# Patient Record
Sex: MALE | Race: WHITE | NOT HISPANIC OR LATINO | Employment: OTHER | URBAN - METROPOLITAN AREA
[De-identification: names, ages, dates, MRNs, and addresses within clinical notes are randomized per-mention and may not be internally consistent; named-entity substitution may affect disease eponyms.]

---

## 2017-04-11 ENCOUNTER — OFFICE VISIT (OUTPATIENT)
Dept: FAMILY MEDICINE | Facility: CLINIC | Age: 72
End: 2017-04-11
Payer: MEDICARE

## 2017-04-11 VITALS
HEIGHT: 70 IN | DIASTOLIC BLOOD PRESSURE: 85 MMHG | SYSTOLIC BLOOD PRESSURE: 135 MMHG | OXYGEN SATURATION: 98 % | HEART RATE: 82 BPM | BODY MASS INDEX: 28.53 KG/M2 | TEMPERATURE: 98 F | WEIGHT: 199.31 LBS

## 2017-04-11 DIAGNOSIS — I10 ESSENTIAL HYPERTENSION: ICD-10-CM

## 2017-04-11 DIAGNOSIS — E78.5 HYPERLIPIDEMIA, UNSPECIFIED HYPERLIPIDEMIA TYPE: ICD-10-CM

## 2017-04-11 DIAGNOSIS — Z00.00 ROUTINE MEDICAL EXAM: Primary | ICD-10-CM

## 2017-04-11 DIAGNOSIS — N20.0 URIC ACID KIDNEY STONE: ICD-10-CM

## 2017-04-11 DIAGNOSIS — F39 MOOD DISORDER: ICD-10-CM

## 2017-04-11 DIAGNOSIS — F41.9 ANXIETY DISORDER, UNSPECIFIED TYPE: ICD-10-CM

## 2017-04-11 DIAGNOSIS — I25.10 CORONARY ARTERY DISEASE INVOLVING NATIVE CORONARY ARTERY OF NATIVE HEART WITHOUT ANGINA PECTORIS: ICD-10-CM

## 2017-04-11 DIAGNOSIS — Z12.5 SCREENING FOR PROSTATE CANCER: ICD-10-CM

## 2017-04-11 DIAGNOSIS — Z86.010 HISTORY OF COLONIC POLYPS: ICD-10-CM

## 2017-04-11 DIAGNOSIS — M54.2 NECK PAIN ON LEFT SIDE: ICD-10-CM

## 2017-04-11 DIAGNOSIS — N40.0 BENIGN PROSTATIC HYPERPLASIA, PRESENCE OF LOWER URINARY TRACT SYMPTOMS UNSPECIFIED, UNSPECIFIED MORPHOLOGY: ICD-10-CM

## 2017-04-11 DIAGNOSIS — D64.9 ANEMIA, UNSPECIFIED TYPE: ICD-10-CM

## 2017-04-11 DIAGNOSIS — M47.812 ARTHROPATHY OF CERVICAL SPINE: ICD-10-CM

## 2017-04-11 PROBLEM — Z86.0100 HISTORY OF COLONIC POLYPS: Status: ACTIVE | Noted: 2017-04-11

## 2017-04-11 PROCEDURE — 1125F AMNT PAIN NOTED PAIN PRSNT: CPT | Mod: S$GLB,,, | Performed by: INTERNAL MEDICINE

## 2017-04-11 PROCEDURE — 3079F DIAST BP 80-89 MM HG: CPT | Mod: S$GLB,,, | Performed by: INTERNAL MEDICINE

## 2017-04-11 PROCEDURE — 3046F HEMOGLOBIN A1C LEVEL >9.0%: CPT | Mod: S$GLB,,, | Performed by: INTERNAL MEDICINE

## 2017-04-11 PROCEDURE — 3075F SYST BP GE 130 - 139MM HG: CPT | Mod: S$GLB,,, | Performed by: INTERNAL MEDICINE

## 2017-04-11 PROCEDURE — 99214 OFFICE O/P EST MOD 30 MIN: CPT | Mod: 25,S$GLB,, | Performed by: INTERNAL MEDICINE

## 2017-04-11 PROCEDURE — 1157F ADVNC CARE PLAN IN RCRD: CPT | Mod: S$GLB,,, | Performed by: INTERNAL MEDICINE

## 2017-04-11 PROCEDURE — 99499 UNLISTED E&M SERVICE: CPT | Mod: S$GLB,,, | Performed by: INTERNAL MEDICINE

## 2017-04-11 PROCEDURE — 99397 PER PM REEVAL EST PAT 65+ YR: CPT | Mod: 25,S$GLB,, | Performed by: INTERNAL MEDICINE

## 2017-04-11 PROCEDURE — 99999 PR PBB SHADOW E&M-EST. PATIENT-LVL III: CPT | Mod: PBBFAC,,, | Performed by: INTERNAL MEDICINE

## 2017-04-11 PROCEDURE — 1159F MED LIST DOCD IN RCRD: CPT | Mod: S$GLB,,, | Performed by: INTERNAL MEDICINE

## 2017-04-11 PROCEDURE — 1160F RVW MEDS BY RX/DR IN RCRD: CPT | Mod: S$GLB,,, | Performed by: INTERNAL MEDICINE

## 2017-04-11 PROCEDURE — 4010F ACE/ARB THERAPY RXD/TAKEN: CPT | Mod: S$GLB,,, | Performed by: INTERNAL MEDICINE

## 2017-04-11 RX ORDER — ASPIRIN 81 MG/1
81 TABLET ORAL DAILY
COMMUNITY

## 2017-04-11 RX ORDER — LORAZEPAM 0.5 MG/1
TABLET ORAL
Qty: 60 TABLET | Refills: 3 | Status: SHIPPED | OUTPATIENT
Start: 2017-04-11 | End: 2017-10-16 | Stop reason: SDUPTHER

## 2017-04-11 RX ORDER — ROSUVASTATIN CALCIUM 40 MG/1
40 TABLET, COATED ORAL DAILY
COMMUNITY
Start: 2017-03-08 | End: 2020-12-02 | Stop reason: SDUPTHER

## 2017-04-11 NOTE — PROGRESS NOTES
Chief complaint: Physical and establish care    71-year-old white male new to me his PCP retired.  Regarding health maintenance it does appear he is overdue for his PSA.  He is due for other blood work not having had labs in a year.  Outside records reviewed and it appeared he did have a colonoscopy with colon polyps 6/16 with a 5 year follow-up recommended.  He is trying to go to the gym 3 times per week    ROS:   CONST: weight stable. EYES: no vision change. ENT: no sore throat. CV: no chest pain w/ exertion. RESP: no shortness of breath. GI: no nausea, vomiting, diarrhea. No dysphagia. : no urinary issues. MUSCULOSKELETAL: no new myalgias or arthralgias other than some chronic left neck pain with movement. SKIN: no new changes. NEURO: no focal deficits. PSYCH: no new issues. ENDOCRINE: no polyuria. HEME: no lymph nodes. ALLERGY: no general pruritis.    Past Medical History:   Diagnosis Date    Anxiety     BPH (benign prostatic hypertrophy) 2/2/2015    Cataract     Coronary artery disease     Diabetes mellitus, type 2     History of colonic polyps 4/11/2017 2/13 -- polyps 6/16, 5 years     History of MI (myocardial infarction) 2005    Followed by cardiology, Dr. Acevedo (at [facility])    History of stroke November 2005    Hyperlipidemia     Hypertension     Incomplete bladder emptying 2/2/2015    Overweight     Personal history of kidney stones     Renal cyst 2/10/2015   History of cervical radiculopathy on the right  Cervical arthropathy on x-ray      Past Surgical History:   Procedure Laterality Date    APPENDECTOMY      BACK SURGERY      CATARACT EXTRACTION      od    CORONARY ANGIOPLASTY WITH STENT PLACEMENT  2000    CYSTOSCOPY W/ URETERAL STENT PLACEMENT      stents placed x2    EYE SURGERY      cataract right eye    SPINE SURGERY      TONSILLECTOMY       Social History     Social History    Marital status:      Spouse name: N/A    Number of children: 2     Years of education: N/A     Occupational History    Retired - chemical industry      Social History Main Topics    Smoking status: Never Smoker    Smokeless tobacco: Never Used    Alcohol use Yes      Comment: occasional    Drug use: No    Sexual activity: Yes     Partners: Female     Other Topics Concern    Not on file     Social History Narrative       family history includes Cancer in his father; Cataracts in his father and mother; Hyperlipidemia in his father and sister. There is no history of Diabetes, Amblyopia, Blindness, Glaucoma, Hypertension, Macular degeneration, Retinal detachment, Strabismus, Stroke, or Thyroid disease.     Gen: no distress  EYES: conjunctiva clear, non-icteric, PERRL  ENT: nose clear, nasal mucosa normal, oropharynx clear and moist, teeth good  NECK:supple, thyroid non-palpable  RESP: effort is good, lungs clear  CV: heart RRR w/o murmur, gallops or rubs; no carotid bruits, no edema  GI: abdomen soft, non-distended, non-tender, no hepatosplenomegaly  MS: gait normal, no clubbing or cyanosis of the digits  SKIN: no rashes, warm to touch  SHOULDER: both arms no defects, full ROM, no impingement, joints stable, strength 5/5,  SKIN no rashes, warm to touch.  NECK full ROM, no defect, reproducible left upper trapezius muscle pain on the left, slightly reduced head turning to the left    Ruben was seen today for neck pain.    Diagnoses and all orders for this visit:    Routine medical exam, new to me, up-to-date on colonoscopy, overdue for PSA and other blood work, continue to exercise    Screening for prostate cancer  -     Prostate Specific Antigen, Diagnostic; Future                                                            Additional evaluation and management issues:    Additionally, patient has numerous other medical issues which are new to me and separate from his physical.  We did openly discussed that he does have uncontrolled diabetes and A1c of 6.7 and that was over a year  ago and we discussed all the A1c should be checked at least every 3 months.  He was given regular metformin 500 but had reproducible itching on his head and face with the metformin.  He has not seen the dietitian and we discussed the importance that we will initially try to control with dietary modification, we can always try extended release metformin since he could've an ALLERGIC to an increase in tenderness his other medications we can use as well.    Reviewed his history of colon polyps as well as his outside: Report.  We reviewed his hyperlipidemia which appears to be well controlled with Crestor.  He does follow with an outside cardiologist who probably has checked his cholesterol is well needs to be documented that it is checked yearly along with his history of diabetes.  He does see an outside ophthalmologist every 2 years because of his history of stroke and he has some vision issues related to that but discussed he likely should see his ophthalmologist yearly given his current current diagnosis of diabetes.  He has blood pressure which she monitors at home and it runs much better.  He has a little bit of anxiety since his stroke and he worries about things in the evening and uses Ativan half a pill to help him sleep at night.  We did discuss that if indeed his need for the Ativan increases we would try to treat his anxiety disorder with other medications.  He was given a refill.    Also has had some intermittent ongoing left neck pain with turning.  It's definitely positional.  It seems to come and go.  He has no radicular symptoms and is familiar with radicular symptoms since he did have some in the past on the right arm.  He never got the results of his cervical spine x-ray which does appear to have been sent to him on the computer.  It does show some expected disc disease and arthritis we discussed we cannot ascertain if there is a pinched nerve based on the x-ray but clinically he does not have a  pinched nerve in the left neck.  He has reproducible muscle pain and we discussed it should heal on his own without reinjury as long as he applies heat and so forth.  We discussed possible physical therapy if indeed he is reinjuring it at the gym and so forth.    We discussed his slight anemia which we will reassess and presumably he is up-to-date from a GI standpoint in that regard.  All the separate issues reviewed and patient counseled and his evaluation and management will be based upon time counseling. Total time over 25 minutes with over 50% counseling.                     Assessment and plan:          Uncontrolled type 2 diabetes mellitus without complication, without long-term current use of insulin, overdue for assessment, ALLERGIC to metformin, refer to dietitian and repeat every 3 months  -     Hemoglobin A1c; Future  -     Lipid panel; Future  -     TSH; Future  -     CBC auto differential; Future  -     Comprehensive metabolic panel; Future  -     Ambulatory referral to Nutrition Services    History of colonic polyps, up-to-date    Hyperlipidemia, unspecified hyperlipidemia type, reassess    Benign prostatic hyperplasia, presence of lower urinary tract symptoms unspecified, unspecified morphology, overdue for PSA  -     Prostate Specific Antigen, Diagnostic; Future    Essential hypertension, continue to monitor at home    Anxiety disorder, unspecified type, minimal use of Ativan at night appears appropriate    Mood disorder    Coronary artery disease involving native coronary artery of native heart without angina pectoris, no symptoms, follows with cardiology    Anemia, unspecified type, reassess    Arthropathy of cervical spine, discussed as above    Neck pain on left side, muscular, consider referral to physical therapy if it's an ongoing recurrent problem    Other orders  -     lorazepam (ATIVAN) 0.5 MG tablet; 1-2 tablets nightly if needed    Clinical note will be sensitive since he is new to me and  "I do not want any discrepancies in the available history to cause problems as well as confusion regarding evaluation and management issues"This note will not be shared with the patient."  "

## 2017-04-12 ENCOUNTER — LAB VISIT (OUTPATIENT)
Dept: LAB | Facility: HOSPITAL | Age: 72
End: 2017-04-12
Attending: INTERNAL MEDICINE
Payer: MEDICARE

## 2017-04-12 DIAGNOSIS — N40.0 BENIGN PROSTATIC HYPERPLASIA, PRESENCE OF LOWER URINARY TRACT SYMPTOMS UNSPECIFIED, UNSPECIFIED MORPHOLOGY: ICD-10-CM

## 2017-04-12 DIAGNOSIS — Z12.5 SCREENING FOR PROSTATE CANCER: ICD-10-CM

## 2017-04-12 LAB
ALBUMIN SERPL BCP-MCNC: 4 G/DL
ALP SERPL-CCNC: 59 U/L
ALT SERPL W/O P-5'-P-CCNC: 30 U/L
ANION GAP SERPL CALC-SCNC: 11 MMOL/L
AST SERPL-CCNC: 36 U/L
BASOPHILS # BLD AUTO: 0.01 K/UL
BASOPHILS NFR BLD: 0.2 %
BILIRUB SERPL-MCNC: 0.6 MG/DL
BUN SERPL-MCNC: 20 MG/DL
CALCIUM SERPL-MCNC: 9.2 MG/DL
CHLORIDE SERPL-SCNC: 105 MMOL/L
CHOLEST/HDLC SERPL: 3.8 {RATIO}
CO2 SERPL-SCNC: 20 MMOL/L
COMPLEXED PSA SERPL-MCNC: 0.55 NG/ML
CREAT SERPL-MCNC: 1.1 MG/DL
DIFFERENTIAL METHOD: ABNORMAL
EOSINOPHIL # BLD AUTO: 0.2 K/UL
EOSINOPHIL NFR BLD: 3.3 %
ERYTHROCYTE [DISTWIDTH] IN BLOOD BY AUTOMATED COUNT: 13.6 %
EST. GFR  (AFRICAN AMERICAN): >60 ML/MIN/1.73 M^2
EST. GFR  (NON AFRICAN AMERICAN): >60 ML/MIN/1.73 M^2
GLUCOSE SERPL-MCNC: 128 MG/DL
HCT VFR BLD AUTO: 39 %
HDL/CHOLESTEROL RATIO: 26.6 %
HDLC SERPL-MCNC: 199 MG/DL
HDLC SERPL-MCNC: 53 MG/DL
HGB BLD-MCNC: 13.2 G/DL
LDLC SERPL CALC-MCNC: 114.4 MG/DL
LYMPHOCYTES # BLD AUTO: 1.2 K/UL
LYMPHOCYTES NFR BLD: 22.7 %
MCH RBC QN AUTO: 30.6 PG
MCHC RBC AUTO-ENTMCNC: 33.8 %
MCV RBC AUTO: 90 FL
MONOCYTES # BLD AUTO: 0.5 K/UL
MONOCYTES NFR BLD: 8.7 %
NEUTROPHILS # BLD AUTO: 3.4 K/UL
NEUTROPHILS NFR BLD: 64.9 %
NONHDLC SERPL-MCNC: 146 MG/DL
PLATELET # BLD AUTO: 171 K/UL
PMV BLD AUTO: 11.2 FL
POTASSIUM SERPL-SCNC: 4.3 MMOL/L
PROT SERPL-MCNC: 7.6 G/DL
RBC # BLD AUTO: 4.32 M/UL
SODIUM SERPL-SCNC: 136 MMOL/L
TRIGL SERPL-MCNC: 158 MG/DL
TSH SERPL DL<=0.005 MIU/L-ACNC: 1.27 UIU/ML
WBC # BLD AUTO: 5.19 K/UL

## 2017-04-12 PROCEDURE — 80061 LIPID PANEL: CPT

## 2017-04-12 PROCEDURE — 84153 ASSAY OF PSA TOTAL: CPT

## 2017-04-12 PROCEDURE — 84443 ASSAY THYROID STIM HORMONE: CPT

## 2017-04-12 PROCEDURE — 80053 COMPREHEN METABOLIC PANEL: CPT

## 2017-04-12 PROCEDURE — 85025 COMPLETE CBC W/AUTO DIFF WBC: CPT

## 2017-04-12 PROCEDURE — 36415 COLL VENOUS BLD VENIPUNCTURE: CPT | Mod: PO

## 2017-04-12 PROCEDURE — 83036 HEMOGLOBIN GLYCOSYLATED A1C: CPT

## 2017-04-12 RX ORDER — ALLOPURINOL 300 MG/1
TABLET ORAL
Qty: 90 TABLET | Refills: 3 | Status: SHIPPED | OUTPATIENT
Start: 2017-04-12 | End: 2018-03-31 | Stop reason: SDUPTHER

## 2017-04-13 LAB
ESTIMATED AVG GLUCOSE: 148 MG/DL
HBA1C MFR BLD HPLC: 6.8 %

## 2017-10-17 RX ORDER — LORAZEPAM 0.5 MG/1
TABLET ORAL
Qty: 20 TABLET | Refills: 0 | Status: SHIPPED | OUTPATIENT
Start: 2017-10-17 | End: 2017-11-29 | Stop reason: SDUPTHER

## 2017-10-18 NOTE — TELEPHONE ENCOUNTER
----- Message from Reynold Willett sent at 10/18/2017  2:52 PM CDT -----  Contact: Self  REFILL: lorazepam (ATIVAN) 0.5 MG tablet

## 2017-10-20 RX ORDER — LORAZEPAM 0.5 MG/1
TABLET ORAL
Qty: 20 TABLET | Refills: 0 | OUTPATIENT
Start: 2017-10-20

## 2017-10-25 ENCOUNTER — TELEPHONE (OUTPATIENT)
Dept: FAMILY MEDICINE | Facility: CLINIC | Age: 72
End: 2017-10-25

## 2017-10-25 ENCOUNTER — HOSPITAL ENCOUNTER (OUTPATIENT)
Dept: RADIOLOGY | Facility: HOSPITAL | Age: 72
Discharge: HOME | End: 2017-10-25
Attending: NURSE PRACTITIONER
Payer: MEDICARE

## 2017-10-25 ENCOUNTER — OFFICE VISIT (OUTPATIENT)
Dept: FAMILY MEDICINE | Facility: CLINIC | Age: 72
End: 2017-10-25
Payer: MEDICARE

## 2017-10-25 VITALS
WEIGHT: 195.56 LBS | HEART RATE: 74 BPM | SYSTOLIC BLOOD PRESSURE: 136 MMHG | OXYGEN SATURATION: 95 % | BODY MASS INDEX: 28 KG/M2 | TEMPERATURE: 99 F | HEIGHT: 70 IN | DIASTOLIC BLOOD PRESSURE: 80 MMHG

## 2017-10-25 DIAGNOSIS — R10.9 ABDOMINAL PAIN, UNSPECIFIED ABDOMINAL LOCATION: ICD-10-CM

## 2017-10-25 DIAGNOSIS — R10.9 ABDOMINAL PAIN, UNSPECIFIED ABDOMINAL LOCATION: Primary | ICD-10-CM

## 2017-10-25 PROCEDURE — 99499 UNLISTED E&M SERVICE: CPT | Mod: S$GLB,,, | Performed by: NURSE PRACTITIONER

## 2017-10-25 PROCEDURE — 74020 XR ABDOMEN FLAT AND ERECT: CPT | Mod: 26,,, | Performed by: RADIOLOGY

## 2017-10-25 PROCEDURE — 99999 PR PBB SHADOW E&M-EST. PATIENT-LVL IV: CPT | Mod: PBBFAC,,, | Performed by: NURSE PRACTITIONER

## 2017-10-25 PROCEDURE — 99214 OFFICE O/P EST MOD 30 MIN: CPT | Mod: S$GLB,,, | Performed by: NURSE PRACTITIONER

## 2017-10-25 PROCEDURE — 74020 XR ABDOMEN FLAT AND ERECT: CPT | Mod: TC,PO

## 2017-10-25 RX ORDER — OMEPRAZOLE 40 MG/1
40 CAPSULE, DELAYED RELEASE ORAL DAILY
Qty: 30 CAPSULE | Refills: 5 | Status: SHIPPED | OUTPATIENT
Start: 2017-10-25 | End: 2018-07-26

## 2017-10-25 NOTE — TELEPHONE ENCOUNTER
I left a message on patient's voice mail that his x-ray was normal and that I was going to send him a prescription of Omeprazole to the pharmacy. If he gets worse he can go to the ER. If he had any questions to call the office and follow up with Dr. Holder as scheduled.

## 2017-10-25 NOTE — TELEPHONE ENCOUNTER
I spoke with the patient and explained that his abdomen x-ray was normal. I sent a prescription of Prilosec to his pharmacy. If he gets worse go to the ER and if not better when see DR. Cid discuss with him.

## 2017-10-25 NOTE — PROGRESS NOTES
Subjective:       Patient ID: Ruben Macedo Jr. is a 72 y.o. male.    Chief Complaint: Abdominal Pain (1 week)    72-year-old male presents to the clinic today with mid area abdominal pain for the past week.  He said he had little bit of diarrhea on Sunday.  He's had no further diarrhea since then.  He denies any nausea, vomiting, constipation, blood in stool.  He said his last bowel movement was on Monday.  He has slight decrease appetite but is eating and drinking fluids without any difficulty.  He states at times he feels slight amount of indigestion feeling.  He denies any cardiac chest pain, heart palpitations, shortness breath, or swelling to lower extremities.  He denies any headaches, dizziness, or blurred vision.  He was had a MI in the past but he had heaviness in the left side of his chest.       Past Medical History:   Diagnosis Date    Anxiety     BPH (benign prostatic hypertrophy) 2/2/2015    Cataract     Coronary artery disease     Diabetes mellitus, type 2     History of colonic polyps 4/11/2017 2/13 --3 yrs    History of MI (myocardial infarction) 2005    Followed by cardiology, Dr. Acevedo (at [facility])    History of stroke November 2005    Hyperlipidemia     Hypertension     Incomplete bladder emptying 2/2/2015    Overweight(278.02)     Personal history of kidney stones     Renal cyst 2/10/2015     Past Surgical History:   Procedure Laterality Date    APPENDECTOMY      BACK SURGERY      CATARACT EXTRACTION      od    CORONARY ANGIOPLASTY WITH STENT PLACEMENT  2000    CYSTOSCOPY W/ URETERAL STENT PLACEMENT      stents placed x2    EYE SURGERY      cataract right eye    SPINE SURGERY      TONSILLECTOMY        reports that he has never smoked. He has never used smokeless tobacco. He reports that he drinks alcohol. He reports that he does not use drugs.  Review of Systems   Constitutional: Negative for chills and fever.   Respiratory: Negative for cough,  shortness of breath and wheezing.    Cardiovascular: Negative for chest pain, palpitations and leg swelling.   Gastrointestinal: Positive for abdominal distention. Negative for blood in stool, constipation, diarrhea, nausea and vomiting.   Genitourinary: Negative for difficulty urinating, flank pain and hematuria.   Musculoskeletal: Negative for gait problem.   Neurological: Negative for dizziness, light-headedness and headaches.       Objective:      Physical Exam   Constitutional: He is oriented to person, place, and time. He appears well-developed and well-nourished.   Eyes: Conjunctivae and EOM are normal. Pupils are equal, round, and reactive to light. Right eye exhibits no discharge. Left eye exhibits no discharge. No scleral icterus.   Cardiovascular: Normal rate, regular rhythm and normal heart sounds.  Exam reveals no gallop and no friction rub.    No murmur heard.  Pulmonary/Chest: Effort normal and breath sounds normal. No respiratory distress. He has no wheezes.   Abdominal: Soft. Bowel sounds are normal. He exhibits no distension. There is no tenderness. There is no rebound and no guarding.   Bowel sounds present in all 4 quadrants Abdomen non-tender to palpation no rebound or guarding noted    Genitourinary:   Genitourinary Comments: No CVA tenderness noted    Musculoskeletal: Normal range of motion. He exhibits no edema.   Neurological: He is alert and oriented to person, place, and time.   Skin: Skin is warm and dry. He is not diaphoretic.   Psychiatric: He has a normal mood and affect.       Assessment:       1. Abdominal pain, unspecified abdominal location        Plan:         Abdominal pain, unspecified abdominal location  -     X-Ray Abdomen Flat And Erect; Future; Expected date: 10/25/2017  -     CBC auto differential; Future; Expected date: 10/25/2017  -     Comprehensive metabolic panel; Future; Expected date: 10/25/2017  -     Amylase; Future; Expected date: 10/25/2017  -     Lipase; Future;  Expected date: 10/25/2017        If x-ray normal will try Prilosec

## 2017-10-27 ENCOUNTER — PATIENT MESSAGE (OUTPATIENT)
Dept: FAMILY MEDICINE | Facility: CLINIC | Age: 72
End: 2017-10-27

## 2017-10-27 DIAGNOSIS — R74.8 ELEVATED LIPASE: ICD-10-CM

## 2017-10-27 DIAGNOSIS — R10.12 LUQ ABDOMINAL PAIN: Primary | ICD-10-CM

## 2017-10-31 ENCOUNTER — HOSPITAL ENCOUNTER (OUTPATIENT)
Dept: RADIOLOGY | Facility: HOSPITAL | Age: 72
Discharge: HOME | End: 2017-10-31
Attending: NURSE PRACTITIONER
Payer: MEDICARE

## 2017-10-31 DIAGNOSIS — R74.8 ELEVATED LIPASE: ICD-10-CM

## 2017-10-31 DIAGNOSIS — R10.12 LUQ ABDOMINAL PAIN: ICD-10-CM

## 2017-10-31 PROCEDURE — 76700 US EXAM ABDOM COMPLETE: CPT | Mod: TC

## 2017-10-31 PROCEDURE — 76700 US EXAM ABDOM COMPLETE: CPT | Mod: 26,,, | Performed by: RADIOLOGY

## 2017-11-03 ENCOUNTER — OFFICE VISIT (OUTPATIENT)
Dept: FAMILY MEDICINE | Facility: CLINIC | Age: 72
End: 2017-11-03
Payer: MEDICARE

## 2017-11-03 ENCOUNTER — LAB VISIT (OUTPATIENT)
Dept: LAB | Facility: HOSPITAL | Age: 72
End: 2017-11-03
Attending: INTERNAL MEDICINE
Payer: MEDICARE

## 2017-11-03 VITALS
HEART RATE: 95 BPM | HEIGHT: 70 IN | DIASTOLIC BLOOD PRESSURE: 78 MMHG | TEMPERATURE: 98 F | OXYGEN SATURATION: 97 % | SYSTOLIC BLOOD PRESSURE: 130 MMHG | WEIGHT: 198 LBS | BODY MASS INDEX: 28.35 KG/M2

## 2017-11-03 DIAGNOSIS — N28.9 RENAL INSUFFICIENCY: ICD-10-CM

## 2017-11-03 DIAGNOSIS — R10.13 EPIGASTRIC PAIN: Primary | ICD-10-CM

## 2017-11-03 DIAGNOSIS — E78.5 HYPERLIPIDEMIA, UNSPECIFIED HYPERLIPIDEMIA TYPE: ICD-10-CM

## 2017-11-03 DIAGNOSIS — T50.905A ADVERSE EFFECT OF DRUG, INITIAL ENCOUNTER: ICD-10-CM

## 2017-11-03 DIAGNOSIS — D64.9 ANEMIA, UNSPECIFIED TYPE: ICD-10-CM

## 2017-11-03 LAB
ANION GAP SERPL CALC-SCNC: 7 MMOL/L
BUN SERPL-MCNC: 20 MG/DL
CALCIUM SERPL-MCNC: 9.3 MG/DL
CHLORIDE SERPL-SCNC: 106 MMOL/L
CO2 SERPL-SCNC: 24 MMOL/L
CREAT SERPL-MCNC: 1.2 MG/DL
EST. GFR  (AFRICAN AMERICAN): >60 ML/MIN/1.73 M^2
EST. GFR  (NON AFRICAN AMERICAN): >60 ML/MIN/1.73 M^2
ESTIMATED AVG GLUCOSE: 143 MG/DL
GLUCOSE SERPL-MCNC: 155 MG/DL
HBA1C MFR BLD HPLC: 6.6 %
POTASSIUM SERPL-SCNC: 4.2 MMOL/L
SODIUM SERPL-SCNC: 137 MMOL/L

## 2017-11-03 PROCEDURE — 99214 OFFICE O/P EST MOD 30 MIN: CPT | Mod: S$GLB,,, | Performed by: INTERNAL MEDICINE

## 2017-11-03 PROCEDURE — 83036 HEMOGLOBIN GLYCOSYLATED A1C: CPT

## 2017-11-03 PROCEDURE — 99499 UNLISTED E&M SERVICE: CPT | Mod: S$GLB,,, | Performed by: INTERNAL MEDICINE

## 2017-11-03 PROCEDURE — 99999 PR PBB SHADOW E&M-EST. PATIENT-LVL III: CPT | Mod: PBBFAC,,, | Performed by: INTERNAL MEDICINE

## 2017-11-03 PROCEDURE — 80048 BASIC METABOLIC PNL TOTAL CA: CPT

## 2017-11-03 PROCEDURE — 36415 COLL VENOUS BLD VENIPUNCTURE: CPT | Mod: PO

## 2017-11-03 NOTE — PROGRESS NOTES
Chief complaint: Follow-up on stomach pain    72-year-old white male new to me his PCP retired.  Patient recently given tramadol by his outside neurologist for some left cervical radiculopathy.  He thereafter developed upset stomach with some epigastric pain and nausea.  He only threw up once.  No diarrhea except for maybe once.  Symptoms resolved when he quit the tramadol.  He was seen here.  CBC and CMP without any major cause.  He does have a chronic anemia according to the blood work and he remembers being told he was always anemic.  Regarding diabetes he is due for his A1c if not overdo which was 6.8.  He is on no medications.  At form in the past gave him diffuse itching so he is probably ALLERGIC to that.  He has not tried any other medications.  He did have some slight renal insufficiency on recent lab work and we will repeat that.  His blood pressure appears to be under good control.  He is on Crestor 40 mg and his most recent LDL was above 100 but previously under all.  Followed by cardiology.    Patient was placed on some Pepcid as well and he was advised that since symptoms are resolved and likely related tramadol he can discontinue    ROS:   CONST: weight stable. EYES: no vision change. ENT: no sore throat. CV: no chest pain w/ exertion. RESP: no shortness of breath. GI: no nausea, vomiting, diarrhea. No dysphagia. : no urinary issues. MUSCULOSKELETAL: no new myalgias or arthralgias other than some chronic left neck pain with movement. SKIN: no new changes. NEURO: no focal deficits. PSYCH: no new issues. ENDOCRINE: no polyuria. HEME: no lymph nodes. ALLERGY: no general pruritis.    Past Medical History:   Diagnosis Date    Anxiety     BPH (benign prostatic hypertrophy) 2/2/2015    Cataract     Coronary artery disease     Diabetes mellitus, type 2     History of colonic polyps 4/11/2017 2/13 -- polyps 6/16, 5 years     History of MI (myocardial infarction) 2005    Followed by cardiology,   Curtis (at [facility])    History of stroke November 2005    Hyperlipidemia     Hypertension     Incomplete bladder emptying 2/2/2015    Overweight     Personal history of kidney stones     Renal cyst 2/10/2015   History of cervical radiculopathy on the right  Cervical arthropathy on x-ray  ANEMIA    Past Surgical History:   Procedure Laterality Date    APPENDECTOMY      BACK SURGERY      CATARACT EXTRACTION      od    CORONARY ANGIOPLASTY WITH STENT PLACEMENT  2000    CYSTOSCOPY W/ URETERAL STENT PLACEMENT      stents placed x2    EYE SURGERY      cataract right eye    SPINE SURGERY      TONSILLECTOMY       Social History     Social History    Marital status:      Spouse name: N/A    Number of children: 2    Years of education: N/A     Occupational History    Retired - chemical industry      Social History Main Topics    Smoking status: Never Smoker    Smokeless tobacco: Never Used    Alcohol use Yes      Comment: occasional    Drug use: No    Sexual activity: Yes     Partners: Female     Other Topics Concern    Not on file     Social History Narrative    No narrative on file       family history includes Cancer in his father; Cataracts in his father and mother; Hyperlipidemia in his father and sister.     Gen: no distress  EYES: conjunctiva clear, non-icteric, PERRL  Abdomen is soft and benign, no hepatosplenomegaly or any pain  SKIN: no rashes, warm to touch      Ruben was seen today for abdominal pain.    Diagnoses and all orders for this visit:    Epigastric pain, likely adverse reaction to tramadol    Uncontrolled type 2 diabetes mellitus without complication, without long-term current use of insulin, overdue for follow-up, nonicteric medications and we discussed possible need for medications although ALLERGIC to metformin  -     Hemoglobin A1c; Future  -     Basic metabolic panel; Future    Anemia, unspecified type, likely a chronic condition and we will  "continue to follow    Renal insufficiency, reassess   -     Basic metabolic panel; Future    Hyperlipidemia, unspecified hyperlipidemia type, incomplete control on Crestor    Adverse effect of drug, initial encounter, added to the ALLERGY list         Interval note will be sensitive based on his expressed anxiety referable to the issues above as well as the need to document possibly less than perfect compliance with diabetic follow-up"This note will not be shared with the patient."  "

## 2017-11-15 RX ORDER — FINASTERIDE 5 MG/1
TABLET, FILM COATED ORAL
Qty: 90 TABLET | Refills: 3 | Status: SHIPPED | OUTPATIENT
Start: 2017-11-15 | End: 2018-10-09 | Stop reason: SDUPTHER

## 2017-11-29 RX ORDER — LORAZEPAM 0.5 MG/1
TABLET ORAL
Qty: 20 TABLET | Refills: 0 | Status: SHIPPED | OUTPATIENT
Start: 2017-11-29 | End: 2018-01-11 | Stop reason: SDUPTHER

## 2017-12-15 ENCOUNTER — OFFICE VISIT (OUTPATIENT)
Dept: URGENT CARE | Facility: CLINIC | Age: 72
End: 2017-12-15
Payer: MEDICARE

## 2017-12-15 VITALS
WEIGHT: 200 LBS | TEMPERATURE: 98 F | HEIGHT: 70 IN | BODY MASS INDEX: 28.63 KG/M2 | SYSTOLIC BLOOD PRESSURE: 110 MMHG | RESPIRATION RATE: 16 BRPM | HEART RATE: 84 BPM | OXYGEN SATURATION: 96 % | DIASTOLIC BLOOD PRESSURE: 68 MMHG

## 2017-12-15 DIAGNOSIS — J06.9 UPPER RESPIRATORY TRACT INFECTION, UNSPECIFIED TYPE: ICD-10-CM

## 2017-12-15 DIAGNOSIS — R05.9 COUGH WITH FEVER: Primary | ICD-10-CM

## 2017-12-15 DIAGNOSIS — R50.9 COUGH WITH FEVER: Primary | ICD-10-CM

## 2017-12-15 LAB
CTP QC/QA: YES
FLUAV AG NPH QL: NEGATIVE
FLUBV AG NPH QL: NEGATIVE

## 2017-12-15 PROCEDURE — 99213 OFFICE O/P EST LOW 20 MIN: CPT | Mod: 25,S$GLB,, | Performed by: NURSE PRACTITIONER

## 2017-12-15 PROCEDURE — 96372 THER/PROPH/DIAG INJ SC/IM: CPT | Mod: S$GLB,,,

## 2017-12-15 PROCEDURE — 87804 INFLUENZA ASSAY W/OPTIC: CPT | Mod: 59,QW,S$GLB, | Performed by: NURSE PRACTITIONER

## 2017-12-15 RX ORDER — OSELTAMIVIR PHOSPHATE 75 MG/1
75 CAPSULE ORAL DAILY
Qty: 20 CAPSULE | Refills: 0 | Status: SHIPPED | OUTPATIENT
Start: 2017-12-15 | End: 2017-12-22

## 2017-12-15 RX ORDER — BETAMETHASONE SODIUM PHOSPHATE AND BETAMETHASONE ACETATE 3; 3 MG/ML; MG/ML
6 INJECTION, SUSPENSION INTRA-ARTICULAR; INTRALESIONAL; INTRAMUSCULAR; SOFT TISSUE
Status: COMPLETED | OUTPATIENT
Start: 2017-12-15 | End: 2017-12-15

## 2017-12-15 RX ADMIN — BETAMETHASONE SODIUM PHOSPHATE AND BETAMETHASONE ACETATE 6 MG: 3; 3 INJECTION, SUSPENSION INTRA-ARTICULAR; INTRALESIONAL; INTRAMUSCULAR; SOFT TISSUE at 12:12

## 2017-12-15 NOTE — PATIENT INSTRUCTIONS

## 2017-12-15 NOTE — PROGRESS NOTES
"Subjective:       Patient ID: Ruben Macedo Jr. is a 72 y.o. male.    Vitals:  height is 5' 10" (1.778 m) and weight is 90.7 kg (200 lb). His oral temperature is 98.4 °F (36.9 °C). His blood pressure is 110/68 and his pulse is 84. His respiration is 16 and oxygen saturation is 96%.     Chief Complaint: Nasal Congestion    Other   This is a new problem. The current episode started in the past 7 days. The problem occurs constantly. The problem has been gradually worsening. Associated symptoms include congestion and coughing. Pertinent negatives include no abdominal pain, chest pain, chills, fever, headaches, myalgias, nausea or sore throat. The symptoms are aggravated by coughing. Treatments tried: otc cough syrup & nyquil. The treatment provided mild relief.     Review of Systems   Constitution: Negative for chills, fever and malaise/fatigue.   HENT: Positive for congestion. Negative for ear pain, hoarse voice and sore throat.    Eyes: Negative for discharge and redness.   Cardiovascular: Negative for chest pain, dyspnea on exertion and leg swelling.   Respiratory: Positive for cough and sputum production (thick yellow phlegm). Negative for shortness of breath and wheezing.    Musculoskeletal: Negative for myalgias.   Gastrointestinal: Negative for abdominal pain and nausea.   Neurological: Negative for headaches.       Objective:      Physical Exam   Constitutional: He appears well-developed and well-nourished.   HENT:   Head: Normocephalic and atraumatic.   Right Ear: External ear normal.   Left Ear: External ear normal.   Nose: Nose normal.   Mouth/Throat: Oropharynx is clear and moist. No oropharyngeal exudate.   Eyes: Conjunctivae and EOM are normal. Pupils are equal, round, and reactive to light.   Neck: Normal range of motion. Neck supple.   Cardiovascular: Normal rate and regular rhythm.    Pulmonary/Chest: Effort normal.   Abdominal: Soft.   Skin: He is not diaphoretic.       Assessment:       No diagnosis " found.    Plan:       NSAIS as ordered  Follow up with PCP concerning pneumovax re-vaccination;  There are no diagnoses linked to this encounter.

## 2018-01-10 RX ORDER — LORAZEPAM 0.5 MG/1
TABLET ORAL
Qty: 20 TABLET | Refills: 0 | Status: CANCELLED | OUTPATIENT
Start: 2018-01-10

## 2018-01-11 RX ORDER — EZETIMIBE 10 MG/1
TABLET ORAL
COMMUNITY
Start: 2017-11-08 | End: 2018-07-26

## 2018-01-11 RX ORDER — ALBUTEROL SULFATE 90 UG/1
AEROSOL, METERED RESPIRATORY (INHALATION)
COMMUNITY
Start: 2017-12-13 | End: 2018-07-26

## 2018-01-11 RX ORDER — GABAPENTIN 300 MG/1
300 CAPSULE ORAL 2 TIMES DAILY
COMMUNITY
Start: 2017-11-16 | End: 2020-07-07 | Stop reason: CLARIF

## 2018-01-12 RX ORDER — LORAZEPAM 0.5 MG/1
TABLET ORAL
Qty: 20 TABLET | Refills: 0 | Status: SHIPPED | OUTPATIENT
Start: 2018-01-12 | End: 2018-02-26 | Stop reason: SDUPTHER

## 2018-02-26 RX ORDER — LORAZEPAM 0.5 MG/1
TABLET ORAL
Qty: 20 TABLET | Refills: 0 | Status: SHIPPED | OUTPATIENT
Start: 2018-02-26 | End: 2018-04-08 | Stop reason: SDUPTHER

## 2018-02-28 RX ORDER — LORAZEPAM 0.5 MG/1
TABLET ORAL
Qty: 20 TABLET | Refills: 0 | OUTPATIENT
Start: 2018-02-28

## 2018-03-31 DIAGNOSIS — N20.0 URIC ACID KIDNEY STONE: ICD-10-CM

## 2018-03-31 RX ORDER — ALLOPURINOL 300 MG/1
TABLET ORAL
Qty: 90 TABLET | Refills: 3 | Status: SHIPPED | OUTPATIENT
Start: 2018-03-31 | End: 2019-03-23 | Stop reason: SDUPTHER

## 2018-04-08 DIAGNOSIS — F41.9 ANXIETY: Primary | ICD-10-CM

## 2018-04-09 RX ORDER — LORAZEPAM 0.5 MG/1
TABLET ORAL
Qty: 20 TABLET | Refills: 0 | Status: SHIPPED | OUTPATIENT
Start: 2018-04-09 | End: 2018-05-22 | Stop reason: SDUPTHER

## 2018-04-26 DIAGNOSIS — E11.9 TYPE 2 DIABETES MELLITUS WITHOUT COMPLICATION: ICD-10-CM

## 2018-05-22 DIAGNOSIS — F41.9 ANXIETY: ICD-10-CM

## 2018-05-24 RX ORDER — LORAZEPAM 0.5 MG/1
TABLET ORAL
Qty: 10 TABLET | Refills: 0 | Status: SHIPPED | OUTPATIENT
Start: 2018-05-24 | End: 2018-06-18 | Stop reason: SDUPTHER

## 2018-05-31 DIAGNOSIS — E11.9 TYPE 2 DIABETES MELLITUS WITHOUT COMPLICATION: ICD-10-CM

## 2018-06-18 DIAGNOSIS — F41.9 ANXIETY: ICD-10-CM

## 2018-06-18 RX ORDER — LORAZEPAM 0.5 MG/1
TABLET ORAL
Qty: 10 TABLET | Refills: 0 | Status: SHIPPED | OUTPATIENT
Start: 2018-06-18 | End: 2018-07-13 | Stop reason: SDUPTHER

## 2018-07-13 DIAGNOSIS — E11.9 TYPE 2 DIABETES MELLITUS WITHOUT COMPLICATION, UNSPECIFIED WHETHER LONG TERM INSULIN USE: ICD-10-CM

## 2018-07-13 DIAGNOSIS — F41.9 ANXIETY: ICD-10-CM

## 2018-07-13 RX ORDER — LORAZEPAM 0.5 MG/1
TABLET ORAL
Qty: 10 TABLET | Refills: 0 | Status: SHIPPED | OUTPATIENT
Start: 2018-07-13 | End: 2018-08-03 | Stop reason: SDUPTHER

## 2018-07-23 ENCOUNTER — TELEPHONE (OUTPATIENT)
Dept: FAMILY MEDICINE | Facility: CLINIC | Age: 73
End: 2018-07-23

## 2018-07-23 DIAGNOSIS — N40.0 BENIGN PROSTATIC HYPERPLASIA, UNSPECIFIED WHETHER LOWER URINARY TRACT SYMPTOMS PRESENT: ICD-10-CM

## 2018-07-23 DIAGNOSIS — D64.9 ANEMIA, UNSPECIFIED TYPE: ICD-10-CM

## 2018-07-23 DIAGNOSIS — E78.5 HYPERLIPIDEMIA, UNSPECIFIED HYPERLIPIDEMIA TYPE: ICD-10-CM

## 2018-07-23 NOTE — TELEPHONE ENCOUNTER
----- Message from Marcela Irwin sent at 7/23/2018  9:21 AM CDT -----  Contact: Self   Patient need lab orders before his appointment on Thursday 7/26/18. Please call at [phone]

## 2018-07-25 ENCOUNTER — LAB VISIT (OUTPATIENT)
Dept: LAB | Facility: HOSPITAL | Age: 73
End: 2018-07-25
Attending: INTERNAL MEDICINE
Payer: MEDICARE

## 2018-07-25 DIAGNOSIS — E78.5 HYPERLIPIDEMIA, UNSPECIFIED HYPERLIPIDEMIA TYPE: ICD-10-CM

## 2018-07-25 DIAGNOSIS — N40.0 BENIGN PROSTATIC HYPERPLASIA, UNSPECIFIED WHETHER LOWER URINARY TRACT SYMPTOMS PRESENT: ICD-10-CM

## 2018-07-25 DIAGNOSIS — D64.9 ANEMIA, UNSPECIFIED TYPE: ICD-10-CM

## 2018-07-25 LAB
ALBUMIN SERPL BCP-MCNC: 4.2 G/DL
ALP SERPL-CCNC: 70 U/L
ALT SERPL W/O P-5'-P-CCNC: 24 U/L
ANION GAP SERPL CALC-SCNC: 10 MMOL/L
AST SERPL-CCNC: 27 U/L
BASOPHILS # BLD AUTO: 0.04 K/UL
BASOPHILS NFR BLD: 0.7 %
BILIRUB SERPL-MCNC: 0.9 MG/DL
BUN SERPL-MCNC: 19 MG/DL
CALCIUM SERPL-MCNC: 9.7 MG/DL
CHLORIDE SERPL-SCNC: 105 MMOL/L
CHOLEST SERPL-MCNC: 203 MG/DL
CHOLEST/HDLC SERPL: 4.6 {RATIO}
CO2 SERPL-SCNC: 22 MMOL/L
COMPLEXED PSA SERPL-MCNC: 0.42 NG/ML
CREAT SERPL-MCNC: 1.2 MG/DL
DIFFERENTIAL METHOD: ABNORMAL
EOSINOPHIL # BLD AUTO: 0.2 K/UL
EOSINOPHIL NFR BLD: 2.7 %
ERYTHROCYTE [DISTWIDTH] IN BLOOD BY AUTOMATED COUNT: 14 %
EST. GFR  (AFRICAN AMERICAN): >60 ML/MIN/1.73 M^2
EST. GFR  (NON AFRICAN AMERICAN): >60 ML/MIN/1.73 M^2
ESTIMATED AVG GLUCOSE: 166 MG/DL
GLUCOSE SERPL-MCNC: 162 MG/DL
HBA1C MFR BLD HPLC: 7.4 %
HCT VFR BLD AUTO: 39.2 %
HDLC SERPL-MCNC: 44 MG/DL
HDLC SERPL: 21.7 %
HGB BLD-MCNC: 12.9 G/DL
IMM GRANULOCYTES # BLD AUTO: 0.01 K/UL
IMM GRANULOCYTES NFR BLD AUTO: 0.2 %
LDLC SERPL CALC-MCNC: 110.4 MG/DL
LYMPHOCYTES # BLD AUTO: 1.1 K/UL
LYMPHOCYTES NFR BLD: 20.1 %
MCH RBC QN AUTO: 30 PG
MCHC RBC AUTO-ENTMCNC: 32.9 G/DL
MCV RBC AUTO: 91 FL
MONOCYTES # BLD AUTO: 0.6 K/UL
MONOCYTES NFR BLD: 11.2 %
NEUTROPHILS # BLD AUTO: 3.6 K/UL
NEUTROPHILS NFR BLD: 65.1 %
NONHDLC SERPL-MCNC: 159 MG/DL
NRBC BLD-RTO: 0 /100 WBC
PLATELET # BLD AUTO: 175 K/UL
PMV BLD AUTO: 11.2 FL
POTASSIUM SERPL-SCNC: 4.2 MMOL/L
PROT SERPL-MCNC: 7.7 G/DL
RBC # BLD AUTO: 4.3 M/UL
SODIUM SERPL-SCNC: 137 MMOL/L
TRIGL SERPL-MCNC: 243 MG/DL
TSH SERPL DL<=0.005 MIU/L-ACNC: 1.16 UIU/ML
WBC # BLD AUTO: 5.56 K/UL

## 2018-07-25 PROCEDURE — 80061 LIPID PANEL: CPT

## 2018-07-25 PROCEDURE — 84153 ASSAY OF PSA TOTAL: CPT

## 2018-07-25 PROCEDURE — 85025 COMPLETE CBC W/AUTO DIFF WBC: CPT

## 2018-07-25 PROCEDURE — 84443 ASSAY THYROID STIM HORMONE: CPT

## 2018-07-25 PROCEDURE — 80053 COMPREHEN METABOLIC PANEL: CPT

## 2018-07-25 PROCEDURE — 36415 COLL VENOUS BLD VENIPUNCTURE: CPT | Mod: PO

## 2018-07-25 PROCEDURE — 83036 HEMOGLOBIN GLYCOSYLATED A1C: CPT

## 2018-07-26 ENCOUNTER — CLINICAL SUPPORT (OUTPATIENT)
Dept: OPHTHALMOLOGY | Facility: CLINIC | Age: 73
End: 2018-07-26
Attending: INTERNAL MEDICINE
Payer: MEDICARE

## 2018-07-26 ENCOUNTER — OFFICE VISIT (OUTPATIENT)
Dept: FAMILY MEDICINE | Facility: CLINIC | Age: 73
End: 2018-07-26
Payer: MEDICARE

## 2018-07-26 VITALS
DIASTOLIC BLOOD PRESSURE: 70 MMHG | OXYGEN SATURATION: 97 % | HEIGHT: 70 IN | WEIGHT: 197.06 LBS | TEMPERATURE: 98 F | SYSTOLIC BLOOD PRESSURE: 120 MMHG | HEART RATE: 82 BPM | BODY MASS INDEX: 28.21 KG/M2

## 2018-07-26 DIAGNOSIS — R80.9 MICROALBUMINURIA: ICD-10-CM

## 2018-07-26 DIAGNOSIS — M47.812 ARTHROPATHY OF CERVICAL SPINE: ICD-10-CM

## 2018-07-26 DIAGNOSIS — I25.10 CORONARY ARTERY DISEASE INVOLVING NATIVE CORONARY ARTERY OF NATIVE HEART WITHOUT ANGINA PECTORIS: ICD-10-CM

## 2018-07-26 DIAGNOSIS — I10 ESSENTIAL HYPERTENSION: ICD-10-CM

## 2018-07-26 DIAGNOSIS — F13.20 BENZODIAZEPINE DEPENDENCE: ICD-10-CM

## 2018-07-26 DIAGNOSIS — Z12.5 SCREENING FOR PROSTATE CANCER: ICD-10-CM

## 2018-07-26 DIAGNOSIS — F39 MOOD DISORDER: ICD-10-CM

## 2018-07-26 DIAGNOSIS — Z00.00 ROUTINE MEDICAL EXAM: Primary | ICD-10-CM

## 2018-07-26 DIAGNOSIS — D64.9 ANEMIA, UNSPECIFIED TYPE: ICD-10-CM

## 2018-07-26 DIAGNOSIS — F41.9 ANXIETY DISORDER, UNSPECIFIED TYPE: ICD-10-CM

## 2018-07-26 DIAGNOSIS — E78.5 HYPERLIPIDEMIA, UNSPECIFIED HYPERLIPIDEMIA TYPE: ICD-10-CM

## 2018-07-26 PROCEDURE — 99214 OFFICE O/P EST MOD 30 MIN: CPT | Mod: 25,S$GLB,, | Performed by: INTERNAL MEDICINE

## 2018-07-26 PROCEDURE — 3045F PR MOST RECENT HEMOGLOBIN A1C LEVEL 7.0-9.0%: CPT | Mod: CPTII,S$GLB,, | Performed by: INTERNAL MEDICINE

## 2018-07-26 PROCEDURE — 3074F SYST BP LT 130 MM HG: CPT | Mod: CPTII,S$GLB,, | Performed by: INTERNAL MEDICINE

## 2018-07-26 PROCEDURE — 99999 PR PBB SHADOW E&M-EST. PATIENT-LVL II: CPT | Mod: PBBFAC,,,

## 2018-07-26 PROCEDURE — 92250 FUNDUS PHOTOGRAPHY W/I&R: CPT | Mod: S$GLB,,, | Performed by: OPHTHALMOLOGY

## 2018-07-26 PROCEDURE — 3078F DIAST BP <80 MM HG: CPT | Mod: CPTII,S$GLB,, | Performed by: INTERNAL MEDICINE

## 2018-07-26 PROCEDURE — 99499 UNLISTED E&M SERVICE: CPT | Mod: S$GLB,,, | Performed by: INTERNAL MEDICINE

## 2018-07-26 PROCEDURE — 99397 PER PM REEVAL EST PAT 65+ YR: CPT | Mod: 25,S$GLB,, | Performed by: INTERNAL MEDICINE

## 2018-07-26 PROCEDURE — 99999 PR PBB SHADOW E&M-EST. PATIENT-LVL III: CPT | Mod: PBBFAC,,, | Performed by: INTERNAL MEDICINE

## 2018-07-26 NOTE — PROGRESS NOTES
HPI     Ruben Macedo Jr. here for diabetic eye exam with non-dilated fundus   photos.    Small pupils: yes  Pt cooperativity: good      Last edited by Richard Saxena on 7/26/2018 11:12 AM. (History)            Assessment /Plan     For exam results, see Encounter Report.    Uncontrolled type 2 diabetes mellitus with microalbuminuria, without long-term current use of insulin  -     Diabetic Eye Screening Photo      Please see Dr. Hoffman's progress notes for interpretation.

## 2018-07-26 NOTE — Clinical Note
HPI    Ruben Macedo Jr. here for diabetic eye exam with non-dilated fundus  photos.  Small pupils: yes Pt cooperativity: good    Last edited by Richard Saxena on 7/26/2018 11:12 AM. (History)

## 2018-07-26 NOTE — PROGRESS NOTES
Chief complaint: Physical     72-year-old white male .  Regarding health maintenance PSA normal.  .  Outside records reviewed and it appeared he did have a colonoscopy with colon polyps 6/16 with a 5 year follow-up recommended.  He is trying to go to the gym 3 times per week    ROS:   CONST: weight stable. EYES: no vision change. ENT: no sore throat. CV: no chest pain w/ exertion. RESP: no shortness of breath. GI: no nausea, vomiting, diarrhea. No dysphagia. : no urinary issues. MUSCULOSKELETAL: no new myalgias or arthralgias other than some chronic left neck pain with movement. SKIN: no new changes. NEURO: no focal deficits. PSYCH: no new issues. ENDOCRINE: no polyuria. HEME: no lymph nodes. ALLERGY: no general pruritis.    Past Medical History:   Diagnosis Date    Anxiety     BPH (benign prostatic hypertrophy) 2/2/2015    Cataract     Coronary artery disease     Diabetes mellitus, type 2 - with PROTIENURIA     History of colonic polyps 4/11/2017 2/13 -- polyps 6/16, 5 years     History of MI (myocardial infarction) 2005    Followed by cardiology, Dr. Acevedo (at [facility])    History of stroke November 2005    Hyperlipidemia     Hypertension     Incomplete bladder emptying 2/2/2015    Overweight     Personal history of kidney stones     Renal cyst 2/10/2015   History of cervical radiculopathy on the right  Cervical arthropathy on x-ray  Anemia- chronic    Past Surgical History:   Procedure Laterality Date    APPENDECTOMY      BACK SURGERY      CATARACT EXTRACTION      od    CORONARY ANGIOPLASTY WITH STENT PLACEMENT  2000    CYSTOSCOPY W/ URETERAL STENT PLACEMENT      stents placed x2    EYE SURGERY      cataract right eye    SPINE SURGERY      TONSILLECTOMY       Social History     Social History    Marital status:      Spouse name: N/A    Number of children: 2    Years of education: N/A     Occupational History    Retired - chemical industry      Social  History Main Topics    Smoking status: Never Smoker    Smokeless tobacco: Never Used    Alcohol use Yes      Comment: occasional    Drug use: No    Sexual activity: Yes     Partners: Female     Other Topics Concern    Not on file     Social History Narrative    No narrative on file       family history includes Cancer in his father; Cataracts in his father and mother; Hyperlipidemia in his father and sister.     Gen: no distress  EYES: conjunctiva clear, non-icteric, PERRL  ENT: nose clear, nasal mucosa normal, oropharynx clear and moist, teeth good  NECK:supple, thyroid non-palpable  RESP: effort is good, lungs clear  CV: heart RRR w/o murmur, gallops or rubs; no carotid bruits, no edema  GI: abdomen soft, non-distended, non-tender, no hepatosplenomegaly  MS: gait normal, no clubbing or cyanosis of the digits  SKIN: no rashes, warm to touch          Diagnoses and all orders for this visit:    Routine medical exam, up-to-date on colonoscopy,  PSA and other blood work, continue to exercise    Screening for prostate cancer  -                                                                Additional evaluation and management issues:    Additionally, patient has numerous other medical issues which are  separate from his physical.  We did openly discussed that he does have uncontrolled diabetes and A1c of 6.7 and 8 mo later 7.4  and we discussed all the A1c should be checked at least every 3 months.  He was given regular metformin 500 but had reproducible itching on his head and face with the metformin.  We did refer him to the dietitian sometime in the past but never went.  We will refer to diabetes education has been a good deal of time today discussing lifestyle modification.  We discussed Amaryl but that might cause low sugars and we discussed Januvia which would be an increased cost.  We will give him 3 months to try and make some improvements, reassess in early November and make decisions on  medications.    Reviewed his history of colon polyps as well as his outside Report.  We reviewed his hyperlipidemia and he is unsteady and Crestor with an LDL still above 110..  He does follow with an outside cardiologist who probably has checked his cholesterol is well needs to be documented that it is checked yearly along with his history of diabetes.  He does see an outside ophthalmologist every 2 years because of his history of stroke and he has some vision issues related to that but discussed he likely should see his ophthalmologist yearly given his current current diagnosis of diabetes.  We will attempt to set up an eye camera today.  He has blood pressure which she monitors at home and it runs much better.  He has a little bit of anxiety since his stroke and he worries about things in the evening and uses Ativan half a pill to help him sleep at night.  We did discuss that if indeed his need for the Ativan increases we would try to treat his anxiety disorder with other medications.  He was given a refill.    Urinalysis reveals some trace hematuria but he had passed some kidney stones about 3 weeks ago.  He does have some microalbuminuria that this could be related to the hematuria and so we will reassess urine with future blood work.  We did discuss microalbuminuria is a sign of kidney damage.  He was given copies of his results.        We discussed his slight anemia which we will reassess and presumably he is up-to-date from a GI standpoint in that regard.  All the separate issues reviewed and patient counseled and his evaluation and management will be based upon time counseling. Total time over 25 minutes with over 50% counseling.                     Assessment and plan:          Uncontrolled type 2 diabetes mellitus with microalbuminuria, without long-term current use of insulin, worsening, gets refer to diabetes education and dietitian and reassess in 3 months, possibly a Januvia or other similar  "medicine  -     Ambulatory consult to Diabetic Education  -     Diabetic Eye Screening Photo; Future    Microalbuminuria, reassess in the future    Anemia, unspecified type, chronic and stable    Mood disorder, chronic and stable    Essential hypertension, chronic and stable    Anxiety disorder, unspecified type    Arthropathy of cervical spine, chronic and stable    Hyperlipidemia, unspecified hyperlipidemia type, incomplete LDL control but he is on the maximum of current to medications    Coronary artery disease involving native coronary artery of native heart without angina pectoris, follows with cardiology    Benzodiazepine dependence, chronic and stable       Clinical note will be sensitive since he is new to me and I do not want any discrepancies in the available history to cause problems as well as confusion regarding evaluation and management issues"This note will not be shared with the patient."  "

## 2018-07-27 ENCOUNTER — TELEPHONE (OUTPATIENT)
Dept: OPHTHALMOLOGY | Facility: CLINIC | Age: 73
End: 2018-07-27

## 2018-07-27 NOTE — TELEPHONE ENCOUNTER
----- Message from Frances Diaz sent at 7/27/2018  4:07 PM CDT -----      ----- Message -----  From: Sterling Hoffman MD  Sent: 7/27/2018   4:05 PM  To: Ed Ramey

## 2018-07-27 NOTE — LETTER
July 27, 2018    Ruben Macedo Jr.  [address] [place] [address]             [facility]  [address]  [place] [address]  Phone: [phone]  Fax: [phone] Dear Mr. Ruben Macedo:    Below are the results from your recent visit:      Your results are normal.  Please follow-up in 4-6 months with the optometrist, Dr. Ruthy Moser.      Sincerely,        Richard Saxena MA

## 2018-08-03 DIAGNOSIS — F41.9 ANXIETY: ICD-10-CM

## 2018-08-03 RX ORDER — LORAZEPAM 0.5 MG/1
TABLET ORAL
Qty: 10 TABLET | Refills: 0 | Status: SHIPPED | OUTPATIENT
Start: 2018-08-03 | End: 2018-08-28 | Stop reason: SDUPTHER

## 2018-08-13 ENCOUNTER — CLINICAL SUPPORT (OUTPATIENT)
Dept: DIABETES | Facility: CLINIC | Age: 73
End: 2018-08-13
Payer: MEDICARE

## 2018-08-13 VITALS — WEIGHT: 197.81 LBS | BODY MASS INDEX: 28.38 KG/M2

## 2018-08-13 PROCEDURE — G0108 DIAB MANAGE TRN  PER INDIV: HCPCS | Mod: S$GLB,,, | Performed by: DIETITIAN, REGISTERED

## 2018-08-13 NOTE — PROGRESS NOTES
Diabetes Education  Author: Antonietta Hernandez RD  Date: 8/13/2018    Diabetes Education Visit  Diabetes Education Record Assessment/Progress: Initial  Pt visit today focused on introducing pt to diabetes self management w emphasis on CHO awareness/counting, meal planning, SMBG and exercise  Diabetes Type  Diabetes Type : Type II  Diabetes History  Diabetes Diagnosis: 5-10 years    Nutrition 24-hr diet recall  Meal Planning: skipping meals, snacks between meal(avoids milk, eggs 5-6x/wk, limited non-starchy veg or salad intake, no whole grains consumed )  What type of beverages do you drink?: diet soda/tea, juice, water, milk, sport drinks  Meal Plan 24 Hour Recall - Breakfast: 9-930am: 3 pancakes (frz type) w light butter and SF syrup  or 2 canned bisucits w 2 eggs, Diet Green Tea  Meal Plan 24 Hour Recall - Lunch: skips but may snack on SF cookies, banana or ships  Meal Plan 24 Hour Recall - Dinner: 430-5pm: 2 BBQ pork chops, rice-a-jose, corn, diet green tea  Meal Plan 24 Hour Recall - Snack: chips or SF desserts    Monitoring   Self Monitoring : wife checks BG for pt; had been checking 1x every 2-3 weeks; now trying to check daily  Blood Glucose Logs: No(pt states fasting when last checked as 132)  Do you use a personal glucose monitor?: No  In the last month, how often have you had a low blood sugar reaction?: never  Can you tell when your blood sugar is too high?: no    Exercise   Exercise Type: walking, use exercise equipment  Intensity: Moderate  Frequency: 3-5 Times per week  Duration: 30 min    Current Diabetes Treatment   Current Treatment: Diet    Social History  Preferred Learning Method: Face to Face, Group Education  Primary Support: Self, Spouse  Educational Level: High School  Smoking Status: Never a Smoker  Alcohol Use: Weekly    Barriers to Change: None  Learning Challenges : None  Readiness to Learn : Acceptance  Cultural Influences: No    Diabetes Education Assessment/Progress  Diabetes Disease  "Process (diabetes disease process and treatment options): Discussion, Instructed, Individual Session, Written Materials Provided, Comprehends Key Points  Nutrition (Incorporating nutritional management into one's lifestyle): Discussion, Instructed, Individual Session, Written Materials Provided, Comprehends Key Points  -diet hx indicates pt has started reducing high CHO food choices such as using only SF foods/beverages but still consumes in excess of 75+ g CHO per meal or snack. Pt tends to skip lunch and consumes larger dinner meal. diet hx indicates little non-starchy veg, milk and whole grains in meals  -Discussed carb vs non-carb foods. Discussed appropriate amount of carbs to have at meals/snacks. Discussed appropriate serving sizes of individual carb items  - Encouraged decreasing portion sizes of CHO to 3-4 servings (45-60g)per meal, 3 evenly-spaced meals daily and limiting snacks to mostly 0-5g CHO. If does have CHO snack occasionally, no more than 15g    Physical Activity (incorporating physical activity into one's lifestyle): Discussion, Individual Session, Instructed, Written Materials Provided, Comprehends Key Points  - though pt goes to gym regularly, encouraged pt to aim for a total of 150 minutes per week of exercise while keeping 3 components in mind: Frequency- 3-5x/wk, Duration- 30 min, Intensity- can say name but "not sing a song"    Medications (states correct name, dose, onset, peak, duration, side effects & timing of meds): Discussion, Demonstrates Understanding/Competency(verbalizes/demonstrates), Written Materials Provided, Individual Session, Instructed  Monitoring (monitoring blood glucose/other parameters & using results): Discussion, Individual Session, Instructed, Demonstrates Understanding/Competency (verbalizes/demonstrates), Written Materials Provided  - -Reviewed A1c and BG goals. Instructed on keeping BG log and bring log to all clinic appts. Copy of log sheet provided.    Acute " Complications (preventing, detecting, and treating acute complications): Discussion, Individual Session, Instructed, Demonstrates Understanding/Competency (verbalizes/demonstrates), Written Materials Provided  Chronic Complications (preventing, detecting, and treating chronic complications): Discussion, Individual Session, Demonstrates Understanding/Competency (verbalizes/demonstrates), Written Materials Provided, Instructed  Clinical (diabetes, other pertinent medical history, and relevant comorbidities reviewed during visit): Discussion, Instructed, Individual Session, Demonstrates Understanding/Competency (verbalizes/demonstrates), Written Materials Provided  Cognitive (knowledge of self-management skills, functional health literacy): Discussion, Individual Session  Psychosocial (emotional response to diabetes): Discussion, Individual Session  Diabetes Distress and Support Systems: Discussion, Individual Session  Behavioral (readiness for change, lifestyle practices, self-care behaviors): Discussion, Individual Session    Goals  Patient has selected/evaluated goals during today's session: Yes, selected  Healthy Eating: Set(distribute CHO into 3 meals/day, 45-60 gCHO/meal)  Start Date: 08/13/18  Physical Activity: Set(increase aerobic/waking to 30 min duration)  Start Date: 08/13/18  Monitoring: Set(SMBG 1x/day)  Start Date: 08/13/18      Diabetes Care Plan/Intervention  Education Plan/Intervention: Individual Follow-Up DSMT    Diabetes Meal Plan  Restrictions: Restricted Carbohydrate  Carbohydrate Per Snack : 15-20g    Education Units of Time   Time Spent: 60 min    Health Maintenance was reviewed today with patient. Discussed with patient importance of routine eye exams, foot exams/foot care, blood work (i.e.: A1c, microalbumin, and lipid), dental visits, yearly flu vaccine, and pneumonia vaccine as indicated by PCP. Patient verbalized understanding.     Health Maintenance Topics with due status: Not Due        Topic Last Completion Date    Colonoscopy 05/20/2016    PROSTATE-SPECIFIC ANTIGEN 07/25/2018    Lipid Panel 07/25/2018    Hemoglobin A1c 07/25/2018    High Dose Statin 07/26/2018    Eye Exam 07/26/2018     Health Maintenance Due   Topic Date Due    Foot Exam  09/25/1955    TETANUS VACCINE  09/25/1963    Zoster Vaccine  09/25/2005    Influenza Vaccine  08/01/2018

## 2018-08-27 DIAGNOSIS — F41.9 ANXIETY: ICD-10-CM

## 2018-08-27 RX ORDER — LORAZEPAM 0.5 MG/1
TABLET ORAL
Qty: 10 TABLET | Refills: 0 | Status: CANCELLED | OUTPATIENT
Start: 2018-08-27

## 2018-08-28 DIAGNOSIS — F41.9 ANXIETY: ICD-10-CM

## 2018-08-28 RX ORDER — LORAZEPAM 0.5 MG/1
TABLET ORAL
Qty: 10 TABLET | Refills: 0 | Status: SHIPPED | OUTPATIENT
Start: 2018-08-28 | End: 2018-09-19 | Stop reason: SDUPTHER

## 2018-08-28 NOTE — TELEPHONE ENCOUNTER
----- Message from Amarilis Rascon sent at 8/28/2018 12:57 PM CDT -----  Contact: Self   Med refill: [phone]    LORazepam (ATIVAN) 0.5 MG tablet      [id number]  [place] - [address] [facility] [facility]

## 2018-09-19 DIAGNOSIS — F41.9 ANXIETY: ICD-10-CM

## 2018-09-19 RX ORDER — LORAZEPAM 0.5 MG/1
TABLET ORAL
Qty: 10 TABLET | Refills: 0 | Status: SHIPPED | OUTPATIENT
Start: 2018-09-19 | End: 2018-10-16 | Stop reason: SDUPTHER

## 2018-10-05 ENCOUNTER — OFFICE VISIT (OUTPATIENT)
Dept: URGENT CARE | Facility: CLINIC | Age: 73
End: 2018-10-05
Payer: MEDICARE

## 2018-10-05 VITALS
OXYGEN SATURATION: 98 % | DIASTOLIC BLOOD PRESSURE: 84 MMHG | BODY MASS INDEX: 28.27 KG/M2 | TEMPERATURE: 98 F | WEIGHT: 197 LBS | SYSTOLIC BLOOD PRESSURE: 127 MMHG | HEART RATE: 84 BPM

## 2018-10-05 DIAGNOSIS — H92.01 RIGHT EAR PAIN: Primary | ICD-10-CM

## 2018-10-05 PROCEDURE — 99214 OFFICE O/P EST MOD 30 MIN: CPT | Mod: 25,S$GLB,, | Performed by: NURSE PRACTITIONER

## 2018-10-05 PROCEDURE — 1101F PT FALLS ASSESS-DOCD LE1/YR: CPT | Mod: CPTII,S$GLB,, | Performed by: NURSE PRACTITIONER

## 2018-10-05 PROCEDURE — 3074F SYST BP LT 130 MM HG: CPT | Mod: CPTII,S$GLB,, | Performed by: NURSE PRACTITIONER

## 2018-10-05 PROCEDURE — 3079F DIAST BP 80-89 MM HG: CPT | Mod: CPTII,S$GLB,, | Performed by: NURSE PRACTITIONER

## 2018-10-05 PROCEDURE — 90662 IIV NO PRSV INCREASED AG IM: CPT | Mod: S$GLB,,, | Performed by: NURSE PRACTITIONER

## 2018-10-05 PROCEDURE — G0008 ADMIN INFLUENZA VIRUS VAC: HCPCS | Mod: S$GLB,,, | Performed by: NURSE PRACTITIONER

## 2018-10-05 RX ORDER — EZETIMIBE 10 MG/1
TABLET ORAL DAILY
COMMUNITY
Start: 2018-08-28 | End: 2020-12-02 | Stop reason: SDUPTHER

## 2018-10-05 NOTE — PROGRESS NOTES
Subjective:       Patient ID: Ruben Macedo Jr. is a 73 y.o. male.    Vitals:  weight is 89.4 kg (197 lb). His temperature is 97.9 °F (36.6 °C). His blood pressure is 127/84 and his pulse is 84. His oxygen saturation is 98%.     Chief Complaint: Otalgia    Otalgia    There is pain in the right ear. This is a new problem. The current episode started yesterday. There has been no fever. The pain is mild. Pertinent negatives include no abdominal pain, diarrhea, headaches, rash, sore throat or vomiting. Associated symptoms comments: Pain and ear feels clogged. He has tried nothing for the symptoms.     Review of Systems   Constitution: Negative for chills and fever.   HENT: Positive for ear pain. Negative for sore throat.    Eyes: Negative for blurred vision.   Cardiovascular: Negative for chest pain.   Respiratory: Negative for shortness of breath.    Skin: Negative for rash.   Musculoskeletal: Negative for back pain and joint pain.   Gastrointestinal: Negative for abdominal pain, diarrhea, nausea and vomiting.   Neurological: Negative for headaches.   Psychiatric/Behavioral: The patient is not nervous/anxious.        Objective:      Physical Exam   Constitutional: He is oriented to person, place, and time. He appears well-developed and well-nourished. He is cooperative.  Non-toxic appearance. He does not appear ill. No distress.   HENT:   Head: Normocephalic and atraumatic.   Right Ear: Hearing, tympanic membrane, external ear and ear canal normal.   Left Ear: Hearing, tympanic membrane, external ear and ear canal normal.   Nose: Nose normal. No mucosal edema, rhinorrhea or nasal deformity. No epistaxis. Right sinus exhibits no maxillary sinus tenderness and no frontal sinus tenderness. Left sinus exhibits no maxillary sinus tenderness and no frontal sinus tenderness.   Mouth/Throat: Uvula is midline, oropharynx is clear and moist and mucous membranes are normal. No trismus in the jaw. Normal dentition. No uvula  swelling. No posterior oropharyngeal erythema.   Eyes: Conjunctivae and lids are normal. No scleral icterus.   Sclera clear bilat   Neck: Trachea normal, full passive range of motion without pain and phonation normal. Neck supple.   Cardiovascular: Normal rate, regular rhythm, normal heart sounds, intact distal pulses and normal pulses.   Pulmonary/Chest: Effort normal and breath sounds normal. No respiratory distress.   Abdominal: Soft. Normal appearance and bowel sounds are normal. He exhibits no distension. There is no tenderness.   Musculoskeletal: Normal range of motion. He exhibits no edema or deformity.   Neurological: He is alert and oriented to person, place, and time. He exhibits normal muscle tone. Coordination normal.   Skin: Skin is warm, dry and intact. He is not diaphoretic. No pallor.   Psychiatric: He has a normal mood and affect. His speech is normal and behavior is normal. Judgment and thought content normal. Cognition and memory are normal.   Nursing note and vitals reviewed.      Assessment:       1. Right ear pain        Plan:         Right ear pain  -     Ambulatory referral to ENT    Other orders  -     Influenza - High Dose (65+) (PF) (IM)      Pt is experiencing ear pain and there are no visual abnormalities.  Refer to ENT for specialist evaluation.

## 2018-10-09 RX ORDER — FINASTERIDE 5 MG/1
TABLET, FILM COATED ORAL
Qty: 90 TABLET | Refills: 3 | Status: SHIPPED | OUTPATIENT
Start: 2018-10-09 | End: 2019-09-26 | Stop reason: SDUPTHER

## 2018-10-16 DIAGNOSIS — F41.9 ANXIETY: ICD-10-CM

## 2018-10-18 ENCOUNTER — TELEPHONE (OUTPATIENT)
Dept: FAMILY MEDICINE | Facility: CLINIC | Age: 73
End: 2018-10-18

## 2018-10-18 NOTE — TELEPHONE ENCOUNTER
----- Message from Lucrecia Saxena sent at 10/18/2018 11:27 AM CDT -----  Contact: Self   Pt calling regarding status on med below. [phone]      LORazepam (ATIVAN) 0.5 MG tablet

## 2018-10-20 RX ORDER — LORAZEPAM 0.5 MG/1
TABLET ORAL
Qty: 10 TABLET | Refills: 0 | Status: SHIPPED | OUTPATIENT
Start: 2018-10-20 | End: 2018-11-19 | Stop reason: SDUPTHER

## 2018-11-19 DIAGNOSIS — F41.9 ANXIETY: ICD-10-CM

## 2018-11-20 RX ORDER — LORAZEPAM 0.5 MG/1
TABLET ORAL
Qty: 10 TABLET | Refills: 0 | Status: SHIPPED | OUTPATIENT
Start: 2018-11-20 | End: 2018-12-27 | Stop reason: SDUPTHER

## 2018-12-27 DIAGNOSIS — F41.9 ANXIETY: ICD-10-CM

## 2018-12-27 RX ORDER — LORAZEPAM 0.5 MG/1
TABLET ORAL
Qty: 10 TABLET | Refills: 0 | Status: SHIPPED | OUTPATIENT
Start: 2018-12-27 | End: 2019-02-01 | Stop reason: SDUPTHER

## 2018-12-27 NOTE — TELEPHONE ENCOUNTER
Covering for PCP - Sent medication refill to patient's preferred pharmacy on file. [facility] database queried/reviewed

## 2019-02-01 DIAGNOSIS — F41.9 ANXIETY: ICD-10-CM

## 2019-02-02 RX ORDER — LORAZEPAM 0.5 MG/1
TABLET ORAL
Qty: 10 TABLET | Refills: 0 | Status: SHIPPED | OUTPATIENT
Start: 2019-02-02 | End: 2019-03-04 | Stop reason: SDUPTHER

## 2019-02-06 ENCOUNTER — TELEPHONE (OUTPATIENT)
Dept: FAMILY MEDICINE | Facility: CLINIC | Age: 74
End: 2019-02-06

## 2019-02-06 NOTE — TELEPHONE ENCOUNTER
----- Message from Eric Horn sent at 2/6/2019 11:09 AM CST -----  Contact: CHUCKIE [phone]  The patient is calling to notify the staff that his pharmacy did not receive his medication refill for : lorazepam

## 2019-03-04 DIAGNOSIS — F41.9 ANXIETY: ICD-10-CM

## 2019-03-04 RX ORDER — LORAZEPAM 0.5 MG/1
TABLET ORAL
Qty: 10 TABLET | Refills: 0 | Status: SHIPPED | OUTPATIENT
Start: 2019-03-04 | End: 2019-04-05 | Stop reason: SDUPTHER

## 2019-03-23 DIAGNOSIS — N20.0 URIC ACID KIDNEY STONE: ICD-10-CM

## 2019-03-23 RX ORDER — ALLOPURINOL 300 MG/1
TABLET ORAL
Qty: 90 TABLET | Refills: 3 | Status: SHIPPED | OUTPATIENT
Start: 2019-03-23 | End: 2020-03-29

## 2019-03-25 ENCOUNTER — TELEPHONE (OUTPATIENT)
Dept: FAMILY MEDICINE | Facility: CLINIC | Age: 74
End: 2019-03-25

## 2019-03-25 DIAGNOSIS — D64.9 ANEMIA, UNSPECIFIED TYPE: ICD-10-CM

## 2019-03-25 NOTE — TELEPHONE ENCOUNTER
----- Message from Laura Duvall sent at 3/25/2019  9:35 AM CDT -----  Contact: Self/  [phone]  Patient would like to have blood work LIPID PANEL and HEMOGLOBIN A1C done.  New orders are needed.  Thank you

## 2019-03-25 NOTE — TELEPHONE ENCOUNTER
----- Message from Laura Duvall sent at 3/25/2019  9:38 AM CDT -----  Contact: Self/  [phone]  Patient would like to get a referral to see a podiatrist.  Thank you

## 2019-03-26 NOTE — TELEPHONE ENCOUNTER
NICOLEI, always need a specific referral diagnosis but guest it might be for diabetes.    Lab orders are in, his last labs were last July so very much overdue.  He does need a follow-up appointment after the labs if not already set

## 2019-03-29 ENCOUNTER — LAB VISIT (OUTPATIENT)
Dept: LAB | Facility: HOSPITAL | Age: 74
End: 2019-03-29
Attending: INTERNAL MEDICINE
Payer: MEDICARE

## 2019-03-29 DIAGNOSIS — E11.9 TYPE 2 DIABETES MELLITUS WITHOUT COMPLICATION: ICD-10-CM

## 2019-03-29 DIAGNOSIS — D64.9 ANEMIA, UNSPECIFIED TYPE: ICD-10-CM

## 2019-03-29 LAB
ALBUMIN SERPL BCP-MCNC: 4.1 G/DL (ref 3.5–5.2)
ALP SERPL-CCNC: 64 U/L (ref 55–135)
ALT SERPL W/O P-5'-P-CCNC: 29 U/L (ref 10–44)
ANION GAP SERPL CALC-SCNC: 11 MMOL/L (ref 8–16)
AST SERPL-CCNC: 33 U/L (ref 10–40)
BASOPHILS # BLD AUTO: 0.03 K/UL (ref 0–0.2)
BASOPHILS NFR BLD: 0.6 % (ref 0–1.9)
BILIRUB SERPL-MCNC: 0.9 MG/DL (ref 0.1–1)
BUN SERPL-MCNC: 19 MG/DL (ref 8–23)
CALCIUM SERPL-MCNC: 9.5 MG/DL (ref 8.7–10.5)
CHLORIDE SERPL-SCNC: 107 MMOL/L (ref 95–110)
CHOLEST SERPL-MCNC: 130 MG/DL (ref 120–199)
CHOLEST SERPL-MCNC: 130 MG/DL (ref 120–199)
CHOLEST/HDLC SERPL: 2.7 {RATIO} (ref 2–5)
CHOLEST/HDLC SERPL: 2.7 {RATIO} (ref 2–5)
CO2 SERPL-SCNC: 20 MMOL/L (ref 23–29)
CREAT SERPL-MCNC: 1.1 MG/DL (ref 0.5–1.4)
DIFFERENTIAL METHOD: ABNORMAL
EOSINOPHIL # BLD AUTO: 0.1 K/UL (ref 0–0.5)
EOSINOPHIL NFR BLD: 2.6 % (ref 0–8)
ERYTHROCYTE [DISTWIDTH] IN BLOOD BY AUTOMATED COUNT: 13.5 % (ref 11.5–14.5)
EST. GFR  (AFRICAN AMERICAN): >60 ML/MIN/1.73 M^2
EST. GFR  (NON AFRICAN AMERICAN): >60 ML/MIN/1.73 M^2
ESTIMATED AVG GLUCOSE: 209 MG/DL (ref 68–131)
ESTIMATED AVG GLUCOSE: 209 MG/DL (ref 68–131)
FERRITIN SERPL-MCNC: 148 NG/ML (ref 20–300)
GLUCOSE SERPL-MCNC: 175 MG/DL (ref 70–110)
HBA1C MFR BLD HPLC: 8.9 % (ref 4–5.6)
HBA1C MFR BLD HPLC: 8.9 % (ref 4–5.6)
HCT VFR BLD AUTO: 37.2 % (ref 40–54)
HDLC SERPL-MCNC: 48 MG/DL (ref 40–75)
HDLC SERPL-MCNC: 48 MG/DL (ref 40–75)
HDLC SERPL: 36.9 % (ref 20–50)
HDLC SERPL: 36.9 % (ref 20–50)
HGB BLD-MCNC: 12.3 G/DL (ref 14–18)
IMM GRANULOCYTES # BLD AUTO: 0.01 K/UL (ref 0–0.04)
IMM GRANULOCYTES NFR BLD AUTO: 0.2 % (ref 0–0.5)
IRON SERPL-MCNC: 84 UG/DL (ref 45–160)
LDLC SERPL CALC-MCNC: 57.4 MG/DL (ref 63–159)
LDLC SERPL CALC-MCNC: 57.4 MG/DL (ref 63–159)
LYMPHOCYTES # BLD AUTO: 1.1 K/UL (ref 1–4.8)
LYMPHOCYTES NFR BLD: 20.5 % (ref 18–48)
MCH RBC QN AUTO: 29.8 PG (ref 27–31)
MCHC RBC AUTO-ENTMCNC: 33.1 G/DL (ref 32–36)
MCV RBC AUTO: 90 FL (ref 82–98)
MONOCYTES # BLD AUTO: 0.5 K/UL (ref 0.3–1)
MONOCYTES NFR BLD: 9.1 % (ref 4–15)
NEUTROPHILS # BLD AUTO: 3.6 K/UL (ref 1.8–7.7)
NEUTROPHILS NFR BLD: 67 % (ref 38–73)
NONHDLC SERPL-MCNC: 82 MG/DL
NONHDLC SERPL-MCNC: 82 MG/DL
NRBC BLD-RTO: 0 /100 WBC
PLATELET # BLD AUTO: 153 K/UL (ref 150–350)
PMV BLD AUTO: 10.7 FL (ref 9.2–12.9)
POTASSIUM SERPL-SCNC: 4 MMOL/L (ref 3.5–5.1)
PROT SERPL-MCNC: 7.2 G/DL (ref 6–8.4)
RBC # BLD AUTO: 4.13 M/UL (ref 4.6–6.2)
SATURATED IRON: 19 % (ref 20–50)
SODIUM SERPL-SCNC: 138 MMOL/L (ref 136–145)
TOTAL IRON BINDING CAPACITY: 435 UG/DL (ref 250–450)
TRANSFERRIN SERPL-MCNC: 294 MG/DL (ref 200–375)
TRIGL SERPL-MCNC: 123 MG/DL (ref 30–150)
TRIGL SERPL-MCNC: 123 MG/DL (ref 30–150)
TSH SERPL DL<=0.005 MIU/L-ACNC: 0.74 UIU/ML (ref 0.4–4)
WBC # BLD AUTO: 5.36 K/UL (ref 3.9–12.7)

## 2019-03-29 PROCEDURE — 85025 COMPLETE CBC W/AUTO DIFF WBC: CPT

## 2019-03-29 PROCEDURE — 80053 COMPREHEN METABOLIC PANEL: CPT

## 2019-03-29 PROCEDURE — 36415 COLL VENOUS BLD VENIPUNCTURE: CPT | Mod: PO

## 2019-03-29 PROCEDURE — 83036 HEMOGLOBIN GLYCOSYLATED A1C: CPT

## 2019-03-29 PROCEDURE — 83540 ASSAY OF IRON: CPT

## 2019-03-29 PROCEDURE — 82728 ASSAY OF FERRITIN: CPT

## 2019-03-29 PROCEDURE — 84443 ASSAY THYROID STIM HORMONE: CPT

## 2019-03-29 PROCEDURE — 80061 LIPID PANEL: CPT

## 2019-04-03 ENCOUNTER — TELEPHONE (OUTPATIENT)
Dept: FAMILY MEDICINE | Facility: CLINIC | Age: 74
End: 2019-04-03

## 2019-04-03 DIAGNOSIS — F41.9 ANXIETY: ICD-10-CM

## 2019-04-03 NOTE — LETTER
April 3, 2019    Ruben Macedo Jr.  [address] [place] [address]             [facility]  [address]  [place] [address]  Phone: [phone]  Fax: [phone] Dear Gabriela Macedo:    I have been unable to reach you by phone for your appointment to Podiatry .  Please call me at the clinic [phone] to book your appointment.      If you have any questions or concerns, please don't hesitate to call.    Sincerely,        Hillary Richardson MA

## 2019-04-03 NOTE — TELEPHONE ENCOUNTER
----- Message from Laura Duvall sent at 4/2/2019  4:00 PM CDT -----  Contact: Sofia/ Spouse/  [phone]  Ms Sofia would like staff to give her a call.  She very worried about patients A1C results.  Thank you.

## 2019-04-04 RX ORDER — LORAZEPAM 0.5 MG/1
TABLET ORAL
Qty: 10 TABLET | Refills: 0 | Status: CANCELLED | OUTPATIENT
Start: 2019-04-04

## 2019-04-04 NOTE — TELEPHONE ENCOUNTER
Looks like patient did read the results so that really should be no question here.  Patient very overdue for follow-up in should of just made an appointment to discuss.  Ask wife if patient read the results message ordered she read the results message.  Messages as below        Viewed by Ruben Macedo Jr. on 4/1/2019  4:27 PM   Written by Cedric Holder MD on 3/30/2019  1:30 PM   Diabetes is very bad- very overdue for appt- last was 7/18 w Dr KRISTIN GÓMEZ cannot refill his Ativan legally since he has not been see in over 3 months

## 2019-04-05 DIAGNOSIS — F41.9 ANXIETY: ICD-10-CM

## 2019-04-05 RX ORDER — LORAZEPAM 0.5 MG/1
TABLET ORAL
Qty: 10 TABLET | Refills: 0 | Status: SHIPPED | OUTPATIENT
Start: 2019-04-05 | End: 2019-04-24 | Stop reason: SDUPTHER

## 2019-04-24 ENCOUNTER — OFFICE VISIT (OUTPATIENT)
Dept: FAMILY MEDICINE | Facility: CLINIC | Age: 74
End: 2019-04-24
Payer: MEDICARE

## 2019-04-24 VITALS
OXYGEN SATURATION: 98 % | DIASTOLIC BLOOD PRESSURE: 82 MMHG | HEIGHT: 70 IN | HEART RATE: 97 BPM | TEMPERATURE: 98 F | SYSTOLIC BLOOD PRESSURE: 132 MMHG | BODY MASS INDEX: 27.36 KG/M2 | WEIGHT: 191.13 LBS

## 2019-04-24 DIAGNOSIS — Z86.010 HISTORY OF COLONIC POLYPS: ICD-10-CM

## 2019-04-24 DIAGNOSIS — Z12.5 SCREENING FOR PROSTATE CANCER: ICD-10-CM

## 2019-04-24 DIAGNOSIS — Z00.00 ROUTINE MEDICAL EXAM: Primary | ICD-10-CM

## 2019-04-24 DIAGNOSIS — I25.10 CORONARY ARTERY DISEASE INVOLVING NATIVE CORONARY ARTERY OF NATIVE HEART WITHOUT ANGINA PECTORIS: ICD-10-CM

## 2019-04-24 DIAGNOSIS — R80.9 MICROALBUMINURIA: ICD-10-CM

## 2019-04-24 DIAGNOSIS — D64.9 ANEMIA, UNSPECIFIED TYPE: ICD-10-CM

## 2019-04-24 DIAGNOSIS — F41.9 ANXIETY: ICD-10-CM

## 2019-04-24 DIAGNOSIS — F13.20 BENZODIAZEPINE DEPENDENCE: ICD-10-CM

## 2019-04-24 DIAGNOSIS — M47.812 ARTHROPATHY OF CERVICAL SPINE: ICD-10-CM

## 2019-04-24 DIAGNOSIS — I10 ESSENTIAL HYPERTENSION: ICD-10-CM

## 2019-04-24 DIAGNOSIS — F39 MOOD DISORDER: ICD-10-CM

## 2019-04-24 DIAGNOSIS — N40.0 BENIGN PROSTATIC HYPERPLASIA, UNSPECIFIED WHETHER LOWER URINARY TRACT SYMPTOMS PRESENT: ICD-10-CM

## 2019-04-24 DIAGNOSIS — E78.5 HYPERLIPIDEMIA, UNSPECIFIED HYPERLIPIDEMIA TYPE: ICD-10-CM

## 2019-04-24 PROCEDURE — 2024F 7 FLD RTA PHOTO EVC RTNOPTHY: CPT | Mod: S$GLB,,, | Performed by: INTERNAL MEDICINE

## 2019-04-24 PROCEDURE — 3075F PR MOST RECENT SYSTOLIC BLOOD PRESS GE 130-139MM HG: ICD-10-PCS | Mod: CPTII,S$GLB,, | Performed by: INTERNAL MEDICINE

## 2019-04-24 PROCEDURE — 99499 UNLISTED E&M SERVICE: CPT | Mod: S$GLB,,, | Performed by: INTERNAL MEDICINE

## 2019-04-24 PROCEDURE — 99999 PR PBB SHADOW E&M-EST. PATIENT-LVL III: ICD-10-PCS | Mod: PBBFAC,,, | Performed by: INTERNAL MEDICINE

## 2019-04-24 PROCEDURE — 3045F PR MOST RECENT HEMOGLOBIN A1C LEVEL 7.0-9.0%: ICD-10-PCS | Mod: CPTII,S$GLB,, | Performed by: INTERNAL MEDICINE

## 2019-04-24 PROCEDURE — 1101F PR PT FALLS ASSESS DOC 0-1 FALLS W/OUT INJ PAST YR: ICD-10-PCS | Mod: CPTII,S$GLB,, | Performed by: INTERNAL MEDICINE

## 2019-04-24 PROCEDURE — 99499 RISK ADDL DX/OHS AUDIT: ICD-10-PCS | Mod: S$GLB,,, | Performed by: INTERNAL MEDICINE

## 2019-04-24 PROCEDURE — 99999 PR PBB SHADOW E&M-EST. PATIENT-LVL III: CPT | Mod: PBBFAC,,, | Performed by: INTERNAL MEDICINE

## 2019-04-24 PROCEDURE — 99214 OFFICE O/P EST MOD 30 MIN: CPT | Mod: 25,S$GLB,, | Performed by: INTERNAL MEDICINE

## 2019-04-24 PROCEDURE — 1101F PT FALLS ASSESS-DOCD LE1/YR: CPT | Mod: CPTII,S$GLB,, | Performed by: INTERNAL MEDICINE

## 2019-04-24 PROCEDURE — 2024F PR 7 FIELD PHOTOS WITH INTERP/ REVIEW: ICD-10-PCS | Mod: S$GLB,,, | Performed by: INTERNAL MEDICINE

## 2019-04-24 PROCEDURE — 3079F PR MOST RECENT DIASTOLIC BLOOD PRESSURE 80-89 MM HG: ICD-10-PCS | Mod: CPTII,S$GLB,, | Performed by: INTERNAL MEDICINE

## 2019-04-24 PROCEDURE — 99214 PR OFFICE/OUTPT VISIT, EST, LEVL IV, 30-39 MIN: ICD-10-PCS | Mod: 25,S$GLB,, | Performed by: INTERNAL MEDICINE

## 2019-04-24 PROCEDURE — 3075F SYST BP GE 130 - 139MM HG: CPT | Mod: CPTII,S$GLB,, | Performed by: INTERNAL MEDICINE

## 2019-04-24 PROCEDURE — 3045F PR MOST RECENT HEMOGLOBIN A1C LEVEL 7.0-9.0%: CPT | Mod: CPTII,S$GLB,, | Performed by: INTERNAL MEDICINE

## 2019-04-24 PROCEDURE — 3079F DIAST BP 80-89 MM HG: CPT | Mod: CPTII,S$GLB,, | Performed by: INTERNAL MEDICINE

## 2019-04-24 RX ORDER — VALSARTAN 320 MG/1
320 TABLET ORAL DAILY
Qty: 90 TABLET | Refills: 3 | Status: SHIPPED | OUTPATIENT
Start: 2019-04-24 | End: 2020-02-14

## 2019-04-24 RX ORDER — LORAZEPAM 0.5 MG/1
TABLET ORAL
Qty: 20 TABLET | Refills: 5 | Status: SHIPPED | OUTPATIENT
Start: 2019-04-24 | End: 2019-06-17 | Stop reason: SDUPTHER

## 2019-04-24 NOTE — PROGRESS NOTES
Chief complaint: Physical     73-year-old white male .  Regarding health maintenance PSA normal.  .  Outside records reviewed and it appeared he did have a colonoscopy with colon polyps 6/16 with a 5 year follow-up recommended.  He is trying to go to the gym 3 times per week    ROS:   CONST: weight stable. EYES: no vision change. ENT: no sore throat. CV: no chest pain w/ exertion. RESP: no shortness of breath. GI: no nausea, vomiting, diarrhea. No dysphagia. : no urinary issues. MUSCULOSKELETAL: no new myalgias or arthralgias other than some chronic left neck pain with movement. SKIN: no new changes. NEURO: no focal deficits. PSYCH: no new issues. ENDOCRINE: no polyuria. HEME: no lymph nodes. ALLERGY: no general pruritis.    Past Medical History:   Diagnosis Date    Anxiety     BPH (benign prostatic hypertrophy) 2/2/2015    Cataract     Coronary artery disease     Diabetes mellitus, type 2 - with PROTIENURIA     History of colonic polyps 4/11/2017 2/13 -- polyps 6/16, 5 years     History of MI (myocardial infarction) 2005    Followed by cardiology, Dr. Acevedo (at [facility])    History of stroke November 2005    Hyperlipidemia     Hypertension     Incomplete bladder emptying 2/2/2015    Overweight     Personal history of kidney stones     Renal cyst 2/10/2015   History of cervical radiculopathy on the right  Cervical arthropathy on x-ray  Anemia- chronic    Past Surgical History:   Procedure Laterality Date    APPENDECTOMY      BACK SURGERY      BIOPSY, TESTICLE Left 6/24/2013    [facility] at [facility]    CATARACT EXTRACTION      od    COLONOSCOPY N/A 2/4/2013    Performed by Haroon England [facility] at [facility] ENDO    CORONARY ANGIOPLASTY WITH STENT PLACEMENT  2000    CYSTOSCOPY W/ URETERAL STENT PLACEMENT      stents placed x2    CYSTOSCOPY, WITH URETERAL STENT INSERTION Left 6/19/2013    [facility] OR     EXPLORATION-SCROTUM Left 6/24/2013    [facility]    EYE SURGERY      cataract right eye    LITHOTRIPSY, ESWL Right 9/3/2013    [facility]    LITHOTRIPSY, USING LASER Left 6/19/2013    [facility]    MANIPULATION, CALCULUS Left 6/19/2013    [facility] OR    PYELOGRAM, RETROGRADE Bilateral 6/19/2013    [facility] OR    REMOVAL Left 6/24/2013    [facility]    SPINE SURGERY      TONSILLECTOMY      URETEROSCOPY Left 6/19/2013    [facility]     Social History     Socioeconomic History    Marital status:      Spouse name: Not on file    Number of children: 2    Years of education: Not on file    Highest education level: Not on file   Occupational History    Occupation: Retired - chemical industry   Social Needs    Financial resource strain: Not on file    Food insecurity:     Worry: Not on file     Inability: Not on file    Transportation needs:     Medical: Not on file     Non-medical: Not on file   Tobacco Use    Smoking status: Never Smoker    Smokeless tobacco: Never Used   Substance and Sexual Activity    Alcohol use: Yes     Comment: occasional    Drug use: No    Sexual activity: Yes     Partners: Female   Lifestyle    Physical activity:     Days per week: Not on file     Minutes per session: Not on file    Stress: Not on file   Relationships    Social connections:     Talks on phone: Not on file     Gets together: Not on file     Attends [employer] service: Not on file     Active member of club or organization: Not on file     Attends meetings of clubs or organizations: Not on file     Relationship status: Not on file   Other Topics Concern    Not on file   Social History Narrative    Not on file       family history includes  Alzheimer's disease in his unknown relative; Cancer in his father; Cataracts in his father and mother; Hyperlipidemia in his father and sister.     Gen: no distress  EYES: conjunctiva clear, non-icteric, PERRL  ENT: nose clear, nasal mucosa normal, oropharynx clear and moist, teeth good  NECK:supple, thyroid non-palpable  RESP: effort is good, lungs clear  CV: heart RRR w/o murmur, gallops or rubs; no carotid bruits, no edema  GI: abdomen soft, non-distended, non-tender, no hepatosplenomegaly  MS: gait normal, no clubbing or cyanosis of the digits  SKIN: no rashes, warm to touch          Diagnoses and all orders for this visit:    Routine medical exam, up-to-date on colonoscopy,  PSA will be due in July in all send him a three-month reminder      -                                                                Additional evaluation and management issues:    Additionally, patient has numerous other medical issues which are  separate from his physical.  We did openly discussed that he does have uncontrolled diabetes and A1c of 6.7 and 8 mo later 7.4  and we discussed all the A1c should be checked at least every 3 months. He was overdue and last a1c was 8.9.  He was given regular metformin 500 but had reproducible itching on his head and face with the metformin.  We did refer him to the dietitian sometime in the past but never went.  We will refer to diabetes education has been a good deal of time today discussing lifestyle modification.  We discussed Amaryl but that might cause low sugars and we discussed Januvia which would be an increased cost.  We will give him 3 months to try and make some improvements, and start januvia 100.    Reviewed his history of colon polyps as well as his outside Report.  We reviewed his hyperlipidemia and he is unsteady and Crestor with an LDL was above 110 now 57..  He does follow with an outside cardiologist who probably has checked his cholesterol is well needs to be documented that it  is checked yearly along with his history of diabetes.  He does see an outside ophthalmologist every 2 years because of his history of stroke and he has some vision issues related to that but discussed he likely should see his ophthalmologist yearly given his current current diagnosis of diabetes.       He has blood pressure which she monitors at home and it runs much better.  He has a little bit of anxiety since his stroke and he worries about things in the evening and uses Ativan half a pill to help him sleep at night. Half a pill 2-3 x a week.  We did discuss that if indeed his need for the Ativan increases we would try to treat his anxiety disorder with other medications.  He was given a refill.            We discussed his slight anemia which we will reassess and presumably he is up-to-date from a GI standpoint in that regard.  All the separate issues reviewed and patient counseled and his evaluation and management will be based upon time counseling. Total time over 25 minutes with over 50% counseling.                     Assessment and plan:          Uncontrolled type 2 diabetes mellitus with microalbuminuria, without long-term current use of insulin, initiate Januvia and follow-up in 3 months    Microalbuminuria    Anemia, unspecified type, chronic and stable    Mood disorder    Essential hypertension, uncontrolled, discussed issues with lisinopril, switch to valsartan increased to 320    Benzodiazepine dependence, minimal use appears appropriate    Arthropathy of cervical spine    Hyperlipidemia, unspecified hyperlipidemia type, better control on Crestor    Coronary artery disease involving native coronary artery of native heart without angina pectoris    Screening for prostate cancer, due in July    Benign prostatic hyperplasia, unspecified whether lower urinary tract symptoms present    History of colonic polyps, up-to-date    Anxiety  -     LORazepam (ATIVAN) 0.5 MG tablet; TAKE 1-2 TABLETS BY MOUTH NIGHTLY  "AS NEEDED    Other orders  -     SITagliptin (JANUVIA) 100 MG Tab; Take 1 tablet (100 mg total) by mouth once daily.  -     valsartan (DIOVAN) 320 MG tablet; Take 1 tablet (320 mg total) by mouth once daily.        Clinical note will be sensitive since he is new to me and I do not want any discrepancies in the available history to cause problems as well as confusion regarding evaluation and management issues"This note will not be shared with the patient."  "

## 2019-04-29 ENCOUNTER — TELEPHONE (OUTPATIENT)
Dept: ADMINISTRATIVE | Facility: HOSPITAL | Age: 74
End: 2019-04-29

## 2019-04-30 ENCOUNTER — OFFICE VISIT (OUTPATIENT)
Dept: PODIATRY | Facility: CLINIC | Age: 74
End: 2019-04-30
Payer: MEDICARE

## 2019-04-30 VITALS
SYSTOLIC BLOOD PRESSURE: 120 MMHG | WEIGHT: 191.13 LBS | BODY MASS INDEX: 27.36 KG/M2 | DIASTOLIC BLOOD PRESSURE: 80 MMHG | HEIGHT: 70 IN

## 2019-04-30 DIAGNOSIS — M20.11 HALLUX ABDUCTO VALGUS, RIGHT: ICD-10-CM

## 2019-04-30 DIAGNOSIS — M20.41 HAMMER TOES OF BOTH FEET: ICD-10-CM

## 2019-04-30 DIAGNOSIS — M20.42 HAMMER TOES OF BOTH FEET: ICD-10-CM

## 2019-04-30 DIAGNOSIS — M21.41 PES PLANUS OF BOTH FEET: ICD-10-CM

## 2019-04-30 DIAGNOSIS — M21.42 PES PLANUS OF BOTH FEET: ICD-10-CM

## 2019-04-30 DIAGNOSIS — E11.9 COMPREHENSIVE DIABETIC FOOT EXAMINATION, TYPE 2 DM, ENCOUNTER FOR: Primary | ICD-10-CM

## 2019-04-30 DIAGNOSIS — M20.12 HALLUX ABDUCTO VALGUS, LEFT: ICD-10-CM

## 2019-04-30 PROCEDURE — 99999 PR PBB SHADOW E&M-EST. PATIENT-LVL III: CPT | Mod: PBBFAC,,, | Performed by: PODIATRIST

## 2019-04-30 PROCEDURE — 3045F PR MOST RECENT HEMOGLOBIN A1C LEVEL 7.0-9.0%: ICD-10-PCS | Mod: CPTII,S$GLB,, | Performed by: PODIATRIST

## 2019-04-30 PROCEDURE — 99499 UNLISTED E&M SERVICE: CPT | Mod: S$GLB,,, | Performed by: PODIATRIST

## 2019-04-30 PROCEDURE — 99203 OFFICE O/P NEW LOW 30 MIN: CPT | Mod: S$GLB,,, | Performed by: PODIATRIST

## 2019-04-30 PROCEDURE — 2024F 7 FLD RTA PHOTO EVC RTNOPTHY: CPT | Mod: S$GLB,,, | Performed by: PODIATRIST

## 2019-04-30 PROCEDURE — 2024F PR 7 FIELD PHOTOS WITH INTERP/ REVIEW: ICD-10-PCS | Mod: S$GLB,,, | Performed by: PODIATRIST

## 2019-04-30 PROCEDURE — 99999 PR PBB SHADOW E&M-EST. PATIENT-LVL III: ICD-10-PCS | Mod: PBBFAC,,, | Performed by: PODIATRIST

## 2019-04-30 PROCEDURE — 3045F PR MOST RECENT HEMOGLOBIN A1C LEVEL 7.0-9.0%: CPT | Mod: CPTII,S$GLB,, | Performed by: PODIATRIST

## 2019-04-30 PROCEDURE — 1101F PT FALLS ASSESS-DOCD LE1/YR: CPT | Mod: CPTII,S$GLB,, | Performed by: PODIATRIST

## 2019-04-30 PROCEDURE — 99203 PR OFFICE/OUTPT VISIT, NEW, LEVL III, 30-44 MIN: ICD-10-PCS | Mod: S$GLB,,, | Performed by: PODIATRIST

## 2019-04-30 PROCEDURE — 99499 RISK ADDL DX/OHS AUDIT: ICD-10-PCS | Mod: S$GLB,,, | Performed by: PODIATRIST

## 2019-04-30 PROCEDURE — 3079F PR MOST RECENT DIASTOLIC BLOOD PRESSURE 80-89 MM HG: ICD-10-PCS | Mod: CPTII,S$GLB,, | Performed by: PODIATRIST

## 2019-04-30 PROCEDURE — 3079F DIAST BP 80-89 MM HG: CPT | Mod: CPTII,S$GLB,, | Performed by: PODIATRIST

## 2019-04-30 PROCEDURE — 3074F SYST BP LT 130 MM HG: CPT | Mod: CPTII,S$GLB,, | Performed by: PODIATRIST

## 2019-04-30 PROCEDURE — 3074F PR MOST RECENT SYSTOLIC BLOOD PRESSURE < 130 MM HG: ICD-10-PCS | Mod: CPTII,S$GLB,, | Performed by: PODIATRIST

## 2019-04-30 PROCEDURE — 1101F PR PT FALLS ASSESS DOC 0-1 FALLS W/OUT INJ PAST YR: ICD-10-PCS | Mod: CPTII,S$GLB,, | Performed by: PODIATRIST

## 2019-04-30 NOTE — LETTER
April 30, 2019      Cedric Holder MD  [address] [address]           [facility]  [address] [place] [address]  Phone: [phone]          Patient: Ruben Macedo Jr.   MR Number: [id number]   YOB: 1945   Date of Visit: 4/30/2019       Dear Dr. Cedric Holder:    Thank you for referring Ruben Macedo to me for evaluation. Attached you will find relevant portions of my assessment and plan of care.    If you have questions, please do not hesitate to call me. I look forward to following Ruben Macedo along with you.    Sincerely,    Ramya Hu DPM    Enclosure  CC:  No Recipients    If you would like to receive this communication electronically, please contact [email or web address] or [phone] to request more information on [email or web address] Link access.    For providers and/or their staff who would like to refer a patient to [facility], please contact us through our one-stop-shop provider referral line, [facility], at [phone].    If you feel you have received this communication in error or would no longer like to receive these types of communications, please e-mail [email or web address]

## 2019-04-30 NOTE — PATIENT INSTRUCTIONS
Recommend lotions: eucerin, eucerin for diabetics, aquaphor, A&D ointment, gold bond for diabetics, sween, Cambridge's Bees all purpose baby ointment,  urea 40 with aloe (found on amazon.com)    Shoe recommendations: (try 6pm.com, zappos.com , nordstromrack.Deltasight, or shoes.Deltasight for discounted prices) you can visit DSW shoes in [place]  or [facility] in the [facility] (there are also several shoe brand outlets in the [facility])    Asics (GT 2000 or gel foundations), new balance stability type shoes, saucony (stabil c3),  Coreas (GTS or Beast or transcend), propet (tennis shoe)    Sofamada Roe (women) Diana&Gabriel (men), clarks, crocs, aerosoles, naturalizers, SAS, ecco, born, anuel clement, rockports (dress shoes)    Vionic, burkenstocks, fitflops, propet (sandals)  Nike comfort thong sandals, crocs, propet (house shoes)    Nail Home remedy:  Vicks Vapor rub to nails for easier manageability      Diabetes: Inspecting Your Feet  Diabetes increases your chances of developing foot problems. So inspect your feet every day. This helps you find small skin irritations before they become serious infections. If you have trouble seeing the bottoms of your feet, use a mirror or ask a family member or friend to help.     Pressure spots on the bottom of the foot are common areas where problems develop.   How to check your feet  Below are tips to help you look for foot problems. Try to check your feet at the same time each day, such as when you get out of bed in the morning:  · Check the top of each foot. The tops of toes, back of the heel, and outer edge of the foot can get a lot of rubbing from poor-fitting shoes.  · Check the bottom of each foot. Daily wear and tear often leads to problems at pressure spots.  · Check the toes and nails. Fungal infections often occur between toes. Toenail problems can also be a sign of fungal infections or lead to breaks in the skin.  · Check your shoes, too. Loose objects inside a shoe can injure the  foot. Use your hand to feel inside your shoes for things like sandra, loose stitching, or rough areas that could irritate your skin.  Warning signs  Look for any color changes in the foot. Redness with streaks can signal a severe infection, which needs immediate medical attention. Tell your doctor right away if you have any of these problems:  · Swelling, sometimes with color changes, may be a sign of poor blood flow or infection. Symptoms include tenderness and an increase in the size of your foot.  · Warm or hot areas on your feet may be signs of infection. A foot that is cold may not be getting enough blood.  · Sensations such as burning, tingling, or pins and needles can be signs of a problem. Also check for areas that may be numb.  · Hot spots are caused by friction or pressure. Look for hot spots in areas that get a lot of rubbing. Hot spots can turn into blisters, calluses, or sores.  · Cracks and sores are caused by dry or irritated skin. They are a sign that the skin is breaking down, which can lead to infection.  · Toenail problems to watch for include nails growing into the skin (ingrown toenail) and causing redness or pain. Thick, yellow, or discolored nails can signal a fungal infection.  · Drainage and odor can develop from untreated sores and ulcers. Call your doctor right away if you notice white or yellow drainage, bleeding, or unpleasant odor.   © 6789-7370 [employer]. [address] [place] [address]. All rights reserved. This information is not intended as a substitute for professional medical care. Always follow your healthcare professional's instructions.        Step-by-Step:  Inspecting Your Feet (Diabetes)    Date Last Reviewed: 10/1/2016  © 9346-0300 [employer]. [address] [place] [address]. All rights reserved. This information is not intended as a substitute for professional medical care. Always follow your healthcare professional's  instructions.

## 2019-04-30 NOTE — PROGRESS NOTES
Subjective:      Patient ID: Ruben Macedo Jr. is a 73 y.o. male.    Chief Complaint: Diabetes Mellitus (pcp Gallo 4-24-19); Diabetic Foot Exam; and Nail Care    Ruben is a 73 y.o. male who presents to the clinic upon referral from Dr. Holder  for evaluation and treatment of diabetic feet. Ruben has a past medical history of Anxiety, BPH (benign prostatic hypertrophy) (2/2/2015), Cataract, Coronary artery disease, Diabetes mellitus, type 2, History of colonic polyps (4/11/2017), History of MI (myocardial infarction) (2005), History of stroke (November 2005), Hyperlipidemia, Hypertension, Incomplete bladder emptying (2/2/2015), Overweight(278.02), Personal history of kidney stones, and Renal cyst (2/10/2015). The patient has no major complaints with feet. Chief concern is how to care for feet as a diabetic.    PCP: Cedric Holder MD    Date Last Seen by PCP:   Chief Complaint   Patient presents with    Diabetes Mellitus     pcp Gallo 4-24-19    Diabetic Foot Exam    Nail Care         Current shoe gear: boat shoes    Hemoglobin A1C   Date Value Ref Range Status   03/29/2019 8.9 (H) 4.0 - 5.6 % Final     Comment:     ADA Screening Guidelines:  5.7-6.4%  Consistent with prediabetes  >or=6.5%  Consistent with diabetes  High levels of fetal hemoglobin interfere with the HbA1C  assay. Heterozygous hemoglobin variants (HbS, HgC, etc)do  not significantly interfere with this assay.   However, presence of multiple variants may affect accuracy.     03/29/2019 8.9 (H) 4.0 - 5.6 % Final     Comment:     ADA Screening Guidelines:  5.7-6.4%  Consistent with prediabetes  >or=6.5%  Consistent with diabetes  High levels of fetal hemoglobin interfere with the HbA1C  assay. Heterozygous hemoglobin variants (HbS, HgC, etc)do  not significantly interfere with this assay.   However, presence of multiple variants may affect accuracy.     07/25/2018 7.4 (H) 4.0 - 5.6 % Final     Comment:     ADA Screening  Guidelines:  5.7-6.4%  Consistent with prediabetes  >or=6.5%  Consistent with diabetes  High levels of fetal hemoglobin interfere with the HbA1C  assay. Heterozygous hemoglobin variants (HbS, HgC, etc)do  not significantly interfere with this assay.   However, presence of multiple variants may affect accuracy.                 Patient Active Problem List   Diagnosis    CAD (coronary artery disease)    HTN (hypertension)    Anxiety    Kidney stone    BPH (benign prostatic hypertrophy)    Incomplete bladder emptying    Renal cyst    History of urinary calculi    Prediabetes    Hyperlipemia    History of colonic polyps    Anemia    Coronary artery disease involving native coronary artery of native heart without angina pectoris    Mood disorder    Anxiety disorder    Essential hypertension       Current Outpatient Medications on File Prior to Visit   Medication Sig Dispense Refill    allopurinol (ZYLOPRIM) 300 MG tablet TAKE ONE TABLET BY MOUTH EVERY DAY 90 tablet 3    aspirin (ECOTRIN) 81 MG EC tablet Take 81 mg by mouth once daily.      clopidogrel (PLAVIX) 75 mg tablet Take 75 mg by mouth once.       ezetimibe (ZETIA) 10 mg tablet       finasteride (PROSCAR) 5 mg tablet TAKE ONE TABLET BY MOUTH EVERY DAY 90 tablet 3    gabapentin (NEURONTIN) 300 MG capsule       LORazepam (ATIVAN) 0.5 MG tablet TAKE 1-2 TABLETS BY MOUTH NIGHTLY AS NEEDED 20 tablet 5    metoprolol succinate (TOPROL-XL) 25 MG 24 hr tablet Take 25 mg by mouth once daily.        rosuvastatin (CRESTOR) 40 MG Tab Take 1 tablet by mouth daily      SITagliptin (JANUVIA) 100 MG Tab Take 1 tablet (100 mg total) by mouth once daily. 30 tablet 3    valsartan (DIOVAN) 320 MG tablet Take 1 tablet (320 mg total) by mouth once daily. 90 tablet 3     No current facility-administered medications on file prior to visit.        Review of patient's allergies indicates:   Allergen Reactions    Morphine Nausea And Vomiting    Metformin Itching     Tramadol Nausea And Vomiting       Past Surgical History:   Procedure Laterality Date    APPENDECTOMY      BACK SURGERY      BIOPSY, TESTICLE Left 6/24/2013    [facility]    CATARACT EXTRACTION      od    COLONOSCOPY N/A 2/4/2013    Performed by Haroon England [facility] at [facility] ENDO    CORONARY ANGIOPLASTY WITH STENT PLACEMENT  2000    CYSTOSCOPY W/ URETERAL STENT PLACEMENT      stents placed x2    CYSTOSCOPY, WITH URETERAL STENT INSERTION Left 6/19/2013    [facility]    EXPLORATION-SCROTUM Left 6/24/2013    [facility] OR    EYE SURGERY      cataract right eye    LITHOTRIPSY, ESWL Right 9/3/2013    [facility] OR    LITHOTRIPSY, USING LASER Left 6/19/2013    [facility] OR    MANIPULATION, CALCULUS Left 6/19/2013    [facility] OR    PYELOGRAM, RETROGRADE Bilateral 6/19/2013    [facility] OR    REMOVAL Left 6/24/2013    [facility]M [facility] OR    SPINE SURGERY      TONSILLECTOMY      URETEROSCOPY Left 6/19/2013    [facility]       Family History   Problem Relation Age of Onset    Hyperlipidemia Father     Cancer Father     Cataracts Father     Cataracts Mother     Hyperlipidemia Sister     Alzheimer's disease Unknown     Diabetes Neg Hx     Amblyopia Neg Hx     Blindness Neg Hx     Glaucoma Neg Hx     Hypertension Neg Hx     Macular degeneration Neg Hx     Retinal detachment Neg Hx     Strabismus Neg Hx     Stroke Neg Hx     Thyroid disease Neg Hx        Social History     Socioeconomic History    Marital status:      Spouse name: Not on file    Number of children: 2    Years of education: Not on file    Highest education level: Not on file   Occupational History     "Occupation: Retired - chemical industry   Social Needs    Financial resource strain: Not on file    Food insecurity:     Worry: Not on file     Inability: Not on file    Transportation needs:     Medical: Not on file     Non-medical: Not on file   Tobacco Use    Smoking status: Never Smoker    Smokeless tobacco: Never Used   Substance and Sexual Activity    Alcohol use: Yes     Comment: occasional    Drug use: No    Sexual activity: Yes     Partners: Female   Lifestyle    Physical activity:     Days per week: Not on file     Minutes per session: Not on file    Stress: Not on file   Relationships    Social connections:     Talks on phone: Not on file     Gets together: Not on file     Attends L[employer]-day service: Not on file     Active member of club or organization: Not on file     Attends meetings of clubs or organizations: Not on file     Relationship status: Not on file   Other Topics Concern    Not on file   Social History Narrative    Not on file       Review of Systems   Constitution: Negative for chills and fever.   Cardiovascular: Negative for claudication and leg swelling.   Respiratory: Negative for cough and shortness of breath.    Skin: Positive for dry skin and nail changes. Negative for itching and rash.   Musculoskeletal: Positive for arthritis, joint pain, myalgias and neck pain. Negative for falls, joint swelling and muscle weakness.   Gastrointestinal: Negative for diarrhea, nausea and vomiting.   Neurological: Positive for paresthesias (hands, arms, from neck). Negative for numbness, tremors and weakness.   Psychiatric/Behavioral: Negative for altered mental status and hallucinations.           Objective:      Vitals:    04/30/19 1056   BP: 120/80   Weight: 86.7 kg (191 lb 2.2 oz)   Height: 5' 10" (1.778 m)   PainSc: 0-No pain       Physical Exam   Constitutional: He appears well-developed and well-nourished.  Non-toxic appearance. He does not have a sickly appearance. No distress. "   alert and oriented x 3.    Cardiovascular:   Pulses:       Dorsalis pedis pulses are 2+ on the right side, and 2+ on the left side.        Posterior tibial pulses are 2+ on the right side, and 2+ on the left side.    Capillary refill time is within normal limits. Digital hair present.    Pulmonary/Chest: No respiratory distress.   Musculoskeletal: He exhibits no deformity.        Right ankle: No tenderness. No lateral malleolus, no medial malleolus, no AITFL, no CF ligament and no posterior TFL tenderness found. Achilles tendon exhibits no pain, no defect and normal Curry's test results.        Left ankle: No tenderness. No lateral malleolus, no medial malleolus, no AITFL, no CF ligament and no posterior TFL tenderness found. Achilles tendon exhibits no pain, no defect and normal Curry's test results.        Right foot: There is no tenderness and no bony tenderness.        Left foot: There is no tenderness and no bony tenderness.   There is equinus deformity bilateral with decreased dorsiflexion at the ankle joint bilateral. No tenderness with compression of heel. Negative tinels sign. Gait analysis reveals excessive pronation through midstance and propulsion with early heel off. Shoes reveals lateral heel counter wear bilateral     Decreased first MPJ range of motion both weightbearing and nonweightbearing, no crepitus observed the first MP joint, + dorsal flag sign. Mild  bunion deformity is observed .    Patient has hammertoes of digits 2-5 bilateral partially reducible   Muscle strength is 5/5 in all groups bilaterally.           Feet:   Right Foot:   Protective Sensation: 5 sites tested. 5 sites sensed.   Left Foot:   Protective Sensation: 5 sites tested. 5 sites sensed.   Lymphadenopathy:   No lymphatic streaking     Neurological: He displays no atrophy. No sensory deficit.   Light touch present     Skin: Skin is warm, dry and intact. No rash noted. He is not diaphoretic. No cyanosis. No pallor. Nails  show no clubbing.   Skin is of normal turgor.   Normal temperature gradient.  Examination of the skin reveals no evidence of significant rashes, open lesions, suspicious appearing nevi or other concerning lesions.      Psychiatric: His mood appears not anxious. His affect is not inappropriate. His speech is not slurred. He is not combative. He is communicative. He is attentive.   Nursing note and vitals reviewed.            Assessment:       Encounter Diagnoses   Name Primary?    Comprehensive diabetic foot examination, type 2 DM, encounter for Yes    Hammer toes of both feet     Hallux abducto valgus, right     Hallux abducto valgus, left     Pes planus of both feet          Plan:     Problem List Items Addressed This Visit     None      Visit Diagnoses     Comprehensive diabetic foot examination, type 2 DM, encounter for    -  Primary    Hammer toes of both feet        Hallux abducto valgus, right        Hallux abducto valgus, left        Pes planus of both feet             I counseled the patient on his conditions, their implications and medical management.    Greater than 50% of this visit spent on counseling and coordination of care.    Education about the diabetic foot, neuropathy, and prevention of limb loss.    Shoe inspection. Diabetic Foot Education. Patient reminded of the importance of good nutrition/healthy diet/weight management and blood sugar control to help prevent podiatric complications of diabetes. Patient instructed on proper foot hygeine. Wear comfortable, proper fitting shoes. Wash feet daily. Dry well. After drying, apply moisturizer to feet (no lotion to webspaces). Inspect feet daily for skin breaks, blisters, swelling, or redness. Wear cotton socks (preferably white)  Change socks every day. Do NOT walk barefoot. Do NOT use heating pads or hot water soaks. We discussed wearing proper shoe gear, daily foot inspections, never walking without protective shoe gear.     Discussed edema  control and the importance of daily moisturizer to the feet such as Gold bonds diabetic foot cream    Recommend applying vicks vaporub to thick abnormal toenails daily x 6 months to treat fungal nail infection.    He will continue to monitor the areas daily, inspect his feet, wear protective shoe gear when ambulatory, moisturizer to maintain skin integrity and follow in this office in approximately 12 months, sooner p.r.n.

## 2019-06-17 DIAGNOSIS — F41.9 ANXIETY: ICD-10-CM

## 2019-06-17 RX ORDER — LORAZEPAM 0.5 MG/1
TABLET ORAL
Qty: 20 TABLET | Refills: 5 | Status: CANCELLED | OUTPATIENT
Start: 2019-06-17

## 2019-06-18 DIAGNOSIS — F41.9 ANXIETY: ICD-10-CM

## 2019-06-21 RX ORDER — LORAZEPAM 0.5 MG/1
TABLET ORAL
Qty: 20 TABLET | Refills: 4 | Status: SHIPPED | OUTPATIENT
Start: 2019-06-21 | End: 2019-12-09 | Stop reason: SDUPTHER

## 2019-06-21 RX ORDER — LORAZEPAM 0.5 MG/1
TABLET ORAL
Qty: 20 TABLET | Refills: 5 | OUTPATIENT
Start: 2019-06-21

## 2019-07-12 DIAGNOSIS — E11.9 TYPE 2 DIABETES MELLITUS WITHOUT COMPLICATION: ICD-10-CM

## 2019-08-14 RX ORDER — SITAGLIPTIN 100 MG/1
TABLET, FILM COATED ORAL
Qty: 30 TABLET | Refills: 0 | Status: SHIPPED | OUTPATIENT
Start: 2019-08-14 | End: 2019-09-12 | Stop reason: SDUPTHER

## 2019-08-23 ENCOUNTER — OFFICE VISIT (OUTPATIENT)
Dept: URGENT CARE | Facility: CLINIC | Age: 74
End: 2019-08-23
Payer: MEDICARE

## 2019-08-23 VITALS
BODY MASS INDEX: 27.92 KG/M2 | HEIGHT: 70 IN | DIASTOLIC BLOOD PRESSURE: 67 MMHG | HEART RATE: 80 BPM | RESPIRATION RATE: 17 BRPM | SYSTOLIC BLOOD PRESSURE: 110 MMHG | OXYGEN SATURATION: 98 % | WEIGHT: 195 LBS | TEMPERATURE: 98 F

## 2019-08-23 DIAGNOSIS — J06.9 VIRAL URI WITH COUGH: Primary | ICD-10-CM

## 2019-08-23 PROCEDURE — 99214 OFFICE O/P EST MOD 30 MIN: CPT | Mod: S$GLB,,, | Performed by: NURSE PRACTITIONER

## 2019-08-23 PROCEDURE — 3078F PR MOST RECENT DIASTOLIC BLOOD PRESSURE < 80 MM HG: ICD-10-PCS | Mod: CPTII,S$GLB,, | Performed by: NURSE PRACTITIONER

## 2019-08-23 PROCEDURE — 3074F PR MOST RECENT SYSTOLIC BLOOD PRESSURE < 130 MM HG: ICD-10-PCS | Mod: CPTII,S$GLB,, | Performed by: NURSE PRACTITIONER

## 2019-08-23 PROCEDURE — 1101F PR PT FALLS ASSESS DOC 0-1 FALLS W/OUT INJ PAST YR: ICD-10-PCS | Mod: CPTII,S$GLB,, | Performed by: NURSE PRACTITIONER

## 2019-08-23 PROCEDURE — 3074F SYST BP LT 130 MM HG: CPT | Mod: CPTII,S$GLB,, | Performed by: NURSE PRACTITIONER

## 2019-08-23 PROCEDURE — 1101F PT FALLS ASSESS-DOCD LE1/YR: CPT | Mod: CPTII,S$GLB,, | Performed by: NURSE PRACTITIONER

## 2019-08-23 PROCEDURE — 99214 PR OFFICE/OUTPT VISIT, EST, LEVL IV, 30-39 MIN: ICD-10-PCS | Mod: S$GLB,,, | Performed by: NURSE PRACTITIONER

## 2019-08-23 PROCEDURE — 3078F DIAST BP <80 MM HG: CPT | Mod: CPTII,S$GLB,, | Performed by: NURSE PRACTITIONER

## 2019-08-23 RX ORDER — LORATADINE 10 MG/1
10 TABLET ORAL DAILY
Qty: 30 TABLET | Refills: 0 | Status: SHIPPED | OUTPATIENT
Start: 2019-08-23 | End: 2020-07-07 | Stop reason: CLARIF

## 2019-08-23 RX ORDER — CODEINE PHOSPHATE AND GUAIFENESIN 10; 100 MG/5ML; MG/5ML
5 SOLUTION ORAL 3 TIMES DAILY PRN
Qty: 118 ML | Refills: 0 | Status: SHIPPED | OUTPATIENT
Start: 2019-08-23 | End: 2019-08-30

## 2019-08-23 RX ORDER — FLUTICASONE PROPIONATE 50 MCG
2 SPRAY, SUSPENSION (ML) NASAL DAILY
Qty: 9.9 ML | Refills: 0 | Status: SHIPPED | OUTPATIENT
Start: 2019-08-23 | End: 2020-07-07 | Stop reason: CLARIF

## 2019-08-23 RX ORDER — BENZONATATE 100 MG/1
200 CAPSULE ORAL 3 TIMES DAILY PRN
Qty: 30 CAPSULE | Refills: 0 | Status: SHIPPED | OUTPATIENT
Start: 2019-08-23 | End: 2019-09-02

## 2019-08-23 NOTE — PATIENT INSTRUCTIONS
You have been diagnosed with a viral syndrome. Viral syndromes are self-limited and usually resolve within 10 days from onset of symptoms. Antibiotics are not effective against viral infections. It is important that you get lots of rest and drink plenty of fluids. Treatment is through symptomatic relief.    Below are suggestions for symptomatic relief:   -Tylenol every 4 hours OR ibuprofen every 6 hours as needed for pain/fever.   -Salt water gargles to soothe throat pain.   -Chloroseptic spray also helps to numb throat pain.   -Nasal saline spray reduces inflammation and dryness.   -Warm face compresses to help with facial sinus pain/pressure.   -Vicks vapor rub at night.   -Flonase OTC or Nasacort OTC for nasal congestion.   -Simple foods like chicken noodle soup.   -Delsym helps with coughing at night   -Zyrtec/Claritin during the day & Benadryl at night may help with allergies.     If you DO NOT have Hypertension or any history of palpitations, it is ok to take over the counter Sudafed or Mucinex D or Allegra-D or Claritin-D or Zyrtec-D.  If you do take one of the above, it is ok to combine that with plain over the counter Mucinex or Allegra or Claritin or Zyrtec. If, for example, you are taking Zyrtec -D, you can combine that with Mucinex, but not Mucinex-D.  If you are taking Mucinex-D, you can combine that with plain Allegra or Claritin or Zyrtec.   If you DO have Hypertension or palpitations, it is safe to take Coricidin HBP for relief of sinus symptoms.    Please return to clinic if symptoms have not subsided 10-14 days from onset, as your viral infection may have developed into a bacterial infection. It is at that time antibiotics would be indicated.       Please follow up with your primary care provider within 2-5 days if your signs and symptoms have not resolved or worsen.     If your condition worsens or fails to improve we recommend that you receive another evaluation at the emergency room immediately  or contact your primary medical clinic to discuss your concerns.   You must understand that you have received an Urgent Care treatment only and that you may be released before all of your medical problems are known or treated. You, the patient, will arrange for follow up care as instructed.           Viral Upper Respiratory Illness (Adult)  You have a viral upper respiratory illness (URI), which is another term for the common cold. This illness is contagious during the first few days. It is spread through the air by coughing and sneezing. It may also be spread by direct contact (touching the sick person and then touching your own eyes, nose, or mouth). Frequent handwashing will decrease risk of spread. Most viral illnesses go away within 7 to 10 days with rest and simple home remedies. Sometimes the illness may last for several weeks. Antibiotics will not kill a virus, and they are generally not prescribed for this condition.    Home care  · If symptoms are severe, rest at home for the first 2 to 3 days. When you resume activity, don't let yourself get too tired.  · Avoid being exposed to cigarette smoke (yours or others).  · You may use acetaminophen or ibuprofen to control pain and fever, unless another medicine was prescribed. (Note: If you have chronic liver or kidney disease, have ever had a stomach ulcer or gastrointestinal bleeding, or are taking blood-thinning medicines, talk with your healthcare provider before using these medicines.) Aspirin should never be given to anyone under 18 years of age who is ill with a viral infection or fever. It may cause severe liver or brain damage.  · Your appetite may be poor, so a light diet is fine. Avoid dehydration by drinking 6 to 8 glasses of fluids per day (water, soft drinks, juices, tea, or soup). Extra fluids will help loosen secretions in the nose and lungs.  · Over-the-counter cold medicines will not shorten the length of time youre sick, but they may be  helpful for the following symptoms: cough, sore throat, and nasal and sinus congestion. (Note: Do not use decongestants if you have high blood pressure.)  Follow-up care  Follow up with your healthcare provider, or as advised.  When to seek medical advice  Call your healthcare provider right away if any of these occur:  · Cough with lots of colored sputum (mucus)  · Severe headache; face, neck, or ear pain  · Difficulty swallowing due to throat pain  · Fever of 100.4°F (38°C)  Call 911, or get immediate medical care  Call emergency services right away if any of these occur:  · Chest pain, shortness of breath, wheezing, or difficulty breathing  · Coughing up blood  · Inability to swallow due to throat pain  Date Last Reviewed: 9/13/2015  © 1971-3591 [employer]. [address] [place] [address], [place] [address]. All rights reserved. This information is not intended as a substitute for professional medical care. Always follow your healthcare professional's instructions.

## 2019-08-23 NOTE — PROGRESS NOTES
"Subjective:       Patient ID: Ruben Macedo Jr. is a 73 y.o. male.    Vitals:  height is 5' 10" (1.778 m) and weight is 88.5 kg (195 lb). His temperature is 97.5 °F (36.4 °C). His blood pressure is 110/67 and his pulse is 80. His respiration is 17 and oxygen saturation is 98%.     Chief Complaint: Cough and Nasal Congestion    73-year-old male with past medical history of hypertension, hyperlipidemia, diabetes, CAD, CVA presents to Urgent Care with complaint of URI symptoms x 3 days. Patient complained coughing, runny nose, and nasal congestion.  Patient denies any shortness of breath.  Patient denies any wheezing or chest pain.    Cough   This is a new problem. The current episode started in the past 7 days. The problem has been gradually worsening. The problem occurs constantly. The cough is productive of purulent sputum. Associated symptoms include headaches, nasal congestion and rhinorrhea. Pertinent negatives include no chest pain, chills, ear pain, eye redness, fever, hemoptysis, myalgias, rash, sore throat, shortness of breath or wheezing. The symptoms are aggravated by lying down. Treatments tried: robitussin. The treatment provided no relief.       Constitution: Negative for chills, sweating, fatigue and fever.   HENT: Positive for congestion. Negative for ear pain, sinus pain, sinus pressure, sore throat and voice change.    Neck: Negative for painful lymph nodes.   Cardiovascular: Negative for chest pain.   Eyes: Negative for eye redness.   Respiratory: Positive for cough. Negative for chest tightness, sputum production, bloody sputum, COPD, shortness of breath, stridor, wheezing and asthma.    Gastrointestinal: Negative for nausea and vomiting.   Musculoskeletal: Negative for muscle ache.   Skin: Negative for rash.   Allergic/Immunologic: Negative for seasonal allergies and asthma.   Neurological: Positive for headaches.   Hematologic/Lymphatic: Negative for swollen lymph nodes.       Objective:    "   Physical Exam   Constitutional: He is oriented to person, place, and time. Vital signs are normal. He appears well-developed and well-nourished. He is active and cooperative.  Non-toxic appearance. He does not have a sickly appearance. He does not appear ill. No distress.   HENT:   Head: Normocephalic and atraumatic.   Right Ear: Hearing, tympanic membrane, external ear and ear canal normal.   Left Ear: Hearing, tympanic membrane, external ear and ear canal normal.   Nose: Mucosal edema and rhinorrhea present. No nasal deformity. No epistaxis. Right sinus exhibits no maxillary sinus tenderness and no frontal sinus tenderness. Left sinus exhibits no maxillary sinus tenderness and no frontal sinus tenderness.   Mouth/Throat: Uvula is midline, oropharynx is clear and moist and mucous membranes are normal. No trismus in the jaw. Normal dentition. No uvula swelling. No posterior oropharyngeal edema, posterior oropharyngeal erythema or tonsillar abscesses. No tonsillar exudate.   Eyes: Pupils are equal, round, and reactive to light. Conjunctivae, EOM and lids are normal. No scleral icterus.   Sclera clear bilat   Neck: Trachea normal, full passive range of motion without pain and phonation normal. Neck supple.   Cardiovascular: Normal rate, regular rhythm, normal heart sounds and intact distal pulses.   Pulses:       Radial pulses are 2+ on the right side, and 2+ on the left side.   Pulmonary/Chest: Effort normal and breath sounds normal. No respiratory distress.   Musculoskeletal: Normal range of motion. He exhibits no edema or deformity.   Neurological: He is alert and oriented to person, place, and time. He has normal strength. Gait normal.   Skin: Skin is warm, dry and intact. Capillary refill takes less than 2 seconds. No rash noted. He is not diaphoretic. No pallor.   Psychiatric: He has a normal mood and affect. His speech is normal and behavior is normal. Judgment and thought content normal. Cognition and memory  are normal.   Nursing note and vitals reviewed.      Assessment:       1. Viral URI with cough        Plan:         Viral URI with cough  -     guaifenesin-codeine 100-10 mg/5 ml (TUSSI-ORGANIDIN NR)  mg/5 mL syrup; Take 5 mLs by mouth 3 (three) times daily as needed for Cough (Take at night may cause drowsiness for cough).  Dispense: 118 mL; Refill: 0  -     benzonatate (TESSALON PERLES) 100 MG capsule; Take 2 capsules (200 mg total) by mouth 3 (three) times daily as needed for Cough.  Dispense: 30 capsule; Refill: 0  -     fluticasone propionate (FLONASE) 50 mcg/actuation nasal spray; 2 sprays (100 mcg total) by Each Nostril route once daily.  Dispense: 9.9 mL; Refill: 0  -     loratadine (CLARITIN) 10 mg tablet; Take 1 tablet (10 mg total) by mouth once daily.  Dispense: 30 tablet; Refill: 0      Patient Instructions   You have been diagnosed with a viral syndrome. Viral syndromes are self-limited and usually resolve within 10 days from onset of symptoms. Antibiotics are not effective against viral infections. It is important that you get lots of rest and drink plenty of fluids. Treatment is through symptomatic relief.    Below are suggestions for symptomatic relief:   -Tylenol every 4 hours OR ibuprofen every 6 hours as needed for pain/fever.   -Salt water gargles to soothe throat pain.   -Chloroseptic spray also helps to numb throat pain.   -Nasal saline spray reduces inflammation and dryness.   -Warm face compresses to help with facial sinus pain/pressure.   -Vicks vapor rub at night.   -Flonase OTC or Nasacort OTC for nasal congestion.   -Simple foods like chicken noodle soup.   -Delsym helps with coughing at night   -Zyrtec/Claritin during the day & Benadryl at night may help with allergies.     If you DO NOT have Hypertension or any history of palpitations, it is ok to take over the counter Sudafed or Mucinex D or Allegra-D or Claritin-D or Zyrtec-D.  If you do take one of the above, it is ok to  combine that with plain over the counter Mucinex or Allegra or Claritin or Zyrtec. If, for example, you are taking Zyrtec -D, you can combine that with Mucinex, but not Mucinex-D.  If you are taking Mucinex-D, you can combine that with plain Allegra or Claritin or Zyrtec.   If you DO have Hypertension or palpitations, it is safe to take Coricidin HBP for relief of sinus symptoms.    Please return to clinic if symptoms have not subsided 10-14 days from onset, as your viral infection may have developed into a bacterial infection. It is at that time antibiotics would be indicated.       Please follow up with your primary care provider within 2-5 days if your signs and symptoms have not resolved or worsen.     If your condition worsens or fails to improve we recommend that you receive another evaluation at the emergency room immediately or contact your primary medical clinic to discuss your concerns.   You must understand that you have received an Urgent Care treatment only and that you may be released before all of your medical problems are known or treated. You, the patient, will arrange for follow up care as instructed.           Viral Upper Respiratory Illness (Adult)  You have a viral upper respiratory illness (URI), which is another term for the common cold. This illness is contagious during the first few days. It is spread through the air by coughing and sneezing. It may also be spread by direct contact (touching the sick person and then touching your own eyes, nose, or mouth). Frequent handwashing will decrease risk of spread. Most viral illnesses go away within 7 to 10 days with rest and simple home remedies. Sometimes the illness may last for several weeks. Antibiotics will not kill a virus, and they are generally not prescribed for this condition.    Home care  · If symptoms are severe, rest at home for the first 2 to 3 days. When you resume activity, don't let yourself get too tired.  · Avoid being exposed  to cigarette smoke (yours or others).  · You may use acetaminophen or ibuprofen to control pain and fever, unless another medicine was prescribed. (Note: If you have chronic liver or kidney disease, have ever had a stomach ulcer or gastrointestinal bleeding, or are taking blood-thinning medicines, talk with your healthcare provider before using these medicines.) Aspirin should never be given to anyone under 18 years of age who is ill with a viral infection or fever. It may cause severe liver or brain damage.  · Your appetite may be poor, so a light diet is fine. Avoid dehydration by drinking 6 to 8 glasses of fluids per day (water, soft drinks, juices, tea, or soup). Extra fluids will help loosen secretions in the nose and lungs.  · Over-the-counter cold medicines will not shorten the length of time youre sick, but they may be helpful for the following symptoms: cough, sore throat, and nasal and sinus congestion. (Note: Do not use decongestants if you have high blood pressure.)  Follow-up care  Follow up with your healthcare provider, or as advised.  When to seek medical advice  Call your healthcare provider right away if any of these occur:  · Cough with lots of colored sputum (mucus)  · Severe headache; face, neck, or ear pain  · Difficulty swallowing due to throat pain  · Fever of 100.4°F (38°C)  Call 911, or get immediate medical care  Call emergency services right away if any of these occur:  · Chest pain, shortness of breath, wheezing, or difficulty breathing  · Coughing up blood  · Inability to swallow due to throat pain  Date Last Reviewed: 9/13/2015 © 2000-2017 [employer]. [address] [place] [address], [place] [address]. All rights reserved. This information is not intended as a substitute for professional medical care. Always follow your healthcare professional's instructions.

## 2019-08-30 DIAGNOSIS — J06.9 VIRAL URI WITH COUGH: ICD-10-CM

## 2019-08-30 RX ORDER — TIZANIDINE 4 MG/1
TABLET ORAL
Qty: 118 ML | OUTPATIENT
Start: 2019-08-30

## 2019-09-12 RX ORDER — SITAGLIPTIN 100 MG/1
TABLET, FILM COATED ORAL
Qty: 30 TABLET | Refills: 0 | Status: SHIPPED | OUTPATIENT
Start: 2019-09-12 | End: 2019-10-10 | Stop reason: SDUPTHER

## 2019-09-26 RX ORDER — FINASTERIDE 5 MG/1
TABLET, FILM COATED ORAL
Qty: 90 TABLET | Refills: 3 | Status: SHIPPED | OUTPATIENT
Start: 2019-09-26 | End: 2020-10-13 | Stop reason: SDUPTHER

## 2019-10-10 RX ORDER — SITAGLIPTIN 100 MG/1
TABLET, FILM COATED ORAL
Qty: 30 TABLET | Refills: 0 | Status: SHIPPED | OUTPATIENT
Start: 2019-10-10 | End: 2019-11-18 | Stop reason: SDUPTHER

## 2019-10-25 ENCOUNTER — LAB VISIT (OUTPATIENT)
Dept: LAB | Facility: HOSPITAL | Age: 74
End: 2019-10-25
Attending: INTERNAL MEDICINE
Payer: MEDICARE

## 2019-10-25 DIAGNOSIS — E11.9 TYPE 2 DIABETES MELLITUS WITHOUT COMPLICATION: ICD-10-CM

## 2019-10-25 LAB
ESTIMATED AVG GLUCOSE: 192 MG/DL (ref 68–131)
HBA1C MFR BLD HPLC: 8.3 % (ref 4–5.6)

## 2019-10-25 PROCEDURE — 83036 HEMOGLOBIN GLYCOSYLATED A1C: CPT

## 2019-10-25 PROCEDURE — 36415 COLL VENOUS BLD VENIPUNCTURE: CPT | Mod: PO

## 2019-11-19 RX ORDER — SITAGLIPTIN 100 MG/1
TABLET, FILM COATED ORAL
Qty: 30 TABLET | Refills: 0 | Status: SHIPPED | OUTPATIENT
Start: 2019-11-19 | End: 2019-12-09 | Stop reason: SDUPTHER

## 2019-11-22 DIAGNOSIS — F41.9 ANXIETY: ICD-10-CM

## 2019-11-22 RX ORDER — LORAZEPAM 0.5 MG/1
TABLET ORAL
Qty: 20 TABLET | Refills: 4 | OUTPATIENT
Start: 2019-11-22

## 2019-11-23 DIAGNOSIS — F41.9 ANXIETY: ICD-10-CM

## 2019-11-25 RX ORDER — LORAZEPAM 0.5 MG/1
TABLET ORAL
Qty: 20 TABLET | Refills: 0 | Status: SHIPPED | OUTPATIENT
Start: 2019-11-25 | End: 2019-12-09 | Stop reason: SDUPTHER

## 2019-12-09 ENCOUNTER — OFFICE VISIT (OUTPATIENT)
Dept: FAMILY MEDICINE | Facility: CLINIC | Age: 74
End: 2019-12-09
Payer: MEDICARE

## 2019-12-09 VITALS
BODY MASS INDEX: 27.87 KG/M2 | HEART RATE: 91 BPM | DIASTOLIC BLOOD PRESSURE: 70 MMHG | SYSTOLIC BLOOD PRESSURE: 108 MMHG | OXYGEN SATURATION: 93 % | TEMPERATURE: 99 F | HEIGHT: 70 IN | WEIGHT: 194.69 LBS

## 2019-12-09 DIAGNOSIS — F39 MOOD DISORDER: ICD-10-CM

## 2019-12-09 DIAGNOSIS — F13.20 BENZODIAZEPINE DEPENDENCE: ICD-10-CM

## 2019-12-09 DIAGNOSIS — F41.9 ANXIETY: ICD-10-CM

## 2019-12-09 DIAGNOSIS — I10 ESSENTIAL HYPERTENSION: ICD-10-CM

## 2019-12-09 PROCEDURE — 1126F PR PAIN SEVERITY QUANTIFIED, NO PAIN PRESENT: ICD-10-PCS | Mod: S$GLB,,, | Performed by: INTERNAL MEDICINE

## 2019-12-09 PROCEDURE — 99499 RISK ADDL DX/OHS AUDIT: ICD-10-PCS | Mod: S$GLB,,, | Performed by: INTERNAL MEDICINE

## 2019-12-09 PROCEDURE — 1159F MED LIST DOCD IN RCRD: CPT | Mod: S$GLB,,, | Performed by: INTERNAL MEDICINE

## 2019-12-09 PROCEDURE — 99499 UNLISTED E&M SERVICE: CPT | Mod: S$GLB,,, | Performed by: INTERNAL MEDICINE

## 2019-12-09 PROCEDURE — 1126F AMNT PAIN NOTED NONE PRSNT: CPT | Mod: S$GLB,,, | Performed by: INTERNAL MEDICINE

## 2019-12-09 PROCEDURE — 1101F PR PT FALLS ASSESS DOC 0-1 FALLS W/OUT INJ PAST YR: ICD-10-PCS | Mod: CPTII,S$GLB,, | Performed by: INTERNAL MEDICINE

## 2019-12-09 PROCEDURE — 3078F PR MOST RECENT DIASTOLIC BLOOD PRESSURE < 80 MM HG: ICD-10-PCS | Mod: CPTII,S$GLB,, | Performed by: INTERNAL MEDICINE

## 2019-12-09 PROCEDURE — 3074F PR MOST RECENT SYSTOLIC BLOOD PRESSURE < 130 MM HG: ICD-10-PCS | Mod: CPTII,S$GLB,, | Performed by: INTERNAL MEDICINE

## 2019-12-09 PROCEDURE — 3078F DIAST BP <80 MM HG: CPT | Mod: CPTII,S$GLB,, | Performed by: INTERNAL MEDICINE

## 2019-12-09 PROCEDURE — 99214 OFFICE O/P EST MOD 30 MIN: CPT | Mod: S$GLB,,, | Performed by: INTERNAL MEDICINE

## 2019-12-09 PROCEDURE — 1101F PT FALLS ASSESS-DOCD LE1/YR: CPT | Mod: CPTII,S$GLB,, | Performed by: INTERNAL MEDICINE

## 2019-12-09 PROCEDURE — 3074F SYST BP LT 130 MM HG: CPT | Mod: CPTII,S$GLB,, | Performed by: INTERNAL MEDICINE

## 2019-12-09 PROCEDURE — 1159F PR MEDICATION LIST DOCUMENTED IN MEDICAL RECORD: ICD-10-PCS | Mod: S$GLB,,, | Performed by: INTERNAL MEDICINE

## 2019-12-09 PROCEDURE — 99214 PR OFFICE/OUTPT VISIT, EST, LEVL IV, 30-39 MIN: ICD-10-PCS | Mod: S$GLB,,, | Performed by: INTERNAL MEDICINE

## 2019-12-09 PROCEDURE — 99999 PR PBB SHADOW E&M-EST. PATIENT-LVL III: ICD-10-PCS | Mod: PBBFAC,,, | Performed by: INTERNAL MEDICINE

## 2019-12-09 PROCEDURE — 99999 PR PBB SHADOW E&M-EST. PATIENT-LVL III: CPT | Mod: PBBFAC,,, | Performed by: INTERNAL MEDICINE

## 2019-12-09 RX ORDER — LORAZEPAM 0.5 MG/1
TABLET ORAL
Qty: 20 TABLET | Refills: 4 | Status: SHIPPED | OUTPATIENT
Start: 2019-12-09 | End: 2020-01-02 | Stop reason: SDUPTHER

## 2019-12-09 RX ORDER — PIOGLITAZONEHYDROCHLORIDE 15 MG/1
15 TABLET ORAL NIGHTLY
Qty: 90 TABLET | Refills: 3 | Status: SHIPPED | OUTPATIENT
Start: 2019-12-09 | End: 2020-03-03 | Stop reason: SDUPTHER

## 2020-01-02 DIAGNOSIS — F41.9 ANXIETY: ICD-10-CM

## 2020-01-03 RX ORDER — LORAZEPAM 0.5 MG/1
TABLET ORAL
Qty: 20 TABLET | Refills: 4 | Status: SHIPPED | OUTPATIENT
Start: 2020-01-03 | End: 2020-08-21 | Stop reason: SDUPTHER

## 2020-02-06 ENCOUNTER — HOSPITAL ENCOUNTER (OUTPATIENT)
Dept: RADIOLOGY | Facility: HOSPITAL | Age: 75
Discharge: HOME | End: 2020-02-06
Attending: NURSE PRACTITIONER
Payer: MEDICARE

## 2020-02-06 ENCOUNTER — OFFICE VISIT (OUTPATIENT)
Dept: FAMILY MEDICINE | Facility: CLINIC | Age: 75
End: 2020-02-06
Payer: MEDICARE

## 2020-02-06 ENCOUNTER — HOSPITAL ENCOUNTER (EMERGENCY)
Facility: HOSPITAL | Age: 75
Discharge: HOME | End: 2020-02-06
Attending: EMERGENCY MEDICINE
Payer: MEDICARE

## 2020-02-06 VITALS
TEMPERATURE: 98 F | OXYGEN SATURATION: 95 % | WEIGHT: 195 LBS | HEART RATE: 72 BPM | BODY MASS INDEX: 27.92 KG/M2 | RESPIRATION RATE: 15 BRPM | HEIGHT: 70 IN | SYSTOLIC BLOOD PRESSURE: 165 MMHG | DIASTOLIC BLOOD PRESSURE: 76 MMHG

## 2020-02-06 VITALS
BODY MASS INDEX: 28.36 KG/M2 | OXYGEN SATURATION: 97 % | SYSTOLIC BLOOD PRESSURE: 126 MMHG | WEIGHT: 198.06 LBS | RESPIRATION RATE: 14 BRPM | HEART RATE: 77 BPM | HEIGHT: 70 IN | TEMPERATURE: 98 F | DIASTOLIC BLOOD PRESSURE: 72 MMHG

## 2020-02-06 DIAGNOSIS — R31.9 HEMATURIA, UNSPECIFIED TYPE: ICD-10-CM

## 2020-02-06 DIAGNOSIS — R10.9 FLANK PAIN: ICD-10-CM

## 2020-02-06 DIAGNOSIS — M46.92 UNSPECIFIED INFLAMMATORY SPONDYLOPATHY, CERVICAL REGION: ICD-10-CM

## 2020-02-06 DIAGNOSIS — M54.9 BACK PAIN, UNSPECIFIED BACK LOCATION, UNSPECIFIED BACK PAIN LATERALITY, UNSPECIFIED CHRONICITY: ICD-10-CM

## 2020-02-06 DIAGNOSIS — R52 PAIN: ICD-10-CM

## 2020-02-06 DIAGNOSIS — R10.31 RLQ ABDOMINAL PAIN: Primary | ICD-10-CM

## 2020-02-06 DIAGNOSIS — F39 MOOD DISORDER: ICD-10-CM

## 2020-02-06 DIAGNOSIS — N39.0 URINARY TRACT INFECTION WITHOUT HEMATURIA, SITE UNSPECIFIED: ICD-10-CM

## 2020-02-06 DIAGNOSIS — F13.20 BENZODIAZEPINE DEPENDENCE: ICD-10-CM

## 2020-02-06 DIAGNOSIS — Z87.442 HISTORY OF URINARY CALCULI: Primary | ICD-10-CM

## 2020-02-06 LAB
ALBUMIN SERPL BCP-MCNC: 4.1 G/DL (ref 3.5–5.2)
ALP SERPL-CCNC: 66 U/L (ref 55–135)
ALT SERPL W/O P-5'-P-CCNC: 20 U/L (ref 10–44)
ANION GAP SERPL CALC-SCNC: 7 MMOL/L (ref 8–16)
AST SERPL-CCNC: 21 U/L (ref 10–40)
BACTERIA #/AREA URNS HPF: ABNORMAL /HPF
BASOPHILS # BLD AUTO: 0.02 K/UL (ref 0–0.2)
BASOPHILS NFR BLD: 0.3 % (ref 0–1.9)
BILIRUB SERPL-MCNC: 0.7 MG/DL (ref 0.1–1)
BILIRUB SERPL-MCNC: NORMAL MG/DL
BILIRUB UR QL STRIP: NEGATIVE
BLOOD URINE, POC: NORMAL
BUN SERPL-MCNC: 17 MG/DL (ref 8–23)
CALCIUM SERPL-MCNC: 9.6 MG/DL (ref 8.7–10.5)
CHLORIDE SERPL-SCNC: 105 MMOL/L (ref 95–110)
CLARITY UR: ABNORMAL
CO2 SERPL-SCNC: 26 MMOL/L (ref 23–29)
COLOR UR: YELLOW
COLOR, POC UA: NORMAL
CREAT SERPL-MCNC: 1.2 MG/DL (ref 0.5–1.4)
DIFFERENTIAL METHOD: ABNORMAL
EOSINOPHIL # BLD AUTO: 0.1 K/UL (ref 0–0.5)
EOSINOPHIL NFR BLD: 1.9 % (ref 0–8)
ERYTHROCYTE [DISTWIDTH] IN BLOOD BY AUTOMATED COUNT: 14.3 % (ref 11.5–14.5)
EST. GFR  (AFRICAN AMERICAN): >60 ML/MIN/1.73 M^2
EST. GFR  (NON AFRICAN AMERICAN): 59 ML/MIN/1.73 M^2
GLUCOSE SERPL-MCNC: 162 MG/DL (ref 70–110)
GLUCOSE UR QL STRIP: 500
GLUCOSE UR QL STRIP: NEGATIVE
HCT VFR BLD AUTO: 37.3 % (ref 40–54)
HGB BLD-MCNC: 11.9 G/DL (ref 14–18)
HGB UR QL STRIP: NEGATIVE
HYALINE CASTS #/AREA URNS LPF: 0 /LPF
IMM GRANULOCYTES # BLD AUTO: 0.02 K/UL (ref 0–0.04)
IMM GRANULOCYTES NFR BLD AUTO: 0.3 % (ref 0–0.5)
KETONES UR QL STRIP: NEGATIVE
KETONES UR QL STRIP: NORMAL
LACTATE SERPL-SCNC: 1.4 MMOL/L (ref 0.5–2.2)
LEUKOCYTE ESTERASE UR QL STRIP: ABNORMAL
LEUKOCYTE ESTERASE URINE, POC: NORMAL
LYMPHOCYTES # BLD AUTO: 1.1 K/UL (ref 1–4.8)
LYMPHOCYTES NFR BLD: 16.3 % (ref 18–48)
MCH RBC QN AUTO: 29.5 PG (ref 27–31)
MCHC RBC AUTO-ENTMCNC: 31.9 G/DL (ref 32–36)
MCV RBC AUTO: 92 FL (ref 82–98)
MICROSCOPIC COMMENT: ABNORMAL
MONOCYTES # BLD AUTO: 0.5 K/UL (ref 0.3–1)
MONOCYTES NFR BLD: 7.4 % (ref 4–15)
NEUTROPHILS # BLD AUTO: 5 K/UL (ref 1.8–7.7)
NEUTROPHILS NFR BLD: 73.8 % (ref 38–73)
NITRITE UR QL STRIP: NEGATIVE
NITRITE, POC UA: NORMAL
NRBC BLD-RTO: 0 /100 WBC
PH UR STRIP: 5 [PH] (ref 5–8)
PH, POC UA: 5
PLATELET # BLD AUTO: 156 K/UL (ref 150–350)
PMV BLD AUTO: 10.8 FL (ref 9.2–12.9)
POTASSIUM SERPL-SCNC: 4 MMOL/L (ref 3.5–5.1)
PROT SERPL-MCNC: 7.7 G/DL (ref 6–8.4)
PROT UR QL STRIP: ABNORMAL
PROTEIN, POC: NORMAL
RBC # BLD AUTO: 4.04 M/UL (ref 4.6–6.2)
RBC #/AREA URNS HPF: 0 /HPF (ref 0–4)
SODIUM SERPL-SCNC: 138 MMOL/L (ref 136–145)
SP GR UR STRIP: 1.02 (ref 1–1.03)
SPECIFIC GRAVITY, POC UA: 1.02
SQUAMOUS #/AREA URNS HPF: 5 /HPF
URN SPEC COLLECT METH UR: ABNORMAL
UROBILINOGEN UR STRIP-ACNC: NEGATIVE EU/DL
UROBILINOGEN, POC UA: NORMAL
WBC # BLD AUTO: 6.73 K/UL (ref 3.9–12.7)
WBC #/AREA URNS HPF: 70 /HPF (ref 0–5)

## 2020-02-06 PROCEDURE — 87077 CULTURE AEROBIC IDENTIFY: CPT

## 2020-02-06 PROCEDURE — 99999 PR PBB SHADOW E&M-EST. PATIENT-LVL V: ICD-10-PCS | Mod: PBBFAC,,, | Performed by: NURSE PRACTITIONER

## 2020-02-06 PROCEDURE — 87088 URINE BACTERIA CULTURE: CPT

## 2020-02-06 PROCEDURE — 96361 HYDRATE IV INFUSION ADD-ON: CPT

## 2020-02-06 PROCEDURE — 93005 ELECTROCARDIOGRAM TRACING: CPT

## 2020-02-06 PROCEDURE — 99999 PR PBB SHADOW E&M-EST. PATIENT-LVL V: CPT | Mod: PBBFAC,,, | Performed by: NURSE PRACTITIONER

## 2020-02-06 PROCEDURE — 81001 URINALYSIS AUTO W/SCOPE: CPT | Mod: S$GLB,,, | Performed by: NURSE PRACTITIONER

## 2020-02-06 PROCEDURE — 74176 CT ABD & PELVIS W/O CONTRAST: CPT | Mod: TC

## 2020-02-06 PROCEDURE — 3074F PR MOST RECENT SYSTOLIC BLOOD PRESSURE < 130 MM HG: ICD-10-PCS | Mod: CPTII,S$GLB,, | Performed by: NURSE PRACTITIONER

## 2020-02-06 PROCEDURE — 99214 PR OFFICE/OUTPT VISIT, EST, LEVL IV, 30-39 MIN: ICD-10-PCS | Mod: 25,S$GLB,, | Performed by: NURSE PRACTITIONER

## 2020-02-06 PROCEDURE — 99499 UNLISTED E&M SERVICE: CPT | Mod: S$GLB,,, | Performed by: NURSE PRACTITIONER

## 2020-02-06 PROCEDURE — 63600175 PHARM REV CODE 636 W HCPCS: Performed by: PHYSICIAN ASSISTANT

## 2020-02-06 PROCEDURE — 1159F MED LIST DOCD IN RCRD: CPT | Mod: S$GLB,,, | Performed by: NURSE PRACTITIONER

## 2020-02-06 PROCEDURE — 3078F DIAST BP <80 MM HG: CPT | Mod: CPTII,S$GLB,, | Performed by: NURSE PRACTITIONER

## 2020-02-06 PROCEDURE — 74176 CT ABDOMEN PELVIS WITHOUT CONTRAST: ICD-10-PCS | Mod: 26,,, | Performed by: RADIOLOGY

## 2020-02-06 PROCEDURE — 63600175 PHARM REV CODE 636 W HCPCS: Performed by: NURSE PRACTITIONER

## 2020-02-06 PROCEDURE — 93010 ELECTROCARDIOGRAM REPORT: CPT | Mod: ,,, | Performed by: INTERNAL MEDICINE

## 2020-02-06 PROCEDURE — 3052F PR MOST RECENT HEMOGLOBIN A1C LEVEL 8.0 - < 9.0%: ICD-10-PCS | Mod: CPTII,S$GLB,, | Performed by: NURSE PRACTITIONER

## 2020-02-06 PROCEDURE — 87086 URINE CULTURE/COLONY COUNT: CPT

## 2020-02-06 PROCEDURE — 25000003 PHARM REV CODE 250: Performed by: PHYSICIAN ASSISTANT

## 2020-02-06 PROCEDURE — 3078F PR MOST RECENT DIASTOLIC BLOOD PRESSURE < 80 MM HG: ICD-10-PCS | Mod: CPTII,S$GLB,, | Performed by: NURSE PRACTITIONER

## 2020-02-06 PROCEDURE — 3074F SYST BP LT 130 MM HG: CPT | Mod: CPTII,S$GLB,, | Performed by: NURSE PRACTITIONER

## 2020-02-06 PROCEDURE — 80053 COMPREHEN METABOLIC PANEL: CPT

## 2020-02-06 PROCEDURE — 99214 OFFICE O/P EST MOD 30 MIN: CPT | Mod: 25,S$GLB,, | Performed by: NURSE PRACTITIONER

## 2020-02-06 PROCEDURE — 99284 EMERGENCY DEPT VISIT MOD MDM: CPT | Mod: 25

## 2020-02-06 PROCEDURE — 85025 COMPLETE CBC W/AUTO DIFF WBC: CPT

## 2020-02-06 PROCEDURE — 1159F PR MEDICATION LIST DOCUMENTED IN MEDICAL RECORD: ICD-10-PCS | Mod: S$GLB,,, | Performed by: NURSE PRACTITIONER

## 2020-02-06 PROCEDURE — 1101F PR PT FALLS ASSESS DOC 0-1 FALLS W/OUT INJ PAST YR: ICD-10-PCS | Mod: CPTII,S$GLB,, | Performed by: NURSE PRACTITIONER

## 2020-02-06 PROCEDURE — 1125F AMNT PAIN NOTED PAIN PRSNT: CPT | Mod: S$GLB,,, | Performed by: NURSE PRACTITIONER

## 2020-02-06 PROCEDURE — 83605 ASSAY OF LACTIC ACID: CPT

## 2020-02-06 PROCEDURE — 99499 RISK ADDL DX/OHS AUDIT: ICD-10-PCS | Mod: S$GLB,,, | Performed by: NURSE PRACTITIONER

## 2020-02-06 PROCEDURE — 81000 URINALYSIS NONAUTO W/SCOPE: CPT

## 2020-02-06 PROCEDURE — 1125F PR PAIN SEVERITY QUANTIFIED, PAIN PRESENT: ICD-10-PCS | Mod: S$GLB,,, | Performed by: NURSE PRACTITIONER

## 2020-02-06 PROCEDURE — 87186 SC STD MICRODIL/AGAR DIL: CPT

## 2020-02-06 PROCEDURE — 1101F PT FALLS ASSESS-DOCD LE1/YR: CPT | Mod: CPTII,S$GLB,, | Performed by: NURSE PRACTITIONER

## 2020-02-06 PROCEDURE — 3052F HG A1C>EQUAL 8.0%<EQUAL 9.0%: CPT | Mod: CPTII,S$GLB,, | Performed by: NURSE PRACTITIONER

## 2020-02-06 PROCEDURE — 96374 THER/PROPH/DIAG INJ IV PUSH: CPT

## 2020-02-06 PROCEDURE — 93010 EKG 12-LEAD: ICD-10-PCS | Mod: ,,, | Performed by: INTERNAL MEDICINE

## 2020-02-06 PROCEDURE — 96376 TX/PRO/DX INJ SAME DRUG ADON: CPT

## 2020-02-06 PROCEDURE — 74176 CT ABD & PELVIS W/O CONTRAST: CPT | Mod: 26,,, | Performed by: RADIOLOGY

## 2020-02-06 PROCEDURE — 81001 POCT URINALYSIS, DIPSTICK OR TABLET REAGENT, AUTOMATED, WITH MICROSCOP: ICD-10-PCS | Mod: S$GLB,,, | Performed by: NURSE PRACTITIONER

## 2020-02-06 RX ORDER — CIPROFLOXACIN 500 MG/1
500 TABLET ORAL
Status: COMPLETED | OUTPATIENT
Start: 2020-02-06 | End: 2020-02-06

## 2020-02-06 RX ORDER — OXYCODONE AND ACETAMINOPHEN 5; 325 MG/1; MG/1
1 TABLET ORAL EVERY 4 HOURS PRN
Qty: 10 TABLET | Refills: 0 | Status: ON HOLD | OUTPATIENT
Start: 2020-02-06 | End: 2020-05-14 | Stop reason: SDUPTHER

## 2020-02-06 RX ORDER — CIPROFLOXACIN 500 MG/1
500 TABLET ORAL 2 TIMES DAILY
Qty: 20 TABLET | Refills: 0 | Status: SHIPPED | OUTPATIENT
Start: 2020-02-06 | End: 2020-02-09 | Stop reason: ALTCHOICE

## 2020-02-06 RX ORDER — CIPROFLOXACIN 2 MG/ML
400 INJECTION, SOLUTION INTRAVENOUS
Status: DISCONTINUED | OUTPATIENT
Start: 2020-02-06 | End: 2020-02-06

## 2020-02-06 RX ORDER — METRONIDAZOLE 500 MG/1
500 TABLET ORAL 3 TIMES DAILY
Qty: 30 TABLET | Refills: 0 | Status: SHIPPED | OUTPATIENT
Start: 2020-02-06 | End: 2020-02-16

## 2020-02-06 RX ORDER — METRONIDAZOLE 500 MG/100ML
500 INJECTION, SOLUTION INTRAVENOUS
Status: DISCONTINUED | OUTPATIENT
Start: 2020-02-06 | End: 2020-02-06

## 2020-02-06 RX ORDER — CIPROFLOXACIN 500 MG/1
500 TABLET ORAL 2 TIMES DAILY
Qty: 20 TABLET | Refills: 0 | Status: SHIPPED | OUTPATIENT
Start: 2020-02-06 | End: 2020-02-06 | Stop reason: SDUPTHER

## 2020-02-06 RX ORDER — METRONIDAZOLE 500 MG/1
500 TABLET ORAL EVERY 12 HOURS
Qty: 14 TABLET | Refills: 0 | Status: SHIPPED | OUTPATIENT
Start: 2020-02-06 | End: 2020-02-06 | Stop reason: SDUPTHER

## 2020-02-06 RX ORDER — HYDROMORPHONE HYDROCHLORIDE 2 MG/ML
0.5 INJECTION, SOLUTION INTRAMUSCULAR; INTRAVENOUS; SUBCUTANEOUS
Status: COMPLETED | OUTPATIENT
Start: 2020-02-06 | End: 2020-02-06

## 2020-02-06 RX ORDER — METRONIDAZOLE 500 MG/1
500 TABLET ORAL
Status: COMPLETED | OUTPATIENT
Start: 2020-02-06 | End: 2020-02-06

## 2020-02-06 RX ADMIN — HYDROMORPHONE HYDROCHLORIDE 0.5 MG: 2 INJECTION, SOLUTION INTRAMUSCULAR; INTRAVENOUS; SUBCUTANEOUS at 04:02

## 2020-02-06 RX ADMIN — HYDROMORPHONE HYDROCHLORIDE 0.5 MG: 2 INJECTION, SOLUTION INTRAMUSCULAR; INTRAVENOUS; SUBCUTANEOUS at 06:02

## 2020-02-06 RX ADMIN — CIPROFLOXACIN HYDROCHLORIDE 500 MG: 500 TABLET, FILM COATED ORAL at 05:02

## 2020-02-06 RX ADMIN — METRONIDAZOLE 500 MG: 500 TABLET ORAL at 05:02

## 2020-02-06 RX ADMIN — SODIUM CHLORIDE 1000 ML: 0.9 INJECTION, SOLUTION INTRAVENOUS at 04:02

## 2020-02-06 NOTE — PROGRESS NOTES
Subjective:       Patient ID: Ruben Macedo Jr. is a 74 y.o. male.    Chief Complaint: Abdominal Pain (right side, for past two days)    74-year-old male presents to the clinic today with complaint of right flank pain radiating to right lower quadrant of abdomen for the past 2 days.  He states he feels like he is about to pass a kidney stone.  He has had several kidney stones in the past and symptoms are similar.  He has a mild upset stomach.  He denies any fever, chills, dysuria, urinary frequency, urinary urgency, vomiting, or diarrhea.  He ate only a very small amount this morning.  He has had to have a stent in the past due to a kidney stone.  His urologist is no longer with [facility].  He has had an appendectomy in the past.    Abdominal Pain   This is a new problem. The current episode started yesterday. The onset quality is gradual. The problem occurs intermittently. The most recent episode lasted 5 hours. The problem has been waxing and waning. The pain is located in the right flank. The pain is at a severity of 6/10. The pain is moderate. The quality of the pain is sharp. The abdominal pain radiates to the back. Associated symptoms include hematuria and nausea. Pertinent negatives include no anorexia, arthralgias, constipation, diarrhea, dysuria, fever, flatus, frequency, headaches, hematochezia, melena, myalgias, vomiting or weight loss. The pain is aggravated by coughing. The pain is relieved by certain positions and movement. He has tried acetaminophen for the symptoms. The treatment provided mild relief. Patient's medical history includes kidney stones.     Past Medical History:   Diagnosis Date    Anxiety     BPH (benign prostatic hypertrophy) 2/2/2015    Cataract     Coronary artery disease     Diabetes mellitus, type 2     History of colonic polyps 4/11/2017 2/13 --3 yrs    History of MI (myocardial infarction) 2005    Followed by cardiology, Dr. Acevedo (at [facility])     History of stroke November 2005    Hyperlipidemia     Hypertension     Incomplete bladder emptying 2/2/2015    Overweight(278.02)     Personal history of kidney stones     Renal cyst 2/10/2015     Past Surgical History:   Procedure Laterality Date    APPENDECTOMY      BACK SURGERY      CATARACT EXTRACTION      od    CORONARY ANGIOPLASTY WITH STENT PLACEMENT  2000    CYSTOSCOPY W/ URETERAL STENT PLACEMENT      stents placed x2    EYE SURGERY      cataract right eye    SPINE SURGERY      TONSILLECTOMY        reports that he has never smoked. He has never used smokeless tobacco. He reports that he drinks alcohol. He reports that he does not use drugs.  Review of Systems   Constitutional: Negative for chills, fever and weight loss.   Gastrointestinal: Positive for abdominal pain and nausea. Negative for anorexia, constipation, diarrhea, flatus, hematochezia, melena and vomiting.   Genitourinary: Positive for flank pain and hematuria. Negative for difficulty urinating, dysuria and frequency.   Musculoskeletal: Negative for arthralgias, gait problem and myalgias.   Neurological: Negative for headaches.       Objective:      Physical Exam   Constitutional: He is oriented to person, place, and time. He appears well-developed and well-nourished. No distress.   Eyes: Pupils are equal, round, and reactive to light. Conjunctivae and EOM are normal. Right eye exhibits no discharge. Left eye exhibits no discharge. No scleral icterus.   Neck: Normal range of motion. Neck supple.   Cardiovascular: Normal rate and regular rhythm. Exam reveals no gallop and no friction rub.   No murmur heard.  Pulmonary/Chest: Effort normal and breath sounds normal. No respiratory distress. He has no wheezes. He has no rales.   Abdominal: Soft. Bowel sounds are normal. There is tenderness.   Mild right sided lower abdominal tenderness no rebound or guarding noted mild right sided CVA tenderness noted    Genitourinary:   Genitourinary  Comments: No CVA tendereness   Musculoskeletal: Normal range of motion. He exhibits no edema.   Neurological: He is alert and oriented to person, place, and time.   Skin: Skin is warm and dry. He is not diaphoretic.   Psychiatric: He has a normal mood and affect.       Assessment:       1. History of urinary calculi    2. Back pain, unspecified back location, unspecified back pain laterality, unspecified chronicity    3. Uncontrolled type 2 diabetes mellitus with microalbuminuria, without long-term current use of insulin    4. Unspecified inflammatory spondylopathy, cervical region    5. Benzodiazepine dependence    6. Mood disorder    7. Hematuria, unspecified type    8. Flank pain        Plan:         History of urinary calculi  -     POCT urinalysis, dipstick or tablet reag  - UA 50 RBC's     Back pain, unspecified back location, unspecified back pain laterality, unspecified chronicity  -     POCT urinalysis, dipstick or tablet reag    Uncontrolled type 2 diabetes mellitus with microalbuminuria, without long-term current use of insulin  - continue current medications     Unspecified inflammatory spondylopathy, cervical region  - The current medical regimen is effective;  continue present plan and medications.    Benzodiazepine dependence  - The current medical regimen is effective;  continue present plan and medications.    Mood disorder  - The current medical regimen is effective;  continue present plan and medications.    Hematuria, unspecified type  - will check CT abdomen pelvis renal protocol     Flank pain  -     CT Abdomen Pelvis  Without Contrast; Future; Expected date: 02/06/2020    - I spoke to the patient about his abnormal CT scan and it was very important that he go to the ER now for further evaluation. He is agreeable to go now for further evaluation. I spoke with one of the PA at the ER and report was given.

## 2020-02-06 NOTE — ED TRIAGE NOTES
Pt presents to ED with right abd pain x 2 days. Reports seeing PCP today and having CT done. Pt reports receiving a call to come into ED for further eval due to abnormal CT. Denies n/v/d, weakness, dizziness, or chest pain. NAD noted. Pt AAOx 4.

## 2020-02-06 NOTE — ED PROVIDER NOTES
Encounter Date: 2/6/2020          History     Chief Complaint   Patient presents with    Abdominal Pain     right abd pains x 2days.   had CT done today and md told to come here ? bowel infarction      Chief Complaint:  Abdominal pain  History of  Present Illness: History obtained from patient. This 74 y.o. male who has past medical history of kidney stones, MI, CVA presents to the ED complaining of right lower quadrant abdominal pain intermittently for the last 2 days with pain radiating around to his right flank.  He reports associated dysuria and urinary frequency.  Pain is currently 6/10.  Attempted treatment with Tylenol without relief.  Denies nausea, vomiting, diarrhea, constipation, chest pain, shortness of breath, fever, chills. Patient states she was evaluated by his primary care doctor today and CT.  Patient states he receive CT results and was instructed to go to the ED for further evaluation.    The history is provided by the patient.       Review of patient's allergies indicates:   Allergen Reactions    Morphine Nausea And Vomiting    Metformin Itching    Tramadol Nausea And Vomiting     Past Medical History:   Diagnosis Date    Anxiety     BPH (benign prostatic hypertrophy) 2/2/2015    Cataract     Coronary artery disease     Diabetes mellitus, type 2     History of colonic polyps 4/11/2017    2/13 --3 yrs    History of MI (myocardial infarction) 2005    Followed by cardiology, Dr[facility] Acevedo (at [facility])    History of stroke November 2005    Hyperlipidemia     Hypertension     Incomplete bladder emptying 2/2/2015    Overweight(278.02)     Personal history of kidney stones     Renal cyst 2/10/2015     Past Surgical History:   Procedure Laterality Date    APPENDECTOMY      BACK SURGERY      CATARACT EXTRACTION      od    CORONARY ANGIOPLASTY WITH STENT PLACEMENT  2000    CYSTOSCOPY W/ URETERAL STENT PLACEMENT      stents placed x2    EYE SURGERY      cataract  right eye    SPINE SURGERY      TONSILLECTOMY       Family History   Problem Relation Age of Onset    Hyperlipidemia Father     Cancer Father     Cataracts Father     Cataracts Mother     Hyperlipidemia Sister     Alzheimer's disease Unknown     Diabetes Neg Hx     Amblyopia Neg Hx     Blindness Neg Hx     Glaucoma Neg Hx     Hypertension Neg Hx     Macular degeneration Neg Hx     Retinal detachment Neg Hx     Strabismus Neg Hx     Stroke Neg Hx     Thyroid disease Neg Hx      Social History     Tobacco Use    Smoking status: Never Smoker    Smokeless tobacco: Never Used   Substance Use Topics    Alcohol use: Not Currently     Frequency: 2-4 times a month     Drinks per session: 1 or 2     Binge frequency: Never     Comment: occasional    Drug use: No     Review of Systems   Constitutional: Negative for chills and fever.   HENT: Negative for congestion, rhinorrhea and sore throat.    Eyes: Negative for visual disturbance.   Respiratory: Negative for cough and shortness of breath.    Cardiovascular: Negative for chest pain.   Gastrointestinal: Positive for abdominal pain. Negative for diarrhea, nausea and vomiting.   Genitourinary: Positive for dysuria and flank pain. Negative for frequency and hematuria.   Musculoskeletal: Negative for back pain.   Skin: Negative for rash.   Neurological: Negative for dizziness, weakness and headaches.       Physical Exam     Initial Vitals [02/06/20 1537]   BP Pulse Resp Temp SpO2   (!) 144/73 76 16 97.7 °F (36.5 °C) 96 %      MAP       --         Physical Exam    Nursing note and vitals reviewed.  Constitutional: He appears well-developed and well-nourished. No distress.   HENT:   Head: Normocephalic and atraumatic.   Right Ear: Tympanic membrane normal.   Left Ear: Tympanic membrane normal.   Nose: Nose normal.   Mouth/Throat: Uvula is midline, oropharynx is clear and moist and mucous membranes are normal.   Eyes: EOM are normal. Pupils are equal, round, and  reactive to light.   Neck: Normal range of motion. Neck supple.   Cardiovascular: Normal rate, regular rhythm and normal heart sounds. Exam reveals no gallop and no friction rub.    No murmur heard.  Pulmonary/Chest: Effort normal and breath sounds normal. No respiratory distress. He has no wheezes. He has no rhonchi. He has no rales.   Abdominal: Soft. Bowel sounds are normal. He exhibits no mass. There is tenderness in the right lower quadrant. There is no rigidity, no rebound, no guarding, no CVA tenderness, no tenderness at McBurney's point and negative Rios's sign.   There is a well-healed surgical scar to the right quadrant.   Musculoskeletal: Normal range of motion.   Neurological: He is alert and oriented to person, place, and time. He has normal strength. No cranial nerve deficit or sensory deficit.   Skin: Skin is warm and dry. Capillary refill takes less than 2 seconds.   Psychiatric: He has a normal mood and affect.         ED Course   Procedures  Labs Reviewed   CBC W/ AUTO DIFFERENTIAL - Abnormal; Notable for the following components:       Result Value    RBC 4.04 (*)     Hemoglobin 11.9 (*)     Hematocrit 37.3 (*)     Mean Corpuscular Hemoglobin Conc 31.9 (*)     Gran% 73.8 (*)     Lymph% 16.3 (*)     All other components within normal limits   COMPREHENSIVE METABOLIC PANEL - Abnormal; Notable for the following components:    Glucose 162 (*)     Anion Gap 7 (*)     eGFR if non  59 (*)     All other components within normal limits   URINALYSIS, REFLEX TO URINE CULTURE - Abnormal; Notable for the following components:    Appearance, UA Hazy (*)     Protein, UA 1+ (*)     Leukocytes, UA 2+ (*)     All other components within normal limits    Narrative:     Preferred Collection Type->Urine, Clean Catch   URINALYSIS MICROSCOPIC - Abnormal; Notable for the following components:    WBC, UA 70 (*)     Bacteria Many (*)     All other components within normal limits    Narrative:      Preferred Collection Type->Urine, Clean Catch   CULTURE, URINE   LACTIC ACID, PLASMA          Imaging Results    None          Medical Decision Making:   ED Management:  This is an evaluation of a 74 y.o. male who presents to the ED for right lower quadrant abdominal pain.  Vital signs are stable.   Afebrile.  Patient is nontoxic appearing and in no acute distress. There is a well-healed surgical incision to the right lower quadrant.  There is right lower quadrant abdominal tenderness palpation without rebound tenderness or guarding. Bowel sounds are normal. Lungs are clear to auscultation. Heart sounds are normal.    CT obtained today via PCP was reviewed.  CT shows focus of fat infarct versus epiploic appendagitis with mild surrounding inflammation.  There is also bilateral nonobstructive nephrolithiasis.      CBC shows no leukocytosis.  H&H stable. CMP shows no significant electrolyte abnormalities.  BUN and creatinine are stable.  No transaminitis.  Lactic acid within normal limits. UA shows pyuria with many bacteria.  Given abnormal CT findings, general surgery was consulted.  I discussed the case with Dr. Titus Chaves with General surgery.  Given absence of leukocytosis or fever, felt the patient was safe to discharge home with antibiotics to target intra-abdominal pathogens and close outpatient follow-up.  Patient treated the ED with Cipro and Flagyl.  Will discharge patient home with Cipro Flagyl and encouraged follow-up with General surgery.  Given history of nephrolithiasis with abnormal CT noting abnormal bladder wall findings, will encourage follow-up with Urology.    Patient given return precautions and instructed to return to the emergency department for any new or worsening symptoms. Patient verbalized understanding and agreed with plan.     I discussed the case with Dr. Mays who evaluated the patient and is in agreement with my assessment and plan.                                      Clinical  Impression:       ICD-10-CM ICD-9-CM   1. RLQ abdominal pain R10.31 789.03   2. Pain R52 780.96   3. Urinary tract infection without hematuria, site unspecified N39.0 599.0                       Willy Lemos PA-C  02/06/20 1747

## 2020-02-06 NOTE — DISCHARGE INSTRUCTIONS
Return to the ER for new or worsening symptoms including fever, increased abdominal pain, nausea or vomiting.  Take medications as prescribed.  Follow-up with General surgery and Urology.

## 2020-02-07 ENCOUNTER — TELEPHONE (OUTPATIENT)
Dept: UROLOGY | Facility: CLINIC | Age: 75
End: 2020-02-07

## 2020-02-07 DIAGNOSIS — E11.9 TYPE 2 DIABETES MELLITUS WITHOUT COMPLICATION: ICD-10-CM

## 2020-02-07 NOTE — TELEPHONE ENCOUNTER
----- Message from Miguelangel Almendarez sent at 2/7/2020  8:34 AM CST -----  Contact: CHUCKIE SIMONS JR. [[id number]]  Name of Who is Calling:Pt Wife Sofia       What is the request in detail: Pt is requesting a call back from clinical team in regard to making appt   Please contact to further discuss and advise.          Can the clinic reply by [facility]:       [phone]

## 2020-02-07 NOTE — TELEPHONE ENCOUNTER
Spoke to wife, Sofia.  Appt scheduled on 2/14/20 with Dr. Sanchez for abnormal CT-rule out bladder cancer.

## 2020-02-09 ENCOUNTER — TELEPHONE (OUTPATIENT)
Dept: EMERGENCY MEDICINE | Facility: HOSPITAL | Age: 75
End: 2020-02-09

## 2020-02-09 DIAGNOSIS — N39.0 URINARY TRACT INFECTION WITHOUT HEMATURIA, SITE UNSPECIFIED: Primary | ICD-10-CM

## 2020-02-09 LAB — BACTERIA UR CULT: ABNORMAL

## 2020-02-09 RX ORDER — NITROFURANTOIN 25; 75 MG/1; MG/1
100 CAPSULE ORAL 2 TIMES DAILY
Qty: 14 CAPSULE | Refills: 0 | Status: SHIPPED | OUTPATIENT
Start: 2020-02-09 | End: 2020-02-09

## 2020-02-09 RX ORDER — NITROFURANTOIN 25; 75 MG/1; MG/1
100 CAPSULE ORAL 2 TIMES DAILY
Qty: 14 CAPSULE | Refills: 0 | Status: SHIPPED | OUTPATIENT
Start: 2020-02-09 | End: 2020-02-16

## 2020-02-10 NOTE — TELEPHONE ENCOUNTER
Urine culture shows resistance to Cipro.  Flagyl was not tested.  Culture is sensitive to Macrobid.  Patient called and notified of results and Macrobid was sent to his preferred pharmacy.  CrCl 69.  He was advised to follow up with Urology as scheduled.  All questions answered.

## 2020-02-13 ENCOUNTER — PATIENT OUTREACH (OUTPATIENT)
Dept: ADMINISTRATIVE | Facility: OTHER | Age: 75
End: 2020-02-13

## 2020-02-13 NOTE — PROGRESS NOTES
Chart reviewed.   Immunizations: Triggered Imm Registry     Orders placed: n/a  Upcoming appts to satisfy ELLY topics: n/a

## 2020-02-14 ENCOUNTER — OFFICE VISIT (OUTPATIENT)
Dept: UROLOGY | Facility: CLINIC | Age: 75
End: 2020-02-14
Payer: MEDICARE

## 2020-02-14 VITALS
SYSTOLIC BLOOD PRESSURE: 118 MMHG | WEIGHT: 195.13 LBS | DIASTOLIC BLOOD PRESSURE: 71 MMHG | HEIGHT: 70 IN | BODY MASS INDEX: 27.94 KG/M2 | HEART RATE: 76 BPM

## 2020-02-14 DIAGNOSIS — R31.29 OTHER MICROSCOPIC HEMATURIA: ICD-10-CM

## 2020-02-14 DIAGNOSIS — R31.29 MICROHEMATURIA: ICD-10-CM

## 2020-02-14 DIAGNOSIS — N40.0 ENLARGED PROSTATE: ICD-10-CM

## 2020-02-14 DIAGNOSIS — N20.0 KIDNEY STONE: Primary | ICD-10-CM

## 2020-02-14 DIAGNOSIS — N30.00 ACUTE CYSTITIS WITHOUT HEMATURIA: ICD-10-CM

## 2020-02-14 LAB
BILIRUB SERPL-MCNC: ABNORMAL MG/DL
BLOOD URINE, POC: 50
COLOR, POC UA: YELLOW
GLUCOSE UR QL STRIP: 500
KETONES UR QL STRIP: ABNORMAL
LEUKOCYTE ESTERASE URINE, POC: ABNORMAL
NITRITE, POC UA: ABNORMAL
PH, POC UA: 5
PROTEIN, POC: ABNORMAL
SPECIFIC GRAVITY, POC UA: 1.02
UROBILINOGEN, POC UA: ABNORMAL

## 2020-02-14 PROCEDURE — 1101F PR PT FALLS ASSESS DOC 0-1 FALLS W/OUT INJ PAST YR: ICD-10-PCS | Mod: CPTII,S$GLB,, | Performed by: UROLOGY

## 2020-02-14 PROCEDURE — 99204 OFFICE O/P NEW MOD 45 MIN: CPT | Mod: 25,S$GLB,, | Performed by: UROLOGY

## 2020-02-14 PROCEDURE — 3074F SYST BP LT 130 MM HG: CPT | Mod: CPTII,S$GLB,, | Performed by: UROLOGY

## 2020-02-14 PROCEDURE — 1101F PT FALLS ASSESS-DOCD LE1/YR: CPT | Mod: CPTII,S$GLB,, | Performed by: UROLOGY

## 2020-02-14 PROCEDURE — 3078F DIAST BP <80 MM HG: CPT | Mod: CPTII,S$GLB,, | Performed by: UROLOGY

## 2020-02-14 PROCEDURE — 99204 PR OFFICE/OUTPT VISIT, NEW, LEVL IV, 45-59 MIN: ICD-10-PCS | Mod: 25,S$GLB,, | Performed by: UROLOGY

## 2020-02-14 PROCEDURE — 81002 URINALYSIS NONAUTO W/O SCOPE: CPT | Mod: S$GLB,,, | Performed by: UROLOGY

## 2020-02-14 PROCEDURE — 1159F PR MEDICATION LIST DOCUMENTED IN MEDICAL RECORD: ICD-10-PCS | Mod: S$GLB,,, | Performed by: UROLOGY

## 2020-02-14 PROCEDURE — 87086 URINE CULTURE/COLONY COUNT: CPT

## 2020-02-14 PROCEDURE — 3078F PR MOST RECENT DIASTOLIC BLOOD PRESSURE < 80 MM HG: ICD-10-PCS | Mod: CPTII,S$GLB,, | Performed by: UROLOGY

## 2020-02-14 PROCEDURE — 3074F PR MOST RECENT SYSTOLIC BLOOD PRESSURE < 130 MM HG: ICD-10-PCS | Mod: CPTII,S$GLB,, | Performed by: UROLOGY

## 2020-02-14 PROCEDURE — 1126F PR PAIN SEVERITY QUANTIFIED, NO PAIN PRESENT: ICD-10-PCS | Mod: S$GLB,,, | Performed by: UROLOGY

## 2020-02-14 PROCEDURE — 81002 POCT URINE DIPSTICK WITHOUT MICROSCOPE: ICD-10-PCS | Mod: S$GLB,,, | Performed by: UROLOGY

## 2020-02-14 PROCEDURE — 1126F AMNT PAIN NOTED NONE PRSNT: CPT | Mod: S$GLB,,, | Performed by: UROLOGY

## 2020-02-14 PROCEDURE — 1159F MED LIST DOCD IN RCRD: CPT | Mod: S$GLB,,, | Performed by: UROLOGY

## 2020-02-14 RX ORDER — VALSARTAN 160 MG/1
TABLET ORAL
COMMUNITY
Start: 2020-02-03 | End: 2020-04-30

## 2020-02-14 NOTE — PROGRESS NOTES
"Subjective:      Ruben Macedo Jr. is a 74 y.o. male who was referred by ED for evaluation of UTI.    Right flank pain last week  Diagnosed with UTI by ED  On macrobid currently    Pain resolsved now; feels well now    No fever  No chills  No flank pain    Previous stones and enlarged followed by Dr Jamison Walter; has not seen urology in years        The following portions of the patient's history were reviewed and updated as appropriate: allergies, current medications, past family history, past medical history, past social history, past surgical history and problem list.    Review of Systems  Constitutional: no fever or chills  ENT: no nasal congestion or sore throat  Respiratory: no cough or shortness of breath  Cardiovascular: no chest pain or palpitations  Gastrointestinal: no nausea or vomiting, tolerating diet  Genitourinary: as per HPI  Hematologic/Lymphatic: no easy bruising or lymphadenopathy  Musculoskeletal: no arthralgias or myalgias  Neurological: no seizures or tremors  Behavioral/Psych: no auditory or visual hallucinations     Objective:   Vitals: /71 (BP Location: Left arm, Patient Position: Sitting, BP Method: X-Large (Automatic))   Pulse 76   Ht 5' 10" (1.778 m)   Wt 88.5 kg (195 lb 1.7 oz)   BMI 27.99 kg/m²     Physical Exam   General: alert and oriented, no acute distress  Head: normocephalic, atraumatic  Neck: normal ROM  Respiratory: Symmetric expansion, non-labored breathing  Cardiovascular: no peripheral edema  Abdomen: soft, non tender, non distended, no palpable masses, no hernias, no hepatomegaly or splenomegaly  Genitourinary:   Penis: normal, no lesions, patent orthotopic meatus, no plaques  Scrotum: no rashes or skin changes;   Testes: descended bilaterally, no masses, nontender, normal epididymides bilaterally, no hydroceles  Prostate: very large; unable to reach base; no nodules on the palpable portion of the gland; seminal vesicles not palpated  Rectum: normal rectal tone, no " rectal mass, normal perineum  Lymphatic: no inguinal nodes  Skin: normal coloration and turgor, no rashes, no suspicious skin lesions noted  Neuro: alert and oriented x3, no gross deficits  Psych: normal judgment and insight, normal mood/affect and non-anxious    Physical Exam    Lab Review   Urinalysis demonstrates nitrite neg; +wbc; +rbc  Micro 10-25 wbc; 3-5 rbc; rare bacteria    Lab Results   Component Value Date    WBC 6.73 02/06/2020    HGB 11.9 (L) 02/06/2020    HCT 37.3 (L) 02/06/2020    MCV 92 02/06/2020     02/06/2020     Lab Results   Component Value Date    CREATININE 1.2 02/06/2020    BUN 17 02/06/2020     Lab Results   Component Value Date    PSA 0.67 12/14/2015       Abnormal    ENTEROCOCCUS FAECALIS   > 100,000 cfu/ml      Resulting Agency [facility]   Susceptibility      Enterococcus faecalis     CULTURE, URINE     Ampicillin <=2 mcg/mL Sensitive     Ciprofloxacin >2 mcg/mL Resistant (C)     Nitrofurantoin <=32 mcg/mL Sensitive     Tetracycline >8 mcg/mL Resistant     Vancomycin 1 mcg/mL Sensitive             Imaging    Impression       This report was flagged in Epic as abnormal.    1. There is a focus of either fat infarct or epiploic appendagitis adjacent to the cecum within the right lower quadrant.  There is mild surrounding inflammation.  Correlation with any focal tenderness in the region recommended.  No recent exams are available for comparison.  2. Bilateral nonobstructive nephrolithiasis, no findings to suggest obstructive uropathy.  There is urinary bladder wall thickening, correlation with urinalysis is advised.  Please note, there appear to be high attenuating filling defects within the posterior aspect of the urinary bladder.  This could reflect blood products or sequela of infection noting malignancy is not excluded, correlation is advised, direct visualization as warranted.  3. Additional findings above.      Electronically signed by: Fabio Staley MD  Date: 02/06/2020  Time:  14:36         Assessment:     1. Kidney stone    2. Enlarged prostate    3. Acute cystitis without hematuria    4. Microhematuria    5. Other microscopic hematuria      -  Plan:     Orders Placed This Encounter    Cystoscopy    Urine culture    CT Urogram Abd Pelvis W WO    POCT URINE DIPSTICK WITHOUT MICROSCOPE   PVR at time of above  Cont proscar  Complete macrobid rx    See PCP, Dr Holder, re nonurologic findings on CT

## 2020-02-16 LAB
BACTERIA UR CULT: NORMAL
BACTERIA UR CULT: NORMAL

## 2020-02-17 NOTE — PROGRESS NOTES
Dr Sanchez forwarded these results to the patient via [email or web address].  He will discuss the results with the patient in person at the next appointment.  The patient was instructed to make an appointment if he does not already have one scheduled.

## 2020-02-18 ENCOUNTER — PATIENT OUTREACH (OUTPATIENT)
Dept: ADMINISTRATIVE | Facility: HOSPITAL | Age: 75
End: 2020-02-18

## 2020-02-18 ENCOUNTER — TELEPHONE (OUTPATIENT)
Dept: ADMINISTRATIVE | Facility: HOSPITAL | Age: 75
End: 2020-02-18

## 2020-02-18 DIAGNOSIS — N40.0 BENIGN PROSTATIC HYPERPLASIA, UNSPECIFIED WHETHER LOWER URINARY TRACT SYMPTOMS PRESENT: ICD-10-CM

## 2020-02-19 NOTE — TELEPHONE ENCOUNTER
Okay, full set of new orders is in.  Please discontinue any previously scheduled order such as the A1c so that he does not accidentally come get just that lab done, thanks      Fasting and urine needed

## 2020-02-28 ENCOUNTER — LAB VISIT (OUTPATIENT)
Dept: LAB | Facility: HOSPITAL | Age: 75
End: 2020-02-28
Attending: INTERNAL MEDICINE
Payer: MEDICARE

## 2020-02-28 DIAGNOSIS — N40.0 BENIGN PROSTATIC HYPERPLASIA, UNSPECIFIED WHETHER LOWER URINARY TRACT SYMPTOMS PRESENT: ICD-10-CM

## 2020-02-28 DIAGNOSIS — E11.9 TYPE 2 DIABETES MELLITUS WITHOUT COMPLICATION: ICD-10-CM

## 2020-02-28 LAB
ANION GAP SERPL CALC-SCNC: 10 MMOL/L (ref 8–16)
BASOPHILS # BLD AUTO: 0.03 K/UL (ref 0–0.2)
BASOPHILS NFR BLD: 0.6 % (ref 0–1.9)
BUN SERPL-MCNC: 21 MG/DL (ref 8–23)
CALCIUM SERPL-MCNC: 9.2 MG/DL (ref 8.7–10.5)
CHLORIDE SERPL-SCNC: 106 MMOL/L (ref 95–110)
CHOLEST SERPL-MCNC: 156 MG/DL (ref 120–199)
CHOLEST/HDLC SERPL: 3.4 {RATIO} (ref 2–5)
CO2 SERPL-SCNC: 23 MMOL/L (ref 23–29)
COMPLEXED PSA SERPL-MCNC: 0.41 NG/ML (ref 0–4)
CREAT SERPL-MCNC: 1.1 MG/DL (ref 0.5–1.4)
DIFFERENTIAL METHOD: ABNORMAL
EOSINOPHIL # BLD AUTO: 0.2 K/UL (ref 0–0.5)
EOSINOPHIL NFR BLD: 3.4 % (ref 0–8)
ERYTHROCYTE [DISTWIDTH] IN BLOOD BY AUTOMATED COUNT: 14.5 % (ref 11.5–14.5)
EST. GFR  (AFRICAN AMERICAN): >60 ML/MIN/1.73 M^2
EST. GFR  (NON AFRICAN AMERICAN): >60 ML/MIN/1.73 M^2
ESTIMATED AVG GLUCOSE: 180 MG/DL (ref 68–131)
GLUCOSE SERPL-MCNC: 156 MG/DL (ref 70–110)
HBA1C MFR BLD HPLC: 7.9 % (ref 4–5.6)
HCT VFR BLD AUTO: 38.7 % (ref 40–54)
HDLC SERPL-MCNC: 46 MG/DL (ref 40–75)
HDLC SERPL: 29.5 % (ref 20–50)
HGB BLD-MCNC: 11.9 G/DL (ref 14–18)
IMM GRANULOCYTES # BLD AUTO: 0.01 K/UL (ref 0–0.04)
IMM GRANULOCYTES NFR BLD AUTO: 0.2 % (ref 0–0.5)
LDLC SERPL CALC-MCNC: 76.2 MG/DL (ref 63–159)
LYMPHOCYTES # BLD AUTO: 0.9 K/UL (ref 1–4.8)
LYMPHOCYTES NFR BLD: 18.4 % (ref 18–48)
MCH RBC QN AUTO: 29.4 PG (ref 27–31)
MCHC RBC AUTO-ENTMCNC: 30.7 G/DL (ref 32–36)
MCV RBC AUTO: 96 FL (ref 82–98)
MONOCYTES # BLD AUTO: 0.5 K/UL (ref 0.3–1)
MONOCYTES NFR BLD: 10.6 % (ref 4–15)
NEUTROPHILS # BLD AUTO: 3.2 K/UL (ref 1.8–7.7)
NEUTROPHILS NFR BLD: 66.8 % (ref 38–73)
NONHDLC SERPL-MCNC: 110 MG/DL
NRBC BLD-RTO: 0 /100 WBC
PLATELET # BLD AUTO: 158 K/UL (ref 150–350)
PMV BLD AUTO: 11.4 FL (ref 9.2–12.9)
POTASSIUM SERPL-SCNC: 4.2 MMOL/L (ref 3.5–5.1)
RBC # BLD AUTO: 4.05 M/UL (ref 4.6–6.2)
SODIUM SERPL-SCNC: 139 MMOL/L (ref 136–145)
TRIGL SERPL-MCNC: 169 MG/DL (ref 30–150)
TSH SERPL DL<=0.005 MIU/L-ACNC: 1.24 UIU/ML (ref 0.4–4)
WBC # BLD AUTO: 4.73 K/UL (ref 3.9–12.7)

## 2020-02-28 PROCEDURE — 85025 COMPLETE CBC W/AUTO DIFF WBC: CPT

## 2020-02-28 PROCEDURE — 80061 LIPID PANEL: CPT

## 2020-02-28 PROCEDURE — 84443 ASSAY THYROID STIM HORMONE: CPT

## 2020-02-28 PROCEDURE — 80048 BASIC METABOLIC PNL TOTAL CA: CPT

## 2020-02-28 PROCEDURE — 84153 ASSAY OF PSA TOTAL: CPT

## 2020-02-28 PROCEDURE — 36415 COLL VENOUS BLD VENIPUNCTURE: CPT | Mod: PO

## 2020-02-28 PROCEDURE — 83036 HEMOGLOBIN GLYCOSYLATED A1C: CPT

## 2020-03-03 ENCOUNTER — OFFICE VISIT (OUTPATIENT)
Dept: FAMILY MEDICINE | Facility: CLINIC | Age: 75
End: 2020-03-03
Payer: MEDICARE

## 2020-03-03 ENCOUNTER — HOSPITAL ENCOUNTER (OUTPATIENT)
Dept: RADIOLOGY | Facility: HOSPITAL | Age: 75
Discharge: HOME | End: 2020-03-03
Attending: UROLOGY
Payer: MEDICARE

## 2020-03-03 VITALS
HEART RATE: 85 BPM | BODY MASS INDEX: 27.84 KG/M2 | HEIGHT: 70 IN | WEIGHT: 194.44 LBS | SYSTOLIC BLOOD PRESSURE: 130 MMHG | OXYGEN SATURATION: 94 % | DIASTOLIC BLOOD PRESSURE: 70 MMHG | TEMPERATURE: 98 F

## 2020-03-03 DIAGNOSIS — F39 MOOD DISORDER: ICD-10-CM

## 2020-03-03 DIAGNOSIS — Z12.5 SCREENING FOR PROSTATE CANCER: ICD-10-CM

## 2020-03-03 DIAGNOSIS — I10 ESSENTIAL HYPERTENSION: ICD-10-CM

## 2020-03-03 DIAGNOSIS — I25.10 CORONARY ARTERY DISEASE INVOLVING NATIVE CORONARY ARTERY OF NATIVE HEART WITHOUT ANGINA PECTORIS: ICD-10-CM

## 2020-03-03 DIAGNOSIS — R82.81 PYURIA: ICD-10-CM

## 2020-03-03 DIAGNOSIS — F13.20 BENZODIAZEPINE DEPENDENCE: ICD-10-CM

## 2020-03-03 DIAGNOSIS — Z86.010 HISTORY OF COLONIC POLYPS: ICD-10-CM

## 2020-03-03 DIAGNOSIS — R80.9 MICROALBUMINURIA: ICD-10-CM

## 2020-03-03 DIAGNOSIS — D64.9 ANEMIA, UNSPECIFIED TYPE: ICD-10-CM

## 2020-03-03 DIAGNOSIS — R31.29 OTHER MICROSCOPIC HEMATURIA: ICD-10-CM

## 2020-03-03 DIAGNOSIS — E78.5 HYPERLIPIDEMIA, UNSPECIFIED HYPERLIPIDEMIA TYPE: ICD-10-CM

## 2020-03-03 PROCEDURE — 99214 PR OFFICE/OUTPT VISIT, EST, LEVL IV, 30-39 MIN: ICD-10-PCS | Mod: S$GLB,,, | Performed by: INTERNAL MEDICINE

## 2020-03-03 PROCEDURE — 1101F PT FALLS ASSESS-DOCD LE1/YR: CPT | Mod: CPTII,S$GLB,, | Performed by: INTERNAL MEDICINE

## 2020-03-03 PROCEDURE — 3051F PR MOST RECENT HEMOGLOBIN A1C LEVEL 7.0 - < 8.0%: ICD-10-PCS | Mod: CPTII,S$GLB,, | Performed by: INTERNAL MEDICINE

## 2020-03-03 PROCEDURE — 1159F PR MEDICATION LIST DOCUMENTED IN MEDICAL RECORD: ICD-10-PCS | Mod: S$GLB,,, | Performed by: INTERNAL MEDICINE

## 2020-03-03 PROCEDURE — 1126F PR PAIN SEVERITY QUANTIFIED, NO PAIN PRESENT: ICD-10-PCS | Mod: S$GLB,,, | Performed by: INTERNAL MEDICINE

## 2020-03-03 PROCEDURE — 3075F PR MOST RECENT SYSTOLIC BLOOD PRESS GE 130-139MM HG: ICD-10-PCS | Mod: CPTII,S$GLB,, | Performed by: INTERNAL MEDICINE

## 2020-03-03 PROCEDURE — 25500020 PHARM REV CODE 255: Performed by: UROLOGY

## 2020-03-03 PROCEDURE — 3078F PR MOST RECENT DIASTOLIC BLOOD PRESSURE < 80 MM HG: ICD-10-PCS | Mod: CPTII,S$GLB,, | Performed by: INTERNAL MEDICINE

## 2020-03-03 PROCEDURE — 99999 PR PBB SHADOW E&M-EST. PATIENT-LVL IV: CPT | Mod: PBBFAC,,, | Performed by: INTERNAL MEDICINE

## 2020-03-03 PROCEDURE — 74178 CT UROGRAM ABD PELVIS W WO: ICD-10-PCS | Mod: 26,,, | Performed by: RADIOLOGY

## 2020-03-03 PROCEDURE — 99999 PR PBB SHADOW E&M-EST. PATIENT-LVL IV: ICD-10-PCS | Mod: PBBFAC,,, | Performed by: INTERNAL MEDICINE

## 2020-03-03 PROCEDURE — 1101F PR PT FALLS ASSESS DOC 0-1 FALLS W/OUT INJ PAST YR: ICD-10-PCS | Mod: CPTII,S$GLB,, | Performed by: INTERNAL MEDICINE

## 2020-03-03 PROCEDURE — 1126F AMNT PAIN NOTED NONE PRSNT: CPT | Mod: S$GLB,,, | Performed by: INTERNAL MEDICINE

## 2020-03-03 PROCEDURE — 3075F SYST BP GE 130 - 139MM HG: CPT | Mod: CPTII,S$GLB,, | Performed by: INTERNAL MEDICINE

## 2020-03-03 PROCEDURE — 1159F MED LIST DOCD IN RCRD: CPT | Mod: S$GLB,,, | Performed by: INTERNAL MEDICINE

## 2020-03-03 PROCEDURE — 74178 CT ABD&PLV WO CNTR FLWD CNTR: CPT | Mod: 26,,, | Performed by: RADIOLOGY

## 2020-03-03 PROCEDURE — 3051F HG A1C>EQUAL 7.0%<8.0%: CPT | Mod: CPTII,S$GLB,, | Performed by: INTERNAL MEDICINE

## 2020-03-03 PROCEDURE — 74178 CT ABD&PLV WO CNTR FLWD CNTR: CPT | Mod: TC

## 2020-03-03 PROCEDURE — 3078F DIAST BP <80 MM HG: CPT | Mod: CPTII,S$GLB,, | Performed by: INTERNAL MEDICINE

## 2020-03-03 PROCEDURE — 99214 OFFICE O/P EST MOD 30 MIN: CPT | Mod: S$GLB,,, | Performed by: INTERNAL MEDICINE

## 2020-03-03 RX ORDER — PIOGLITAZONEHYDROCHLORIDE 30 MG/1
30 TABLET ORAL NIGHTLY
Qty: 90 TABLET | Refills: 0 | Status: SHIPPED | OUTPATIENT
Start: 2020-03-03 | End: 2020-08-21 | Stop reason: SDUPTHER

## 2020-03-03 RX ADMIN — IOHEXOL 125 ML: 350 INJECTION, SOLUTION INTRAVENOUS at 09:03

## 2020-03-03 NOTE — PROGRESS NOTES
Dr Sanchez forwarded these results to the patient via [employer].  He will discuss the results with the patient in person at the next appointment.  The patient was instructed to make an appointment if he does not already have one scheduled.

## 2020-03-03 NOTE — PROGRESS NOTES
Chief complaint:  Follow-up on medical conditions    74-year-old white male .  Regarding health maintenance PSA normal.  .  Outside records reviewed and it appeared he did have a colonoscopy with colon polyps 6/16 with a 5 year follow-up recommended.  He is trying to go to the gym 3 times per week.  He is here with his wife.  We reviewed his diabetes which has been chronically uncontrolled with the last A1c of 7.9 slightly improved after adding Actos 15 mg about three months ago and he is tolerating it well so we will increase to 30.  He had itching with metformin.  He is on Januvia 100.  We discussed his diet at great length.  He did see diabetes teaching in the past and does not think he needs to go again.  He is eating a lot of sugar free chocolate and probably other dietary factors that he could improve on according to his wife.    His recent urinalysis did continue to show pyuria.  He did have a positive culture previously and at that time was probably passing a kidney stone on the right.  His most recent CT scan showed nonobstructing kidney stones in both kidneys any does have a cystoscope scheduled for later this month.  He is asymptomatic and so will repeat a urinalysis and culture today.  He did complete seven days of Macrobid while he was having symptoms.  Next Elda did have two insect bites on the left anterior shin about three weeks ago.  There were horse flies.  There is still a little bit itchy and slightly raised.  It does appear he has some dilated veins underneath and he has these scattered throughout the lower extremity.  There are blanching.  There is no infection or induration to suggest any abscess formation he has been putting some hydrocortisone on it.  We discussed using more stress or cream and maybe even cover with a Band-Aid since it may be itchy because of the slight raise and it rubbing on his pants.  I think the inflammatory changes should improve but he probably will also have the  dilated veins there chronically as is scattered on the rest of his lower extremities.  No edema.    ROS:   CONST: weight stable. EYES: no vision change. ENT: no sore throat. CV: no chest pain w/ exertion. RESP: no shortness of breath. GI: no nausea, vomiting, diarrhea. No dysphagia. : no urinary issues. MUSCULOSKELETAL: no new myalgias or arthralgias other than some chronic left neck pain with movement. SKIN: no new changes. NEURO: no focal deficits. PSYCH: no new issues. ENDOCRINE: no polyuria. HEME: no lymph nodes. ALLERGY: no general pruritis.    Past Medical History:   Diagnosis Date    Anxiety     BPH (benign prostatic hypertrophy) 2/2/2015    Cataract     Coronary artery disease     Diabetes mellitus, type 2 - with PROTIENURIA     History of colonic polyps 4/11/2017 2/13 -- polyps 6/16, 5 years     History of MI (myocardial infarction) 2005    Followed by cardiology, [facility]. Acevedo ([facility]    History of stroke November 2005    Hyperlipidemia     Hypertension     Incomplete bladder emptying 2/2/2015    Overweight     Personal history of kidney stones     Renal cyst 2/10/2015   History of cervical radiculopathy on the right  Cervical arthropathy on x-ray  Anemia- chronic    Past Surgical History:   Procedure Laterality Date    APPENDECTOMY      BACK SURGERY      CATARACT EXTRACTION      od    CORONARY ANGIOPLASTY WITH STENT PLACEMENT  2000    CYSTOSCOPY W/ URETERAL STENT PLACEMENT      stents placed x2    EYE SURGERY      cataract right eye    SPINE SURGERY      TONSILLECTOMY       Social History     Socioeconomic History    Marital status:      Spouse name: Not on file    Number of children: 2    Years of education: Not on file    Highest education level: Not on file   Occupational History    Occupation: Retired - chemical industry   Social Needs    Financial resource strain: Not hard at all    Food insecurity:     Worry: Never true     Inability:  Never true    Transportation needs:     Medical: No     Non-medical: No   Tobacco Use    Smoking status: Never Smoker    Smokeless tobacco: Never Used   Substance and Sexual Activity    Alcohol use: Not Currently     Frequency: 2-4 times a month     Drinks per session: 1 or 2     Binge frequency: Never     Comment: occasional    Drug use: No    Sexual activity: Yes     Partners: Female   Lifestyle    Physical activity:     Days per week: 3 days     Minutes per session: 90 min    Stress: Not at all   Relationships    Social connections:     Talks on phone: Once a week     Gets together: Once a week     Attends Church service: Not on file     Active member of club or organization: Yes     Attends meetings of clubs or organizations: More than 4 times per year     Relationship status:    Other Topics Concern    Not on file   Social History Narrative    Not on file       family history includes Alzheimer's disease in his unknown relative; Cancer in his father; Cataracts in his father and mother; Hyperlipidemia in his father and sister.     Gen: no distress  Skin examined as above          Ruben was seen today for annual exam.    Diagnoses and all orders for this visit:    Uncontrolled type 2 diabetes mellitus with microalbuminuria, without long-term current use of insulin, discussed dietary and lifestyle modification improvements, increase Actos to 30 mg and continue Januvia, follow-up in three months    Screening for prostate cancer, normal PSA    Essential hypertension, chronic and stable    Mood disorder, chronic and stable    Benzodiazepine dependence, did not need a refill today    Microalbuminuria, recent micro albuminuria may well also be related to the pyuria    Anemia, unspecified type, slight hemoglobin reduction appears stable in fluctuates    Hyperlipidemia, unspecified hyperlipidemia type, good control on statin    Uncontrolled type 2 diabetes mellitus without complication, without  "long-term current use of insulin    History of colonic polyps, up-to-date    Coronary artery disease involving native coronary artery of native heart without angina pectoris, no recent angina    Pyuria, follow-up with Urology to assess for infected kidney stones and so forth but perhaps the residual pyuria was from a residual UTI as he has no symptoms currently or perhaps he was in the process of passing a stone with some pyuria behind it and so forth.  Will repeat studies today and hold off on antibiotics but he was to watch for symptoms and fever and so forth where upon we would initiate antibiotics promptly  -     Urinalysis; Future  -     Urine culture; Future    Other orders  -     pioglitazone (ACTOS) 30 MG tablet; Take 1 tablet (30 mg total) by mouth every evening.             Patient and wife counseled at length today.Total time over 25 minutes with over 50% counseling.                               -----"This note will not be shared with the patient."  "

## 2020-03-09 ENCOUNTER — TELEPHONE (OUTPATIENT)
Dept: UROLOGY | Facility: CLINIC | Age: 75
End: 2020-03-09

## 2020-03-09 ENCOUNTER — HOSPITAL ENCOUNTER (EMERGENCY)
Facility: OTHER | Age: 75
Discharge: HOME | End: 2020-03-09
Attending: EMERGENCY MEDICINE
Payer: MEDICARE

## 2020-03-09 VITALS
TEMPERATURE: 98 F | DIASTOLIC BLOOD PRESSURE: 84 MMHG | SYSTOLIC BLOOD PRESSURE: 136 MMHG | BODY MASS INDEX: 27.92 KG/M2 | HEIGHT: 70 IN | OXYGEN SATURATION: 98 % | WEIGHT: 195 LBS | HEART RATE: 69 BPM | RESPIRATION RATE: 18 BRPM

## 2020-03-09 DIAGNOSIS — N30.01 ACUTE CYSTITIS WITH HEMATURIA: ICD-10-CM

## 2020-03-09 DIAGNOSIS — R31.9 HEMATURIA, UNSPECIFIED TYPE: Primary | ICD-10-CM

## 2020-03-09 LAB
ALBUMIN SERPL BCP-MCNC: 3.9 G/DL (ref 3.5–5.2)
ALP SERPL-CCNC: 53 U/L (ref 55–135)
ALT SERPL W/O P-5'-P-CCNC: 26 U/L (ref 10–44)
ANION GAP SERPL CALC-SCNC: 11 MMOL/L (ref 8–16)
APTT BLDCRRT: 26.3 SEC (ref 21–32)
AST SERPL-CCNC: 27 U/L (ref 10–40)
BACTERIA #/AREA URNS HPF: ABNORMAL /HPF
BASOPHILS # BLD AUTO: 0.02 K/UL (ref 0–0.2)
BASOPHILS NFR BLD: 0.4 % (ref 0–1.9)
BILIRUB SERPL-MCNC: 0.5 MG/DL (ref 0.1–1)
BILIRUB UR QL STRIP: ABNORMAL
BUN SERPL-MCNC: 28 MG/DL (ref 8–23)
CALCIUM SERPL-MCNC: 9.4 MG/DL (ref 8.7–10.5)
CHLORIDE SERPL-SCNC: 109 MMOL/L (ref 95–110)
CLARITY UR: ABNORMAL
CO2 SERPL-SCNC: 20 MMOL/L (ref 23–29)
COLOR UR: ABNORMAL
CREAT SERPL-MCNC: 1.2 MG/DL (ref 0.5–1.4)
DIFFERENTIAL METHOD: ABNORMAL
EOSINOPHIL # BLD AUTO: 0.2 K/UL (ref 0–0.5)
EOSINOPHIL NFR BLD: 4.6 % (ref 0–8)
ERYTHROCYTE [DISTWIDTH] IN BLOOD BY AUTOMATED COUNT: 14.8 % (ref 11.5–14.5)
EST. GFR  (AFRICAN AMERICAN): >60 ML/MIN/1.73 M^2
EST. GFR  (NON AFRICAN AMERICAN): 59 ML/MIN/1.73 M^2
GLUCOSE SERPL-MCNC: 139 MG/DL (ref 70–110)
GLUCOSE UR QL STRIP: NEGATIVE
HCT VFR BLD AUTO: 35.8 % (ref 40–54)
HGB BLD-MCNC: 11.4 G/DL (ref 14–18)
HGB UR QL STRIP: ABNORMAL
HYALINE CASTS #/AREA URNS LPF: 0 /LPF
IMM GRANULOCYTES # BLD AUTO: 0.01 K/UL (ref 0–0.04)
IMM GRANULOCYTES NFR BLD AUTO: 0.2 % (ref 0–0.5)
INR PPP: 1 (ref 0.8–1.2)
KETONES UR QL STRIP: ABNORMAL
LEUKOCYTE ESTERASE UR QL STRIP: ABNORMAL
LYMPHOCYTES # BLD AUTO: 1.3 K/UL (ref 1–4.8)
LYMPHOCYTES NFR BLD: 24.8 % (ref 18–48)
MCH RBC QN AUTO: 29.4 PG (ref 27–31)
MCHC RBC AUTO-ENTMCNC: 31.8 G/DL (ref 32–36)
MCV RBC AUTO: 92 FL (ref 82–98)
MICROSCOPIC COMMENT: ABNORMAL
MONOCYTES # BLD AUTO: 0.5 K/UL (ref 0.3–1)
MONOCYTES NFR BLD: 10.1 % (ref 4–15)
NEUTROPHILS # BLD AUTO: 3 K/UL (ref 1.8–7.7)
NEUTROPHILS NFR BLD: 59.9 % (ref 38–73)
NITRITE UR QL STRIP: ABNORMAL
NRBC BLD-RTO: 0 /100 WBC
PH UR STRIP: 5 [PH] (ref 5–8)
PLATELET # BLD AUTO: 126 K/UL (ref 150–350)
PMV BLD AUTO: 10.4 FL (ref 9.2–12.9)
POTASSIUM SERPL-SCNC: 4.6 MMOL/L (ref 3.5–5.1)
PROT SERPL-MCNC: 7.1 G/DL (ref 6–8.4)
PROT UR QL STRIP: ABNORMAL
PROTHROMBIN TIME: 10.8 SEC (ref 9–12.5)
RBC # BLD AUTO: 3.88 M/UL (ref 4.6–6.2)
RBC #/AREA URNS HPF: >100 /HPF (ref 0–4)
SODIUM SERPL-SCNC: 140 MMOL/L (ref 136–145)
SP GR UR STRIP: ABNORMAL (ref 1–1.03)
URN SPEC COLLECT METH UR: ABNORMAL
UROBILINOGEN UR STRIP-ACNC: ABNORMAL EU/DL
WBC # BLD AUTO: 5.04 K/UL (ref 3.9–12.7)
WBC #/AREA URNS HPF: 6 /HPF (ref 0–5)

## 2020-03-09 PROCEDURE — 85610 PROTHROMBIN TIME: CPT

## 2020-03-09 PROCEDURE — 81000 URINALYSIS NONAUTO W/SCOPE: CPT

## 2020-03-09 PROCEDURE — 36415 COLL VENOUS BLD VENIPUNCTURE: CPT

## 2020-03-09 PROCEDURE — 99283 EMERGENCY DEPT VISIT LOW MDM: CPT

## 2020-03-09 PROCEDURE — 85730 THROMBOPLASTIN TIME PARTIAL: CPT

## 2020-03-09 PROCEDURE — 85025 COMPLETE CBC W/AUTO DIFF WBC: CPT

## 2020-03-09 PROCEDURE — 80053 COMPREHEN METABOLIC PANEL: CPT

## 2020-03-09 RX ORDER — AMOXICILLIN 500 MG/1
500 CAPSULE ORAL 3 TIMES DAILY
Qty: 42 CAPSULE | Refills: 0 | Status: SHIPPED | OUTPATIENT
Start: 2020-03-09 | End: 2020-03-23

## 2020-03-09 NOTE — TELEPHONE ENCOUNTER
Called and spoke with patient.  She just wanted to update Dr Salazar on her husbands condition.  Informed her that Dr Sanchez was consulted and gave the directions for him to take the amoxicillin for 2 weeks until the procedure.  Wife stated that she called the cardiologist to see if he can hold the Plavix and is awaiting a call back.

## 2020-03-09 NOTE — TELEPHONE ENCOUNTER
----- Message from Kaylynn Mullins sent at 3/9/2020 10:29 AM CDT -----  Contact: chava (wife) [phone]   Name of Who is Calling:     What is the request in detail: request call back in reference to questions/concerns about patient ///patient went to er on this morning and want to give updated  Information//patient is urinating blood   Please contact to further discuss and advise      Can the clinic reply by [facility]: no     What [facility]  chava (wife) [phone]

## 2020-03-09 NOTE — ED PROVIDER NOTES
"Encounter Date: 3/9/2020    SCRIBE #1 NOTE: I, Eda Dimas, am scribing for, and in the presence of, Dr. Mauro.       History     Chief Complaint   Patient presents with    Hematuria     hematuria starting this morning.      Time seen by provider: 6:08 AM    This is a 74 y.o. male who presents with complaint of hematuria that worsened this morning. He reports urinating "pure blood" this morning. He reports associated back "pressure" since yesterday. Patient denies difficulty urinating, fever, chills, nausea, vomiting, chest pain, or SOB. Patient states he has had intermittent cloudy urine that contained microscopic blood over the past month. He was seen by Dr. Holder, PCP, and was treated for a UTI. He was referred to Dr. Sanchez, urology, for follow up. Patient notes that he has a cystoscope procedure scheduled for this month. He notes he has had multiple "tests and scans done" recently for this issue. Patient reports history of kidney stones but states that these symptoms are not similar. This is the extent of the patient's complaints at this time.  Patient reports he was last on antibiotics approximately 2 weeks ago.  Unaware of the results of a recent urinalysis and culture done last week.    The history is provided by the patient.     Review of patient's allergies indicates:   Allergen Reactions    Morphine Nausea And Vomiting    Metformin Itching    Tramadol Nausea And Vomiting     Past Medical History:   Diagnosis Date    Anxiety     BPH (benign prostatic hypertrophy) 2/2/2015    Cataract     Coronary artery disease     Diabetes mellitus, type 2     History of colonic polyps 4/11/2017 2/13 --3 yrs    History of MI (myocardial infarction) 2005    Followed by cardiology, Dr. Acevedo (at [facility])    History of stroke November 2005    Hyperlipidemia     Hypertension     Incomplete bladder emptying 2/2/2015    Overweight(278.02)     Personal history of kidney stones  "    Renal cyst 2/10/2015     Past Surgical History:   Procedure Laterality Date    APPENDECTOMY      BACK SURGERY      CATARACT EXTRACTION      od    CORONARY ANGIOPLASTY WITH STENT PLACEMENT  2000    CYSTOSCOPY W/ URETERAL STENT PLACEMENT      stents placed x2    EYE SURGERY      cataract right eye    SPINE SURGERY      TONSILLECTOMY       Family History   Problem Relation Age of Onset    Hyperlipidemia Father     Cancer Father     Cataracts Father     Cataracts Mother     Hyperlipidemia Sister     Alzheimer's disease Unknown     Diabetes Neg Hx     Amblyopia Neg Hx     Blindness Neg Hx     Glaucoma Neg Hx     Hypertension Neg Hx     Macular degeneration Neg Hx     Retinal detachment Neg Hx     Strabismus Neg Hx     Stroke Neg Hx     Thyroid disease Neg Hx      Social History     Tobacco Use    Smoking status: Never Smoker    Smokeless tobacco: Never Used   Substance Use Topics    Alcohol use: Not Currently     Frequency: 2-4 times a month     Drinks per session: 1 or 2     Binge frequency: Never     Comment: occasional    Drug use: No     Review of Systems   Constitutional: Negative for chills and fever.   HENT: Negative for sore throat.    Respiratory: Negative for shortness of breath.    Cardiovascular: Negative for chest pain.   Gastrointestinal: Negative for abdominal pain, diarrhea, nausea and vomiting.   Genitourinary: Positive for hematuria. Negative for difficulty urinating and dysuria.   Musculoskeletal: Positive for back pain.   Skin: Negative for rash.   Neurological: Negative for weakness.   Hematological: Does not bruise/bleed easily.   All other systems reviewed and are negative.      Physical Exam     Initial Vitals [03/09/20 0516]   BP Pulse Resp Temp SpO2   (!) 148/67 71 16 97.5 °F (36.4 °C) 96 %      MAP       --         Physical Exam    Nursing note and vitals reviewed.  Constitutional: He appears well-developed and well-nourished. He is not diaphoretic. No distress.    HENT:   Head: Normocephalic and atraumatic.   Right Ear: External ear normal.   Left Ear: External ear normal.   Nose: Nose normal.   Mouth/Throat: Oropharynx is clear and moist. No oropharyngeal exudate.   Eyes: Conjunctivae and EOM are normal. Pupils are equal, round, and reactive to light. No scleral icterus.   Neck: Normal range of motion. Neck supple. No JVD present.   Cardiovascular: Normal rate, regular rhythm, normal heart sounds and intact distal pulses. Exam reveals no gallop and no friction rub.    No murmur heard.  Pulmonary/Chest: Breath sounds normal. No respiratory distress. He has no wheezes. He has no rhonchi. He has no rales. He exhibits no tenderness.   Abdominal: Soft. Bowel sounds are normal. He exhibits no distension and no mass. There is no tenderness. There is no rebound and no guarding.   Musculoskeletal: Normal range of motion. He exhibits no edema or tenderness.   Neurological: He is alert and oriented to person, place, and time. He has normal strength. No cranial nerve deficit or sensory deficit.   Skin: Skin is warm and dry. Capillary refill takes less than 2 seconds. No rash noted.   Psychiatric: He has a normal mood and affect.         ED Course   Procedures  Labs Reviewed   CBC W/ AUTO DIFFERENTIAL - Abnormal; Notable for the following components:       Result Value    RBC 3.88 (*)     Hemoglobin 11.4 (*)     Hematocrit 35.8 (*)     Mean Corpuscular Hemoglobin Conc 31.8 (*)     RDW 14.8 (*)     Platelets 126 (*)     All other components within normal limits   URINALYSIS, REFLEX TO URINE CULTURE - Abnormal; Notable for the following components:    Color, UA Red (*)     Appearance, UA Cloudy (*)     Ketones, UA Trace (*)     Occult Blood UA 3+ (*)     Leukocytes, UA Trace (*)     All other components within normal limits    Narrative:     Preferred Collection Type->Urine, Clean Catch   COMPREHENSIVE METABOLIC PANEL - Abnormal; Notable for the following components:    CO2 20 (*)      Glucose 139 (*)     BUN, Bld 28 (*)     Alkaline Phosphatase 53 (*)     eGFR if non  59 (*)     All other components within normal limits   URINALYSIS MICROSCOPIC - Abnormal; Notable for the following components:    RBC, UA >100 (*)     WBC, UA 6 (*)     Bacteria Moderate (*)     All other components within normal limits    Narrative:     Preferred Collection Type->Urine, Clean Catch   PROTIME-INR   APTT          Imaging Results    None          Medical Decision Making:   History:   Old Medical Records: I decided to obtain old medical records.  Old Records Summarized: other records and records from clinic visits.       <> Summary of Records: Reviewed and summarized the old medical record and it showed  Urine culture Status:  Final result   Visible to patient:  Yes (Patient Portal) Next appt:  03/24/2020 at 01:00 PM in Urology   Component 6d ago  Urine Culture, Routine Abnormal    ENTEROCOCCUS FAECALIS >100,000 cfu/ml    Susceptibility    Enterococcus faecalis    CULTURE, URINE    Ampicillin <=2 mcg/mL Sensitive    Nitrofurantoin <=32 mcg/mL Sensitive    Tetracycline >8 mcg/mL Resistant    Vancomycin 2 mcg/mL Sensitive             Differential Diagnosis:   Acute pyelonephritis, ureterolithiais, AAA rupture / dissection, intrabominal hemorrhage, coagulopathy, liver failure, renal failure,  malignancy,  mass, cystitis, medication reaction    Clinical Tests:   Lab Tests: Ordered and Reviewed  ED Management:  Patient's H&H is at his baseline, he remains hemodynamically stable so I do not believe the level of bleeding he has had so far is an urgent or emergent phase.  He has no other significant lab derangement.  I reviewed with him the results of his urine culture as well as Dr. Sanchez is plan.  Wife states that she will reach out to  to discuss holding Plavix. After taking into careful account the patient's historical factors, physical exam findings, empirical and objective data obtained  from ED workup, the patient appears to be low risk for an emergent medical condition. I feel it is safe and appropriate at this time for the patient to be discharged for follow up and re-evaluation as detailed in the discharge instructions. The patient improved with treatment in the ED and the patient/guardian is comfortable going home. I have discussed the specifics of the workup with the patient/guardian and the patient/guardian has verbalized understanding of the details of the workup, the diagnosis, the treatment plan, and the need for outpatient follow-up.  I educated the patient/guardian on the warning signs and symptoms for which they must seek immediate medical attention. I have advised the patient/guardian that they can return to the ED and/or activate EMS at any time with worsening of their symptoms, change of their symptoms, or with any other medical complaints.  Patient's/guardian understands the ED visit today was primarily to address immediate concerns and to rule out emergent causes of the symptoms. They also understand that these symptoms may require further workup and evaluation as an outpatient. I emphasized the importance of followup.  All questions addressed and patient/guardian were given discharge instructions and followup information.               Scribe Attestation:   Scribe #1: I performed the above scribed service and the documentation accurately describes the services I performed. I attest to the accuracy of the note.    Attending Attestation:           Physician Attestation for Scribe:  Physician Attestation Statement for Scribe #1: I, Dr. Mauro, reviewed documentation, as scribed by Eda Dimas in my presence, and it is both accurate and complete.                 ED Course as of Mar 09 0914   Mon Mar 09, 2020   5476 Case discussed with Dr. Sanchez, requesting initiate patient on amoxicillin for the next 2 weeks until his cystoscope.  Requested patient contact his cardiologist and  ask if Plavix can be held for now.     [MA]      ED Course User Index  [MA] Michael Mauro MD                Clinical Impression:     1. Hematuria, unspecified type    2. Acute cystitis with hematuria                ED Disposition Condition    Discharge Stable        ED Prescriptions     Medication Sig Dispense Start Date End Date Auth. Provider    amoxicillin (AMOXIL) 500 MG capsule Take 1 capsule (500 mg total) by mouth 3 (three) times daily. for 14 days 42 capsule 3/9/2020 3/23/2020 Michael Mauro MD        Follow-up Information     Follow up With Specialties Details Why Contact Info    Gelacio Sanchez MD Urology Schedule an appointment as soon as possible for a visit in 3 days For follow-up and re-evaluation [address] [address]  [phone]      [facility] [place] [facility]  As needed, for any new or worsening symptoms [address] [facility] [id number] [phone]                                     Michael Mauro MD  03/09/20 0919

## 2020-03-09 NOTE — ED NOTES
Pt c/o hematuria x 1 month. Pt states he has recently had 2 CT scans to find out cause of hematuria. Pt states he also has pain across his lower back.  Pt states he has a hx of kidney stones, but this pain is different. Pt is A & O x 3, denies SOB, fever, chills and N/V/D. Skin is warm, dry and pink. SOLO x 3mm. BBS- CTA. Abd- SNT. PSM x 4 exts. Will continue to monitor closely.

## 2020-03-10 ENCOUNTER — PATIENT MESSAGE (OUTPATIENT)
Dept: FAMILY MEDICINE | Facility: CLINIC | Age: 75
End: 2020-03-10

## 2020-03-10 RX ORDER — NITROFURANTOIN 25; 75 MG/1; MG/1
100 CAPSULE ORAL 2 TIMES DAILY
Qty: 14 CAPSULE | Refills: 0 | Status: SHIPPED | OUTPATIENT
Start: 2020-03-10 | End: 2020-03-17

## 2020-03-17 ENCOUNTER — TELEPHONE (OUTPATIENT)
Dept: UROLOGY | Facility: CLINIC | Age: 75
End: 2020-03-17

## 2020-03-29 DIAGNOSIS — N20.0 URIC ACID KIDNEY STONE: ICD-10-CM

## 2020-03-29 RX ORDER — ALLOPURINOL 300 MG/1
TABLET ORAL
Qty: 30 TABLET | Refills: 11 | Status: SHIPPED | OUTPATIENT
Start: 2020-03-29 | End: 2021-01-25

## 2020-04-01 ENCOUNTER — PATIENT MESSAGE (OUTPATIENT)
Dept: UROLOGY | Facility: CLINIC | Age: 75
End: 2020-04-01

## 2020-04-01 ENCOUNTER — TELEPHONE (OUTPATIENT)
Dept: UROLOGY | Facility: CLINIC | Age: 75
End: 2020-04-01

## 2020-04-01 NOTE — TELEPHONE ENCOUNTER
LVM to discuss symptoms.      ----- Message from Sheila Parr sent at 4/1/2020 12:49 PM CDT -----  Contact: pt wife    Name of Who is Calling:Cristian    What is the request in detail: Pt wife states the infection has return since the pt has finished the antibiotics Please contact to further discuss and advise    Can the clinic reply by [facility]: No    [facility]: [phone]

## 2020-04-12 ENCOUNTER — PATIENT MESSAGE (OUTPATIENT)
Dept: FAMILY MEDICINE | Facility: CLINIC | Age: 75
End: 2020-04-12

## 2020-04-12 DIAGNOSIS — R82.81 PYURIA: Primary | ICD-10-CM

## 2020-04-13 ENCOUNTER — PATIENT MESSAGE (OUTPATIENT)
Dept: FAMILY MEDICINE | Facility: CLINIC | Age: 75
End: 2020-04-13

## 2020-04-13 RX ORDER — NITROFURANTOIN 25; 75 MG/1; MG/1
100 CAPSULE ORAL 2 TIMES DAILY
Qty: 10 CAPSULE | Refills: 0 | Status: SHIPPED | OUTPATIENT
Start: 2020-04-13 | End: 2020-04-18

## 2020-04-14 ENCOUNTER — LAB VISIT (OUTPATIENT)
Dept: LAB | Facility: HOSPITAL | Age: 75
End: 2020-04-14
Attending: INTERNAL MEDICINE
Payer: MEDICARE

## 2020-04-14 ENCOUNTER — PATIENT MESSAGE (OUTPATIENT)
Dept: FAMILY MEDICINE | Facility: CLINIC | Age: 75
End: 2020-04-14

## 2020-04-14 ENCOUNTER — PATIENT MESSAGE (OUTPATIENT)
Dept: UROLOGY | Facility: CLINIC | Age: 75
End: 2020-04-14

## 2020-04-14 DIAGNOSIS — R82.81 PYURIA: ICD-10-CM

## 2020-04-14 LAB
BACTERIA #/AREA URNS AUTO: ABNORMAL /HPF
BILIRUB UR QL STRIP: NEGATIVE
CLARITY UR REFRACT.AUTO: ABNORMAL
COLOR UR AUTO: ABNORMAL
GLUCOSE UR QL STRIP: NEGATIVE
HGB UR QL STRIP: ABNORMAL
HYALINE CASTS UR QL AUTO: 25 /LPF
KETONES UR QL STRIP: NEGATIVE
LEUKOCYTE ESTERASE UR QL STRIP: ABNORMAL
MICROSCOPIC COMMENT: ABNORMAL
NITRITE UR QL STRIP: NEGATIVE
NON-SQ EPI CELLS #/AREA URNS AUTO: 16 /HPF
PH UR STRIP: 5 [PH] (ref 5–8)
PROT UR QL STRIP: ABNORMAL
RBC #/AREA URNS AUTO: >100 /HPF (ref 0–4)
SP GR UR STRIP: 1.02 (ref 1–1.03)
SQUAMOUS #/AREA URNS AUTO: 3 /HPF
URN SPEC COLLECT METH UR: ABNORMAL
WBC #/AREA URNS AUTO: >100 /HPF (ref 0–5)
WBC CLUMPS UR QL AUTO: ABNORMAL

## 2020-04-14 PROCEDURE — 87086 URINE CULTURE/COLONY COUNT: CPT

## 2020-04-14 PROCEDURE — 81001 URINALYSIS AUTO W/SCOPE: CPT

## 2020-04-15 ENCOUNTER — PATIENT MESSAGE (OUTPATIENT)
Dept: FAMILY MEDICINE | Facility: CLINIC | Age: 75
End: 2020-04-15

## 2020-04-15 LAB
BACTERIA UR CULT: NORMAL
BACTERIA UR CULT: NORMAL

## 2020-04-22 ENCOUNTER — TELEPHONE (OUTPATIENT)
Dept: UROLOGY | Facility: CLINIC | Age: 75
End: 2020-04-22

## 2020-04-22 ENCOUNTER — TELEPHONE (OUTPATIENT)
Dept: FAMILY MEDICINE | Facility: CLINIC | Age: 75
End: 2020-04-22

## 2020-04-22 DIAGNOSIS — R30.0 DYSURIA: Primary | ICD-10-CM

## 2020-04-22 NOTE — TELEPHONE ENCOUNTER
----- Message from Lucrecia Saxena sent at 4/22/2020 10:30 AM CDT -----  Contact: richard - wife   Type: Patient Call Back       What is the request in detail:  Pt wife calling to speak to a nurse regarding pt urine      Can the clinic reply by [facility]? No       Would the patient rather a call back or a response via [facility]? Call back       Best call back number:  [phone] ask for richard

## 2020-04-22 NOTE — TELEPHONE ENCOUNTER
"Spoke with the pt's wife, Mrs. Macedo stated "when I  urinate it burns and he feels pressure. He completed the antibiotics and after 5 days he began to feel symptoms". Pt is scheduled for a Cystoscope on 5/8/20 Please advise            Thanks Daisy  "

## 2020-04-22 NOTE — TELEPHONE ENCOUNTER
Wife states patient has finished antibiotics and she brought patient urine in herself and looked at it and it still has WBC'S over 100. Asking for advice

## 2020-04-22 NOTE — TELEPHONE ENCOUNTER
Please inform the wife that the last message I received from the patient himself was that he had notified his urologist about the matter and the treatment and that urology was going to handle it from there.     Message from April 15th was that he started his antibiotics    Wife may not have been aware of all this.

## 2020-04-22 NOTE — TELEPHONE ENCOUNTER
----- Message from Torsten Dominique sent at 4/22/2020  1:06 PM CDT -----  Contact: Sofia (spouse)  Name of Who is Calling: Sofia (spouse)      What is the request in detail: Would like to speak with staff in regards to following up on patient having another UTI. States he finished the antibiotics but still having symptoms. Please advise      Can the clinic reply by [facility]: no      What [facility]: [phone]

## 2020-04-23 ENCOUNTER — TELEPHONE (OUTPATIENT)
Dept: UROLOGY | Facility: CLINIC | Age: 75
End: 2020-04-23

## 2020-04-23 RX ORDER — NITROFURANTOIN 25; 75 MG/1; MG/1
100 CAPSULE ORAL 2 TIMES DAILY
Qty: 14 CAPSULE | Refills: 0 | Status: SHIPPED | OUTPATIENT
Start: 2020-04-23 | End: 2020-04-30

## 2020-04-23 NOTE — TELEPHONE ENCOUNTER
----- Message from Baljit Valente sent at 4/23/2020 12:46 PM CDT -----  Contact: Pt  Type:  Patient Returning Call    Who Called: CHUCKIE SIMONS JR. [[id number]]    Who Left Message for Patient: Marina    Does the patient know what this is regarding?:Yes     Best Call Back Number:[phone] (home)     Additional Information:

## 2020-04-24 ENCOUNTER — LAB VISIT (OUTPATIENT)
Dept: LAB | Facility: HOSPITAL | Age: 75
End: 2020-04-24
Attending: INTERNAL MEDICINE
Payer: MEDICARE

## 2020-04-24 DIAGNOSIS — R30.0 DYSURIA: ICD-10-CM

## 2020-04-24 PROCEDURE — 87086 URINE CULTURE/COLONY COUNT: CPT

## 2020-04-26 LAB
BACTERIA UR CULT: NORMAL
BACTERIA UR CULT: NORMAL

## 2020-04-30 RX ORDER — SITAGLIPTIN 100 MG/1
TABLET, FILM COATED ORAL
Qty: 30 TABLET | Refills: 3 | Status: SHIPPED | OUTPATIENT
Start: 2020-04-30 | End: 2020-07-31 | Stop reason: SDUPTHER

## 2020-04-30 RX ORDER — VALSARTAN 160 MG/1
TABLET ORAL
Qty: 60 TABLET | Refills: 2 | Status: SHIPPED | OUTPATIENT
Start: 2020-04-30 | End: 2020-07-24

## 2020-05-07 ENCOUNTER — PATIENT OUTREACH (OUTPATIENT)
Dept: ADMINISTRATIVE | Facility: OTHER | Age: 75
End: 2020-05-07

## 2020-05-07 NOTE — PROGRESS NOTES
Chart reviewed.   Immunizations: Triggered Imm Registry     Orders placed: eye exam  Upcoming appts to satisfy ELLY topics: n/a

## 2020-05-08 ENCOUNTER — HOSPITAL ENCOUNTER (OUTPATIENT)
Dept: PREADMISSION TESTING | Facility: OTHER | Age: 75
Discharge: HOME | End: 2020-05-08
Attending: UROLOGY
Payer: MEDICARE

## 2020-05-08 ENCOUNTER — ANESTHESIA EVENT (OUTPATIENT)
Dept: SURGERY | Facility: OTHER | Age: 75
End: 2020-05-08
Payer: MEDICARE

## 2020-05-08 ENCOUNTER — PROCEDURE VISIT (OUTPATIENT)
Dept: UROLOGY | Facility: CLINIC | Age: 75
End: 2020-05-08
Payer: MEDICARE

## 2020-05-08 ENCOUNTER — LAB VISIT (OUTPATIENT)
Dept: INTERNAL MEDICINE | Facility: CLINIC | Age: 75
End: 2020-05-08
Payer: MEDICARE

## 2020-05-08 VITALS
DIASTOLIC BLOOD PRESSURE: 72 MMHG | BODY MASS INDEX: 27.92 KG/M2 | SYSTOLIC BLOOD PRESSURE: 113 MMHG | HEIGHT: 70 IN | WEIGHT: 195 LBS | HEART RATE: 76 BPM

## 2020-05-08 VITALS
HEIGHT: 70 IN | DIASTOLIC BLOOD PRESSURE: 76 MMHG | SYSTOLIC BLOOD PRESSURE: 144 MMHG | BODY MASS INDEX: 27.92 KG/M2 | HEART RATE: 69 BPM | TEMPERATURE: 98 F | WEIGHT: 195 LBS | OXYGEN SATURATION: 97 %

## 2020-05-08 DIAGNOSIS — N30.00 ACUTE CYSTITIS WITHOUT HEMATURIA: ICD-10-CM

## 2020-05-08 DIAGNOSIS — D49.4 BLADDER TUMOR: ICD-10-CM

## 2020-05-08 DIAGNOSIS — N40.0 ENLARGED PROSTATE: ICD-10-CM

## 2020-05-08 DIAGNOSIS — D49.4 BLADDER TUMOR: Primary | ICD-10-CM

## 2020-05-08 DIAGNOSIS — R31.29 MICROHEMATURIA: ICD-10-CM

## 2020-05-08 LAB
BILIRUB SERPL-MCNC: ABNORMAL MG/DL
BLOOD URINE, POC: 250
COLOR, POC UA: ABNORMAL
GLUCOSE UR QL STRIP: NORMAL
KETONES UR QL STRIP: ABNORMAL
LEUKOCYTE ESTERASE URINE, POC: ABNORMAL
NITRITE, POC UA: ABNORMAL
PH, POC UA: 5
PROTEIN, POC: 30
SARS-COV-2 RNA RESP QL NAA+PROBE: NOT DETECTED
SPECIFIC GRAVITY, POC UA: 1.02
UROBILINOGEN, POC UA: NORMAL

## 2020-05-08 PROCEDURE — 81002 URINALYSIS NONAUTO W/O SCOPE: CPT | Mod: S$GLB,,, | Performed by: UROLOGY

## 2020-05-08 PROCEDURE — U0002 COVID-19 LAB TEST NON-CDC: HCPCS

## 2020-05-08 PROCEDURE — 52000 CYSTOURETHROSCOPY: CPT | Mod: S$GLB,,, | Performed by: UROLOGY

## 2020-05-08 PROCEDURE — 81002 POCT URINE DIPSTICK WITHOUT MICROSCOPE: ICD-10-PCS | Mod: S$GLB,,, | Performed by: UROLOGY

## 2020-05-08 PROCEDURE — 52000 CYSTOSCOPY: ICD-10-PCS | Mod: S$GLB,,, | Performed by: UROLOGY

## 2020-05-08 RX ORDER — LIDOCAINE HYDROCHLORIDE 20 MG/ML
JELLY TOPICAL ONCE
Status: CANCELLED | OUTPATIENT
Start: 2020-05-08 | End: 2020-05-08

## 2020-05-08 RX ORDER — CEPHALEXIN 500 MG/1
500 CAPSULE ORAL
Status: COMPLETED | OUTPATIENT
Start: 2020-05-08 | End: 2020-05-08

## 2020-05-08 RX ORDER — LIDOCAINE HYDROCHLORIDE 20 MG/ML
JELLY TOPICAL
Status: COMPLETED | OUTPATIENT
Start: 2020-05-08 | End: 2020-05-08

## 2020-05-08 RX ORDER — PREGABALIN 50 MG/1
50 CAPSULE ORAL ONCE
Status: CANCELLED | OUTPATIENT
Start: 2020-05-08 | End: 2020-05-08

## 2020-05-08 RX ORDER — LIDOCAINE HYDROCHLORIDE 10 MG/ML
0.5 INJECTION, SOLUTION EPIDURAL; INFILTRATION; INTRACAUDAL; PERINEURAL ONCE
Status: CANCELLED | OUTPATIENT
Start: 2020-05-08 | End: 2020-05-08

## 2020-05-08 RX ORDER — ACETAMINOPHEN 500 MG
1000 TABLET ORAL
Status: CANCELLED | OUTPATIENT
Start: 2020-05-08 | End: 2020-05-08

## 2020-05-08 RX ORDER — SODIUM CHLORIDE, SODIUM LACTATE, POTASSIUM CHLORIDE, CALCIUM CHLORIDE 600; 310; 30; 20 MG/100ML; MG/100ML; MG/100ML; MG/100ML
INJECTION, SOLUTION INTRAVENOUS CONTINUOUS
Status: CANCELLED | OUTPATIENT
Start: 2020-05-08

## 2020-05-08 RX ADMIN — CEPHALEXIN 500 MG: 500 CAPSULE ORAL at 03:05

## 2020-05-08 RX ADMIN — LIDOCAINE HYDROCHLORIDE 5 ML: 20 JELLY TOPICAL at 03:05

## 2020-05-08 NOTE — PROCEDURES
Cystoscopy  Date/Time: 5/8/2020 12:51 PM  Performed by: Gelacio Sanchez MD  Authorized by: Gelacio Sanchez MD     Consent Done?:  Yes (Written)  Indications: recurrent UTIs and hematuria    Position:  Supine  Anesthesia:  Lidocaine jelly  Patient sedated?: No    Preparation: Patient was prepped and draped in usual sterile fashion      Scope type:  Flexible cystoscope  Prostate normal: No        Hyperplasia         Positive Varices  Bladder neck normal: Bladder neck abnormal (tumor)  Bladder normal: Yes      Patient tolerance:  Patient tolerated the procedure well with no immediate complications      CT urogram punctate nonobstructive bilateral renal stones   urine culture multiple organisms x2     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

## 2020-05-08 NOTE — ANESTHESIA PREPROCEDURE EVALUATION
05/08/2020  Ruben Macedo Jr. is a 74 y.o., male.    Anesthesia Evaluation    I have reviewed the Patient Summary Reports.    I have reviewed the Nursing Notes.   I have reviewed the Medications.     Review of Systems  Anesthesia Hx:  Denies Family Hx of Anesthesia complications.    Social:  Non-Smoker    Hematology/Oncology:     Oncology Normal    -- Anemia (borderline):   EENT/Dental:EENT/Dental Normal   Cardiovascular:   Hypertension CAD asymptomatic CABG/stent (2005)  ECG has been reviewed. Dr. Acevedo, sees pt yearly, last EST 3 yrs ago, neg   Pulmonary:  Pulmonary Normal    Renal/:   Chronic Renal Disease (very mild), CRI renal calculi BPH    Hepatic/GI:  Hepatic/GI Normal    Musculoskeletal:  Musculoskeletal Normal    Neurological:   CVA (2005), no residual symptoms    Endocrine:   Diabetes, poorly controlled, type 2    Dermatological:  Skin Normal    Psych:  Psychiatric Normal           Physical Exam  General:  Well nourished      Dental:  Dental Findings: In tact, Molar caps        Mental Status:  Mental Status Findings:  Cooperative, Alert and Oriented         Anesthesia Plan  Type of Anesthesia, risks & benefits discussed:  Anesthesia Type:  general  Patient's Preference:   Intra-op Monitoring Plan: standard ASA monitors  Intra-op Monitoring Plan Comments:   Post Op Pain Control Plan: multimodal analgesia and per primary service following discharge from PACU  Post Op Pain Control Plan Comments:   Induction:   IV  Beta Blocker:         Informed Consent: Patient understands risks and agrees with Anesthesia plan.  Questions answered. Anesthesia consent signed with patient.  ASA Score: 3     Day of Surgery Review of History & Physical:    H&P update referred to the surgeon.     Anesthesia Plan Notes: Recent EKG/Labs in [facility] as above, covid pending    Plavix held 5/3, OK'd by  cardiologist        Ready For Surgery From Anesthesia Perspective.

## 2020-05-08 NOTE — DISCHARGE INSTRUCTIONS
Information to Prepare you for your Surgery    PRE-ADMIT TESTING -  [phone]    [address]  [place] [facility]          Your surgery has been scheduled at [facility]. We are pleased to have the opportunity to serve you. For Further Information please call [phone].    On the day of surgery please report to the Information Desk on the 1st floor.    · CONTACT YOUR PHYSICIAN'S OFFICE THE DAY PRIOR TO YOUR SURGERY TO OBTAIN YOUR ARRIVAL TIME.     · The evening before surgery do not eat anything after 9 p.m. ( this includes hard candy, chewing gum and mints).  You may only have GATORADE, POWERADE AND WATER  from 9 p.m. until you leave your home.   DO NOT DRINK ANY LIQUIDS ON THE WAY TO THE HOSPITAL.      SPECIAL MEDICATION INSTRUCTIONS: TAKE medications checked off by the Anesthesiologist on your Medication List.    Angiogram Patients: Take medications as instructed by your physician, including aspirin.     Surgery Patients:    If you take ASPIRIN - Your PHYSICIAN/SURGEON will need to inform you IF/OR when you need to stop taking aspirin prior to your surgery.     Do Not take any medications containing IBUPROFEN.  Do Not Wear any make-up or dark nail polish   (especially eye make-up) to surgery. If you come to surgery with makeup on you will be required to remove the makeup or nail polish.    Do not shave your surgical area at least 5 days prior to your surgery. The surgical prep will be performed at the hospital according to Infection Control regulations.    Leave all valuables at home.   Do Not wear any jewelry or watches, including any metal in body piercings. Jewelry must be removed prior to coming to the hospital.  There is a possibility that rings that are unable to be removed may be cut off if they are on the surgical extremity.    Contact Lens must be removed before surgery. Either do not wear the contact lens or bring a case and solution for  storage.  Please bring a container for eyeglasses or dentures as required.  Bring any paperwork your physician has provided, such as consent forms,  history and physicals, doctor's orders, etc.   Bring comfortable clothes that are loose fitting to wear upon discharge. Take into consideration the type of surgery being performed.  Maintain your diet as advised per your physician the day prior to surgery.      Adequate rest the night before surgery is advised.   Park in the Parking lot behind the hospital or in the [facility] across the street from the parking lot. Parking is complimentary.  If you will be discharged the same day as your procedure, please arrange for a responsible adult to drive you home or to accompany you if traveling by taxi.   YOU WILL NOT BE PERMITTED TO DRIVE OR TO LEAVE THE HOSPITAL ALONE AFTER SURGERY.   It is strongly recommended that you arrange for someone to remain with you for the first 24 hrs following your surgery.    The Surgeon will speak to your family/visitor after your surgery regarding the outcome of your surgery and post op care.  The Surgeon may speak to you after your surgery, but there is a possibility you may not remember the details.  Please check with your family members regarding the conversation with the Surgeon.    We strongly recommend whoever is bringing you home be present for discharge instructions.  This will ensure a thorough understanding for your post op home care.    EACH PATIENT IS ALLOWED TWO FAMILY MEMBERS OR VISITORS IN THE ROOM AND IN THE WAITING ROOMS WHILE YOU ARE IN SURGERY. ALL CHILDREN MUST ALWAYS BE ACCOMPANIED BY AN ADULT.    Thank you for your cooperation.  The Staff of [facility].                Bathing Instructions with Hibiclens     Shower the evening before and morning of your procedure with Hibiclens:   Wash your face with water and your regular face wash/soap   Apply Hibiclens directly on your skin or on a  wet washcloth and wash gently. When showering: Move away from the shower stream when applying Hibiclens to avoid rinsing off too soon.   Rinse thoroughly with warm water   Do not dilute Hibiclens         Dry off as usual, do not use any deodorant, powder, body lotions, perfume, after shave or cologne.

## 2020-05-11 ENCOUNTER — HOSPITAL ENCOUNTER (OUTPATIENT)
Facility: OTHER | Age: 75
Discharge: HOME | End: 2020-05-14
Attending: UROLOGY | Admitting: UROLOGY
Payer: MEDICARE

## 2020-05-11 ENCOUNTER — ANESTHESIA (OUTPATIENT)
Dept: SURGERY | Facility: OTHER | Age: 75
End: 2020-05-11
Payer: MEDICARE

## 2020-05-11 ENCOUNTER — ANESTHESIA EVENT (OUTPATIENT)
Dept: SURGERY | Facility: OTHER | Age: 75
End: 2020-05-11
Payer: MEDICARE

## 2020-05-11 DIAGNOSIS — R33.9 INCOMPLETE BLADDER EMPTYING: ICD-10-CM

## 2020-05-11 DIAGNOSIS — D49.4 BLADDER TUMOR: Primary | ICD-10-CM

## 2020-05-11 DIAGNOSIS — N40.0 BENIGN PROSTATIC HYPERPLASIA, UNSPECIFIED WHETHER LOWER URINARY TRACT SYMPTOMS PRESENT: ICD-10-CM

## 2020-05-11 DIAGNOSIS — I10 ESSENTIAL HYPERTENSION: ICD-10-CM

## 2020-05-11 LAB
POCT GLUCOSE: 115 MG/DL (ref 70–110)
POCT GLUCOSE: 118 MG/DL (ref 70–110)
POCT GLUCOSE: 95 MG/DL (ref 70–110)

## 2020-05-11 PROCEDURE — 88305 TISSUE EXAM BY PATHOLOGIST: CPT | Mod: 26,,, | Performed by: PATHOLOGY

## 2020-05-11 PROCEDURE — 63600175 PHARM REV CODE 636 W HCPCS: Performed by: NURSE ANESTHETIST, CERTIFIED REGISTERED

## 2020-05-11 PROCEDURE — 25000003 PHARM REV CODE 250: Performed by: NURSE ANESTHETIST, CERTIFIED REGISTERED

## 2020-05-11 PROCEDURE — 25000003 PHARM REV CODE 250: Performed by: ANESTHESIOLOGY

## 2020-05-11 PROCEDURE — 25000003 PHARM REV CODE 250: Performed by: SPECIALIST

## 2020-05-11 PROCEDURE — 63600175 PHARM REV CODE 636 W HCPCS: Performed by: UROLOGY

## 2020-05-11 PROCEDURE — 52240 CYSTOSCOPY AND TREATMENT: CPT | Mod: ,,, | Performed by: UROLOGY

## 2020-05-11 PROCEDURE — 63600175 PHARM REV CODE 636 W HCPCS: Performed by: SPECIALIST

## 2020-05-11 PROCEDURE — 25000003 PHARM REV CODE 250: Performed by: STUDENT IN AN ORGANIZED HEALTH CARE EDUCATION/TRAINING PROGRAM

## 2020-05-11 PROCEDURE — 37000009 HC ANESTHESIA EA ADD 15 MINS: Performed by: UROLOGY

## 2020-05-11 PROCEDURE — 63600175 PHARM REV CODE 636 W HCPCS: Performed by: ANESTHESIOLOGY

## 2020-05-11 PROCEDURE — 36000707: Performed by: UROLOGY

## 2020-05-11 PROCEDURE — 82962 GLUCOSE BLOOD TEST: CPT | Performed by: UROLOGY

## 2020-05-11 PROCEDURE — 27201423 OPTIME MED/SURG SUP & DEVICES STERILE SUPPLY: Performed by: UROLOGY

## 2020-05-11 PROCEDURE — 88112 CYTOPATH CELL ENHANCE TECH: CPT | Performed by: PATHOLOGY

## 2020-05-11 PROCEDURE — 88112 CYTOPATH CELL ENHANCE TECH: CPT | Mod: 26,,, | Performed by: PATHOLOGY

## 2020-05-11 PROCEDURE — 82962 GLUCOSE BLOOD TEST: CPT

## 2020-05-11 PROCEDURE — 99900035 HC TECH TIME PER 15 MIN (STAT)

## 2020-05-11 PROCEDURE — 37000008 HC ANESTHESIA 1ST 15 MINUTES: Performed by: UROLOGY

## 2020-05-11 PROCEDURE — 52001 PR CYSTOURETHROSCOPY W/IRRIG & EVAC CLOTS: ICD-10-PCS | Mod: 59,,, | Performed by: UROLOGY

## 2020-05-11 PROCEDURE — 52240 PR CYSTOURETHROSCOPY,FULGUR >5 CM LESN: ICD-10-PCS | Mod: ,,, | Performed by: UROLOGY

## 2020-05-11 PROCEDURE — 88305 TISSUE EXAM BY PATHOLOGIST: ICD-10-PCS | Mod: 26,,, | Performed by: PATHOLOGY

## 2020-05-11 PROCEDURE — 88112 PR  CYTOPATH, CELL ENHANCE TECH: ICD-10-PCS | Mod: 26,,, | Performed by: PATHOLOGY

## 2020-05-11 PROCEDURE — 36000706: Performed by: UROLOGY

## 2020-05-11 PROCEDURE — 71000039 HC RECOVERY, EACH ADD'L HOUR: Performed by: UROLOGY

## 2020-05-11 PROCEDURE — 94761 N-INVAS EAR/PLS OXIMETRY MLT: CPT

## 2020-05-11 PROCEDURE — 52001 CYSTO W/IRRG&EVAC MLT CLOTS: CPT | Mod: 59,,, | Performed by: UROLOGY

## 2020-05-11 PROCEDURE — 88305 TISSUE EXAM BY PATHOLOGIST: CPT | Performed by: PATHOLOGY

## 2020-05-11 PROCEDURE — 71000033 HC RECOVERY, INTIAL HOUR: Performed by: UROLOGY

## 2020-05-11 RX ORDER — PREGABALIN 50 MG/1
50 CAPSULE ORAL ONCE
Status: COMPLETED | OUTPATIENT
Start: 2020-05-11 | End: 2020-05-11

## 2020-05-11 RX ORDER — CEFAZOLIN SODIUM 1 G/3ML
2 INJECTION, POWDER, FOR SOLUTION INTRAMUSCULAR; INTRAVENOUS
Status: COMPLETED | OUTPATIENT
Start: 2020-05-11 | End: 2020-05-11

## 2020-05-11 RX ORDER — OXYCODONE HYDROCHLORIDE 5 MG/1
5 TABLET ORAL
Status: DISCONTINUED | OUTPATIENT
Start: 2020-05-11 | End: 2020-05-11 | Stop reason: HOSPADM

## 2020-05-11 RX ORDER — ONDANSETRON 2 MG/ML
4 INJECTION INTRAMUSCULAR; INTRAVENOUS DAILY PRN
Status: DISCONTINUED | OUTPATIENT
Start: 2020-05-11 | End: 2020-05-11 | Stop reason: SDUPTHER

## 2020-05-11 RX ORDER — LIDOCAINE HYDROCHLORIDE 10 MG/ML
0.5 INJECTION, SOLUTION EPIDURAL; INFILTRATION; INTRACAUDAL; PERINEURAL ONCE
Status: DISCONTINUED | OUTPATIENT
Start: 2020-05-11 | End: 2020-05-11 | Stop reason: HOSPADM

## 2020-05-11 RX ORDER — GLYCOPYRROLATE 0.2 MG/ML
INJECTION INTRAMUSCULAR; INTRAVENOUS
Status: DISCONTINUED | OUTPATIENT
Start: 2020-05-11 | End: 2020-05-11

## 2020-05-11 RX ORDER — LIDOCAINE HYDROCHLORIDE 20 MG/ML
INJECTION INTRAVENOUS
Status: DISCONTINUED | OUTPATIENT
Start: 2020-05-11 | End: 2020-05-11

## 2020-05-11 RX ORDER — LIDOCAINE HYDROCHLORIDE 20 MG/ML
JELLY TOPICAL
Status: DISCONTINUED | OUTPATIENT
Start: 2020-05-11 | End: 2020-05-14 | Stop reason: HOSPADM

## 2020-05-11 RX ORDER — OXYBUTYNIN CHLORIDE 5 MG/1
5 TABLET ORAL 3 TIMES DAILY
Status: DISCONTINUED | OUTPATIENT
Start: 2020-05-11 | End: 2020-05-14

## 2020-05-11 RX ORDER — DIPHENHYDRAMINE HYDROCHLORIDE 50 MG/ML
25 INJECTION INTRAMUSCULAR; INTRAVENOUS EVERY 6 HOURS PRN
Status: DISCONTINUED | OUTPATIENT
Start: 2020-05-11 | End: 2020-05-11 | Stop reason: HOSPADM

## 2020-05-11 RX ORDER — ONDANSETRON 2 MG/ML
INJECTION INTRAMUSCULAR; INTRAVENOUS
Status: DISCONTINUED | OUTPATIENT
Start: 2020-05-11 | End: 2020-05-11

## 2020-05-11 RX ORDER — MEPERIDINE HYDROCHLORIDE 25 MG/ML
12.5 INJECTION INTRAMUSCULAR; INTRAVENOUS; SUBCUTANEOUS ONCE AS NEEDED
Status: DISCONTINUED | OUTPATIENT
Start: 2020-05-11 | End: 2020-05-11 | Stop reason: SDUPTHER

## 2020-05-11 RX ORDER — SODIUM CHLORIDE, SODIUM LACTATE, POTASSIUM CHLORIDE, CALCIUM CHLORIDE 600; 310; 30; 20 MG/100ML; MG/100ML; MG/100ML; MG/100ML
INJECTION, SOLUTION INTRAVENOUS CONTINUOUS
Status: DISCONTINUED | OUTPATIENT
Start: 2020-05-11 | End: 2020-05-11

## 2020-05-11 RX ORDER — ONDANSETRON 2 MG/ML
4 INJECTION INTRAMUSCULAR; INTRAVENOUS DAILY PRN
Status: DISCONTINUED | OUTPATIENT
Start: 2020-05-11 | End: 2020-05-11 | Stop reason: HOSPADM

## 2020-05-11 RX ORDER — SODIUM CHLORIDE 0.9 % (FLUSH) 0.9 %
3 SYRINGE (ML) INJECTION
Status: DISCONTINUED | OUTPATIENT
Start: 2020-05-11 | End: 2020-05-14 | Stop reason: HOSPADM

## 2020-05-11 RX ORDER — ACETAMINOPHEN 325 MG/1
650 TABLET ORAL EVERY 4 HOURS
Status: DISCONTINUED | OUTPATIENT
Start: 2020-05-11 | End: 2020-05-12

## 2020-05-11 RX ORDER — PROPOFOL 10 MG/ML
VIAL (ML) INTRAVENOUS
Status: DISCONTINUED | OUTPATIENT
Start: 2020-05-11 | End: 2020-05-11

## 2020-05-11 RX ORDER — NEOSTIGMINE METHYLSULFATE 1 MG/ML
INJECTION, SOLUTION INTRAVENOUS
Status: DISCONTINUED | OUTPATIENT
Start: 2020-05-11 | End: 2020-05-11

## 2020-05-11 RX ORDER — ATROPA BELLADONNA AND OPIUM 16.2; 6 MG/1; MG/1
60 SUPPOSITORY RECTAL EVERY 8 HOURS PRN
Status: DISCONTINUED | OUTPATIENT
Start: 2020-05-11 | End: 2020-05-14 | Stop reason: HOSPADM

## 2020-05-11 RX ORDER — ALLOPURINOL 300 MG/1
300 TABLET ORAL DAILY
Status: DISCONTINUED | OUTPATIENT
Start: 2020-05-12 | End: 2020-05-14 | Stop reason: HOSPADM

## 2020-05-11 RX ORDER — GABAPENTIN 300 MG/1
300 CAPSULE ORAL 2 TIMES DAILY
Status: DISCONTINUED | OUTPATIENT
Start: 2020-05-11 | End: 2020-05-14 | Stop reason: HOSPADM

## 2020-05-11 RX ORDER — DIPHENHYDRAMINE HYDROCHLORIDE 50 MG/ML
25 INJECTION INTRAMUSCULAR; INTRAVENOUS EVERY 6 HOURS PRN
Status: DISCONTINUED | OUTPATIENT
Start: 2020-05-11 | End: 2020-05-11 | Stop reason: SDUPTHER

## 2020-05-11 RX ORDER — FENTANYL CITRATE 50 UG/ML
INJECTION, SOLUTION INTRAMUSCULAR; INTRAVENOUS
Status: DISCONTINUED | OUTPATIENT
Start: 2020-05-11 | End: 2020-05-11

## 2020-05-11 RX ORDER — CIPROFLOXACIN 2 MG/ML
INJECTION, SOLUTION INTRAVENOUS
Status: DISCONTINUED | OUTPATIENT
Start: 2020-05-11 | End: 2020-05-11

## 2020-05-11 RX ORDER — METOPROLOL SUCCINATE 25 MG/1
25 TABLET, EXTENDED RELEASE ORAL DAILY
Status: DISCONTINUED | OUTPATIENT
Start: 2020-05-12 | End: 2020-05-14 | Stop reason: HOSPADM

## 2020-05-11 RX ORDER — OXYCODONE HYDROCHLORIDE 5 MG/1
5 TABLET ORAL
Status: DISCONTINUED | OUTPATIENT
Start: 2020-05-11 | End: 2020-05-11 | Stop reason: SDUPTHER

## 2020-05-11 RX ORDER — ACETAMINOPHEN 500 MG
1000 TABLET ORAL
Status: COMPLETED | OUTPATIENT
Start: 2020-05-11 | End: 2020-05-11

## 2020-05-11 RX ORDER — FLUTICASONE PROPIONATE 50 MCG
2 SPRAY, SUSPENSION (ML) NASAL DAILY
Status: DISCONTINUED | OUTPATIENT
Start: 2020-05-12 | End: 2020-05-14 | Stop reason: HOSPADM

## 2020-05-11 RX ORDER — PHENYLEPHRINE HYDROCHLORIDE 10 MG/ML
INJECTION INTRAVENOUS
Status: DISCONTINUED | OUTPATIENT
Start: 2020-05-11 | End: 2020-05-11

## 2020-05-11 RX ORDER — ATROPA BELLADONNA AND OPIUM 16.2; 6 MG/1; MG/1
60 SUPPOSITORY RECTAL ONCE
Status: COMPLETED | OUTPATIENT
Start: 2020-05-11 | End: 2020-05-11

## 2020-05-11 RX ORDER — ONDANSETRON 8 MG/1
8 TABLET, ORALLY DISINTEGRATING ORAL EVERY 8 HOURS PRN
Status: DISCONTINUED | OUTPATIENT
Start: 2020-05-11 | End: 2020-05-14 | Stop reason: HOSPADM

## 2020-05-11 RX ORDER — MEPERIDINE HYDROCHLORIDE 25 MG/ML
12.5 INJECTION INTRAMUSCULAR; INTRAVENOUS; SUBCUTANEOUS ONCE AS NEEDED
Status: DISCONTINUED | OUTPATIENT
Start: 2020-05-11 | End: 2020-05-11 | Stop reason: HOSPADM

## 2020-05-11 RX ORDER — HYDROMORPHONE HYDROCHLORIDE 2 MG/ML
0.4 INJECTION, SOLUTION INTRAMUSCULAR; INTRAVENOUS; SUBCUTANEOUS EVERY 5 MIN PRN
Status: DISCONTINUED | OUTPATIENT
Start: 2020-05-11 | End: 2020-05-11 | Stop reason: SDUPTHER

## 2020-05-11 RX ORDER — EPHEDRINE SULFATE 50 MG/ML
INJECTION, SOLUTION INTRAVENOUS
Status: DISCONTINUED | OUTPATIENT
Start: 2020-05-11 | End: 2020-05-11

## 2020-05-11 RX ORDER — ROCURONIUM BROMIDE 10 MG/ML
INJECTION, SOLUTION INTRAVENOUS
Status: DISCONTINUED | OUTPATIENT
Start: 2020-05-11 | End: 2020-05-11

## 2020-05-11 RX ORDER — HYDROMORPHONE HYDROCHLORIDE 2 MG/ML
0.4 INJECTION, SOLUTION INTRAMUSCULAR; INTRAVENOUS; SUBCUTANEOUS EVERY 5 MIN PRN
Status: DISCONTINUED | OUTPATIENT
Start: 2020-05-11 | End: 2020-05-11 | Stop reason: HOSPADM

## 2020-05-11 RX ORDER — LIDOCAINE HYDROCHLORIDE 20 MG/ML
JELLY TOPICAL ONCE
Status: DISCONTINUED | OUTPATIENT
Start: 2020-05-11 | End: 2020-05-11 | Stop reason: HOSPADM

## 2020-05-11 RX ORDER — FINASTERIDE 5 MG/1
5 TABLET, FILM COATED ORAL DAILY
Status: DISCONTINUED | OUTPATIENT
Start: 2020-05-12 | End: 2020-05-14 | Stop reason: HOSPADM

## 2020-05-11 RX ORDER — SODIUM CHLORIDE 0.9 % (FLUSH) 0.9 %
3 SYRINGE (ML) INJECTION
Status: DISCONTINUED | OUTPATIENT
Start: 2020-05-11 | End: 2020-05-11

## 2020-05-11 RX ADMIN — FENTANYL CITRATE 100 MCG: 50 INJECTION, SOLUTION INTRAMUSCULAR; INTRAVENOUS at 12:05

## 2020-05-11 RX ADMIN — EPHEDRINE SULFATE 5 MG: 50 INJECTION INTRAMUSCULAR; INTRAVENOUS; SUBCUTANEOUS at 12:05

## 2020-05-11 RX ADMIN — ACETAMINOPHEN 1000 MG: 500 TABLET, FILM COATED ORAL at 10:05

## 2020-05-11 RX ADMIN — LIDOCAINE HYDROCHLORIDE 75 MG: 20 INJECTION, SOLUTION INTRAVENOUS at 04:05

## 2020-05-11 RX ADMIN — OXYBUTYNIN CHLORIDE 5 MG: 5 TABLET ORAL at 08:05

## 2020-05-11 RX ADMIN — EPHEDRINE SULFATE 10 MG: 50 INJECTION INTRAMUSCULAR; INTRAVENOUS; SUBCUTANEOUS at 01:05

## 2020-05-11 RX ADMIN — GLYCOPYRROLATE 0.2 MG: 0.2 INJECTION, SOLUTION INTRAMUSCULAR; INTRAVENOUS at 04:05

## 2020-05-11 RX ADMIN — HYDROMORPHONE HYDROCHLORIDE 0.4 MG: 2 INJECTION, SOLUTION INTRAMUSCULAR; INTRAVENOUS; SUBCUTANEOUS at 02:05

## 2020-05-11 RX ADMIN — ROCURONIUM BROMIDE 35 MG: 10 INJECTION, SOLUTION INTRAVENOUS at 12:05

## 2020-05-11 RX ADMIN — PHENYLEPHRINE HYDROCHLORIDE 100 MCG: 10 INJECTION INTRAVENOUS at 04:05

## 2020-05-11 RX ADMIN — PREGABALIN 50 MG: 50 CAPSULE ORAL at 10:05

## 2020-05-11 RX ADMIN — ROCURONIUM BROMIDE 15 MG: 10 INJECTION, SOLUTION INTRAVENOUS at 12:05

## 2020-05-11 RX ADMIN — HYDROMORPHONE HYDROCHLORIDE 0.4 MG: 2 INJECTION, SOLUTION INTRAMUSCULAR; INTRAVENOUS; SUBCUTANEOUS at 03:05

## 2020-05-11 RX ADMIN — FENTANYL CITRATE 50 MCG: 50 INJECTION, SOLUTION INTRAMUSCULAR; INTRAVENOUS at 12:05

## 2020-05-11 RX ADMIN — FENTANYL CITRATE 50 MCG: 50 INJECTION, SOLUTION INTRAMUSCULAR; INTRAVENOUS at 01:05

## 2020-05-11 RX ADMIN — CIPROFLOXACIN 400 MG: 2 INJECTION, SOLUTION INTRAVENOUS at 04:05

## 2020-05-11 RX ADMIN — SODIUM CHLORIDE, SODIUM LACTATE, POTASSIUM CHLORIDE, AND CALCIUM CHLORIDE: 600; 310; 30; 20 INJECTION, SOLUTION INTRAVENOUS at 01:05

## 2020-05-11 RX ADMIN — PROPOFOL 150 MG: 10 INJECTION, EMULSION INTRAVENOUS at 04:05

## 2020-05-11 RX ADMIN — OXYCODONE HYDROCHLORIDE 5 MG: 5 TABLET ORAL at 02:05

## 2020-05-11 RX ADMIN — NEOSTIGMINE METHYLSULFATE 5 MG: 1 INJECTION INTRAVENOUS at 01:05

## 2020-05-11 RX ADMIN — CARBOXYMETHYLCELLULOSE SODIUM 2 DROP: 2.5 SOLUTION/ DROPS OPHTHALMIC at 12:05

## 2020-05-11 RX ADMIN — ATROPA BELLADONNA AND OPIUM 60 MG: 16.2; 6 SUPPOSITORY RECTAL at 02:05

## 2020-05-11 RX ADMIN — PROPOFOL 200 MG: 10 INJECTION, EMULSION INTRAVENOUS at 12:05

## 2020-05-11 RX ADMIN — SODIUM CHLORIDE, SODIUM LACTATE, POTASSIUM CHLORIDE, AND CALCIUM CHLORIDE: 600; 310; 30; 20 INJECTION, SOLUTION INTRAVENOUS at 04:05

## 2020-05-11 RX ADMIN — ONDANSETRON 4 MG: 2 INJECTION INTRAMUSCULAR; INTRAVENOUS at 01:05

## 2020-05-11 RX ADMIN — CEFAZOLIN 2 G: 330 INJECTION, POWDER, FOR SOLUTION INTRAMUSCULAR; INTRAVENOUS at 12:05

## 2020-05-11 RX ADMIN — EPHEDRINE SULFATE 5 MG: 50 INJECTION INTRAMUSCULAR; INTRAVENOUS; SUBCUTANEOUS at 01:05

## 2020-05-11 RX ADMIN — ONDANSETRON 4 MG: 2 INJECTION INTRAMUSCULAR; INTRAVENOUS at 04:05

## 2020-05-11 RX ADMIN — GLYCOPYRROLATE 0.6 MG: 0.2 INJECTION, SOLUTION INTRAMUSCULAR; INTRAVENOUS at 01:05

## 2020-05-11 RX ADMIN — SODIUM CHLORIDE, SODIUM LACTATE, POTASSIUM CHLORIDE, AND CALCIUM CHLORIDE: 600; 310; 30; 20 INJECTION, SOLUTION INTRAVENOUS at 11:05

## 2020-05-11 RX ADMIN — GABAPENTIN 300 MG: 300 CAPSULE ORAL at 08:05

## 2020-05-11 RX ADMIN — CARBOXYMETHYLCELLULOSE SODIUM 2 DROP: 2.5 SOLUTION/ DROPS OPHTHALMIC at 04:05

## 2020-05-11 RX ADMIN — ACETAMINOPHEN 650 MG: 325 TABLET ORAL at 08:05

## 2020-05-11 RX ADMIN — LIDOCAINE HYDROCHLORIDE 40 MG: 20 INJECTION, SOLUTION INTRAVENOUS at 12:05

## 2020-05-11 RX ADMIN — ACETAMINOPHEN 650 MG: 325 TABLET ORAL at 11:05

## 2020-05-11 NOTE — ANESTHESIA POSTPROCEDURE EVALUATION
Anesthesia Post Evaluation    Patient: Ruben Macedo Jr.    Procedure(s) Performed: Procedure(s) (LRB):  TURBT (TRANSURETHRAL RESECTION OF BLADDER TUMOR) (N/A)    Final Anesthesia Type: general    Patient location during evaluation: PACU  Patient participation: Yes- Able to Participate  Level of consciousness: awake and alert and oriented  Post-procedure vital signs: reviewed and stable  Pain management: adequate  Airway patency: patent    PONV status at discharge: No PONV  Anesthetic complications: no      Cardiovascular status: blood pressure returned to baseline and hemodynamically stable  Respiratory status: unassisted, spontaneous ventilation and room air  Hydration status: euvolemic  Follow-up not needed.          Vitals Value Taken Time   /76 5/11/2020  3:26 PM   Temp 36.6 °C (97.8 °F) 5/11/2020  1:40 PM   Pulse 63 5/11/2020  3:36 PM   Resp 18 5/11/2020  3:25 PM   SpO2 94 % 5/11/2020  3:36 PM   Vitals shown include unvalidated device data.      No case tracking events are documented in the log.      Pain/Gudelia Score: Pain Rating Prior to Med Admin: 6 (5/11/2020  3:31 PM)  Pain Rating Post Med Admin: 7 (5/11/2020  3:25 PM)  Gudelia Score: 10 (5/11/2020  3:25 PM)

## 2020-05-11 NOTE — OP NOTE
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    Attestation: I performed the procedure.    History:  74-year-old man with a large bladder tumor who underwent transurethral resection earlier today.  The patient was on Plavix until 6 days preoperatively.  He stopped this with permission of his cardiologist.  At the end of the procedure there was no significant hematuria.  However he has developed persistent hematuria in the recovery room which has not cleared with continuous irrigation.  He is hemodynamically stable.  He returns operating room for clot evacuation and fulguration.  Informed consent was obtained from his wife via phone consent.    Procedure In Detail:  The patient was brought to operating room and underwent general anesthesia.  The abdomen was prepped and draped in sterile fashion after the Elizabeth catheter was removed.  Cystoscopy was performed with a 22 Croatian cystoscope.  The evacuator was used to remove a moderate amount of clot from the urinary bladder.  There was diffuse oozing from the previous resection sites primarily at the left lateral wall the bladder neck and at the 2 o'clock position of the bladder wall.  The cystoscope was removed and the monopolar  resectoscope was advanced into the bladder.  Sterile water was utilized.  The resection sites were cauterized with the loop.  This produced good hemostasis.  There did did appear to be some diffuse oozing continuously which required multiple fulgurations.  Ultimately there was no significant bleeding with the bladder decompressed.  The resectoscope was removed and a 24 Lao 3 way Sal was placed.  The catheter irrigated.  Irrigant was light pink.  The catheter was connected to continuous irrigation.  The bladder remained soft and nondistended throughout the case.  The patient tolerated the procedure well and returned to recovery room in good condition

## 2020-05-11 NOTE — NURSING
Just received pt from PACU via stretcher.  VSS and afebrile.  CBI in place and draining to tension.  Urine bloody and clear.  Titrate urine to clear pink.  Pt denies and pain or discomfort at this time.  25 Cuban rush isabel in place.  Taped to left thigh. Diabetic Type 2.  ACHS.  Will continue to monitor.

## 2020-05-11 NOTE — ANESTHESIA PROCEDURE NOTES
Intubation  Performed by: Naida More CRNA  Authorized by: Francisco Javier Morales MD     Intubation:     Induction:  Intravenous    Intubated:  Postinduction    Mask Ventilation:  N/a    Attempts:  1    Attempted By:  CRNA    Method of Intubation:  Other (see comments) (lma)    Difficult Airway Encountered?: No      Complications:  None    Airway Device:  Other (see comments) (lma)    Airway Device Size:  5.0    Style/Cuff Inflation:  Cuffed    Inflation Amount (mL):  10    Secured at:  The lips    Placement Verified By:  Capnometry    Complicating Factors:  None

## 2020-05-11 NOTE — PROGRESS NOTES
post op    Resting comfortably  Af vss  Ab soft and nondistended  Urine clear light pink on slow CBI      Cont CBI  Plan voiding trial Wednesday morning  WIll restart plavix on Thursday morning if urine remains clear  B&O supp around the clock for bladder pain  Ditropan  Tylenol po around the clock    Will ask hosp med to assist with medical management; thanks

## 2020-05-11 NOTE — ANESTHESIA PROCEDURE NOTES
Intubation  Performed by: Janie May CRNA  Authorized by: Martínez Portillo MD     Intubation:     Induction:  Intravenous    Intubated:  Postinduction    Mask Ventilation:  Moderately difficult with oral airway (2 person bag-mask d/t facial hair)    Attempts:  1    Attempted By:  CRNA    Method of Intubation:  Video laryngoscopy    Blade:  Paredes 3    Laryngeal View Grade: Grade I - full view of chords      Laryngeal View Grade comment:  Per Paredes    Difficult Airway Encountered?: No      Complications:  None    Airway Device:  Oral endotracheal tube    Airway Device Size:  7.5    Style/Cuff Inflation:  Cuffed    Tube secured:  22    Secured at:  The lips    Placement Verified By:  Capnometry    Complicating Factors:  None    Findings Post-Intubation:  BS equal bilateral

## 2020-05-11 NOTE — PROGRESS NOTES
post op    Urine bloody with fast CBI  Bladder mildly distended  Will return to OR for clot evac and fulguration  Discussed with pt and wife  Phone consents done with wife

## 2020-05-11 NOTE — TRANSFER OF CARE
"Anesthesia Transfer of Care Note    Patient: Ruben Macedo Jr.    Procedure(s) Performed: Procedure(s):  REMOVAL, BLOOD CLOT  FULGURATION, BLADDER  CYSTOSCOPY    Patient location: PACU    Anesthesia Type: general    Transport from OR: Transported from OR on 2-3 L/min O2 by NC with adequate spontaneous ventilation    Post pain: adequate analgesia    Post assessment: no apparent anesthetic complications and tolerated procedure well    Post vital signs: stable    Level of consciousness: awake and responds to stimulation    Nausea/Vomiting: no nausea/vomiting    Complications: none    Transfer of care protocol was followed      Last vitals:   Visit Vitals  BP (!) 142/75 (BP Location: Right arm)   Pulse 64   Temp 36.6 °C (97.8 °F) (Oral)   Resp 18   Ht 5' 10" (1.778 m)   Wt 88.5 kg (195 lb)   SpO2 97%   BMI 27.98 kg/m²     "

## 2020-05-11 NOTE — ANESTHESIA PREPROCEDURE EVALUATION
05/11/2020  Ruben Macedo Jr. is a 74 y.o., male.    Pre-op Assessment    I have reviewed the Patient Summary Reports.     I have reviewed the Nursing Notes.   I have reviewed the Medications.     Review of Systems  Anesthesia Hx:  No problems with previous Anesthesia  Denies Family Hx of Anesthesia complications.    Social:  Non-Smoker    Hematology/Oncology:     Oncology Normal    -- Anemia (borderline):   EENT/Dental:EENT/Dental Normal   Cardiovascular:   Hypertension CAD asymptomatic CABG/stent (2005)  ECG has been reviewed. Dr. Acevedo, sees pt yearly, last EST 3 yrs ago, neg   Pulmonary:  Pulmonary Normal    Renal/:   Chronic Renal Disease (very mild), CRI renal calculi BPH    Hepatic/GI:  Hepatic/GI Normal    Musculoskeletal:  Musculoskeletal Normal    Neurological:   CVA (2005), no residual symptoms    Endocrine:   Diabetes, poorly controlled, type 2    Dermatological:  Skin Normal    Psych:   Psychiatric History anxiety          Physical Exam  General:  Well nourished    Airway/Jaw/Neck:  Jaw/Neck Findings: Neck ROM: Normal ROM      Dental:  Dental Findings: In tact, Molar caps        Mental Status:  Mental Status Findings:  Cooperative, Alert and Oriented         Anesthesia Plan  Type of Anesthesia, risks & benefits discussed:  Anesthesia Type:  general  Patient's Preference:   Intra-op Monitoring Plan: standard ASA monitors  Intra-op Monitoring Plan Comments:   Post Op Pain Control Plan: multimodal analgesia and per primary service following discharge from PACU  Post Op Pain Control Plan Comments:   Induction:   IV  Beta Blocker:         Informed Consent: Patient understands risks and agrees with Anesthesia plan.  Questions answered. Anesthesia consent signed with patient.  ASA Score: 3  emergent   Day of Surgery Review of History & Physical:    H&P update referred to the surgeon.      Anesthesia Plan Notes: Pt returning to the OR for postop bleeding. Consent obtained from the pt's wife via telephone.            Ready For Surgery From Anesthesia Perspective.

## 2020-05-11 NOTE — TRANSFER OF CARE
"Anesthesia Transfer of Care Note    Patient: Ruben Macedo Jr.    Procedure(s) Performed: Procedure(s) (LRB):  TURBT (TRANSURETHRAL RESECTION OF BLADDER TUMOR) (N/A)    Patient location: PACU    Anesthesia Type: general    Transport from OR: Transported from OR on 2-3 L/min O2 by NC with adequate spontaneous ventilation    Post pain: adequate analgesia    Post assessment: no apparent anesthetic complications    Post vital signs: stable    Level of consciousness: awake and alert    Nausea/Vomiting: no nausea/vomiting    Complications: none    Transfer of care protocol was followed      Last vitals:   Visit Vitals  BP (!) 152/82 (BP Location: Right arm, Patient Position: Sitting)   Pulse 83   Temp 36.7 °C (98 °F) (Oral)   Resp 18   Ht 5' 10" (1.778 m)   Wt 88.5 kg (195 lb)   SpO2 95%   BMI 27.98 kg/m²     "

## 2020-05-11 NOTE — H&P
[facility]  Staff: Gelacio Sanchez MD    HPI:  Ruben Macedo Jr. is a 74 y.o. male with bladder tumor found during work up for hematuria and recurrent UTI. CT urogram negative for upper tract disease.     ROS:  Neg except per HPI    Past Medical History:   Diagnosis Date    Anticoagulant long-term use     plavix    Anxiety     BPH (benign prostatic hypertrophy) 2/2/2015    Cataract     Coronary artery disease 2005    stent x 1        Diabetes mellitus, type 2     History of colonic polyps 4/11/2017 2/13 --3 yrs    History of MI (myocardial infarction) 2005    [facility]    History of stroke November 2005    Hyperlipidemia     Hypertension     Incomplete bladder emptying 2/2/2015    Overweight(278.02)     Personal history of kidney stones     Renal cyst 2/10/2015    Stroke 2005    loss of peripheral vision       Past Surgical History:   Procedure Laterality Date    APPENDECTOMY      BACK SURGERY      CATARACT EXTRACTION      od    CORONARY ANGIOPLASTY WITH STENT PLACEMENT  2000    CYSTOSCOPY W/ URETERAL STENT PLACEMENT      stents placed x2    EYE SURGERY      cataract right eye    SPINE SURGERY      TONSILLECTOMY         Social History     Socioeconomic History    Marital status:      Spouse name: Not on file    Number of children: 2    Years of education: Not on file    Highest education level: Not on file   Occupational History    Occupation: Retired - chemical industry   Social Needs    Financial resource strain: Not hard at all    Food insecurity:     Worry: Never true     Inability: Never true    Transportation needs:     Medical: No     Non-medical: No   Tobacco Use    Smoking status: Never Smoker    Smokeless tobacco: Never Used   Substance and Sexual Activity    Alcohol use: Not Currently     Frequency: 2-4 times a month     Drinks per session: 1 or 2     Binge frequency: Never     Comment:  occasional    Drug use: No    Sexual activity: Yes     Partners: Female   Lifestyle    Physical activity:     Days per week: 3 days     Minutes per session: 90 min    Stress: Not at all   Relationships    Social connections:     Talks on phone: Once a week     Gets together: Once a week     Attends Sikhism service: Not on file     Active member of club or organization: Yes     Attends meetings of clubs or organizations: More than 4 times per year     Relationship status:    Other Topics Concern    Not on file   Social History Narrative    Not on file       Family History   Problem Relation Age of Onset    Hyperlipidemia Father     Cancer Father     Cataracts Father     Cataracts Mother     Hyperlipidemia Sister     Alzheimer's disease Unknown     Diabetes Neg Hx     Amblyopia Neg Hx     Blindness Neg Hx     Glaucoma Neg Hx     Hypertension Neg Hx     Macular degeneration Neg Hx     Retinal detachment Neg Hx     Strabismus Neg Hx     Stroke Neg Hx     Thyroid disease Neg Hx        Review of patient's allergies indicates:   Allergen Reactions    Morphine Nausea And Vomiting    Metformin Itching    Tramadol Nausea And Vomiting       No current facility-administered medications on file prior to encounter.      Current Outpatient Medications on File Prior to Encounter   Medication Sig Dispense Refill    allopurinoL (ZYLOPRIM) 300 MG tablet TAKE ONE TABLET BY MOUTH EVERY DAY 30 tablet 11    aspirin (ECOTRIN) 81 MG EC tablet Take 81 mg by mouth once daily.      clopidogrel (PLAVIX) 75 mg tablet Take 75 mg by mouth once. Last dose taken 5/3/20 as instructed by cardiologist, Dr. Noah Pedersen      ezetimibe (ZETIA) 10 mg tablet once daily.       finasteride (PROSCAR) 5 mg tablet TAKE ONE TABLET BY MOUTH EVERY DAY 90 tablet 3    JANUVIA 100 mg Tab TAKE ONE TABLET BY MOUTH DAILY 30 tablet 3    metoprolol succinate (TOPROL-XL) 25 MG 24 hr tablet Take 25 mg by mouth once daily.         "oxyCODONE-acetaminophen (PERCOCET) 5-325 mg per tablet Take 1 tablet by mouth every 4 (four) hours as needed for Pain. 10 tablet 0    pioglitazone (ACTOS) 30 MG tablet Take 1 tablet (30 mg total) by mouth every evening. 90 tablet 0    rosuvastatin (CRESTOR) 40 MG Tab 40 mg once daily. Take 1 tablet by mouth daily      valsartan (DIOVAN) 160 MG tablet TAKE 2 TABS BY MOUTH ONCE DAILY (Patient taking differently: 160 mg once daily. ) 60 tablet 2    fluticasone propionate (FLONASE) 50 mcg/actuation nasal spray 2 sprays (100 mcg total) by Each Nostril route once daily. 9.9 mL 0    gabapentin (NEURONTIN) 300 MG capsule Take 300 mg by mouth 2 (two) times daily.       loratadine (CLARITIN) 10 mg tablet Take 1 tablet (10 mg total) by mouth once daily. 30 tablet 0    LORazepam (ATIVAN) 0.5 MG tablet TAKE 1-2 TABLETS BY MOUTH NIGHTLY AS NEEDED (Patient not taking: Reported on 5/8/2020) 20 tablet 4       Anticoagulation:  Holding plavix    Physical Exam:  Estimated body mass index is 27.98 kg/m² as calculated from the following:    Height as of this encounter: 5' 10" (1.778 m).    Weight as of this encounter: 88.5 kg (195 lb).    General: No acute distress, well developed. AAOx3  Head: Normocephalic, Atraumatic  Eyes: Extra-occular movements intact, No discharge  Neck: supple, symmetrical, trachea midline  Lungs: normal respiratory effort, no respiratory distress, no wheezes  CV: regular rate, 2+ pulses  Abdomen: soft, non-tender, non-distended, no organomegaly  MSK: no edema, no deformities, normal ROM  Skin: skin color, texture, turgor normal.  Neurologic: no focal deficits, sensation intact    Labs:      Lab Results   Component Value Date    WBC 5.04 03/09/2020    HGB 11.4 (L) 03/09/2020    HCT 35.8 (L) 03/09/2020    MCV 92 03/09/2020     (L) 03/09/2020           BMP  Lab Results   Component Value Date     03/09/2020    K 4.6 03/09/2020     03/09/2020    CO2 20 (L) 03/09/2020    BUN 28 (H) " 03/09/2020    CREATININE 1.2 03/09/2020    CALCIUM 9.4 03/09/2020    ANIONGAP 11 03/09/2020    ESTGFRAFRICA >60 03/09/2020    EGFRNONAA 59 (A) 03/09/2020       Lab Results   Component Value Date    PSA 0.67 12/14/2015    PSA 1.86 06/18/2013       Assessment: Ruben Macedo Jr. is a 74 y.o. male with anterior bladder neck tumor.    Plan:     1. To OR today for TURBT.   2. Consents signed   3. I have explained the risk, benefits, and alternatives of the procedure in detail. The patient voices understanding and all questions have been answered. The patient agrees to proceed as planned.       Edna Fraire MD

## 2020-05-11 NOTE — OP NOTE
[facility]  Operative Note    Date: 05/11/2020    Pre-Op Diagnosis: bladder tumor    Post-Op Diagnosis: same    Procedure(s) Performed:   1.  TURBT of large (>5cm) sized bladder tumor    Specimen(s): bladder tumor    Staff Surgeon: Gelacio Sanchez MD    Assistant Surgeon: Edna Fraire MD    Anesthesia: General endotracheal anesthesia    Indications: Ruben Macedo Jr. is a 74 y.o. male with a bladder tumor.  He presents today for surgical resection.      Findings:   1.  Large papillary bladder tumor encompassing left lateral bladder wall, anterior bladder wall and bladder neck, and right lateral bladder wall. There was a more nodular appearing lesion on the left lateral wall   2.  NADEEN post-TURBT showed mobile bladder with no palpable masses, prostate 35g     Estimated Blood Loss: min    Drains: 24 Fr 3-way isabel catheter    Procedure in detail:  After the risks, benefits and possible complications of the procedure were explained, consents were obtained. The patient was taken to the operating room and placed under anesthesia. Pre-operative antibiotics were administered 30 minutes prior to expected start time. The patient was placed in the dorsal lithotomy position and prepped and draped in the normal and sterile fashion. Time out was performed.      A rigid cystoscope in a 22 Fr sheath was introduced into the bladder per urethra. This passed easily.  The entire urethra was visualized which showed no masses or strictures.  The right and left ureteral orifices were identified in the normal anatomic position and were seen effluxing clear urine.  Formal cystoscopy was performed which revealed papillary bladder lesions throughout the left lateral, anterior, and right lateral wall. There was a nodular lesions present on the left lateral wall as well.     The resectoscope was then assembled with the visual obturator. This was placed into the bladder via the urethra and the visual obturator was  exchanged for the resecting mechanism.  The left lateral wall nodular tumor was first resected, superficially until the base was identified.  We then resected tumor at the anterior bladder neck that hung down over the opening of the bladder neck. The base of the tumor was fulgurated. Our attention was turned to the right bladder wall and areas of active bleeding were resected and fulgurated as well. Specimens were then removed and passed off the field for pathologic analysis.      The bladder was drained and hemostasis was achieved.  The resectoscope was removed.  A 24 3-way Fr isabel catheter was placed with 30 cc in the balloon and placed on traction.  The bladder was then irrigated.    Decision was made to forego mitomycin installation given the extent of the resection on the left.     Post-resection bimanual exam revealed a mobile bladder with no palpable masses.       The patient tolerated the procedure well and was transferred to recovery in stable condition.    Disposition:  The patient will be kept overnight for close monitoring on CBI.     Edna Fraire MD

## 2020-05-12 ENCOUNTER — TELEPHONE (OUTPATIENT)
Dept: UROLOGY | Facility: CLINIC | Age: 75
End: 2020-05-12

## 2020-05-12 LAB
ANION GAP SERPL CALC-SCNC: 9 MMOL/L (ref 8–16)
BASOPHILS # BLD AUTO: 0.02 K/UL (ref 0–0.2)
BASOPHILS NFR BLD: 0.3 % (ref 0–1.9)
BUN SERPL-MCNC: 17 MG/DL (ref 8–23)
CALCIUM SERPL-MCNC: 9 MG/DL (ref 8.7–10.5)
CHLORIDE SERPL-SCNC: 102 MMOL/L (ref 95–110)
CO2 SERPL-SCNC: 25 MMOL/L (ref 23–29)
CREAT SERPL-MCNC: 1 MG/DL (ref 0.5–1.4)
DIFFERENTIAL METHOD: ABNORMAL
EOSINOPHIL # BLD AUTO: 0.1 K/UL (ref 0–0.5)
EOSINOPHIL NFR BLD: 1.6 % (ref 0–8)
ERYTHROCYTE [DISTWIDTH] IN BLOOD BY AUTOMATED COUNT: 15.9 % (ref 11.5–14.5)
EST. GFR  (AFRICAN AMERICAN): >60 ML/MIN/1.73 M^2
EST. GFR  (NON AFRICAN AMERICAN): >60 ML/MIN/1.73 M^2
FINAL PATHOLOGIC DIAGNOSIS: NORMAL
GLUCOSE SERPL-MCNC: 121 MG/DL (ref 70–110)
GROSS: NORMAL
HCT VFR BLD AUTO: 32.6 % (ref 40–54)
HGB BLD-MCNC: 10.4 G/DL (ref 14–18)
IMM GRANULOCYTES # BLD AUTO: 0.02 K/UL (ref 0–0.04)
IMM GRANULOCYTES NFR BLD AUTO: 0.3 % (ref 0–0.5)
LYMPHOCYTES # BLD AUTO: 0.5 K/UL (ref 1–4.8)
LYMPHOCYTES NFR BLD: 7.6 % (ref 18–48)
MCH RBC QN AUTO: 29.7 PG (ref 27–31)
MCHC RBC AUTO-ENTMCNC: 31.9 G/DL (ref 32–36)
MCV RBC AUTO: 93 FL (ref 82–98)
MONOCYTES # BLD AUTO: 0.5 K/UL (ref 0.3–1)
MONOCYTES NFR BLD: 7.8 % (ref 4–15)
NEUTROPHILS # BLD AUTO: 5.6 K/UL (ref 1.8–7.7)
NEUTROPHILS NFR BLD: 82.4 % (ref 38–73)
NRBC BLD-RTO: 0 /100 WBC
PLATELET # BLD AUTO: 116 K/UL (ref 150–350)
PMV BLD AUTO: 10.7 FL (ref 9.2–12.9)
POCT GLUCOSE: 128 MG/DL (ref 70–110)
POCT GLUCOSE: 143 MG/DL (ref 70–110)
POCT GLUCOSE: 171 MG/DL (ref 70–110)
POCT GLUCOSE: 183 MG/DL (ref 70–110)
POCT GLUCOSE: 196 MG/DL (ref 70–110)
POTASSIUM SERPL-SCNC: 4.6 MMOL/L (ref 3.5–5.1)
RBC # BLD AUTO: 3.5 M/UL (ref 4.6–6.2)
SODIUM SERPL-SCNC: 136 MMOL/L (ref 136–145)
WBC # BLD AUTO: 6.81 K/UL (ref 3.9–12.7)

## 2020-05-12 PROCEDURE — 94761 N-INVAS EAR/PLS OXIMETRY MLT: CPT

## 2020-05-12 PROCEDURE — 25000003 PHARM REV CODE 250: Performed by: INTERNAL MEDICINE

## 2020-05-12 PROCEDURE — 25000003 PHARM REV CODE 250: Performed by: UROLOGY

## 2020-05-12 PROCEDURE — 25000003 PHARM REV CODE 250: Performed by: STUDENT IN AN ORGANIZED HEALTH CARE EDUCATION/TRAINING PROGRAM

## 2020-05-12 PROCEDURE — 25000003 PHARM REV CODE 250: Performed by: NURSE PRACTITIONER

## 2020-05-12 PROCEDURE — G0378 HOSPITAL OBSERVATION PER HR: HCPCS

## 2020-05-12 PROCEDURE — 82962 GLUCOSE BLOOD TEST: CPT | Mod: 91

## 2020-05-12 PROCEDURE — 36415 COLL VENOUS BLD VENIPUNCTURE: CPT

## 2020-05-12 PROCEDURE — 85025 COMPLETE CBC W/AUTO DIFF WBC: CPT

## 2020-05-12 PROCEDURE — 80048 BASIC METABOLIC PNL TOTAL CA: CPT

## 2020-05-12 RX ORDER — OXYCODONE HYDROCHLORIDE 5 MG/1
5 TABLET ORAL ONCE
Status: COMPLETED | OUTPATIENT
Start: 2020-05-12 | End: 2020-05-12

## 2020-05-12 RX ORDER — ACETAMINOPHEN 325 MG/1
650 TABLET ORAL EVERY 6 HOURS
Status: DISCONTINUED | OUTPATIENT
Start: 2020-05-12 | End: 2020-05-14 | Stop reason: HOSPADM

## 2020-05-12 RX ORDER — HYDRALAZINE HYDROCHLORIDE 20 MG/ML
10 INJECTION INTRAMUSCULAR; INTRAVENOUS EVERY 8 HOURS PRN
Status: DISCONTINUED | OUTPATIENT
Start: 2020-05-12 | End: 2020-05-14 | Stop reason: HOSPADM

## 2020-05-12 RX ORDER — AMOXICILLIN 250 MG
1 CAPSULE ORAL 2 TIMES DAILY
Status: DISCONTINUED | OUTPATIENT
Start: 2020-05-12 | End: 2020-05-14 | Stop reason: HOSPADM

## 2020-05-12 RX ORDER — IBUPROFEN 200 MG
24 TABLET ORAL
Status: DISCONTINUED | OUTPATIENT
Start: 2020-05-12 | End: 2020-05-14 | Stop reason: HOSPADM

## 2020-05-12 RX ORDER — OXYCODONE AND ACETAMINOPHEN 10; 325 MG/1; MG/1
1 TABLET ORAL ONCE
Status: DISCONTINUED | OUTPATIENT
Start: 2020-05-12 | End: 2020-05-12

## 2020-05-12 RX ORDER — INSULIN ASPART 100 [IU]/ML
0-5 INJECTION, SOLUTION INTRAVENOUS; SUBCUTANEOUS
Status: DISCONTINUED | OUTPATIENT
Start: 2020-05-12 | End: 2020-05-14 | Stop reason: HOSPADM

## 2020-05-12 RX ORDER — GLUCAGON 1 MG
1 KIT INJECTION
Status: DISCONTINUED | OUTPATIENT
Start: 2020-05-12 | End: 2020-05-14 | Stop reason: HOSPADM

## 2020-05-12 RX ORDER — IBUPROFEN 200 MG
16 TABLET ORAL
Status: DISCONTINUED | OUTPATIENT
Start: 2020-05-12 | End: 2020-05-14 | Stop reason: HOSPADM

## 2020-05-12 RX ADMIN — ACETAMINOPHEN 650 MG: 325 TABLET ORAL at 03:05

## 2020-05-12 RX ADMIN — ACETAMINOPHEN 650 MG: 325 TABLET ORAL at 09:05

## 2020-05-12 RX ADMIN — ALLOPURINOL 300 MG: 300 TABLET ORAL at 09:05

## 2020-05-12 RX ADMIN — SODIUM CHLORIDE 250 ML: 0.9 INJECTION, SOLUTION INTRAVENOUS at 12:05

## 2020-05-12 RX ADMIN — ACETAMINOPHEN 650 MG: 325 TABLET ORAL at 07:05

## 2020-05-12 RX ADMIN — ACETAMINOPHEN 650 MG: 325 TABLET ORAL at 02:05

## 2020-05-12 RX ADMIN — ACETAMINOPHEN 650 MG: 325 TABLET ORAL at 05:05

## 2020-05-12 RX ADMIN — SITAGLIPTIN 100 MG: 100 TABLET, FILM COATED ORAL at 09:05

## 2020-05-12 RX ADMIN — GABAPENTIN 300 MG: 300 CAPSULE ORAL at 08:05

## 2020-05-12 RX ADMIN — OXYBUTYNIN CHLORIDE 5 MG: 5 TABLET ORAL at 08:05

## 2020-05-12 RX ADMIN — SODIUM CHLORIDE 250 ML: 0.9 INJECTION, SOLUTION INTRAVENOUS at 04:05

## 2020-05-12 RX ADMIN — OXYCODONE HYDROCHLORIDE 5 MG: 5 TABLET ORAL at 09:05

## 2020-05-12 RX ADMIN — FINASTERIDE 5 MG: 5 TABLET, FILM COATED ORAL at 09:05

## 2020-05-12 RX ADMIN — OXYBUTYNIN CHLORIDE 5 MG: 5 TABLET ORAL at 02:05

## 2020-05-12 RX ADMIN — DOCUSATE SODIUM - SENNOSIDES 1 TABLET: 50; 8.6 TABLET, FILM COATED ORAL at 08:05

## 2020-05-12 RX ADMIN — OXYBUTYNIN CHLORIDE 5 MG: 5 TABLET ORAL at 09:05

## 2020-05-12 RX ADMIN — OXYCODONE HYDROCHLORIDE 5 MG: 5 TABLET ORAL at 05:05

## 2020-05-12 NOTE — HPI
74 YOM with CAD (on DAPT at home), HLD, HTN, and DM presented initially for evaluation of recurrent UTI and hematuria. On work up, he was found to have a bladder tumor. He underwent transurethral resection of bladder tumor on 5/11 and was taken back to the OR on the same day for persistent bleeding. He was kept overnight on continuous bladder irrigation.

## 2020-05-12 NOTE — SUBJECTIVE & OBJECTIVE
Past Medical History:   Diagnosis Date    Anticoagulant long-term use     plavix    Anxiety     BPH (benign prostatic hypertrophy) 2/2/2015    Cataract     Coronary artery disease 2005    stent x 1        Diabetes mellitus, type 2     History of colonic polyps 4/11/2017 2/13 --3 yrs    History of MI (myocardial infarction) 2005    Followed by cardiology, Dr. Acevedo (at [facility])    History of stroke November 2005    Hyperlipidemia     Hypertension     Incomplete bladder emptying 2/2/2015    Overweight(278.02)     Personal history of kidney stones     Renal cyst 2/10/2015    Stroke 2005    loss of peripheral vision       Past Surgical History:   Procedure Laterality Date    APPENDECTOMY      BACK SURGERY      CATARACT EXTRACTION      od    CORONARY ANGIOPLASTY WITH STENT PLACEMENT  2000    CYSTOSCOPY W/ URETERAL STENT PLACEMENT      stents placed x2    EYE SURGERY      cataract right eye    SPINE SURGERY      TONSILLECTOMY         Review of patient's allergies indicates:   Allergen Reactions    Morphine Nausea And Vomiting    Metformin Itching    Tramadol Nausea And Vomiting       No current facility-administered medications on file prior to encounter.      Current Outpatient Medications on File Prior to Encounter   Medication Sig    allopurinoL (ZYLOPRIM) 300 MG tablet TAKE ONE TABLET BY MOUTH EVERY DAY    aspirin (ECOTRIN) 81 MG EC tablet Take 81 mg by mouth once daily.    clopidogrel (PLAVIX) 75 mg tablet Take 75 mg by mouth once. Last dose taken 5/3/20 as instructed by cardiologist, Dr. Noah Pedersen    ezetimibe (ZETIA) 10 mg tablet once daily.     finasteride (PROSCAR) 5 mg tablet TAKE ONE TABLET BY MOUTH EVERY DAY    JANUVIA 100 mg Tab TAKE ONE TABLET BY MOUTH DAILY    metoprolol succinate (TOPROL-XL) 25 MG 24 hr tablet Take 25 mg by mouth once daily.      oxyCODONE-acetaminophen (PERCOCET) 5-325 mg per tablet Take 1 tablet by mouth every 4 (four) hours  as needed for Pain.    pioglitazone (ACTOS) 30 MG tablet Take 1 tablet (30 mg total) by mouth every evening.    rosuvastatin (CRESTOR) 40 MG Tab 40 mg once daily. Take 1 tablet by mouth daily    valsartan (DIOVAN) 160 MG tablet TAKE 2 TABS BY MOUTH ONCE DAILY (Patient taking differently: 160 mg once daily. )    fluticasone propionate (FLONASE) 50 mcg/actuation nasal spray 2 sprays (100 mcg total) by Each Nostril route once daily.    gabapentin (NEURONTIN) 300 MG capsule Take 300 mg by mouth 2 (two) times daily.     loratadine (CLARITIN) 10 mg tablet Take 1 tablet (10 mg total) by mouth once daily.    LORazepam (ATIVAN) 0.5 MG tablet TAKE 1-2 TABLETS BY MOUTH NIGHTLY AS NEEDED (Patient not taking: Reported on 5/8/2020)     Family History     Problem Relation (Age of Onset)    Alzheimer's disease     Cancer Father    Cataracts Father, Mother    Hyperlipidemia Father, Sister        Tobacco Use    Smoking status: Never Smoker    Smokeless tobacco: Never Used   Substance and Sexual Activity    Alcohol use: Not Currently     Frequency: 2-4 times a month     Drinks per session: 1 or 2     Binge frequency: Never     Comment: occasional    Drug use: No    Sexual activity: Yes     Partners: Female     Review of Systems   Constitutional: Negative for activity change, appetite change, fatigue and fever.   HENT: Positive for sore throat. Negative for congestion, ear pain and postnasal drip.    Eyes: Negative for discharge.   Respiratory: Negative for apnea, shortness of breath and wheezing.    Cardiovascular: Negative for chest pain and leg swelling.   Gastrointestinal: Negative for abdominal distention, abdominal pain, nausea and vomiting.   Endocrine: Negative for polydipsia, polyphagia and polyuria.   Genitourinary: Negative for difficulty urinating, flank pain, frequency, hematuria and urgency.        Suprapubic pain   Musculoskeletal: Negative for arthralgias and joint swelling.   Skin: Negative for pallor and  rash.   Allergic/Immunologic: Negative for environmental allergies and food allergies.   Neurological: Negative for dizziness, speech difficulty, weakness, light-headedness and headaches.   Hematological: Does not bruise/bleed easily.   Psychiatric/Behavioral: Negative for agitation.     Objective:     Vital Signs (Most Recent):  Temp: 97.5 °F (36.4 °C) (05/11/20 2359)  Pulse: 77 (05/11/20 2359)  Resp: 18 (05/11/20 2359)  BP: (!) 140/67 (05/11/20 2359)  SpO2: (!) 92 % (05/11/20 2359) Vital Signs (24h Range):  Temp:  [97.5 °F (36.4 °C)-98 °F (36.7 °C)] 97.5 °F (36.4 °C)  Pulse:  [64-83] 77  Resp:  [17-19] 18  SpO2:  [92 %-100 %] 92 %  BP: (129-165)/(59-84) 140/67     Weight: 88.5 kg (195 lb 1.7 oz)  Body mass index is 27.99 kg/m².    Physical Exam   Constitutional: He is oriented to person, place, and time. He appears well-developed and well-nourished.   HENT:   Head: Normocephalic.   Eyes: Conjunctivae are normal.   Neck: Normal range of motion. Neck supple.   Cardiovascular: Normal rate, regular rhythm, normal heart sounds and intact distal pulses.   Pulmonary/Chest: Effort normal and breath sounds normal.   Abdominal: Soft. Bowel sounds are normal. He exhibits no distension. There is no tenderness.   Musculoskeletal: Normal range of motion.   Neurological: He is alert and oriented to person, place, and time.   Skin: Skin is warm and dry.   Psychiatric: He has a normal mood and affect. His behavior is normal.       Significant Labs:   CBC:   Recent Labs   Lab 05/12/20  0342   WBC 6.81   HGB 10.4*   HCT 32.6*   *     CMP:   Recent Labs   Lab 05/12/20  0342      K 4.6      CO2 25   *   BUN 17   CREATININE 1.0   CALCIUM 9.0   ANIONGAP 9   EGFRNONAA >60       Significant Imaging: I have reviewed all pertinent imaging results/findings within the past 24 hours.

## 2020-05-12 NOTE — PROGRESS NOTES
Patient BP low this AM, Dr. Mars notified, instructed to hold metoprolol. 250 cc NS bolus given per order. BP bernardo slightly but is currently 94/61. Patient is asymptomatic. Dr. Mars notified, a second 250 cc NS bolus ordered. Will give and continue to monitor.

## 2020-05-12 NOTE — CONSULTS
[facility] Hospital Medicine  Consult Note    Patient Name: Ruben Macedo Jr.  MRN: [id number]  Admission Date: 5/11/2020  Hospital Length of Stay: 0 days  Attending Physician: Gelacio Sanchez MD   Primary Care Provider: Cedric Holder MD           Patient information was obtained from patient, past medical records and ER records.     Consults  Subjective:     Principal Problem: Bladder tumor    Chief Complaint: No chief complaint on file.       HPI: The patient is a 74 year old male with a past medical history of CAD, HLD, HTN, and DM who presented today for a bladder tumor resection by Dr. Sanchez.  Hospital Medicine was consulted for medical co management while hospitalized.      Past Medical History:   Diagnosis Date    Anticoagulant long-term use     plavix    Anxiety     BPH (benign prostatic hypertrophy) 2/2/2015    Cataract     Coronary artery disease 2005    stent x 1        Diabetes mellitus, type 2     History of colonic polyps 4/11/2017 2/13 --3 yrs    History of MI (myocardial infarction) 2005    Followed by cardiology, Dr. Acevedo (at [facility])    History of stroke November 2005    Hyperlipidemia     Hypertension     Incomplete bladder emptying 2/2/2015    Overweight(278.02)     Personal history of kidney stones     Renal cyst 2/10/2015    Stroke 2005    loss of peripheral vision       Past Surgical History:   Procedure Laterality Date    APPENDECTOMY      BACK SURGERY      CATARACT EXTRACTION      od    CORONARY ANGIOPLASTY WITH STENT PLACEMENT  2000    CYSTOSCOPY W/ URETERAL STENT PLACEMENT      stents placed x2    EYE SURGERY      cataract right eye    SPINE SURGERY      TONSILLECTOMY         Review of patient's allergies indicates:   Allergen Reactions    Morphine Nausea And Vomiting    Metformin Itching    Tramadol Nausea And Vomiting       No current facility-administered medications on file prior to encounter.      Current  Outpatient Medications on File Prior to Encounter   Medication Sig    allopurinoL (ZYLOPRIM) 300 MG tablet TAKE ONE TABLET BY MOUTH EVERY DAY    aspirin (ECOTRIN) 81 MG EC tablet Take 81 mg by mouth once daily.    clopidogrel (PLAVIX) 75 mg tablet Take 75 mg by mouth once. Last dose taken 5/3/20 as instructed by cardiologist, Dr. Noah Pedersen    ezetimibe (ZETIA) 10 mg tablet once daily.     finasteride (PROSCAR) 5 mg tablet TAKE ONE TABLET BY MOUTH EVERY DAY    JANUVIA 100 mg Tab TAKE ONE TABLET BY MOUTH DAILY    metoprolol succinate (TOPROL-XL) 25 MG 24 hr tablet Take 25 mg by mouth once daily.      oxyCODONE-acetaminophen (PERCOCET) 5-325 mg per tablet Take 1 tablet by mouth every 4 (four) hours as needed for Pain.    pioglitazone (ACTOS) 30 MG tablet Take 1 tablet (30 mg total) by mouth every evening.    rosuvastatin (CRESTOR) 40 MG Tab 40 mg once daily. Take 1 tablet by mouth daily    valsartan (DIOVAN) 160 MG tablet TAKE 2 TABS BY MOUTH ONCE DAILY (Patient taking differently: 160 mg once daily. )    fluticasone propionate (FLONASE) 50 mcg/actuation nasal spray 2 sprays (100 mcg total) by Each Nostril route once daily.    gabapentin (NEURONTIN) 300 MG capsule Take 300 mg by mouth 2 (two) times daily.     loratadine (CLARITIN) 10 mg tablet Take 1 tablet (10 mg total) by mouth once daily.    LORazepam (ATIVAN) 0.5 MG tablet TAKE 1-2 TABLETS BY MOUTH NIGHTLY AS NEEDED (Patient not taking: Reported on 5/8/2020)     Family History     Problem Relation (Age of Onset)    Alzheimer's disease     Cancer Father    Cataracts Father, Mother    Hyperlipidemia Father, Sister        Tobacco Use    Smoking status: Never Smoker    Smokeless tobacco: Never Used   Substance and Sexual Activity    Alcohol use: Not Currently     Frequency: 2-4 times a month     Drinks per session: 1 or 2     Binge frequency: Never     Comment: occasional    Drug use: No    Sexual activity: Yes     Partners: Female     Review  of Systems   Constitutional: Negative for activity change, appetite change, fatigue and fever.   HENT: Positive for sore throat. Negative for congestion, ear pain and postnasal drip.    Eyes: Negative for discharge.   Respiratory: Negative for apnea, shortness of breath and wheezing.    Cardiovascular: Negative for chest pain and leg swelling.   Gastrointestinal: Negative for abdominal distention, abdominal pain, nausea and vomiting.   Endocrine: Negative for polydipsia, polyphagia and polyuria.   Genitourinary: Negative for difficulty urinating, flank pain, frequency, hematuria and urgency.        Suprapubic pain   Musculoskeletal: Negative for arthralgias and joint swelling.   Skin: Negative for pallor and rash.   Allergic/Immunologic: Negative for environmental allergies and food allergies.   Neurological: Negative for dizziness, speech difficulty, weakness, light-headedness and headaches.   Hematological: Does not bruise/bleed easily.   Psychiatric/Behavioral: Negative for agitation.     Objective:     Vital Signs (Most Recent):  Temp: 97.5 °F (36.4 °C) (05/11/20 2359)  Pulse: 77 (05/11/20 2359)  Resp: 18 (05/11/20 2359)  BP: (!) 140/67 (05/11/20 2359)  SpO2: (!) 92 % (05/11/20 2359) Vital Signs (24h Range):  Temp:  [97.5 °F (36.4 °C)-98 °F (36.7 °C)] 97.5 °F (36.4 °C)  Pulse:  [64-83] 77  Resp:  [17-19] 18  SpO2:  [92 %-100 %] 92 %  BP: (129-165)/(59-84) 140/67     Weight: 88.5 kg (195 lb 1.7 oz)  Body mass index is 27.99 kg/m².    Physical Exam   Constitutional: He is oriented to person, place, and time. He appears well-developed and well-nourished.   HENT:   Head: Normocephalic.   Eyes: Conjunctivae are normal.   Neck: Normal range of motion. Neck supple.   Cardiovascular: Normal rate, regular rhythm, normal heart sounds and intact distal pulses.   Pulmonary/Chest: Effort normal and breath sounds normal.   Abdominal: Soft. Bowel sounds are normal. He exhibits no distension. There is no tenderness.    Musculoskeletal: Normal range of motion.   Neurological: He is alert and oriented to person, place, and time.   Skin: Skin is warm and dry.   Psychiatric: He has a normal mood and affect. His behavior is normal.       Significant Labs:   CBC:   Recent Labs   Lab 05/12/20  0342   WBC 6.81   HGB 10.4*   HCT 32.6*   *     CMP:   Recent Labs   Lab 05/12/20  0342      K 4.6      CO2 25   *   BUN 17   CREATININE 1.0   CALCIUM 9.0   ANIONGAP 9   EGFRNONAA >60       Significant Imaging: I have reviewed all pertinent imaging results/findings within the past 24 hours.    Assessment/Plan:     * Bladder tumor  Per primary team      Essential hypertension  Hypertensive currently    Continue toprol  Hydralazine PRN    Hyperlipemia  Hold for now; restart on discharge      Prediabetes  Januvia continued  Low dose SSI  BG AC/HS        VTE Risk Mitigation (From admission, onward)    None              Thank you for your consult. I will follow-up with patient. Please contact us if you have any additional questions.    Mayco Newton NP  Department of Hospital Medicine   [facility]

## 2020-05-12 NOTE — CARE UPDATE
Patient admitted for persistent bleeding following transurethral resection of bladder tumor on 5/11. Hospital medicine consulted for medical management. Noted to have persistently low bp; patient asymptomatic. RN instructed to hold metoprolol and give IV bolus. Please see consult note from this morning. Rest of management as per primary team. Will continue to follow.

## 2020-05-12 NOTE — PROGRESS NOTES
Elizabeth in place throughout shift, color of urine continues to clear, no visible clots present. Patient with low BP throughout shift, meds held, see previous note. Patient tolerating a regular diet, ambulating in room independently, pain controlled with PO medication. Patient remained free of falls throughout shift. Purposeful hourly rounding complete.

## 2020-05-12 NOTE — ANESTHESIA POSTPROCEDURE EVALUATION
Anesthesia Post Evaluation    Patient: Ruben Macedo Jr.    Procedure(s) Performed: Procedure(s):  REMOVAL, BLOOD CLOT  FULGURATION, BLADDER  CYSTOSCOPY    Final Anesthesia Type: general    Patient location during evaluation: PACU  Patient participation: Yes- Able to Participate  Level of consciousness: awake and alert and oriented  Post-procedure vital signs: reviewed and stable  Pain management: adequate  Airway patency: patent    PONV status at discharge: No PONV  Anesthetic complications: no      Cardiovascular status: blood pressure returned to baseline and hemodynamically stable  Respiratory status: unassisted, spontaneous ventilation and room air  Hydration status: euvolemic  Follow-up not needed.          Vitals Value Taken Time   BP 93/53 5/12/2020 10:36 AM   Temp 36.9 °C (98.4 °F) 5/12/2020  7:46 AM   Pulse 91 5/12/2020 10:36 AM   Resp 16 5/12/2020  8:53 AM   SpO2 92 % 5/12/2020 10:36 AM         Event Time     Out of Recovery 18:05:00          Pain/Gudelia Score: Pain Rating Prior to Med Admin: 5 (5/12/2020  9:05 AM)  Pain Rating Post Med Admin: 4 (5/12/2020  6:43 AM)  Gudelia Score: 10 (5/11/2020  6:00 PM)

## 2020-05-12 NOTE — HPI
The patient is a 74 year old male with a past medical history of CAD, HLD, HTN, and DM who presented today for a bladder tumor resection by Dr. Sanchez.  [facility] Medicine was consulted for medical co management while hospitalized.

## 2020-05-12 NOTE — TELEPHONE ENCOUNTER
----- Message from Gelacio Sanchez MD sent at 5/12/2020  4:10 PM CDT -----  Please make appt for next Tuesday to see me in person and discuss pathology.

## 2020-05-12 NOTE — PLAN OF CARE
Initial Discharge Planning Assessment:  Patient admitted on 5/11/2020    Chart reviewed, Care plan discussed with treatment team,  attending Dr Sanchez    PCP updated in Epic: Dr. TAMAR Holder  Pharmacy, updated in [facility]: [facility] on [address]    DME at home: none    Current dispo: home with family  Transportation: Wife to drive home    Power of  or Living Will: none    Case management  to follow.  No anticipated DC needs from CM perspective.       05/12/20 1416   Discharge Assessment   Assessment Type Discharge Planning Assessment   Confirmed/corrected address and phone number on facesheet? Yes   Assessment information obtained from? Patient   Communicated expected length of stay with patient/caregiver yes   Prior to hospitilization cognitive status: Alert/Oriented   Prior to hospitalization functional status: Independent   Current cognitive status: Alert/Oriented   Current Functional Status: Independent   Lives With spouse   Able to Return to Prior Arrangements yes   Is patient able to care for self after discharge? Yes   Who are your caregiver(s) and their phone number(s)? wife: Bell Macedo [phone]   Patient's perception of discharge disposition home or selfcare   Readmission Within the Last 30 Days no previous admission in last 30 days   Patient currently being followed by outpatient case management? No   Patient currently receives any other outside agency services? No   Equipment Currently Used at Home none   Do you have any problems affording any of your prescribed medications? No   Is the patient taking medications as prescribed? yes   Does the patient have transportation home? Yes   Transportation Anticipated family or friend will provide   Does the patient receive services at the [facility]? No   Discharge Plan A Home   DME Needed Upon Discharge  none   Patient/Family in Agreement with Plan yes

## 2020-05-12 NOTE — ASSESSMENT & PLAN NOTE
74 YOM with multiple medical comorbidities s/p TURBT and same-day takeback for persistent bleeding on 5/11     -- CBI clamped this morning, patient instructed to walk this morning. Will recheck catheter mid-morning  -- encouraged patient to drink plenty of fluids   -- remain off plavix for now, plan to restart 5/14  -- appreciate assistance of hospital medicine  -- plan for voiding trial 5/13   -- continue tylenol, oxybutynin tid, and B&O suppositories PRN for bladder symptoms

## 2020-05-12 NOTE — SUBJECTIVE & OBJECTIVE
Interval History:  Urine in tubing very light pink with CBI on a slow drip. CBI clamped.  Patient not c/o bladder discomfort, mainly c/o throat soreness   Tolerating jello     Objective:     Temp:  [97.5 °F (36.4 °C)-98 °F (36.7 °C)] 98 °F (36.7 °C)  Pulse:  [64-87] 87  Resp:  [17-19] 18  SpO2:  [92 %-100 %] 93 %  BP: (115-165)/(59-84) 115/59     Body mass index is 27.99 kg/m².           Drains     Drain                 Urethral Catheter 05/11/20 1654 Non-latex;Triple-lumen 24 Fr. less than 1 day                Physical Exam   Constitutional: He is oriented to person, place, and time. He appears well-developed and well-nourished. No distress.   HENT:   Head: Normocephalic and atraumatic.   Eyes: No scleral icterus.   Neck: No tracheal deviation present.   Cardiovascular: Normal rate.    Pulmonary/Chest: Effort normal. No respiratory distress.   Abdominal: Soft. He exhibits no distension. There is no tenderness.   Genitourinary:   Genitourinary Comments: 24 3-way Sal catheter in place draining very light pink urine    Musculoskeletal: Normal range of motion.   Neurological: He is alert and oriented to person, place, and time.   Skin: Skin is warm and dry.     Psychiatric: He has a normal mood and affect. His behavior is normal.       Significant Labs:    BMP:  Recent Labs   Lab 05/12/20  0342      K 4.6      CO2 25   BUN 17   CREATININE 1.0   CALCIUM 9.0       CBC:  Recent Labs   Lab 05/12/20  0342   WBC 6.81   HGB 10.4*   HCT 32.6*   *       All pertinent labs results from the past 24 hours have been reviewed.    Significant Imaging:  All pertinent imaging results/findings from the past 24 hours have been reviewed.

## 2020-05-12 NOTE — PROGRESS NOTES
[facility]  Urology  Progress Note    Patient Name: Ruben Macedo Jr.  MRN: [id number]  Admission Date: 5/11/2020  Hospital Length of Stay: 0 days  Code Status: No Order   Attending Provider: Gelacio Sanchez MD   Primary Care Physician: Cedric Holder MD    Subjective:     HPI:  74 YOM with CAD (on DAPT at home), HLD, HTN, and DM presented initially for evaluation of recurrent UTI and hematuria. On work up, he was found to have a bladder tumor. He underwent transurethral resection of bladder tumor on 5/11 and was taken back to the OR on the same day for persistent bleeding. He was kept overnight on continuous bladder irrigation.     Interval History:  Urine in tubing very light pink with CBI on a slow drip. CBI clamped.  Patient not c/o bladder discomfort, mainly c/o throat soreness   Tolerating jello     Objective:     Temp:  [97.5 °F (36.4 °C)-98 °F (36.7 °C)] 98 °F (36.7 °C)  Pulse:  [64-87] 87  Resp:  [17-19] 18  SpO2:  [92 %-100 %] 93 %  BP: (115-165)/(59-84) 115/59     Body mass index is 27.99 kg/m².           Drains     Drain                 Urethral Catheter 05/11/20 1654 Non-latex;Triple-lumen 24 Fr. less than 1 day                Physical Exam   Constitutional: He is oriented to person, place, and time. He appears well-developed and well-nourished. No distress.   HENT:   Head: Normocephalic and atraumatic.   Eyes: No scleral icterus.   Neck: No tracheal deviation present.   Cardiovascular: Normal rate.    Pulmonary/Chest: Effort normal. No respiratory distress.   Abdominal: Soft. He exhibits no distension. There is no tenderness.   Genitourinary:   Genitourinary Comments: 24 3-way Sal catheter in place draining very light pink urine    Musculoskeletal: Normal range of motion.   Neurological: He is alert and oriented to person, place, and time.   Skin: Skin is warm and dry.     Psychiatric: He has a normal mood and affect. His behavior is normal.       Significant  Labs:    BMP:  Recent Labs   Lab 05/12/20  0342      K 4.6      CO2 25   BUN 17   CREATININE 1.0   CALCIUM 9.0       CBC:  Recent Labs   Lab 05/12/20  0342   WBC 6.81   HGB 10.4*   HCT 32.6*   *       All pertinent labs results from the past 24 hours have been reviewed.    Significant Imaging:  All pertinent imaging results/findings from the past 24 hours have been reviewed.                    Assessment/Plan:     * Bladder tumor  74 YOM with multiple medical comorbidities s/p TURBT and same-day takeback for persistent bleeding on 5/11     -- CBI clamped this morning, patient instructed to walk this morning. Will recheck catheter mid-morning  -- encouraged patient to drink plenty of fluids   -- remain off plavix for now, plan to restart 5/14  -- appreciate assistance of hospital medicine  -- plan for voiding trial 5/13   -- continue tylenol, oxybutynin tid, and B&O suppositories PRN for bladder symptoms         VTE Risk Mitigation (From admission, onward)    None          Edna Fraire MD  Urology  [facility]

## 2020-05-13 PROBLEM — Z87.19 HX OF CONSTIPATION: Status: ACTIVE | Noted: 2017-04-11

## 2020-05-13 LAB
ANION GAP SERPL CALC-SCNC: 8 MMOL/L (ref 8–16)
BASOPHILS # BLD AUTO: 0.02 K/UL (ref 0–0.2)
BASOPHILS NFR BLD: 0.3 % (ref 0–1.9)
BUN SERPL-MCNC: 20 MG/DL (ref 8–23)
CALCIUM SERPL-MCNC: 8.6 MG/DL (ref 8.7–10.5)
CHLORIDE SERPL-SCNC: 104 MMOL/L (ref 95–110)
CO2 SERPL-SCNC: 23 MMOL/L (ref 23–29)
CREAT SERPL-MCNC: 1.2 MG/DL (ref 0.5–1.4)
DIFFERENTIAL METHOD: ABNORMAL
EOSINOPHIL # BLD AUTO: 0.2 K/UL (ref 0–0.5)
EOSINOPHIL NFR BLD: 3.3 % (ref 0–8)
ERYTHROCYTE [DISTWIDTH] IN BLOOD BY AUTOMATED COUNT: 15.9 % (ref 11.5–14.5)
EST. GFR  (AFRICAN AMERICAN): >60 ML/MIN/1.73 M^2
EST. GFR  (NON AFRICAN AMERICAN): 59 ML/MIN/1.73 M^2
FINAL PATHOLOGIC DIAGNOSIS: ABNORMAL
GLUCOSE SERPL-MCNC: 132 MG/DL (ref 70–110)
HCT VFR BLD AUTO: 29.5 % (ref 40–54)
HGB BLD-MCNC: 9.6 G/DL (ref 14–18)
IMM GRANULOCYTES # BLD AUTO: 0.04 K/UL (ref 0–0.04)
IMM GRANULOCYTES NFR BLD AUTO: 0.7 % (ref 0–0.5)
LYMPHOCYTES # BLD AUTO: 1.1 K/UL (ref 1–4.8)
LYMPHOCYTES NFR BLD: 17.7 % (ref 18–48)
MCH RBC QN AUTO: 30.2 PG (ref 27–31)
MCHC RBC AUTO-ENTMCNC: 32.5 G/DL (ref 32–36)
MCV RBC AUTO: 93 FL (ref 82–98)
MONOCYTES # BLD AUTO: 0.8 K/UL (ref 0.3–1)
MONOCYTES NFR BLD: 12.7 % (ref 4–15)
NEUTROPHILS # BLD AUTO: 3.9 K/UL (ref 1.8–7.7)
NEUTROPHILS NFR BLD: 65.3 % (ref 38–73)
NRBC BLD-RTO: 0 /100 WBC
PLATELET # BLD AUTO: 114 K/UL (ref 150–350)
PMV BLD AUTO: 10.2 FL (ref 9.2–12.9)
POCT GLUCOSE: 122 MG/DL (ref 70–110)
POCT GLUCOSE: 125 MG/DL (ref 70–110)
POCT GLUCOSE: 129 MG/DL (ref 70–110)
POCT GLUCOSE: 132 MG/DL (ref 70–110)
POCT GLUCOSE: 137 MG/DL (ref 70–110)
POTASSIUM SERPL-SCNC: 3.9 MMOL/L (ref 3.5–5.1)
RBC # BLD AUTO: 3.18 M/UL (ref 4.6–6.2)
SODIUM SERPL-SCNC: 135 MMOL/L (ref 136–145)
WBC # BLD AUTO: 5.99 K/UL (ref 3.9–12.7)

## 2020-05-13 PROCEDURE — 99225 PR SUBSEQUENT OBSERVATION CARE,LEVEL II: CPT | Mod: ,,, | Performed by: INTERNAL MEDICINE

## 2020-05-13 PROCEDURE — 25000003 PHARM REV CODE 250: Performed by: UROLOGY

## 2020-05-13 PROCEDURE — 82962 GLUCOSE BLOOD TEST: CPT | Mod: 91

## 2020-05-13 PROCEDURE — 80048 BASIC METABOLIC PNL TOTAL CA: CPT

## 2020-05-13 PROCEDURE — 25000003 PHARM REV CODE 250: Performed by: INTERNAL MEDICINE

## 2020-05-13 PROCEDURE — 99900035 HC TECH TIME PER 15 MIN (STAT)

## 2020-05-13 PROCEDURE — 36415 COLL VENOUS BLD VENIPUNCTURE: CPT

## 2020-05-13 PROCEDURE — 25000003 PHARM REV CODE 250: Performed by: STUDENT IN AN ORGANIZED HEALTH CARE EDUCATION/TRAINING PROGRAM

## 2020-05-13 PROCEDURE — G0378 HOSPITAL OBSERVATION PER HR: HCPCS

## 2020-05-13 PROCEDURE — 94761 N-INVAS EAR/PLS OXIMETRY MLT: CPT

## 2020-05-13 PROCEDURE — 99225 PR SUBSEQUENT OBSERVATION CARE,LEVEL II: ICD-10-PCS | Mod: ,,, | Performed by: INTERNAL MEDICINE

## 2020-05-13 PROCEDURE — 85025 COMPLETE CBC W/AUTO DIFF WBC: CPT

## 2020-05-13 RX ORDER — POLYETHYLENE GLYCOL 3350 17 G/17G
17 POWDER, FOR SOLUTION ORAL ONCE
Status: COMPLETED | OUTPATIENT
Start: 2020-05-13 | End: 2020-05-13

## 2020-05-13 RX ORDER — OXYCODONE HYDROCHLORIDE 5 MG/1
5 TABLET ORAL EVERY 6 HOURS PRN
Status: DISCONTINUED | OUTPATIENT
Start: 2020-05-13 | End: 2020-05-14 | Stop reason: HOSPADM

## 2020-05-13 RX ADMIN — DOCUSATE SODIUM - SENNOSIDES 1 TABLET: 50; 8.6 TABLET, FILM COATED ORAL at 09:05

## 2020-05-13 RX ADMIN — SITAGLIPTIN 100 MG: 100 TABLET, FILM COATED ORAL at 09:05

## 2020-05-13 RX ADMIN — DOCUSATE SODIUM - SENNOSIDES 1 TABLET: 50; 8.6 TABLET, FILM COATED ORAL at 08:05

## 2020-05-13 RX ADMIN — ACETAMINOPHEN 650 MG: 325 TABLET ORAL at 11:05

## 2020-05-13 RX ADMIN — ACETAMINOPHEN 650 MG: 325 TABLET ORAL at 05:05

## 2020-05-13 RX ADMIN — OXYCODONE HYDROCHLORIDE 5 MG: 5 TABLET ORAL at 10:05

## 2020-05-13 RX ADMIN — ALLOPURINOL 300 MG: 300 TABLET ORAL at 09:05

## 2020-05-13 RX ADMIN — GABAPENTIN 300 MG: 300 CAPSULE ORAL at 08:05

## 2020-05-13 RX ADMIN — POLYETHYLENE GLYCOL 3350 17 G: 17 POWDER, FOR SOLUTION ORAL at 11:05

## 2020-05-13 RX ADMIN — OXYBUTYNIN CHLORIDE 5 MG: 5 TABLET ORAL at 04:05

## 2020-05-13 RX ADMIN — FINASTERIDE 5 MG: 5 TABLET, FILM COATED ORAL at 09:05

## 2020-05-13 RX ADMIN — OXYBUTYNIN CHLORIDE 5 MG: 5 TABLET ORAL at 09:05

## 2020-05-13 RX ADMIN — OXYBUTYNIN CHLORIDE 5 MG: 5 TABLET ORAL at 08:05

## 2020-05-13 RX ADMIN — GABAPENTIN 300 MG: 300 CAPSULE ORAL at 09:05

## 2020-05-13 NOTE — PROGRESS NOTES
Patient abdomen appears distended while rounding on patient. It is firmer and more distended than previous assessment. Patient complains of some abdominal fullness. Elizabeth draining clear pink urine with no visible clots present. No complaints of N/V from patient.     Dr. Davila called (on call urology), ordered to bladder scan patient and call back if large amount ( >150cc), if less, no new orders just continue to monitor. Bladder scans ranged from 50-140cc.     Patient sitting up in chair eating dinner, no new complaints. All information passed to oncoming RN.

## 2020-05-13 NOTE — SUBJECTIVE & OBJECTIVE
Interval History:  Elizabeth draining slightly pink tinged urine   Not had BM since AM 5/11     Objective:     Temp:  [97.7 °F (36.5 °C)-99.5 °F (37.5 °C)] 99.5 °F (37.5 °C)  Pulse:  [78-91] 88  Resp:  [16-18] 18  SpO2:  [92 %-97 %] 97 %  BP: ()/(53-63) 110/63     Body mass index is 27.99 kg/m².      Bladder Scan Volume (mL): 140 mL (05/12/20 1840)    Drains     Drain                 Urethral Catheter 05/11/20 1654 Non-latex;Triple-lumen 24 Fr. less than 1 day                Physical Exam   Constitutional: He is oriented to person, place, and time. He appears well-developed and well-nourished. No distress.   HENT:   Head: Normocephalic and atraumatic.   Eyes: No scleral icterus.   Neck: No tracheal deviation present.   Cardiovascular: Normal rate.    Pulmonary/Chest: Effort normal. No respiratory distress.   Abdominal: Soft. He exhibits no distension. There is no tenderness.   Genitourinary:   Genitourinary Comments: 24 3-way Sal catheter in place draining yellow urine with very slight pink tinge   Musculoskeletal: Normal range of motion.   Neurological: He is alert and oriented to person, place, and time.   Skin: Skin is warm and dry.     Psychiatric: He has a normal mood and affect. His behavior is normal.       Significant Labs:    BMP:  Recent Labs   Lab 05/12/20  0342      K 4.6      CO2 25   BUN 17   CREATININE 1.0   CALCIUM 9.0       CBC:  Recent Labs   Lab 05/12/20  0342   WBC 6.81   HGB 10.4*   HCT 32.6*   *       All pertinent labs results from the past 24 hours have been reviewed.    Significant Imaging:  All pertinent imaging results/findings from the past 24 hours have been reviewed.                Review of Systems

## 2020-05-13 NOTE — PROGRESS NOTES
[facility] Hospital Medicine  Progress Note    Patient Name: Ruben Macedo Jr.  MRN: [id number]  Patient Class: OP- Observation   Admission Date: 5/11/2020  Length of Stay: 0 days  Attending Physician: Gelacio Sanchez MD  Primary Care Provider: Cedric Holder MD        Subjective:     Principal Problem:Bladder tumor        HPI:  The patient is a 74 year old male with a past medical history of CAD, HLD, HTN, and DM who presented today for a bladder tumor resection by Dr. Sanchez.  Hospital Medicine was consulted for medical co management while hospitalized.      Overview/Hospital Course:  No notes on file    Interval History: No overnight events. Elizabeth in place with small amount of dark red urine. Pt hasn't had BM since Monday. No complaints. Denies N/V, abd pain.     Review of Systems   Constitutional: Negative for activity change, appetite change, chills, diaphoresis, fatigue, fever and unexpected weight change.   Respiratory: Negative for cough, chest tightness, shortness of breath, wheezing and stridor.    Cardiovascular: Negative for chest pain, palpitations and leg swelling.   Gastrointestinal: Negative for abdominal pain, blood in stool, constipation, diarrhea, nausea and vomiting.   Endocrine: Negative for cold intolerance and heat intolerance.   Genitourinary: Negative for dysuria, flank pain and frequency.        Elizabeth in place   Musculoskeletal: Negative for arthralgias, joint swelling and myalgias.   Skin: Negative.    Neurological: Negative for dizziness, weakness, light-headedness and headaches.     Objective:     Vital Signs (Most Recent):  Temp: 98.3 °F (36.8 °C) (05/13/20 1135)  Pulse: 79 (05/13/20 1135)  Resp: 16 (05/13/20 1135)  BP: 127/68 (05/13/20 1135)  SpO2: (!) 94 % (05/13/20 1135) Vital Signs (24h Range):  Temp:  [97.7 °F (36.5 °C)-99.5 °F (37.5 °C)] 98.3 °F (36.8 °C)  Pulse:  [79-98] 79  Resp:  [16-18] 16  SpO2:  [94 %-97 %] 94 %  BP: ()/(58-68) 127/68      Weight: 88.5 kg (195 lb 1.7 oz)  Body mass index is 27.99 kg/m².    Intake/Output Summary (Last 24 hours) at 5/13/2020 1540  Last data filed at 5/13/2020 1345  Gross per 24 hour   Intake 1210 ml   Output 3050 ml   Net -1840 ml      Physical Exam   HENT:   Head: Normocephalic and atraumatic.   Mouth/Throat: Oropharynx is clear and moist.   Eyes: Pupils are equal, round, and reactive to light. Conjunctivae and EOM are normal.   Neck: Normal range of motion. Neck supple.   Cardiovascular: Normal rate, regular rhythm, normal heart sounds and intact distal pulses.   Pulmonary/Chest: Effort normal and breath sounds normal.   Abdominal: Soft. Bowel sounds are normal.   Genitourinary:   Genitourinary Comments: Elizabeth drainage scant amount of dark red urine   Musculoskeletal: Normal range of motion.   Neurological: He is alert.   Skin: Skin is warm and dry.   Psychiatric: He has a normal mood and affect.       Significant Labs: All pertinent labs within the past 24 hours have been reviewed.    Significant Imaging: I have reviewed all pertinent imaging results/findings within the past 24 hours.      Assessment/Plan:      * Bladder tumor  Per primary team      Essential hypertension  BP improved  Continue toprol  Hydralazine PRN    Hx of constipation  Cont bowel regimen  Give dose of miralax      Hyperlipemia  Hold for now; restart on discharge      Prediabetes  Januvia continued  Low dose SSI  BG AC/HS        VTE Risk Mitigation (From admission, onward)    None                Flo Mars MD  Department of Hospital Medicine   [facility]

## 2020-05-13 NOTE — PROGRESS NOTES
[facility]  Urology  Progress Note    Patient Name: Ruben Macedo Jr.  MRN: [id number]  Admission Date: 5/11/2020  Hospital Length of Stay: 0 days  Code Status: No Order   Attending Provider: Gelacio Sanchez MD   Primary Care Physician: Cedric Holder MD    Subjective:     HPI:  74 YOM with CAD (on DAPT at home), HLD, HTN, and DM presented initially for evaluation of recurrent UTI and hematuria. On work up, he was found to have a bladder tumor. He underwent transurethral resection of bladder tumor on 5/11 and was taken back to the OR on the same day for persistent bleeding. He was kept overnight on continuous bladder irrigation.     Interval History:  Elizabeth draining slightly pink tinged urine   Not had BM since AM 5/11     Objective:     Temp:  [97.7 °F (36.5 °C)-99.5 °F (37.5 °C)] 99.5 °F (37.5 °C)  Pulse:  [78-91] 88  Resp:  [16-18] 18  SpO2:  [92 %-97 %] 97 %  BP: ()/(53-63) 110/63     Body mass index is 27.99 kg/m².      Bladder Scan Volume (mL): 140 mL (05/12/20 1840)    Drains     Drain                 Urethral Catheter 05/11/20 1654 Non-latex;Triple-lumen 24 Fr. less than 1 day                Physical Exam   Constitutional: He is oriented to person, place, and time. He appears well-developed and well-nourished. No distress.   HENT:   Head: Normocephalic and atraumatic.   Eyes: No scleral icterus.   Neck: No tracheal deviation present.   Cardiovascular: Normal rate.    Pulmonary/Chest: Effort normal. No respiratory distress.   Abdominal: Soft. He exhibits no distension. There is no tenderness.   Genitourinary:   Genitourinary Comments: 24 3-way Sal catheter in place draining yellow urine with very slight pink tinge   Musculoskeletal: Normal range of motion.   Neurological: He is alert and oriented to person, place, and time.   Skin: Skin is warm and dry.     Psychiatric: He has a normal mood and affect. His behavior is normal.       Significant Labs:    BMP:  Recent Labs   Lab  05/12/20  0342      K 4.6      CO2 25   BUN 17   CREATININE 1.0   CALCIUM 9.0       CBC:  Recent Labs   Lab 05/12/20  0342   WBC 6.81   HGB 10.4*   HCT 32.6*   *       All pertinent labs results from the past 24 hours have been reviewed.    Significant Imaging:  All pertinent imaging results/findings from the past 24 hours have been reviewed.                Review of Systems          Assessment/Plan:     * Bladder tumor  74 YOM with multiple medical comorbidities s/p TURBT and same-day takeback for persistent bleeding on 5/11     -- Isabel catheter draining very lightly pink tinged urine   -- encouraged patient to drink plenty of fluids   -- remain off plavix for now, plan to restart 5/14  -- appreciate assistance of hospital medicine  -- continue tylenol, oxybutynin tid, and B&O suppositories PRN for bladder symptoms   -- will DC isabel later this AM and complete voiding trial  -- tentative DC home later today          VTE Risk Mitigation (From admission, onward)    None          Edna Fraire MD  Urology  [facility]

## 2020-05-13 NOTE — PROGRESS NOTES
Patient complaining of pain 6/10 between doses of scheduled acetaminophen. Called Dr. Davila and rec'd order for PRN oxycodone. See MAR.

## 2020-05-13 NOTE — PLAN OF CARE
Pt VSS and afebrile, free of falls, trauma, injury, and skin breakdowns, isabel in place with pink urine this shift, on RA. Pt pain controlled with medication. Pt in low locked bed, call light in reach, safety precautions maintained, will continue to monitor. Pt BG monitored, no coverage needed this shift.

## 2020-05-13 NOTE — PROGRESS NOTES
No significant events during shift. VSS. Patient ambulated in room and remained free of falls. Elizabeth catheter in place per urology service, draining clear pink urine, no visible clots present. Tolerating regular diet.

## 2020-05-13 NOTE — ASSESSMENT & PLAN NOTE
74 YOM with multiple medical comorbidities s/p TURBT and same-day takeback for persistent bleeding on 5/11     -- Isabel catheter draining very lightly pink tinged urine   -- encouraged patient to drink plenty of fluids   -- remain off plavix for now, plan to restart 5/14  -- appreciate assistance of hospital medicine  -- continue tylenol, oxybutynin tid, and B&O suppositories PRN for bladder symptoms   -- will DC isabel later this AM and complete voiding trial  -- tentative DC home later today

## 2020-05-13 NOTE — PLAN OF CARE
Family to drive home.    Pt to be discharged after Voiding trial.    No DC needs from CM perspective.       05/13/20 1022   Final Note   Assessment Type Final Discharge Note   Anticipated Discharge Disposition Home   What phone number can be called within the next 1-3 days to see how you are doing after discharge? [phone]   Hospital Follow Up  Appt(s) scheduled? Yes   Discharge plans and expectations educations in teach back method with documentation complete? Yes   Right Care Referral Info   Post Acute Recommendation No Care

## 2020-05-13 NOTE — SUBJECTIVE & OBJECTIVE
Interval History: No overnight events. Elizabeth in place with small amount of dark red urine. Pt hasn't had BM since Monday. No complaints. Denies N/V, abd pain.     Review of Systems   Constitutional: Negative for activity change, appetite change, chills, diaphoresis, fatigue, fever and unexpected weight change.   Respiratory: Negative for cough, chest tightness, shortness of breath, wheezing and stridor.    Cardiovascular: Negative for chest pain, palpitations and leg swelling.   Gastrointestinal: Negative for abdominal pain, blood in stool, constipation, diarrhea, nausea and vomiting.   Endocrine: Negative for cold intolerance and heat intolerance.   Genitourinary: Negative for dysuria, flank pain and frequency.        Elizabeth in place   Musculoskeletal: Negative for arthralgias, joint swelling and myalgias.   Skin: Negative.    Neurological: Negative for dizziness, weakness, light-headedness and headaches.     Objective:     Vital Signs (Most Recent):  Temp: 98.3 °F (36.8 °C) (05/13/20 1135)  Pulse: 79 (05/13/20 1135)  Resp: 16 (05/13/20 1135)  BP: 127/68 (05/13/20 1135)  SpO2: (!) 94 % (05/13/20 1135) Vital Signs (24h Range):  Temp:  [97.7 °F (36.5 °C)-99.5 °F (37.5 °C)] 98.3 °F (36.8 °C)  Pulse:  [79-98] 79  Resp:  [16-18] 16  SpO2:  [94 %-97 %] 94 %  BP: ()/(58-68) 127/68     Weight: 88.5 kg (195 lb 1.7 oz)  Body mass index is 27.99 kg/m².    Intake/Output Summary (Last 24 hours) at 5/13/2020 1540  Last data filed at 5/13/2020 1345  Gross per 24 hour   Intake 1210 ml   Output 3050 ml   Net -1840 ml      Physical Exam   HENT:   Head: Normocephalic and atraumatic.   Mouth/Throat: Oropharynx is clear and moist.   Eyes: Pupils are equal, round, and reactive to light. Conjunctivae and EOM are normal.   Neck: Normal range of motion. Neck supple.   Cardiovascular: Normal rate, regular rhythm, normal heart sounds and intact distal pulses.   Pulmonary/Chest: Effort normal and breath sounds normal.   Abdominal: Soft.  Bowel sounds are normal.   Genitourinary:   Genitourinary Comments: Elizabeth drainage scant amount of dark red urine   Musculoskeletal: Normal range of motion.   Neurological: He is alert.   Skin: Skin is warm and dry.   Psychiatric: He has a normal mood and affect.       Significant Labs: All pertinent labs within the past 24 hours have been reviewed.    Significant Imaging: I have reviewed all pertinent imaging results/findings within the past 24 hours.

## 2020-05-14 ENCOUNTER — PATIENT OUTREACH (OUTPATIENT)
Dept: ADMINISTRATIVE | Facility: OTHER | Age: 75
End: 2020-05-14

## 2020-05-14 VITALS
OXYGEN SATURATION: 94 % | RESPIRATION RATE: 14 BRPM | HEIGHT: 70 IN | SYSTOLIC BLOOD PRESSURE: 140 MMHG | HEART RATE: 71 BPM | BODY MASS INDEX: 27.94 KG/M2 | WEIGHT: 195.13 LBS | DIASTOLIC BLOOD PRESSURE: 66 MMHG | TEMPERATURE: 97 F

## 2020-05-14 LAB
ANION GAP SERPL CALC-SCNC: 8 MMOL/L (ref 8–16)
BASOPHILS # BLD AUTO: 0.02 K/UL (ref 0–0.2)
BASOPHILS NFR BLD: 0.4 % (ref 0–1.9)
BUN SERPL-MCNC: 20 MG/DL (ref 8–23)
CALCIUM SERPL-MCNC: 9 MG/DL (ref 8.7–10.5)
CHLORIDE SERPL-SCNC: 105 MMOL/L (ref 95–110)
CO2 SERPL-SCNC: 24 MMOL/L (ref 23–29)
CREAT SERPL-MCNC: 1.2 MG/DL (ref 0.5–1.4)
DIFFERENTIAL METHOD: ABNORMAL
EOSINOPHIL # BLD AUTO: 0.2 K/UL (ref 0–0.5)
EOSINOPHIL NFR BLD: 4.2 % (ref 0–8)
ERYTHROCYTE [DISTWIDTH] IN BLOOD BY AUTOMATED COUNT: 16.2 % (ref 11.5–14.5)
EST. GFR  (AFRICAN AMERICAN): >60 ML/MIN/1.73 M^2
EST. GFR  (NON AFRICAN AMERICAN): 59 ML/MIN/1.73 M^2
GLUCOSE SERPL-MCNC: 191 MG/DL (ref 70–110)
HCT VFR BLD AUTO: 32.1 % (ref 40–54)
HGB BLD-MCNC: 9.9 G/DL (ref 14–18)
IMM GRANULOCYTES # BLD AUTO: 0.01 K/UL (ref 0–0.04)
IMM GRANULOCYTES NFR BLD AUTO: 0.2 % (ref 0–0.5)
LYMPHOCYTES # BLD AUTO: 1.1 K/UL (ref 1–4.8)
LYMPHOCYTES NFR BLD: 22.2 % (ref 18–48)
MCH RBC QN AUTO: 29.3 PG (ref 27–31)
MCHC RBC AUTO-ENTMCNC: 30.8 G/DL (ref 32–36)
MCV RBC AUTO: 95 FL (ref 82–98)
MONOCYTES # BLD AUTO: 0.5 K/UL (ref 0.3–1)
MONOCYTES NFR BLD: 11 % (ref 4–15)
NEUTROPHILS # BLD AUTO: 2.9 K/UL (ref 1.8–7.7)
NEUTROPHILS NFR BLD: 62 % (ref 38–73)
NRBC BLD-RTO: 0 /100 WBC
PLATELET # BLD AUTO: 132 K/UL (ref 150–350)
PMV BLD AUTO: 10.4 FL (ref 9.2–12.9)
POCT GLUCOSE: 107 MG/DL (ref 70–110)
POCT GLUCOSE: 141 MG/DL (ref 70–110)
POTASSIUM SERPL-SCNC: 4.3 MMOL/L (ref 3.5–5.1)
RBC # BLD AUTO: 3.38 M/UL (ref 4.6–6.2)
SODIUM SERPL-SCNC: 137 MMOL/L (ref 136–145)
WBC # BLD AUTO: 4.73 K/UL (ref 3.9–12.7)

## 2020-05-14 PROCEDURE — 94761 N-INVAS EAR/PLS OXIMETRY MLT: CPT

## 2020-05-14 PROCEDURE — 25000003 PHARM REV CODE 250: Performed by: UROLOGY

## 2020-05-14 PROCEDURE — 99225 PR SUBSEQUENT OBSERVATION CARE,LEVEL II: CPT | Mod: ,,, | Performed by: INTERNAL MEDICINE

## 2020-05-14 PROCEDURE — 99225 PR SUBSEQUENT OBSERVATION CARE,LEVEL II: ICD-10-PCS | Mod: ,,, | Performed by: INTERNAL MEDICINE

## 2020-05-14 PROCEDURE — 36415 COLL VENOUS BLD VENIPUNCTURE: CPT

## 2020-05-14 PROCEDURE — 25000003 PHARM REV CODE 250: Performed by: INTERNAL MEDICINE

## 2020-05-14 PROCEDURE — 85025 COMPLETE CBC W/AUTO DIFF WBC: CPT

## 2020-05-14 PROCEDURE — G0378 HOSPITAL OBSERVATION PER HR: HCPCS

## 2020-05-14 PROCEDURE — 82962 GLUCOSE BLOOD TEST: CPT | Mod: 91

## 2020-05-14 PROCEDURE — 80048 BASIC METABOLIC PNL TOTAL CA: CPT

## 2020-05-14 PROCEDURE — 25000003 PHARM REV CODE 250: Performed by: STUDENT IN AN ORGANIZED HEALTH CARE EDUCATION/TRAINING PROGRAM

## 2020-05-14 RX ORDER — BISACODYL 10 MG
10 SUPPOSITORY, RECTAL RECTAL DAILY PRN
Status: DISCONTINUED | OUTPATIENT
Start: 2020-05-14 | End: 2020-05-14 | Stop reason: HOSPADM

## 2020-05-14 RX ORDER — OXYBUTYNIN CHLORIDE 5 MG/1
5 TABLET ORAL 3 TIMES DAILY PRN
Status: DISCONTINUED | OUTPATIENT
Start: 2020-05-14 | End: 2020-05-14 | Stop reason: HOSPADM

## 2020-05-14 RX ORDER — OXYBUTYNIN CHLORIDE 5 MG/1
5 TABLET ORAL 3 TIMES DAILY PRN
Qty: 30 TABLET | Refills: 0 | Status: SHIPPED | OUTPATIENT
Start: 2020-05-14 | End: 2020-05-28 | Stop reason: SDUPTHER

## 2020-05-14 RX ORDER — LACTULOSE 10 G/15ML
15 SOLUTION ORAL DAILY
Status: DISCONTINUED | OUTPATIENT
Start: 2020-05-14 | End: 2020-05-14 | Stop reason: HOSPADM

## 2020-05-14 RX ORDER — OXYCODONE AND ACETAMINOPHEN 5; 325 MG/1; MG/1
1 TABLET ORAL EVERY 4 HOURS PRN
Qty: 12 EACH | Refills: 0 | Status: SHIPPED | OUTPATIENT
Start: 2020-05-14 | End: 2020-07-13 | Stop reason: SDUPTHER

## 2020-05-14 RX ADMIN — SITAGLIPTIN 100 MG: 100 TABLET, FILM COATED ORAL at 09:05

## 2020-05-14 RX ADMIN — ACETAMINOPHEN 650 MG: 325 TABLET ORAL at 12:05

## 2020-05-14 RX ADMIN — LACTULOSE 15 G: 10 SOLUTION ORAL at 01:05

## 2020-05-14 RX ADMIN — ACETAMINOPHEN 650 MG: 325 TABLET ORAL at 11:05

## 2020-05-14 RX ADMIN — OXYCODONE HYDROCHLORIDE 5 MG: 5 TABLET ORAL at 02:05

## 2020-05-14 RX ADMIN — OXYBUTYNIN CHLORIDE 5 MG: 5 TABLET ORAL at 09:05

## 2020-05-14 RX ADMIN — DOCUSATE SODIUM - SENNOSIDES 1 TABLET: 50; 8.6 TABLET, FILM COATED ORAL at 09:05

## 2020-05-14 RX ADMIN — GABAPENTIN 300 MG: 300 CAPSULE ORAL at 09:05

## 2020-05-14 RX ADMIN — ACETAMINOPHEN 650 MG: 325 TABLET ORAL at 05:05

## 2020-05-14 RX ADMIN — ALLOPURINOL 300 MG: 300 TABLET ORAL at 09:05

## 2020-05-14 RX ADMIN — FINASTERIDE 5 MG: 5 TABLET, FILM COATED ORAL at 09:05

## 2020-05-14 NOTE — PLAN OF CARE
05/14/20 1703   Final Note   Assessment Type Final Discharge Note   Anticipated Discharge Disposition Home   What phone number can be called within the next 1-3 days to see how you are doing after discharge?   ([phone])   Hospital Follow Up  Appt(s) scheduled? No

## 2020-05-14 NOTE — ASSESSMENT & PLAN NOTE
74 YOM with multiple medical comorbidities s/p TURBT and same-day takeback for persistent bleeding on 5/11     -- Elizabeth catheter removed this AM  -- voiding trial   -- repeat labs pending   -- encouraged patient to drink plenty of fluids   -- remain off plavix for now, plan to restart 5/14 if urine clear   -- appreciate assistance of hospital medicine  -- continue tylenol, oxybutynin tid PRN, and B&O suppositories PRN for bladder symptoms   -- tentative DC home later today if completes voiding trial

## 2020-05-14 NOTE — PLAN OF CARE
Assessed pt for spiritual distress - none was reported nor presented.  Pt expressed gratitude to and deep trust in his medical caregivers, and is at peace they are doing their best for him.  Pt reports being at peace with his medical condition and good family support.  Follow-up care was offered upon request.

## 2020-05-14 NOTE — SUBJECTIVE & OBJECTIVE
Interval History:  Feeling well, urine very light pink   Still no BM  Catheter removed at bedside this AM    Objective:     Temp:  [97.6 °F (36.4 °C)-98.6 °F (37 °C)] 97.6 °F (36.4 °C)  Pulse:  [72-96] 72  Resp:  [16-18] 18  SpO2:  [94 %-97 %] 95 %  BP: ()/(57-69) 110/57     Body mass index is 27.99 kg/m².      Bladder Scan Volume (mL): 140 mL (05/12/20 1840)    Drains     Drain                 Urethral Catheter 05/11/20 1654 Non-latex;Triple-lumen 24 Fr. less than 1 day                Physical Exam   Constitutional: He is oriented to person, place, and time. He appears well-developed and well-nourished. No distress.   HENT:   Head: Normocephalic and atraumatic.   Eyes: No scleral icterus.   Neck: No tracheal deviation present.   Cardiovascular: Normal rate.    Pulmonary/Chest: Effort normal. No respiratory distress.   Abdominal: Soft. He exhibits no distension. There is no tenderness.   Genitourinary:   Genitourinary Comments: 24 3-way Sal catheter in place draining yellow urine with very slight pink tinge - removed    Musculoskeletal: Normal range of motion.   Neurological: He is alert and oriented to person, place, and time.   Skin: Skin is warm and dry.     Psychiatric: He has a normal mood and affect. His behavior is normal.       Significant Labs:    BMP:  Recent Labs   Lab 05/12/20  0342 05/13/20  0624    135*   K 4.6 3.9    104   CO2 25 23   BUN 17 20   CREATININE 1.0 1.2   CALCIUM 9.0 8.6*       CBC:  Recent Labs   Lab 05/12/20  0342 05/13/20  0624   WBC 6.81 5.99   HGB 10.4* 9.6*   HCT 32.6* 29.5*   * 114*       All pertinent labs results from the past 24 hours have been reviewed.    Significant Imaging:  All pertinent imaging results/findings from the past 24 hours have been reviewed.                Review of Systems

## 2020-05-14 NOTE — SUBJECTIVE & OBJECTIVE
Interval History: No overnight events. Elizabeth removed today. Still no BM. Denies N/V, abd pain.     Review of Systems   Constitutional: Negative for activity change, appetite change, chills, diaphoresis, fatigue, fever and unexpected weight change.   Respiratory: Negative for cough, chest tightness, shortness of breath, wheezing and stridor.    Cardiovascular: Negative for chest pain, palpitations and leg swelling.   Gastrointestinal: Negative for abdominal pain, blood in stool, constipation, diarrhea, nausea and vomiting.   Endocrine: Negative for cold intolerance and heat intolerance.   Genitourinary: Negative for dysuria, flank pain and frequency.   Musculoskeletal: Negative for arthralgias, joint swelling and myalgias.   Skin: Negative.    Neurological: Negative for dizziness, weakness, light-headedness and headaches.     Objective:     Vital Signs (Most Recent):  Temp: 97.4 °F (36.3 °C) (05/14/20 1201)  Pulse: 71 (05/14/20 1201)  Resp: 14 (05/14/20 1201)  BP: (!) 140/66 (05/14/20 1201)  SpO2: (!) 94 % (05/14/20 1201) Vital Signs (24h Range):  Temp:  [97.4 °F (36.3 °C)-98.6 °F (37 °C)] 97.4 °F (36.3 °C)  Pulse:  [71-96] 71  Resp:  [14-20] 14  SpO2:  [94 %-97 %] 94 %  BP: ()/(57-69) 140/66     Weight: 88.5 kg (195 lb 1.7 oz)  Body mass index is 27.99 kg/m².    Intake/Output Summary (Last 24 hours) at 5/14/2020 1524  Last data filed at 5/14/2020 0500  Gross per 24 hour   Intake --   Output 1500 ml   Net -1500 ml      Physical Exam   HENT:   Head: Normocephalic and atraumatic.   Mouth/Throat: Oropharynx is clear and moist.   Eyes: Pupils are equal, round, and reactive to light. Conjunctivae and EOM are normal.   Neck: Normal range of motion. Neck supple.   Cardiovascular: Normal rate, regular rhythm, normal heart sounds and intact distal pulses.   Pulmonary/Chest: Effort normal and breath sounds normal.   Abdominal: Soft. Bowel sounds are normal. He exhibits no distension. There is no tenderness.    Musculoskeletal: Normal range of motion.   Neurological: He is alert.   Skin: Skin is warm and dry.   Psychiatric: He has a normal mood and affect.       Significant Labs: All pertinent labs within the past 24 hours have been reviewed.    Significant Imaging: I have reviewed all pertinent imaging results/findings within the past 24 hours.

## 2020-05-14 NOTE — PROGRESS NOTES
[facility] Hospital Medicine  Progress Note    Patient Name: Ruben Macedo Jr.  MRN: [id number]  Patient Class: OP- Observation   Admission Date: 5/11/2020  Length of Stay: 0 days  Attending Physician: Gelacio Sanchez MD  Primary Care Provider: Cedric Holder MD        Subjective:     Principal Problem:Bladder tumor        HPI:  The patient is a 74 year old male with a past medical history of CAD, HLD, HTN, and DM who presented today for a bladder tumor resection by Dr. Sanchez.  Hospital Medicine was consulted for medical co management while hospitalized.      Overview/Hospital Course:  No notes on file    Interval History: No overnight events. Elizabeth removed today. Still no BM. Denies N/V, abd pain.     Review of Systems   Constitutional: Negative for activity change, appetite change, chills, diaphoresis, fatigue, fever and unexpected weight change.   Respiratory: Negative for cough, chest tightness, shortness of breath, wheezing and stridor.    Cardiovascular: Negative for chest pain, palpitations and leg swelling.   Gastrointestinal: Negative for abdominal pain, blood in stool, constipation, diarrhea, nausea and vomiting.   Endocrine: Negative for cold intolerance and heat intolerance.   Genitourinary: Negative for dysuria, flank pain and frequency.   Musculoskeletal: Negative for arthralgias, joint swelling and myalgias.   Skin: Negative.    Neurological: Negative for dizziness, weakness, light-headedness and headaches.     Objective:     Vital Signs (Most Recent):  Temp: 97.4 °F (36.3 °C) (05/14/20 1201)  Pulse: 71 (05/14/20 1201)  Resp: 14 (05/14/20 1201)  BP: (!) 140/66 (05/14/20 1201)  SpO2: (!) 94 % (05/14/20 1201) Vital Signs (24h Range):  Temp:  [97.4 °F (36.3 °C)-98.6 °F (37 °C)] 97.4 °F (36.3 °C)  Pulse:  [71-96] 71  Resp:  [14-20] 14  SpO2:  [94 %-97 %] 94 %  BP: ()/(57-69) 140/66     Weight: 88.5 kg (195 lb 1.7 oz)  Body mass index is 27.99 kg/m².    Intake/Output  Summary (Last 24 hours) at 5/14/2020 1524  Last data filed at 5/14/2020 0500  Gross per 24 hour   Intake --   Output 1500 ml   Net -1500 ml      Physical Exam   HENT:   Head: Normocephalic and atraumatic.   Mouth/Throat: Oropharynx is clear and moist.   Eyes: Pupils are equal, round, and reactive to light. Conjunctivae and EOM are normal.   Neck: Normal range of motion. Neck supple.   Cardiovascular: Normal rate, regular rhythm, normal heart sounds and intact distal pulses.   Pulmonary/Chest: Effort normal and breath sounds normal.   Abdominal: Soft. Bowel sounds are normal. He exhibits no distension. There is no tenderness.   Musculoskeletal: Normal range of motion.   Neurological: He is alert.   Skin: Skin is warm and dry.   Psychiatric: He has a normal mood and affect.       Significant Labs: All pertinent labs within the past 24 hours have been reviewed.    Significant Imaging: I have reviewed all pertinent imaging results/findings within the past 24 hours.      Assessment/Plan:      * Bladder tumor  Per primary team      Essential hypertension  BP improved  Continue toprol  Hydralazine PRN    Hx of constipation  Cont bowel regimen  Can discharge on daily miralax      Hyperlipemia  Hold for now; restart on discharge      Prediabetes  Januvia continued  Low dose SSI  BG AC/HS        VTE Risk Mitigation (From admission, onward)    None                Flo Mars MD  Department of Hospital Medicine   [facility]

## 2020-05-14 NOTE — DISCHARGE SUMMARY
[facility]  Urology  Discharge Summary      Patient Name: Ruben Macedo Jr.  MRN: [id number]  Admission Date: 5/11/2020  Hospital Length of Stay: 0 days  Discharge Date and Time:  05/14/2020 1:36 PM  Attending Physician: Gelacio Sanchez MD   Discharging Provider: Edna Fraire MD  Primary Care Physician: Cedirc Holder MD    HPI:   74 YOM with CAD (on DAPT at home), HLD, HTN, and DM presented initially for evaluation of recurrent UTI and hematuria. On work up, he was found to have a bladder tumor. He underwent transurethral resection of bladder tumor on 5/11 and was taken back to the OR on the same day for persistent bleeding. He was kept overnight on continuous bladder irrigation.     Procedure(s):  REMOVAL, BLOOD CLOT  FULGURATION, BLADDER  CYSTOSCOPY     Indwelling Lines/Drains at time of discharge:   Lines/Drains/Airways     None                 Hospital Course:  Please see the preoperative H&P and other available documentation for full details related to history prior to this admission.  Briefly, Ruben Macedo Jr. is a 74 y.o. male who was admitted following scheduled elective surgery for Bladder tumor    Please see the operative reports for full intraoperative findings and details.  After take back to OR for clot evacuation and bladder wall fulguration, the patient did well and was transferred from the PACU to the floor in stable condition where they had a stable and uncomplicated hospital course.  His urine slowly continued to lighten in color and his 24 Fr Sal catheter was removed on POD 3 when urine remained a very light clear pink.  He was unable to urinate and bladder scan showed 700cc. The 22 Fr isabel catheter was reinserted and drained about 700cc of clear wine-tinged urine and very few small old clots. His blood counts were monitored to ensure he was no longer actively bleeding. Currently, the patient is doing well at 3 Days Post-Op Day Post-Op and is stable and  appropriate for discharge home at this time. We will arrange close follow up for post op check and in office voiding trial.       Consults:   Consults (From admission, onward)        Status Ordering Provider     Inpatient consult to [facility]  Once     Provider:  Rustam Ayon MD    Acknowledged AMADA EDMOND     Inpatient consult to [facility]  Once     Provider:  Mayco Newotn NP    Acknowledged AMADA EDMOND          Pending Diagnostic Studies:     None          Final Active Diagnoses:    Diagnosis Date Noted POA    PRINCIPAL PROBLEM:  Bladder tumor [D49.4] 05/08/2020 Yes    Essential hypertension [I10] 04/11/2017 Yes    Hx of constipation [Z87.19] 04/11/2017 Not Applicable    Hyperlipemia [E78.5] 12/11/2015 Yes    Prediabetes [R73.03] 05/12/2015 Yes      Problems Resolved During this Admission:       Discharged Condition: good    Disposition: Home or Self Care    Follow Up:  Follow-up Information     Zena Coe NP On 5/18/2020.    Specialty:  Urology  Why:  post op check , catheter removal  Contact information:  [address] [place] [id number]  [phone]                 Patient Instructions:      Diet Adult Regular     Notify your health care provider if you experience any of the following:  temperature >100.4     Notify your health care provider if you experience any of the following:  persistent nausea and vomiting or diarrhea     Notify your health care provider if you experience any of the following:  severe uncontrolled pain     Notify your health care provider if you experience any of the following:  redness, tenderness, or signs of infection (pain, swelling, redness, odor or green/yellow discharge around incision site)     Notify your health care provider if you experience any of the following:  difficulty breathing or increased cough     Notify your health care provider if you experience any of the following:  severe persistent  headache     Notify your health care provider if you experience any of the following:  worsening rash     Notify your health care provider if you experience any of the following:  persistent dizziness, light-headedness, or visual disturbances     Notify your health care provider if you experience any of the following:  increased confusion or weakness     No dressing needed     Activity as tolerated     Shower on day dressing removed (No bath)   Order Comments: OK to shower. Do not soak incisions in bathtub or pool. You may have Dermabond (purple skin glue) over your incisions. This will come off on its own.     Weight bearing restrictions (specify):   Order Comments: Please do not lift anything greater than 10 lbs for 6 weeks in order to reduce your risk of getting a hernia.     Medications:  Reconciled Home Medications:      Medication List      CHANGE how you take these medications    valsartan 160 MG tablet  Commonly known as:  DIOVAN  TAKE 2 TABS BY MOUTH ONCE DAILY  What changed:  See the new instructions.        CONTINUE taking these medications    allopurinoL 300 MG tablet  Commonly known as:  ZYLOPRIM  TAKE ONE TABLET BY MOUTH EVERY DAY     aspirin 81 MG EC tablet  Commonly known as:  ECOTRIN  Take 81 mg by mouth once daily.     clopidogreL 75 mg tablet  Commonly known as:  PLAVIX  Take 75 mg by mouth once. Last dose taken 5/3/20 as instructed by cardiologist, Dr. Noah Pedersen     ezetimibe 10 mg tablet  Commonly known as:  ZETIA  once daily.     finasteride 5 mg tablet  Commonly known as:  PROSCAR  TAKE ONE TABLET BY MOUTH EVERY DAY     fluticasone propionate 50 mcg/actuation nasal spray  Commonly known as:  FLONASE  2 sprays (100 mcg total) by Each Nostril route once daily.     gabapentin 300 MG capsule  Commonly known as:  NEURONTIN  Take 300 mg by mouth 2 (two) times daily.     JANUVIA 100 MG Tab  Generic drug:  SITagliptin  TAKE ONE TABLET BY MOUTH DAILY     loratadine 10 mg tablet  Commonly known  as:  CLARITIN  Take 1 tablet (10 mg total) by mouth once daily.     LORazepam 0.5 MG tablet  Commonly known as:  ATIVAN  TAKE 1-2 TABLETS BY MOUTH NIGHTLY AS NEEDED     metoprolol succinate 25 MG 24 hr tablet  Commonly known as:  TOPROL-XL  Take 25 mg by mouth once daily.     oxyCODONE-acetaminophen 5-325 mg per tablet  Commonly known as:  PERCOCET  Take 1 tablet by mouth every 4 (four) hours as needed for Pain.     pioglitazone 30 MG tablet  Commonly known as:  ACTOS  Take 1 tablet (30 mg total) by mouth every evening.     rosuvastatin 40 MG Tab  Commonly known as:  CRESTOR  40 mg once daily. Take 1 tablet by mouth daily            Time spent on the discharge of patient: 20 minutes    Edna Fraire MD  Urology  [facility]

## 2020-05-14 NOTE — PLAN OF CARE
VSS and afebrile, AAOx4. Pain controlled with PRN pain meds. Regular diet tolerated well. Pt still distended but according to patient it has improved since yesterday. Pt is passing gas and having bowel sounds. Elizabeth intact draining clear pink urine. Pt is doing well. Plan of care reviewed with patient. Purposeful rounding done, call light at bed side, bed at lowest position, brakes on, non-skid socks on, will continue to monitor.

## 2020-05-14 NOTE — PLAN OF CARE
Free from falls, injury, or skin breakdown this hospital admission.  Pt eager & in agreement w/ DC. VU of DC instructions and the need to attend follow-up appointment--paperwork & pain prescription passed & explained. Ditropan prescription filled and delivered to bedside. IV removed w/ cath tip intact, WNL. 22 Fr Elizabeth to gravity, draining pink urine w/ small clots noted.To be DCd home w/ family--will be escorted downstairs via  transport team once dressed, ready & ride arrives. Pt discharged in no distress w/ extra drainage and leg bags.

## 2020-05-14 NOTE — PROGRESS NOTES
[facility]  Urology  Progress Note    Patient Name: Ruben Macedo Jr.  MRN: [id number]  Admission Date: 5/11/2020  Hospital Length of Stay: 0 days  Code Status: No Order   Attending Provider: Gelacio Sanchez MD   Primary Care Physician: Cedric Holder MD    Subjective:     HPI:  74 YOM with CAD (on DAPT at home), HLD, HTN, and DM presented initially for evaluation of recurrent UTI and hematuria. On work up, he was found to have a bladder tumor. He underwent transurethral resection of bladder tumor on 5/11 and was taken back to the OR on the same day for persistent bleeding. He was kept overnight on continuous bladder irrigation.     Interval History:  Feeling well, urine very light pink   Still no BM  Catheter removed at bedside this AM    Objective:     Temp:  [97.6 °F (36.4 °C)-98.6 °F (37 °C)] 97.6 °F (36.4 °C)  Pulse:  [72-96] 72  Resp:  [16-18] 18  SpO2:  [94 %-97 %] 95 %  BP: ()/(57-69) 110/57     Body mass index is 27.99 kg/m².      Bladder Scan Volume (mL): 140 mL (05/12/20 1840)    Drains     Drain                 Urethral Catheter 05/11/20 1654 Non-latex;Triple-lumen 24 Fr. less than 1 day                Physical Exam   Constitutional: He is oriented to person, place, and time. He appears well-developed and well-nourished. No distress.   HENT:   Head: Normocephalic and atraumatic.   Eyes: No scleral icterus.   Neck: No tracheal deviation present.   Cardiovascular: Normal rate.    Pulmonary/Chest: Effort normal. No respiratory distress.   Abdominal: Soft. He exhibits no distension. There is no tenderness.   Genitourinary:   Genitourinary Comments: 24 3-way Asl catheter in place draining yellow urine with very slight pink tinge - removed    Musculoskeletal: Normal range of motion.   Neurological: He is alert and oriented to person, place, and time.   Skin: Skin is warm and dry.     Psychiatric: He has a normal mood and affect. His behavior is normal.       Significant  Labs:    BMP:  Recent Labs   Lab 05/12/20  0342 05/13/20  0624    135*   K 4.6 3.9    104   CO2 25 23   BUN 17 20   CREATININE 1.0 1.2   CALCIUM 9.0 8.6*       CBC:  Recent Labs   Lab 05/12/20  0342 05/13/20  0624   WBC 6.81 5.99   HGB 10.4* 9.6*   HCT 32.6* 29.5*   * 114*       All pertinent labs results from the past 24 hours have been reviewed.    Significant Imaging:  All pertinent imaging results/findings from the past 24 hours have been reviewed.                Review of Systems          Assessment/Plan:     * Bladder tumor  74 YOM with multiple medical comorbidities s/p TURBT and same-day takeback for persistent bleeding on 5/11     -- Elizabeth catheter removed this AM  -- voiding trial   -- dulcolax suppository  -- repeat labs pending   -- encouraged patient to drink plenty of fluids   -- remain off plavix for now, plan to restart 5/14 if urine clear   -- appreciate assistance of hospital medicine  -- continue tylenol, oxybutynin tid PRN, and B&O suppositories PRN for bladder symptoms   -- tentative DC home later today if completes voiding trial         VTE Risk Mitigation (From admission, onward)    None          Edna Fraire MD  Urology  [facility]

## 2020-05-15 ENCOUNTER — TELEPHONE (OUTPATIENT)
Dept: UROLOGY | Facility: CLINIC | Age: 75
End: 2020-05-15

## 2020-05-15 NOTE — TELEPHONE ENCOUNTER
----- Message from Eun Saba sent at 5/15/2020 10:11 AM CDT -----  Contact: Bell Macedo (Spouse)  Name of Who is Calling : Bell Macedo (Spouse)    Bell Macedo (Spouse) is requesting a call from staff in regards to medication she would like to know if  can continue taking Plavix  .....Please contact to further discuss and advise.    Can the clinic reply by [facility] : No    What Number to Call Back :  [phone]

## 2020-05-15 NOTE — TELEPHONE ENCOUNTER
Informed the patient wife to contact the cardiologist and was informed that she did and they told her to start the patient back on medication.

## 2020-05-16 ENCOUNTER — NURSE TRIAGE (OUTPATIENT)
Dept: ADMINISTRATIVE | Facility: CLINIC | Age: 75
End: 2020-05-16

## 2020-05-16 NOTE — TELEPHONE ENCOUNTER
Spoke with Dr. Sanchez, report given per restarting of plavix, with now noted blood tinged urine, new orders noted to tell patient to hold plavix until seen by NP on Monday

## 2020-05-16 NOTE — TELEPHONE ENCOUNTER
Spoke with patient's wife, informed of conversation with Dr. Sanchez, to hold plavix until follow up appt on Monday, patient's wife verbalized understanding, instructed to call back for any changes or concerns, verb understanding,

## 2020-05-16 NOTE — TELEPHONE ENCOUNTER
"  Reason for Disposition   Caller has urgent medication question about med that PCP prescribed and triager unable to answer question    Additional Information   Negative: Drug overdose and nurse unable to answer question   Negative: Caller requesting information not related to medicine   Negative: Caller requesting a prescription for Strep throat and has a positive culture result   Negative: Rash while taking a medication or within 3 days of stopping it   Negative: Immunization reaction suspected   Negative: [1] Asthma AND [2] having symptoms of asthma (cough, wheezing, etc)   Negative: [1] DOUBLE DOSE (an extra dose or lesser amount) of prescription drug AND [2] NO symptoms (Exception: a double dose of antibiotics)   Negative: Diabetes drug error or overdose (e.g., insulin or extra dose)   Negative: [1] Request for URGENT new prescription or refill of "essential" medication (i.e., likelihood of harm to patient if not taken) AND [2] triager unable to fill per unit policy   Negative: [1] Prescription not at pharmacy AND [2] was prescribed today by PCP   Negative: Pharmacy calling with prescription questions and triager unable to answer question    Protocols used: MEDICATION QUESTION CALL-A-AH    "

## 2020-05-16 NOTE — TELEPHONE ENCOUNTER
Patient's wife calling re blood noted in urine this am, after adm plavix, states she called urology to ask if she should restart, was told that was a cardiology decision, cardiology said yes, so she administered, but now his urine is brown with some blood tinges, no clots, patient has no other s/s, will call cardiology on call per patient's request.

## 2020-05-18 ENCOUNTER — OFFICE VISIT (OUTPATIENT)
Dept: UROLOGY | Facility: CLINIC | Age: 75
End: 2020-05-18
Payer: MEDICARE

## 2020-05-18 ENCOUNTER — TELEPHONE (OUTPATIENT)
Dept: UROLOGY | Facility: CLINIC | Age: 75
End: 2020-05-18

## 2020-05-18 VITALS
SYSTOLIC BLOOD PRESSURE: 132 MMHG | WEIGHT: 195 LBS | HEART RATE: 75 BPM | BODY MASS INDEX: 27.92 KG/M2 | DIASTOLIC BLOOD PRESSURE: 75 MMHG | HEIGHT: 70 IN

## 2020-05-18 DIAGNOSIS — C68.9 UROTHELIAL CARCINOMA: Primary | ICD-10-CM

## 2020-05-18 PROCEDURE — 1125F AMNT PAIN NOTED PAIN PRSNT: CPT | Mod: S$GLB,,, | Performed by: NURSE PRACTITIONER

## 2020-05-18 PROCEDURE — 1125F PR PAIN SEVERITY QUANTIFIED, PAIN PRESENT: ICD-10-PCS | Mod: S$GLB,,, | Performed by: NURSE PRACTITIONER

## 2020-05-18 PROCEDURE — 1101F PR PT FALLS ASSESS DOC 0-1 FALLS W/OUT INJ PAST YR: ICD-10-PCS | Mod: CPTII,S$GLB,, | Performed by: NURSE PRACTITIONER

## 2020-05-18 PROCEDURE — 51700 PR IRRIGATION, BLADDER: ICD-10-PCS | Mod: S$GLB,,, | Performed by: NURSE PRACTITIONER

## 2020-05-18 PROCEDURE — 3078F PR MOST RECENT DIASTOLIC BLOOD PRESSURE < 80 MM HG: ICD-10-PCS | Mod: CPTII,S$GLB,, | Performed by: NURSE PRACTITIONER

## 2020-05-18 PROCEDURE — 3078F DIAST BP <80 MM HG: CPT | Mod: CPTII,S$GLB,, | Performed by: NURSE PRACTITIONER

## 2020-05-18 PROCEDURE — 1159F PR MEDICATION LIST DOCUMENTED IN MEDICAL RECORD: ICD-10-PCS | Mod: S$GLB,,, | Performed by: NURSE PRACTITIONER

## 2020-05-18 PROCEDURE — 3075F SYST BP GE 130 - 139MM HG: CPT | Mod: CPTII,S$GLB,, | Performed by: NURSE PRACTITIONER

## 2020-05-18 PROCEDURE — 99213 PR OFFICE/OUTPT VISIT, EST, LEVL III, 20-29 MIN: ICD-10-PCS | Mod: 25,S$GLB,, | Performed by: NURSE PRACTITIONER

## 2020-05-18 PROCEDURE — 3075F PR MOST RECENT SYSTOLIC BLOOD PRESS GE 130-139MM HG: ICD-10-PCS | Mod: CPTII,S$GLB,, | Performed by: NURSE PRACTITIONER

## 2020-05-18 PROCEDURE — 1159F MED LIST DOCD IN RCRD: CPT | Mod: S$GLB,,, | Performed by: NURSE PRACTITIONER

## 2020-05-18 PROCEDURE — 1101F PT FALLS ASSESS-DOCD LE1/YR: CPT | Mod: CPTII,S$GLB,, | Performed by: NURSE PRACTITIONER

## 2020-05-18 PROCEDURE — 51700 IRRIGATION OF BLADDER: CPT | Mod: S$GLB,,, | Performed by: NURSE PRACTITIONER

## 2020-05-18 PROCEDURE — 99213 OFFICE O/P EST LOW 20 MIN: CPT | Mod: 25,S$GLB,, | Performed by: NURSE PRACTITIONER

## 2020-05-18 NOTE — TELEPHONE ENCOUNTER
----- Message from Zena Coe NP sent at 5/18/2020  1:47 PM CDT -----  Please schedule pre op visit with Dr. Sanchez in mid June. Thanks!

## 2020-05-18 NOTE — PROGRESS NOTES
"Subjective:      Ruben Macedo Jr. is a 74 y.o. male who returns today regarding his isabel catheter. He is an established patient of Dr. Pearl and is new to me today.    The patient is s/p TURBT on 5/11. He was taken back to the OR the same day for persistent bleeding. He was kept on CBI and discharged on 5/14.    He presents today for VT. Doing well. Uncomfortable with catheter but denies significant bladder pain. He remains off of Plavix but has resumed his ASA. Hematuria is resolving. Denies fever/chills.     The following portions of the patient's history were reviewed and updated as appropriate: allergies, current medications, past family history, past medical history, past social history, past surgical history and problem list.    Review of Systems  Constitutional: no fever or chills  ENT: no nasal congestion or sore throat  Respiratory: no cough or shortness of breath  Cardiovascular: no chest pain or palpitations  Gastrointestinal: no nausea or vomiting, tolerating diet  Genitourinary: as per HPI  Hematologic/Lymphatic: no easy bruising or lymphadenopathy  Musculoskeletal: no arthralgias or myalgias  Neurological: no seizures or tremors  Behavioral/Psych: no auditory or visual hallucinations     Objective:   Vitals: /75 (BP Location: Right arm, Patient Position: Sitting, BP Method: X-Large (Automatic))   Pulse 75   Ht 5' 10" (1.778 m)   Wt 88.5 kg (195 lb)   BMI 27.98 kg/m²     Physical Exam   General: alert and oriented, no acute distress  Head: normocephalic, atraumatic  Neck: supple, no lymphadenopathy, normal ROM, no masses  Respiratory: Symmetric expansion, non-labored breathing  Cardiovascular: regular rate and rhythm, nomal pulses, no peripheral edema  Abdomen: soft, non tender, non distended, no palpable masses, no hernias, no hepatomegaly or splenomegaly  Genitourinary: isabel to gravity with clear, roscoe urine (no clots noted)  Lymphatic: no inguinal nodes  Skin: normal coloration and " turgor, no rashes, no suspicious skin lesions noted  Neuro: alert and oriented x3, no gross deficits  Psych: normal judgment and insight, normal mood/affect and non-anxious  No CVA tenderness    Lab Review   Urinalysis demonstrates : no specimen   Lab Results   Component Value Date    WBC 4.73 05/14/2020    HGB 9.9 (L) 05/14/2020    HCT 32.1 (L) 05/14/2020    MCV 95 05/14/2020     (L) 05/14/2020     Lab Results   Component Value Date    CREATININE 1.2 05/14/2020    BUN 20 05/14/2020     Lab Results   Component Value Date    PSA 0.67 12/14/2015     Imaging   None      FINAL PATHOLOGICAL DIAGNOSIS  Bladder tumor, biopsy:  -High-grade papillary urothelial carcinoma  -Negative for lamina propria invasion  -Muscularis propria is present and is uninvolved by tumor    Voiding Trial (Fill/Pull/Void): 240cc of sterile saline was instilled into the bladder through the previously placed isabel catheter.  The isabel balloon was then deflated and the isabel removed.  The patient subsequently voided 240cc, consistent with successful voiding trial.  The isabel was not replaced.    Assessment:   Urothelial carcinoma    Plan:   Ruben was seen today for follow-up.    Diagnoses and all orders for this visit:    Urothelial carcinoma      Plan:  --Successful VT  --Continue to avoid straining for BM and heavy lifting   --Dr. Sanchez will call the patient with pathology results  --Follow up with Dr. Sanchez in June to schedule TURBT + blue light laser for 7/8

## 2020-05-20 ENCOUNTER — TELEPHONE (OUTPATIENT)
Dept: UROLOGY | Facility: CLINIC | Age: 75
End: 2020-05-20

## 2020-05-20 DIAGNOSIS — R30.0 DYSURIA: Primary | ICD-10-CM

## 2020-05-20 NOTE — TELEPHONE ENCOUNTER
Spoke to the patient wife and informed that the patient is having burning with urination, no fever, cloudy per the patient wife.Per the patient wife the N.P stated she would place a order for a u/c and a u/a .

## 2020-05-20 NOTE — TELEPHONE ENCOUNTER
----- Message from Brandi Fisher sent at 5/20/2020  8:16 AM CDT -----  Contact: Sofia Macedo-wife  Name of Who is Calling: Ruben Macedo Jr.    What is the request in detail: requesting orders for UA and UC for the pt. States that hes is having pain and blood in urine. Please contact to further discuss and advise.      Can the clinic reply by [facility]: n    What Number to Call Back if not in [facility]: [phone] work ask for Sofia Macedo

## 2020-05-21 ENCOUNTER — TELEPHONE (OUTPATIENT)
Dept: UROLOGY | Facility: CLINIC | Age: 75
End: 2020-05-21

## 2020-05-21 ENCOUNTER — LAB VISIT (OUTPATIENT)
Dept: LAB | Facility: HOSPITAL | Age: 75
End: 2020-05-21
Attending: NURSE PRACTITIONER
Payer: MEDICARE

## 2020-05-21 DIAGNOSIS — R30.0 DYSURIA: ICD-10-CM

## 2020-05-21 LAB
BACTERIA #/AREA URNS HPF: ABNORMAL /HPF
MICROSCOPIC COMMENT: ABNORMAL
NON-SQ EPI CELLS #/AREA URNS HPF: 5 /HPF
RBC #/AREA URNS HPF: 100 /HPF (ref 0–4)
WBC #/AREA URNS HPF: >100 /HPF (ref 0–5)
WBC CLUMPS URNS QL MICRO: ABNORMAL

## 2020-05-21 PROCEDURE — 87088 URINE BACTERIA CULTURE: CPT

## 2020-05-21 PROCEDURE — 87077 CULTURE AEROBIC IDENTIFY: CPT

## 2020-05-21 PROCEDURE — 87186 SC STD MICRODIL/AGAR DIL: CPT

## 2020-05-21 PROCEDURE — 87086 URINE CULTURE/COLONY COUNT: CPT

## 2020-05-21 PROCEDURE — 81000 URINALYSIS NONAUTO W/SCOPE: CPT

## 2020-05-21 NOTE — TELEPHONE ENCOUNTER
SPOKE TO THE PATIENT WIFE AND THE RESULTS ARE BACK AND PLEASE REVIEW AND PLACE MEDICATION IF NEEDED.

## 2020-05-21 NOTE — TELEPHONE ENCOUNTER
----- Message from Aleisha Concepcion sent at 5/21/2020 12:17 PM CDT -----  Contact: Bell Macedo (Spouse)  Name of Who is Calling: Bell Hellen (Spouse)    What is the request in detail:   Patient's spouse called requesting antibiotics called into the local pharmacy for possible UTI. Patient's spouse will be at work today until 3:00 PM.   Please give a call back at your earliest convenience and further advise.     THANKS!        Reply by [facility]: no      Call Back : Bell Hellen (Spouse).// # [phone]

## 2020-05-22 ENCOUNTER — TELEPHONE (OUTPATIENT)
Dept: UROLOGY | Facility: CLINIC | Age: 75
End: 2020-05-22

## 2020-05-22 DIAGNOSIS — N30.00 ACUTE CYSTITIS WITHOUT HEMATURIA: Primary | ICD-10-CM

## 2020-05-22 RX ORDER — AMOXICILLIN 500 MG/1
500 CAPSULE ORAL EVERY 12 HOURS
Qty: 28 CAPSULE | Refills: 0 | Status: SHIPPED | OUTPATIENT
Start: 2020-05-22 | End: 2020-06-05

## 2020-05-22 NOTE — TELEPHONE ENCOUNTER
Spoke with the pt's wife. rx for amoxicillin BID x 14 days. Sensitivities pending. Will adjust if needed.

## 2020-05-22 NOTE — TELEPHONE ENCOUNTER
----- Message from Darlene Iyer RN sent at 5/22/2020  1:59 PM CDT -----  Patient is requesting antibiotic for positive urine culture. Please advise. Thanks!  ----- Message -----  From: Sheila Parr  Sent: 5/22/2020   1:51 PM CDT  To: Saravanan Freitas Staff      Name of Who is Calling:Pt      What is the request in detail: Pt wife states he needs an antibiotic , states the preliminary labs are in ,stated that is was positive. Pt wife asked can something be called in Please contact to further discuss and advise    Can the clinic reply by [facility]: No      What Number to Call Back if not in [facility]: [phone]

## 2020-05-23 LAB — BACTERIA UR CULT: ABNORMAL

## 2020-05-27 ENCOUNTER — TELEPHONE (OUTPATIENT)
Dept: UROLOGY | Facility: CLINIC | Age: 75
End: 2020-05-27

## 2020-05-27 NOTE — TELEPHONE ENCOUNTER
----- Message from Kaylynn Mullins sent at 5/27/2020  3:28 PM CDT -----  Contact: CHUCKIE SIMONS JR. [[id number]]   Name of Who is Calling:     What is the request in detail:  Request call back in reference to patient Please contact to further discuss and advise      Can the clinic reply by [facility]: no     What Number to Call Back if not in [facility]:  Laverne (wife) [phone]

## 2020-05-27 NOTE — TELEPHONE ENCOUNTER
----- Message from Hawa Larios sent at 5/27/2020 12:35 PM CDT -----  Contact: Zena  Name of Who is Calling: Zena pt wife      What is the request in detail:  Patient wife states her his is still symptomatic a and the amoxicillin (AMOXIL) 500 MG capsule is still not working she is requesting to speak with someone concerning the matter       Can the clinic reply by [facility]: no      What [facility] Back [facility]: [phone]

## 2020-05-27 NOTE — TELEPHONE ENCOUNTER
Duplicate msg waiting for Dr. Davila to review results to discuss with patient    ---Felicita Varghese

## 2020-05-27 NOTE — TELEPHONE ENCOUNTER
Forward msg to Dr. Davila to look at the urine culture results to see if the antibiotics need to be adjusted.    ---Felicita Varghese

## 2020-05-28 ENCOUNTER — PATIENT MESSAGE (OUTPATIENT)
Dept: UROLOGY | Facility: CLINIC | Age: 75
End: 2020-05-28

## 2020-05-28 ENCOUNTER — TELEPHONE (OUTPATIENT)
Dept: UROLOGY | Facility: CLINIC | Age: 75
End: 2020-05-28

## 2020-05-28 DIAGNOSIS — N39.0 URINARY TRACT INFECTION WITHOUT HEMATURIA, SITE UNSPECIFIED: Primary | ICD-10-CM

## 2020-05-28 RX ORDER — OXYBUTYNIN CHLORIDE 5 MG/1
5 TABLET ORAL 3 TIMES DAILY PRN
Qty: 30 TABLET | Refills: 3 | Status: ON HOLD | OUTPATIENT
Start: 2020-05-28 | End: 2020-11-12

## 2020-05-28 NOTE — TELEPHONE ENCOUNTER
Spoke with patient's wife she will obtain the urine culture and will wait for the results.    --Felicita Varghese

## 2020-05-28 NOTE — TELEPHONE ENCOUNTER
----- Message from Danny Davila MD sent at 5/28/2020  8:24 AM CDT -----  Contact: Zena  Have him drop off another sample for culture. The antibiotic he is on should treat the infection based on his culture results.    ----- Message -----  From: Felicita Varghese MA  Sent: 5/27/2020   3:21 PM CDT  To: Zena Coe NP    The patient's urine culture results are back. Do you want to adjust the medication due to patient still having same symptoms.    Felicita  ----- Message -----  From: Hawa Larios  Sent: 5/27/2020  12:35 PM CDT  To: Daniel Brizuela Staff    Name of Who is Calling: Zena pt wife      What is the request in detail:  Patient wife states her his is still symptomatic a and the amoxicillin (AMOXIL) 500 MG capsule is still not working she is requesting to speak with someone concerning the matter       Can the clinic reply by [facility]: no      [facility] [phone]

## 2020-05-29 ENCOUNTER — LAB VISIT (OUTPATIENT)
Dept: LAB | Facility: HOSPITAL | Age: 75
End: 2020-05-29
Attending: UROLOGY
Payer: MEDICARE

## 2020-05-29 DIAGNOSIS — N39.0 URINARY TRACT INFECTION WITHOUT HEMATURIA, SITE UNSPECIFIED: ICD-10-CM

## 2020-05-29 PROCEDURE — 87086 URINE CULTURE/COLONY COUNT: CPT

## 2020-05-29 RX ORDER — OXYBUTYNIN CHLORIDE 5 MG/1
5 TABLET ORAL 3 TIMES DAILY PRN
Qty: 30 TABLET | Refills: 3 | Status: CANCELLED | OUTPATIENT
Start: 2020-05-29 | End: 2021-05-29

## 2020-05-31 LAB — BACTERIA UR CULT: NO GROWTH

## 2020-06-03 ENCOUNTER — TELEPHONE (OUTPATIENT)
Dept: UROLOGY | Facility: CLINIC | Age: 75
End: 2020-06-03

## 2020-06-03 ENCOUNTER — LAB VISIT (OUTPATIENT)
Dept: LAB | Facility: HOSPITAL | Age: 75
End: 2020-06-03
Attending: NURSE PRACTITIONER
Payer: MEDICARE

## 2020-06-03 DIAGNOSIS — R30.0 DYSURIA: ICD-10-CM

## 2020-06-03 DIAGNOSIS — R30.0 DYSURIA: Primary | ICD-10-CM

## 2020-06-03 PROCEDURE — 87086 URINE CULTURE/COLONY COUNT: CPT

## 2020-06-03 NOTE — TELEPHONE ENCOUNTER
Spoke with patient regarding getting an urine culture due to blood in urine. Patient verbalized understanding will obtain this urine sample.    ---Felicita Varghese

## 2020-06-03 NOTE — TELEPHONE ENCOUNTER
----- Message from Zena Coe NP sent at 6/3/2020  1:24 PM CDT -----  Contact: Pt  I will place a standing order for urine culture for him. His wife works at [employer] on [employer] so she is usually able to get this done quickly for him. Thanks!    ----- Message -----  From: Felicita Varghese MA  Sent: 6/3/2020   1:17 PM CDT  To: Zena Coe NP    Do you want this patient to come into clinic today  ----- Message -----  From: Sheila Parr  Sent: 6/3/2020  12:56 PM CDT  To: Daniel Brizuela Staff      Name of Who is Calling:CHUCKIE SIMONS JR. [[id number]]      What is the request in detail: Pt states he is in a lot of pain when he urinates and passing brownish blood since yesterday states it has gotten worst today.  pt would like to speak with provider Please contact to further discuss and advise    Can the clinic reply by [facility]: No      What Number to Call Back if not in [facility]: [phone]

## 2020-06-05 LAB — BACTERIA UR CULT: NO GROWTH

## 2020-06-18 ENCOUNTER — PATIENT OUTREACH (OUTPATIENT)
Dept: ADMINISTRATIVE | Facility: OTHER | Age: 75
End: 2020-06-18

## 2020-06-19 ENCOUNTER — OFFICE VISIT (OUTPATIENT)
Dept: UROLOGY | Facility: CLINIC | Age: 75
End: 2020-06-19
Payer: MEDICARE

## 2020-06-19 VITALS
WEIGHT: 195 LBS | HEIGHT: 70 IN | BODY MASS INDEX: 27.92 KG/M2 | HEART RATE: 73 BPM | SYSTOLIC BLOOD PRESSURE: 155 MMHG | DIASTOLIC BLOOD PRESSURE: 74 MMHG

## 2020-06-19 DIAGNOSIS — C68.9 UROTHELIAL CARCINOMA: ICD-10-CM

## 2020-06-19 DIAGNOSIS — R30.0 DYSURIA: Primary | ICD-10-CM

## 2020-06-19 DIAGNOSIS — C67.9 MALIGNANT NEOPLASM OF URINARY BLADDER, UNSPECIFIED SITE: ICD-10-CM

## 2020-06-19 DIAGNOSIS — C68.9 UROTHELIAL CARCINOMA: Primary | ICD-10-CM

## 2020-06-19 PROCEDURE — 87086 URINE CULTURE/COLONY COUNT: CPT

## 2020-06-19 PROCEDURE — 3077F PR MOST RECENT SYSTOLIC BLOOD PRESSURE >= 140 MM HG: ICD-10-PCS | Mod: CPTII,S$GLB,, | Performed by: UROLOGY

## 2020-06-19 PROCEDURE — 99499 RISK ADDL DX/OHS AUDIT: ICD-10-PCS | Mod: S$GLB,,, | Performed by: UROLOGY

## 2020-06-19 PROCEDURE — 1101F PR PT FALLS ASSESS DOC 0-1 FALLS W/OUT INJ PAST YR: ICD-10-PCS | Mod: CPTII,S$GLB,, | Performed by: UROLOGY

## 2020-06-19 PROCEDURE — 3077F SYST BP >= 140 MM HG: CPT | Mod: CPTII,S$GLB,, | Performed by: UROLOGY

## 2020-06-19 PROCEDURE — 1126F AMNT PAIN NOTED NONE PRSNT: CPT | Mod: S$GLB,,, | Performed by: UROLOGY

## 2020-06-19 PROCEDURE — 1159F PR MEDICATION LIST DOCUMENTED IN MEDICAL RECORD: ICD-10-PCS | Mod: S$GLB,,, | Performed by: UROLOGY

## 2020-06-19 PROCEDURE — 87077 CULTURE AEROBIC IDENTIFY: CPT

## 2020-06-19 PROCEDURE — 81002 POCT URINE DIPSTICK WITHOUT MICROSCOPE: ICD-10-PCS | Mod: S$GLB,,, | Performed by: UROLOGY

## 2020-06-19 PROCEDURE — 81002 URINALYSIS NONAUTO W/O SCOPE: CPT | Mod: S$GLB,,, | Performed by: UROLOGY

## 2020-06-19 PROCEDURE — 99499 UNLISTED E&M SERVICE: CPT | Mod: S$GLB,,, | Performed by: UROLOGY

## 2020-06-19 PROCEDURE — 99214 PR OFFICE/OUTPT VISIT, EST, LEVL IV, 30-39 MIN: ICD-10-PCS | Mod: 25,S$GLB,, | Performed by: UROLOGY

## 2020-06-19 PROCEDURE — 3078F DIAST BP <80 MM HG: CPT | Mod: CPTII,S$GLB,, | Performed by: UROLOGY

## 2020-06-19 PROCEDURE — 3078F PR MOST RECENT DIASTOLIC BLOOD PRESSURE < 80 MM HG: ICD-10-PCS | Mod: CPTII,S$GLB,, | Performed by: UROLOGY

## 2020-06-19 PROCEDURE — 1159F MED LIST DOCD IN RCRD: CPT | Mod: S$GLB,,, | Performed by: UROLOGY

## 2020-06-19 PROCEDURE — 1126F PR PAIN SEVERITY QUANTIFIED, NO PAIN PRESENT: ICD-10-PCS | Mod: S$GLB,,, | Performed by: UROLOGY

## 2020-06-19 PROCEDURE — 99214 OFFICE O/P EST MOD 30 MIN: CPT | Mod: 25,S$GLB,, | Performed by: UROLOGY

## 2020-06-19 PROCEDURE — 87088 URINE BACTERIA CULTURE: CPT

## 2020-06-19 PROCEDURE — 87186 SC STD MICRODIL/AGAR DIL: CPT

## 2020-06-19 PROCEDURE — 1101F PT FALLS ASSESS-DOCD LE1/YR: CPT | Mod: CPTII,S$GLB,, | Performed by: UROLOGY

## 2020-06-19 RX ORDER — SULFAMETHOXAZOLE AND TRIMETHOPRIM 400; 80 MG/1; MG/1
1 TABLET ORAL 2 TIMES DAILY
Qty: 14 TABLET | Refills: 0 | Status: SHIPPED | OUTPATIENT
Start: 2020-06-19 | End: 2020-06-26

## 2020-06-19 RX ORDER — LORAZEPAM 0.5 MG/1
TABLET ORAL
COMMUNITY
Start: 2020-06-05 | End: 2020-07-16 | Stop reason: SDUPTHER

## 2020-06-19 RX ORDER — LIDOCAINE HYDROCHLORIDE 20 MG/ML
JELLY TOPICAL ONCE
Status: CANCELLED | OUTPATIENT
Start: 2020-06-19 | End: 2020-06-19

## 2020-06-19 RX ORDER — OXYBUTYNIN CHLORIDE 5 MG/1
TABLET ORAL
COMMUNITY
Start: 2020-06-12 | End: 2020-07-16 | Stop reason: SDUPTHER

## 2020-06-19 RX ORDER — BETAMETHASONE VALERATE 1.2 MG/G
CREAM TOPICAL
COMMUNITY
Start: 2020-03-13

## 2020-06-19 RX ORDER — PHENAZOPYRIDINE HYDROCHLORIDE 100 MG/1
200 TABLET, FILM COATED ORAL 3 TIMES DAILY PRN
Qty: 30 TABLET | Refills: 0 | Status: SHIPPED | OUTPATIENT
Start: 2020-06-19 | End: 2020-06-29

## 2020-06-19 NOTE — PROGRESS NOTES
Subjective:      Ruben Macedo Jr. is a 74 y.o. 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  There was a large amount of tumor and he presents for a 2nd stage resection..    Severe dysuria    Urine is clear and he is back on plavix  The following portions of the patient's history were reviewed and updated as appropriate: allergies, current medications, past family history, past medical history, past social history, past surgical history and problem list.    Review of Systems  Pertinent items are noted in HPI.  A comprehensive multipoint review of systems was negative except as otherwise stated in the HPI.    Past Medical History:   Diagnosis Date    Anticoagulant long-term use     plavix    Anxiety     BPH (benign prostatic hypertrophy) 2/2/2015    Cataract     Coronary artery disease 2005    stent x 1        Diabetes mellitus, type 2     History of colonic polyps 4/11/2017 2/13 --3 yrs    History of MI (myocardial infarction) 2005    Followed by cardiology, Dr. Acevedo (at [facility])    History of stroke November 2005    Hyperlipidemia     Hypertension     Incomplete bladder emptying 2/2/2015    Overweight(278.02)     Personal history of kidney stones     Renal cyst 2/10/2015    Stroke 2005    loss of peripheral vision     Past Surgical History:   Procedure Laterality Date    APPENDECTOMY      BACK SURGERY      BLADDER FULGURATION  5/11/2020    Procedure: FULGURATION, BLADDER;  Surgeon: Gelacio Sanchez MD;  Location: [facility] OR;  Service: Urology;;    CATARACT EXTRACTION      od    CORONARY ANGIOPLASTY WITH STENT PLACEMENT  2000    CYSTOSCOPY  5/11/2020    Procedure: CYSTOSCOPY;  Surgeon: Gelacio Sanchez MD;  Location: [facility] OR;  Service: Urology;;    CYSTOSCOPY W/ URETERAL STENT PLACEMENT      stents placed x2    EYE SURGERY      cataract right eye    REMOVAL OF BLOOD CLOT  5/11/2020    Procedure: REMOVAL,  "BLOOD CLOT;  Surgeon: Gelacio Sanchez MD;  [facility];  Service: Urology;;    SPINE SURGERY      TONSILLECTOMY       Diabetes Medications             JANUVIA 100 mg Tab TAKE ONE TABLET BY MOUTH DAILY    pioglitazone (ACTOS) 30 MG tablet Take 1 tablet (30 mg total) by mouth every evening.        Review of patient's allergies indicates:   Allergen Reactions    Morphine Nausea And Vomiting    Metformin Itching    Tramadol Nausea And Vomiting          Objective:   Vitals: BP (!) 155/74 (BP Location: Right arm, Patient Position: Sitting, BP Method: Large (Automatic))   Pulse 73   Ht 5' 10" (1.778 m)   Wt 88.5 kg (195 lb)   BMI 27.98 kg/m²     Physical Exam   General: alert and oriented, no acute distress  Respiratory: Symmetric expansion, non-labored breathing  Cardiovascular: no peripheral edema  Abdomen: soft, non distended  Skin: normal coloration and turgor, no rashes, no suspicious skin lesions noted  Neuro: no gross deficits  Psych: normal judgment and insight, normal mood/affect and non-anxious    Physical Exam    Lab Review   Urinalysis demonstrates nitrite neg; +wbc; +rbc    Lab Results   Component Value Date    WBC 4.73 05/14/2020    HGB 9.9 (L) 05/14/2020    HCT 32.1 (L) 05/14/2020    MCV 95 05/14/2020     (L) 05/14/2020     Lab Results   Component Value Date    CREATININE 1.2 05/14/2020    BUN 20 05/14/2020     Final Pathologic Diagnosis Bladder tumor, biopsy:   -High-grade papillary urothelial carcinoma   -Negative for lamina propria invasion   -Muscularis propria is present and is uninvolved by tumor    Comment: Interpreted by: Frances Ray M.D., Signed on 05/12/2020 at 16:18         Imaging     Narrative & Impression     EXAMINATION:  CT UROGRAM ABD PELVIS W WO     CLINICAL HISTORY:  Hematuria;Other microscopic hematuria     TECHNIQUE:  Low dose axial, sagittal and coronal reformations were obtained from the lung bases to the pubic symphysis before and following the IV " administration of 120 mL of Omnipaque 350.  Timing was optimized for nephrogram and excretory renal phases.     COMPARISON:  None     FINDINGS:  -Lung Bases: No nodules or infiltrates.     -Heart/Pericardium: Normal size.     -Aorta: Tapers normally without atherosclerotic change.     -EG Junction: There is no hiatal hernia present.     ABDOMEN:     - Liver: No focal mass.     - Gallbladder: No calcified gallstones.     - Bile Ducts: No evidence of dilated ducts.     - Spleen: Negative.     - Adrenals: Unremarkable.     - Pancreas: No mass or peripancreatic fat stranding.     - Retroperitoneum:  No significant adenopathy.     - Abdominal wall:  Unremarkable.     :     - Kidneys: No obstructing stones or solid masses are present.There is no obstructive uropathy.  Nonobstructive bilateral renal calculi identified.  Tiny renal cysts.     - Ureters: The ureters taper normally without filling defects.There is no ureteral duplication.     - Bladder: The bladder is smooth walled without filling defects.     PELVIS: No pelvic masses, adenopathy, or free fluid.     BOWEL/MESENTERY: No evidence of bowel obstruction or inflammation.     BONES: No acute fracture or bony destructive process.     Impression:     Nonobstructive renal calculi.  No evidence for solid renal mass lesions.        Electronically signed by: Cole Nagel MD  Date:                                            03/03/2020  Time:                                           10:06       Assessment and Plan:   Urothelial carcinoma  High-grade multifocal Ta with residual disease requiring 2nd stage resection  -     Diet NPO; Standing  -     Case Request Operating Room: TURBT,WITH BLUE LIGHT CYSTOSCOPY AND CYSVIEW  -     Place in Outpatient - Extended Recovery; Standing    Schedule 2nd stage TURBT with Blue Light 7/9    Hold plavix 7 days preop if OK with Dr Noah Acevedo, poonam    Malignant neoplasm of urinary bladder, unspecified site    Other orders  -      Progressive Mobility Protocol (mobilize patient to their highest level of functioning at least twice daily); Standing    Dysuria  Urine cs  Bactrim

## 2020-06-19 NOTE — H&P (VIEW-ONLY)
Subjective:      Ruben Macedo Jr. is a 74 y.o. 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  There was a large amount of tumor and he presents for a 2nd stage resection..    Severe dysuria    Urine is clear and he is back on plavix  The following portions of the patient's history were reviewed and updated as appropriate: allergies, current medications, past family history, past medical history, past social history, past surgical history and problem list.    Review of Systems  Pertinent items are noted in HPI.  A comprehensive multipoint review of systems was negative except as otherwise stated in the HPI.    Past Medical History:   Diagnosis Date    Anticoagulant long-term use     plavix    Anxiety     BPH (benign prostatic hypertrophy) 2/2/2015    Cataract     Coronary artery disease 2005    stent x 1        Diabetes mellitus, type 2     History of colonic polyps 4/11/2017 2/13 --3 yrs    History of MI (myocardial infarction) 2005    Followed by cardiology, Dr[facility]vedo (at [facility])    History of stroke November 2005    Hyperlipidemia     Hypertension     Incomplete bladder emptying 2/2/2015    Overweight(278.02)     Personal history of kidney stones     Renal cyst 2/10/2015    Stroke 2005    loss of peripheral vision     Past Surgical History:   Procedure Laterality Date    APPENDECTOMY      BACK SURGERY      BLADDER FULGURATION  5/11/2020    Procedure: FULGURATION, BLADDER;  Surgeon: Gelacio Sanchez MD;  Location: [facility] OR;  Service: Urology;;    CATARACT EXTRACTION      od    CORONARY ANGIOPLASTY WITH STENT PLACEMENT  2000    CYSTOSCOPY  5/11/2020    Procedure: CYSTOSCOPY;  Surgeon: Gelacio Sanchez MD;  Location: [facility] OR;  Service: Urology;;    CYSTOSCOPY W/ URETERAL STENT PLACEMENT      stents placed x2    EYE SURGERY      cataract right eye    REMOVAL OF BLOOD CLOT  5/11/2020    Procedure: REMOVAL,  "BLOOD CLOT;  Surgeon: Gelacio Sanchez MD;  Location: [facility];  Service: Urology;;    SPINE SURGERY      TONSILLECTOMY       Diabetes Medications             JANUVIA 100 mg Tab TAKE ONE TABLET BY MOUTH DAILY    pioglitazone (ACTOS) 30 MG tablet Take 1 tablet (30 mg total) by mouth every evening.        Review of patient's allergies indicates:   Allergen Reactions    Morphine Nausea And Vomiting    Metformin Itching    Tramadol Nausea And Vomiting          Objective:   Vitals: BP (!) 155/74 (BP Location: Right arm, Patient Position: Sitting, BP Method: Large (Automatic))   Pulse 73   Ht 5' 10" (1.778 m)   Wt 88.5 kg (195 lb)   BMI 27.98 kg/m²     Physical Exam   General: alert and oriented, no acute distress  Respiratory: Symmetric expansion, non-labored breathing  Cardiovascular: no peripheral edema  Abdomen: soft, non distended  Skin: normal coloration and turgor, no rashes, no suspicious skin lesions noted  Neuro: no gross deficits  Psych: normal judgment and insight, normal mood/affect and non-anxious    Physical Exam    Lab Review   Urinalysis demonstrates nitrite neg; +wbc; +rbc    Lab Results   Component Value Date    WBC 4.73 05/14/2020    HGB 9.9 (L) 05/14/2020    HCT 32.1 (L) 05/14/2020    MCV 95 05/14/2020     (L) 05/14/2020     Lab Results   Component Value Date    CREATININE 1.2 05/14/2020    BUN 20 05/14/2020     Final Pathologic Diagnosis Bladder tumor, biopsy:   -High-grade papillary urothelial carcinoma   -Negative for lamina propria invasion   -Muscularis propria is present and is uninvolved by tumor    Comment: Interpreted by: Frances Ray M.D., Signed on 05/12/2020 at 16:18         Imaging     Narrative & Impression     EXAMINATION:  CT UROGRAM ABD PELVIS W WO     CLINICAL HISTORY:  Hematuria;Other microscopic hematuria     TECHNIQUE:  Low dose axial, sagittal and coronal reformations were obtained from the lung bases to the pubic symphysis before and following the IV " administration of 120 mL of Omnipaque 350.  Timing was optimized for nephrogram and excretory renal phases.     COMPARISON:  None     FINDINGS:  -Lung Bases: No nodules or infiltrates.     -Heart/Pericardium: Normal size.     -Aorta: Tapers normally without atherosclerotic change.     -EG Junction: There is no hiatal hernia present.     ABDOMEN:     - Liver: No focal mass.     - Gallbladder: No calcified gallstones.     - Bile Ducts: No evidence of dilated ducts.     - Spleen: Negative.     - Adrenals: Unremarkable.     - Pancreas: No mass or peripancreatic fat stranding.     - Retroperitoneum:  No significant adenopathy.     - Abdominal wall:  Unremarkable.     :     - Kidneys: No obstructing stones or solid masses are present.There is no obstructive uropathy.  Nonobstructive bilateral renal calculi identified.  Tiny renal cysts.     - Ureters: The ureters taper normally without filling defects.There is no ureteral duplication.     - Bladder: The bladder is smooth walled without filling defects.     PELVIS: No pelvic masses, adenopathy, or free fluid.     BOWEL/MESENTERY: No evidence of bowel obstruction or inflammation.     BONES: No acute fracture or bony destructive process.     Impression:     Nonobstructive renal calculi.  No evidence for solid renal mass lesions.        Electronically signed by: Cole Nagel MD  Date:                                            03/03/2020  Time:                                           10:06       Assessment and Plan:   Urothelial carcinoma  High-grade multifocal Ta with residual disease requiring 2nd stage resection  -     Diet NPO; Standing  -     Case Request Operating Room: TURBT,WITH BLUE LIGHT CYSTOSCOPY AND CYSVIEW  -     Place in Outpatient - Extended Recovery; Standing    Schedule 2nd stage TURBT with Blue Light 7/9    Hold plavix 7 days preop if OK with Dr Noah Acevedo, poonam    Malignant neoplasm of urinary bladder, unspecified site    Other orders  -      Progressive Mobility Protocol (mobilize patient to their highest level of functioning at least twice daily); Standing    Dysuria  Urine cs  Bactrim

## 2020-06-22 LAB
BACTERIA UR CULT: ABNORMAL
BILIRUB SERPL-MCNC: NEGATIVE MG/DL
BLOOD URINE, POC: 250
CLARITY, POC UA: ABNORMAL
COLOR, POC UA: YELLOW
GLUCOSE UR QL STRIP: NORMAL
KETONES UR QL STRIP: NEGATIVE
LEUKOCYTE ESTERASE URINE, POC: ABNORMAL
NITRITE, POC UA: NEGATIVE
PH, POC UA: 5
PROTEIN, POC: 30
SPECIFIC GRAVITY, POC UA: 1.02
UROBILINOGEN, POC UA: NORMAL

## 2020-06-22 RX ORDER — AMOXICILLIN 875 MG/1
875 TABLET, FILM COATED ORAL EVERY 12 HOURS
Qty: 14 TABLET | Refills: 0 | Status: SHIPPED | OUTPATIENT
Start: 2020-06-22 | End: 2020-06-29

## 2020-06-22 NOTE — PROGRESS NOTES
Dr Sanchez forwarded these results to the patient via [email or web address].  Bactrim stopped and Amoxil started

## 2020-07-07 ENCOUNTER — ANESTHESIA EVENT (OUTPATIENT)
Dept: SURGERY | Facility: OTHER | Age: 75
End: 2020-07-07
Payer: MEDICARE

## 2020-07-07 ENCOUNTER — HOSPITAL ENCOUNTER (OUTPATIENT)
Dept: PREADMISSION TESTING | Facility: OTHER | Age: 75
Discharge: HOME | End: 2020-07-07
Attending: UROLOGY
Payer: MEDICARE

## 2020-07-07 VITALS
TEMPERATURE: 98 F | DIASTOLIC BLOOD PRESSURE: 62 MMHG | HEIGHT: 70 IN | OXYGEN SATURATION: 97 % | SYSTOLIC BLOOD PRESSURE: 114 MMHG | HEART RATE: 72 BPM | WEIGHT: 195 LBS | RESPIRATION RATE: 16 BRPM | BODY MASS INDEX: 27.92 KG/M2

## 2020-07-07 DIAGNOSIS — C68.9 UROTHELIAL CARCINOMA: ICD-10-CM

## 2020-07-07 PROCEDURE — U0003 INFECTIOUS AGENT DETECTION BY NUCLEIC ACID (DNA OR RNA); SEVERE ACUTE RESPIRATORY SYNDROME CORONAVIRUS 2 (SARS-COV-2) (CORONAVIRUS DISEASE [COVID-19]), AMPLIFIED PROBE TECHNIQUE, MAKING USE OF HIGH THROUGHPUT TECHNOLOGIES AS DESCRIBED BY CMS-2020-01-R: HCPCS

## 2020-07-07 RX ORDER — CLOPIDOGREL BISULFATE 75 MG/1
75 TABLET ORAL DAILY
COMMUNITY
End: 2020-12-02 | Stop reason: SDUPTHER

## 2020-07-07 RX ORDER — PREGABALIN 50 MG/1
50 CAPSULE ORAL
Status: CANCELLED | OUTPATIENT
Start: 2020-07-07 | End: 2020-07-07

## 2020-07-07 RX ORDER — LIDOCAINE HYDROCHLORIDE 10 MG/ML
0.5 INJECTION, SOLUTION EPIDURAL; INFILTRATION; INTRACAUDAL; PERINEURAL ONCE
Status: CANCELLED | OUTPATIENT
Start: 2020-07-07 | End: 2020-07-07

## 2020-07-07 RX ORDER — ACETAMINOPHEN 500 MG
1000 TABLET ORAL
Status: CANCELLED | OUTPATIENT
Start: 2020-07-07 | End: 2020-07-07

## 2020-07-07 RX ORDER — FAMOTIDINE 20 MG/1
20 TABLET, FILM COATED ORAL
Status: CANCELLED | OUTPATIENT
Start: 2020-07-07 | End: 2020-07-07

## 2020-07-07 RX ORDER — SODIUM CHLORIDE, SODIUM LACTATE, POTASSIUM CHLORIDE, CALCIUM CHLORIDE 600; 310; 30; 20 MG/100ML; MG/100ML; MG/100ML; MG/100ML
INJECTION, SOLUTION INTRAVENOUS CONTINUOUS
Status: CANCELLED | OUTPATIENT
Start: 2020-07-07

## 2020-07-07 NOTE — DISCHARGE INSTRUCTIONS
Information to Prepare you for your Surgery    PRE-ADMIT TESTING -  [phone]    [address]  [place] [facility]          Your surgery has been scheduled at [facility]. We are pleased to have the opportunity to serve you. For Further Information please call [phone].    On the day of surgery please report to the Information Desk on the 1st floor.    · CONTACT YOUR PHYSICIAN'S OFFICE THE DAY PRIOR TO YOUR SURGERY TO OBTAIN YOUR ARRIVAL TIME.     · The evening before surgery do not eat anything after 9 p.m. ( this includes hard candy, chewing gum and mints).  You may only have GATORADE, POWERADE AND WATER  from 9 p.m. until you leave your home.   DO NOT DRINK ANY LIQUIDS ON THE WAY TO THE HOSPITAL.      SPECIAL MEDICATION INSTRUCTIONS: TAKE medications checked off by the Anesthesiologist on your Medication List.    Angiogram Patients: Take medications as instructed by your physician, including aspirin.     Surgery Patients:    If you take ASPIRIN - Your PHYSICIAN/SURGEON will need to inform you IF/OR when you need to stop taking aspirin prior to your surgery.     Do Not take any medications containing IBUPROFEN.  Do Not Wear any make-up or dark nail polish   (especially eye make-up) to surgery. If you come to surgery with makeup on you will be required to remove the makeup or nail polish.    Do not shave your surgical area at least 5 days prior to your surgery. The surgical prep will be performed at the hospital according to Infection Control regulations.    Leave all valuables at home.   Do Not wear any jewelry or watches, including any metal in body piercings. Jewelry must be removed prior to coming to the hospital.  There is a possibility that rings that are unable to be removed may be cut off if they are on the surgical extremity.    Contact Lens must be removed before surgery. Either do not wear the contact lens or bring a case and solution for  storage.  Please bring a container for eyeglasses or dentures as required.  Bring any paperwork your physician has provided, such as consent forms,  history and physicals, doctor's orders, etc.   Bring comfortable clothes that are loose fitting to wear upon discharge. Take into consideration the type of surgery being performed.  Maintain your diet as advised per your physician the day prior to surgery.      Adequate rest the night before surgery is advised.   Park in the Parking lot behind the hospital or in the [facility] across the street from the parking lot. Parking is complimentary.  If you will be discharged the same day as your procedure, please arrange for a responsible adult to drive you home or to accompany you if traveling by taxi.   YOU WILL NOT BE PERMITTED TO DRIVE OR TO LEAVE THE HOSPITAL ALONE AFTER SURGERY.   If you are being discharged the same day, it is strongly recommended that you arrange for someone to remain with you for the first 24 hrs following your surgery.    The Surgeon will speak to your family/visitor after your surgery regarding the outcome of your surgery and post op care.  The Surgeon may speak to you after your surgery, but there is a possibility you may not remember the details.  Please check with your family members regarding the conversation with the Surgeon.    We strongly recommend whoever is bringing you home be present for discharge instructions.  This will ensure a thorough understanding for your post op home care.    ALL CHILDREN MUST ALWAYS BE ACCOMPANIED BY AN ADULT.    Visitors-Refer to current Visitor policy handouts.    Thank you for your cooperation.  The Staff of [facility].                Bathing Instructions with Hibiclens     Shower the evening before and morning of your procedure with Hibiclens:   Wash your face with water and your regular face wash/soap   Apply Hibiclens directly on your skin or on a wet washcloth and wash  gently. When showering: Move away from the shower stream when applying Hibiclens to avoid rinsing off too soon.   Rinse thoroughly with warm water   Do not dilute Hibiclens         Dry off as usual, do not use any deodorant, powder, body lotions, perfume, after shave or cologne.

## 2020-07-07 NOTE — ANESTHESIA PREPROCEDURE EVALUATION
07/07/2020  Ruben Macedo Jr. is a 74 y.o., male.    Anesthesia Evaluation    I have reviewed the Patient Summary Reports.    I have reviewed the Nursing Notes. I have reviewed the NPO Status.   I have reviewed the Medications.     Review of Systems  Anesthesia Hx:  No problems with previous Anesthesia  Denies Family Hx of Anesthesia complications.    Social:  Non-Smoker    Hematology/Oncology:     Oncology Normal    -- Anemia (borderline):   EENT/Dental:EENT/Dental Normal   Cardiovascular:   Hypertension Past MI CAD asymptomatic CABG/stent (2005)  ECG has been reviewed. Dr. Acevedo, sees pt yearly, last EST 3 yrs ago, neg   Pulmonary:  Pulmonary Normal    Renal/:   Chronic Renal Disease (very mild), CRI renal calculi BPH    Hepatic/GI:  Hepatic/GI Normal    Musculoskeletal:  Musculoskeletal Normal    Neurological:   CVA (2005), no residual symptoms    Endocrine:   Diabetes, poorly controlled, type 2    Dermatological:  Skin Normal    Psych:   Psychiatric History anxiety          Physical Exam  General:  Well nourished    Airway/Jaw/Neck:  Jaw/Neck Findings: Neck ROM: Normal ROM      Dental:  Dental Findings: In tact, Molar caps        Mental Status:  Mental Status Findings:  Cooperative, Alert and Oriented         Anesthesia Plan  Type of Anesthesia, risks & benefits discussed:  Anesthesia Type:  general  Patient's Preference:   Intra-op Monitoring Plan: standard ASA monitors  Intra-op Monitoring Plan Comments:   Post Op Pain Control Plan: multimodal analgesia and per primary service following discharge from PACU  Post Op Pain Control Plan Comments:   Induction:   IV  Beta Blocker:         Informed Consent: Patient understands risks and agrees with Anesthesia plan.  Questions answered. Anesthesia consent signed with patient.  ASA Score: 3     Day of Surgery Review of History & Physical:    H&P update  referred to the surgeon.     Anesthesia Plan Notes: Pt known to ACS. No labs.            Ready For Surgery From Anesthesia Perspective.

## 2020-07-08 ENCOUNTER — TELEPHONE (OUTPATIENT)
Dept: UROLOGY | Facility: CLINIC | Age: 75
End: 2020-07-08

## 2020-07-08 LAB — SARS-COV-2 RNA RESP QL NAA+PROBE: NOT DETECTED

## 2020-07-08 NOTE — TELEPHONE ENCOUNTER
PATIENT WIFE COLLECTED A URINE AND SHE PLACE PATIENT ON AMOXIL 07/05/20, AND IT DID GROW BACTERIAL BACK. CONFIRMED ARRIVAL TIME 10:00AM.

## 2020-07-08 NOTE — TELEPHONE ENCOUNTER
----- Message from Eun Saba sent at 7/8/2020  8:55 AM CDT -----  Regarding: Call Back  Name of Who is Calling : Bell Macedo (Spouse)    Bell Macedo (Spouse) is requesting a call from staff in regards to patient procedure and labs .....Please contact to further discuss and advise.    Can the clinic reply by [facility] : No    What Number to Call Back :  [phone]

## 2020-07-08 NOTE — PROGRESS NOTES
Dr Sanchez forwarded these results to the patient via [employer].  He will discuss the results with the patient in person at the next appointment.  The patient was instructed to make an appointment if he does not already have one scheduled.

## 2020-07-09 ENCOUNTER — ANESTHESIA (OUTPATIENT)
Dept: SURGERY | Facility: OTHER | Age: 75
End: 2020-07-09
Payer: MEDICARE

## 2020-07-09 ENCOUNTER — HOSPITAL ENCOUNTER (OUTPATIENT)
Facility: OTHER | Age: 75
Discharge: HOME | End: 2020-07-11
Attending: UROLOGY | Admitting: UROLOGY
Payer: MEDICARE

## 2020-07-09 DIAGNOSIS — C67.9 MALIGNANT NEOPLASM OF URINARY BLADDER, UNSPECIFIED SITE: ICD-10-CM

## 2020-07-09 DIAGNOSIS — C68.9 UROTHELIAL CARCINOMA: ICD-10-CM

## 2020-07-09 LAB
POCT GLUCOSE: 129 MG/DL (ref 70–110)
POCT GLUCOSE: 92 MG/DL (ref 70–110)
POCT GLUCOSE: 97 MG/DL (ref 70–110)

## 2020-07-09 PROCEDURE — 63600175 PHARM REV CODE 636 W HCPCS: Performed by: UROLOGY

## 2020-07-09 PROCEDURE — 36000707: Performed by: UROLOGY

## 2020-07-09 PROCEDURE — 25000003 PHARM REV CODE 250: Performed by: SPECIALIST

## 2020-07-09 PROCEDURE — 71000033 HC RECOVERY, INTIAL HOUR: Performed by: UROLOGY

## 2020-07-09 PROCEDURE — 88307 PR  SURG PATH,LEVEL V: ICD-10-PCS | Mod: 26,,, | Performed by: PATHOLOGY

## 2020-07-09 PROCEDURE — 25000003 PHARM REV CODE 250: Performed by: UROLOGY

## 2020-07-09 PROCEDURE — 37000008 HC ANESTHESIA 1ST 15 MINUTES: Performed by: UROLOGY

## 2020-07-09 PROCEDURE — 88307 TISSUE EXAM BY PATHOLOGIST: CPT | Performed by: PATHOLOGY

## 2020-07-09 PROCEDURE — 63600175 PHARM REV CODE 636 W HCPCS: Performed by: SPECIALIST

## 2020-07-09 PROCEDURE — 25000003 PHARM REV CODE 250: Performed by: STUDENT IN AN ORGANIZED HEALTH CARE EDUCATION/TRAINING PROGRAM

## 2020-07-09 PROCEDURE — 27201423 OPTIME MED/SURG SUP & DEVICES STERILE SUPPLY: Performed by: UROLOGY

## 2020-07-09 PROCEDURE — 94761 N-INVAS EAR/PLS OXIMETRY MLT: CPT

## 2020-07-09 PROCEDURE — 63600175 PHARM REV CODE 636 W HCPCS: Performed by: NURSE ANESTHETIST, CERTIFIED REGISTERED

## 2020-07-09 PROCEDURE — 82962 GLUCOSE BLOOD TEST: CPT | Performed by: UROLOGY

## 2020-07-09 PROCEDURE — 63600175 PHARM REV CODE 636 W HCPCS: Performed by: STUDENT IN AN ORGANIZED HEALTH CARE EDUCATION/TRAINING PROGRAM

## 2020-07-09 PROCEDURE — 37000009 HC ANESTHESIA EA ADD 15 MINS: Performed by: UROLOGY

## 2020-07-09 PROCEDURE — 36000706: Performed by: UROLOGY

## 2020-07-09 PROCEDURE — 52240 PR CYSTOURETHROSCOPY,FULGUR >5 CM LESN: ICD-10-PCS | Mod: ,,, | Performed by: UROLOGY

## 2020-07-09 PROCEDURE — A9589 INSTI HEXAMINOLEVULINATE HCL: HCPCS | Performed by: UROLOGY

## 2020-07-09 PROCEDURE — 94799 UNLISTED PULMONARY SVC/PX: CPT

## 2020-07-09 PROCEDURE — 52240 CYSTOSCOPY AND TREATMENT: CPT | Mod: ,,, | Performed by: UROLOGY

## 2020-07-09 PROCEDURE — 25000003 PHARM REV CODE 250: Performed by: NURSE ANESTHETIST, CERTIFIED REGISTERED

## 2020-07-09 PROCEDURE — 71000039 HC RECOVERY, EACH ADD'L HOUR: Performed by: UROLOGY

## 2020-07-09 PROCEDURE — 88307 TISSUE EXAM BY PATHOLOGIST: CPT | Mod: 26,,, | Performed by: PATHOLOGY

## 2020-07-09 RX ORDER — LIDOCAINE HYDROCHLORIDE 20 MG/ML
JELLY TOPICAL
Status: COMPLETED | OUTPATIENT
Start: 2020-07-09 | End: 2020-07-09

## 2020-07-09 RX ORDER — METOPROLOL SUCCINATE 25 MG/1
25 TABLET, EXTENDED RELEASE ORAL DAILY
Status: DISCONTINUED | OUTPATIENT
Start: 2020-07-10 | End: 2020-07-11 | Stop reason: HOSPADM

## 2020-07-09 RX ORDER — TALC
6 POWDER (GRAM) TOPICAL NIGHTLY PRN
Status: DISCONTINUED | OUTPATIENT
Start: 2020-07-09 | End: 2020-07-11 | Stop reason: HOSPADM

## 2020-07-09 RX ORDER — LIDOCAINE HCL/PF 100 MG/5ML
SYRINGE (ML) INTRAVENOUS
Status: DISCONTINUED | OUTPATIENT
Start: 2020-07-09 | End: 2020-07-09

## 2020-07-09 RX ORDER — ONDANSETRON HYDROCHLORIDE 2 MG/ML
INJECTION, SOLUTION INTRAMUSCULAR; INTRAVENOUS
Status: DISCONTINUED | OUTPATIENT
Start: 2020-07-09 | End: 2020-07-09

## 2020-07-09 RX ORDER — ONDANSETRON 2 MG/ML
4 INJECTION INTRAMUSCULAR; INTRAVENOUS DAILY PRN
Status: DISCONTINUED | OUTPATIENT
Start: 2020-07-09 | End: 2020-07-09 | Stop reason: HOSPADM

## 2020-07-09 RX ORDER — IBUPROFEN 200 MG
24 TABLET ORAL
Status: DISCONTINUED | OUTPATIENT
Start: 2020-07-09 | End: 2020-07-11 | Stop reason: HOSPADM

## 2020-07-09 RX ORDER — GLYCOPYRROLATE 0.2 MG/ML
INJECTION INTRAMUSCULAR; INTRAVENOUS
Status: DISCONTINUED | OUTPATIENT
Start: 2020-07-09 | End: 2020-07-09

## 2020-07-09 RX ORDER — EPHEDRINE SULFATE 50 MG/ML
INJECTION, SOLUTION INTRAVENOUS
Status: DISCONTINUED | OUTPATIENT
Start: 2020-07-09 | End: 2020-07-09

## 2020-07-09 RX ORDER — MEPERIDINE HYDROCHLORIDE 25 MG/ML
12.5 INJECTION INTRAMUSCULAR; INTRAVENOUS; SUBCUTANEOUS ONCE AS NEEDED
Status: DISCONTINUED | OUTPATIENT
Start: 2020-07-09 | End: 2020-07-09 | Stop reason: HOSPADM

## 2020-07-09 RX ORDER — INSULIN ASPART 100 [IU]/ML
1-10 INJECTION, SOLUTION INTRAVENOUS; SUBCUTANEOUS
Status: DISCONTINUED | OUTPATIENT
Start: 2020-07-09 | End: 2020-07-11 | Stop reason: HOSPADM

## 2020-07-09 RX ORDER — GENTAMICIN SULFATE 40 MG/ML
80 INJECTION, SOLUTION INTRAMUSCULAR; INTRAVENOUS ONCE
Status: COMPLETED | OUTPATIENT
Start: 2020-07-09 | End: 2020-07-09

## 2020-07-09 RX ORDER — OXYCODONE HYDROCHLORIDE 5 MG/1
10 TABLET ORAL EVERY 4 HOURS PRN
Status: DISCONTINUED | OUTPATIENT
Start: 2020-07-09 | End: 2020-07-11 | Stop reason: HOSPADM

## 2020-07-09 RX ORDER — SODIUM CHLORIDE, SODIUM LACTATE, POTASSIUM CHLORIDE, CALCIUM CHLORIDE 600; 310; 30; 20 MG/100ML; MG/100ML; MG/100ML; MG/100ML
INJECTION, SOLUTION INTRAVENOUS CONTINUOUS
Status: DISCONTINUED | OUTPATIENT
Start: 2020-07-09 | End: 2020-07-10

## 2020-07-09 RX ORDER — ACETAMINOPHEN 500 MG
1000 TABLET ORAL EVERY 6 HOURS PRN
Status: DISCONTINUED | OUTPATIENT
Start: 2020-07-09 | End: 2020-07-11 | Stop reason: HOSPADM

## 2020-07-09 RX ORDER — LORAZEPAM 0.5 MG/1
0.5 TABLET ORAL EVERY 12 HOURS PRN
Status: DISCONTINUED | OUTPATIENT
Start: 2020-07-09 | End: 2020-07-11 | Stop reason: HOSPADM

## 2020-07-09 RX ORDER — DIPHENHYDRAMINE HYDROCHLORIDE 50 MG/ML
INJECTION INTRAMUSCULAR; INTRAVENOUS
Status: DISCONTINUED | OUTPATIENT
Start: 2020-07-09 | End: 2020-07-09

## 2020-07-09 RX ORDER — NEOSTIGMINE METHYLSULFATE 1 MG/ML
INJECTION, SOLUTION INTRAVENOUS
Status: DISCONTINUED | OUTPATIENT
Start: 2020-07-09 | End: 2020-07-09

## 2020-07-09 RX ORDER — ROCURONIUM BROMIDE 10 MG/ML
INJECTION, SOLUTION INTRAVENOUS
Status: DISCONTINUED | OUTPATIENT
Start: 2020-07-09 | End: 2020-07-09

## 2020-07-09 RX ORDER — PROPOFOL 10 MG/ML
VIAL (ML) INTRAVENOUS
Status: DISCONTINUED | OUTPATIENT
Start: 2020-07-09 | End: 2020-07-09

## 2020-07-09 RX ORDER — HYDROMORPHONE HYDROCHLORIDE 2 MG/ML
0.4 INJECTION, SOLUTION INTRAMUSCULAR; INTRAVENOUS; SUBCUTANEOUS EVERY 5 MIN PRN
Status: DISCONTINUED | OUTPATIENT
Start: 2020-07-09 | End: 2020-07-09 | Stop reason: HOSPADM

## 2020-07-09 RX ORDER — GLUCAGON 1 MG
1 KIT INJECTION
Status: DISCONTINUED | OUTPATIENT
Start: 2020-07-09 | End: 2020-07-11 | Stop reason: HOSPADM

## 2020-07-09 RX ORDER — ROSUVASTATIN CALCIUM 10 MG/1
40 TABLET, COATED ORAL DAILY
Status: DISCONTINUED | OUTPATIENT
Start: 2020-07-10 | End: 2020-07-11 | Stop reason: HOSPADM

## 2020-07-09 RX ORDER — FENTANYL CITRATE 50 UG/ML
INJECTION, SOLUTION INTRAMUSCULAR; INTRAVENOUS
Status: DISCONTINUED | OUTPATIENT
Start: 2020-07-09 | End: 2020-07-09

## 2020-07-09 RX ORDER — FAMOTIDINE 20 MG/1
20 TABLET, FILM COATED ORAL
Status: COMPLETED | OUTPATIENT
Start: 2020-07-09 | End: 2020-07-09

## 2020-07-09 RX ORDER — SODIUM CHLORIDE 0.9 % (FLUSH) 0.9 %
10 SYRINGE (ML) INJECTION
Status: DISCONTINUED | OUTPATIENT
Start: 2020-07-09 | End: 2020-07-11 | Stop reason: HOSPADM

## 2020-07-09 RX ORDER — PHENYLEPHRINE HYDROCHLORIDE 10 MG/ML
INJECTION INTRAVENOUS
Status: DISCONTINUED | OUTPATIENT
Start: 2020-07-09 | End: 2020-07-09

## 2020-07-09 RX ORDER — SODIUM CHLORIDE, SODIUM LACTATE, POTASSIUM CHLORIDE, CALCIUM CHLORIDE 600; 310; 30; 20 MG/100ML; MG/100ML; MG/100ML; MG/100ML
INJECTION, SOLUTION INTRAVENOUS CONTINUOUS
Status: DISCONTINUED | OUTPATIENT
Start: 2020-07-09 | End: 2020-07-09

## 2020-07-09 RX ORDER — DIPHENHYDRAMINE HCL 25 MG
25 CAPSULE ORAL EVERY 12 HOURS PRN
Status: DISCONTINUED | OUTPATIENT
Start: 2020-07-09 | End: 2020-07-11 | Stop reason: HOSPADM

## 2020-07-09 RX ORDER — OXYCODONE HYDROCHLORIDE 5 MG/1
5 TABLET ORAL EVERY 4 HOURS PRN
Status: DISCONTINUED | OUTPATIENT
Start: 2020-07-09 | End: 2020-07-11 | Stop reason: HOSPADM

## 2020-07-09 RX ORDER — SODIUM CHLORIDE 0.9 % (FLUSH) 0.9 %
3 SYRINGE (ML) INJECTION
Status: DISCONTINUED | OUTPATIENT
Start: 2020-07-09 | End: 2020-07-11 | Stop reason: HOSPADM

## 2020-07-09 RX ORDER — ALLOPURINOL 300 MG/1
300 TABLET ORAL DAILY
Status: DISCONTINUED | OUTPATIENT
Start: 2020-07-10 | End: 2020-07-11 | Stop reason: HOSPADM

## 2020-07-09 RX ORDER — FINASTERIDE 5 MG/1
5 TABLET, FILM COATED ORAL DAILY
Status: DISCONTINUED | OUTPATIENT
Start: 2020-07-10 | End: 2020-07-11 | Stop reason: HOSPADM

## 2020-07-09 RX ORDER — VALSARTAN 80 MG/1
160 TABLET ORAL DAILY
Status: DISCONTINUED | OUTPATIENT
Start: 2020-07-10 | End: 2020-07-11 | Stop reason: HOSPADM

## 2020-07-09 RX ORDER — ACETAMINOPHEN 500 MG
1000 TABLET ORAL
Status: COMPLETED | OUTPATIENT
Start: 2020-07-09 | End: 2020-07-09

## 2020-07-09 RX ORDER — CEFAZOLIN SODIUM 1 G/3ML
2 INJECTION, POWDER, FOR SOLUTION INTRAMUSCULAR; INTRAVENOUS
Status: DISCONTINUED | OUTPATIENT
Start: 2020-07-09 | End: 2020-07-09

## 2020-07-09 RX ORDER — LIDOCAINE HYDROCHLORIDE 20 MG/ML
JELLY TOPICAL ONCE
Status: DISCONTINUED | OUTPATIENT
Start: 2020-07-09 | End: 2020-07-11 | Stop reason: HOSPADM

## 2020-07-09 RX ORDER — PREGABALIN 50 MG/1
50 CAPSULE ORAL
Status: COMPLETED | OUTPATIENT
Start: 2020-07-09 | End: 2020-07-09

## 2020-07-09 RX ORDER — EZETIMIBE 10 MG/1
10 TABLET ORAL DAILY
Status: DISCONTINUED | OUTPATIENT
Start: 2020-07-10 | End: 2020-07-11 | Stop reason: HOSPADM

## 2020-07-09 RX ORDER — LIDOCAINE HYDROCHLORIDE 10 MG/ML
0.5 INJECTION, SOLUTION EPIDURAL; INFILTRATION; INTRACAUDAL; PERINEURAL ONCE
Status: DISCONTINUED | OUTPATIENT
Start: 2020-07-09 | End: 2020-07-09 | Stop reason: HOSPADM

## 2020-07-09 RX ORDER — ATROPA BELLADONNA AND OPIUM 16.2; 6 MG/1; MG/1
60 SUPPOSITORY RECTAL ONCE
Status: COMPLETED | OUTPATIENT
Start: 2020-07-09 | End: 2020-07-09

## 2020-07-09 RX ORDER — IBUPROFEN 200 MG
16 TABLET ORAL
Status: DISCONTINUED | OUTPATIENT
Start: 2020-07-09 | End: 2020-07-11 | Stop reason: HOSPADM

## 2020-07-09 RX ORDER — DIPHENHYDRAMINE HYDROCHLORIDE 50 MG/ML
25 INJECTION INTRAMUSCULAR; INTRAVENOUS EVERY 6 HOURS PRN
Status: DISCONTINUED | OUTPATIENT
Start: 2020-07-09 | End: 2020-07-09 | Stop reason: HOSPADM

## 2020-07-09 RX ORDER — OXYBUTYNIN CHLORIDE 5 MG/1
5 TABLET ORAL 3 TIMES DAILY PRN
Status: DISCONTINUED | OUTPATIENT
Start: 2020-07-09 | End: 2020-07-11 | Stop reason: HOSPADM

## 2020-07-09 RX ORDER — ONDANSETRON 2 MG/ML
4 INJECTION INTRAMUSCULAR; INTRAVENOUS EVERY 6 HOURS PRN
Status: DISCONTINUED | OUTPATIENT
Start: 2020-07-09 | End: 2020-07-11 | Stop reason: HOSPADM

## 2020-07-09 RX ORDER — OXYCODONE HYDROCHLORIDE 5 MG/1
5 TABLET ORAL
Status: DISCONTINUED | OUTPATIENT
Start: 2020-07-09 | End: 2020-07-09 | Stop reason: HOSPADM

## 2020-07-09 RX ADMIN — GLYCOPYRROLATE 0.2 MG: 0.2 INJECTION, SOLUTION INTRAMUSCULAR; INTRAVENOUS at 12:07

## 2020-07-09 RX ADMIN — PHENYLEPHRINE HYDROCHLORIDE 200 MCG: 10 INJECTION INTRAVENOUS at 01:07

## 2020-07-09 RX ADMIN — OXYCODONE HYDROCHLORIDE 5 MG: 5 TABLET ORAL at 03:07

## 2020-07-09 RX ADMIN — NEOSTIGMINE METHYLSULFATE 5 MG: 1 INJECTION INTRAVENOUS at 01:07

## 2020-07-09 RX ADMIN — PROMETHAZINE HYDROCHLORIDE 6.25 MG: 25 INJECTION INTRAMUSCULAR; INTRAVENOUS at 02:07

## 2020-07-09 RX ADMIN — HYDROMORPHONE HYDROCHLORIDE 0.4 MG: 2 INJECTION, SOLUTION INTRAMUSCULAR; INTRAVENOUS; SUBCUTANEOUS at 05:07

## 2020-07-09 RX ADMIN — HEXAMINOLEVULINATE HYDROCHLORIDE 100 MG: KIT at 11:07

## 2020-07-09 RX ADMIN — OXYCODONE 5 MG: 5 TABLET ORAL at 08:07

## 2020-07-09 RX ADMIN — EPHEDRINE SULFATE 10 MG: 50 INJECTION INTRAVENOUS at 01:07

## 2020-07-09 RX ADMIN — FENTANYL CITRATE 100 MCG: 50 INJECTION, SOLUTION INTRAMUSCULAR; INTRAVENOUS at 12:07

## 2020-07-09 RX ADMIN — DIPHENHYDRAMINE HYDROCHLORIDE 6.25 MG: 50 INJECTION, SOLUTION INTRAMUSCULAR; INTRAVENOUS at 01:07

## 2020-07-09 RX ADMIN — EPHEDRINE SULFATE 10 MG: 50 INJECTION INTRAVENOUS at 12:07

## 2020-07-09 RX ADMIN — ONDANSETRON 4 MG: 2 INJECTION, SOLUTION INTRAMUSCULAR; INTRAVENOUS at 01:07

## 2020-07-09 RX ADMIN — AMPICILLIN SODIUM 1 G: 1 INJECTION, POWDER, FOR SOLUTION INTRAMUSCULAR; INTRAVENOUS at 12:07

## 2020-07-09 RX ADMIN — ROCURONIUM BROMIDE 40 MG: 10 INJECTION, SOLUTION INTRAVENOUS at 12:07

## 2020-07-09 RX ADMIN — PREGABALIN 50 MG: 50 CAPSULE ORAL at 10:07

## 2020-07-09 RX ADMIN — LIDOCAINE HYDROCHLORIDE 10 ML: 20 JELLY TOPICAL at 10:07

## 2020-07-09 RX ADMIN — HYDROMORPHONE HYDROCHLORIDE 0.4 MG: 2 INJECTION, SOLUTION INTRAMUSCULAR; INTRAVENOUS; SUBCUTANEOUS at 02:07

## 2020-07-09 RX ADMIN — PHENYLEPHRINE HYDROCHLORIDE 100 MCG: 10 INJECTION INTRAVENOUS at 12:07

## 2020-07-09 RX ADMIN — PHENYLEPHRINE HYDROCHLORIDE 200 MCG: 10 INJECTION INTRAVENOUS at 12:07

## 2020-07-09 RX ADMIN — PHENYLEPHRINE HYDROCHLORIDE 100 MCG: 10 INJECTION INTRAVENOUS at 01:07

## 2020-07-09 RX ADMIN — ATROPA BELLADONNA AND OPIUM 60 MG: 16.2; 6 SUPPOSITORY RECTAL at 02:07

## 2020-07-09 RX ADMIN — GLYCOPYRROLATE 0.6 MG: 0.2 INJECTION, SOLUTION INTRAMUSCULAR; INTRAVENOUS at 01:07

## 2020-07-09 RX ADMIN — OXYBUTYNIN CHLORIDE 5 MG: 5 TABLET ORAL at 08:07

## 2020-07-09 RX ADMIN — FAMOTIDINE 20 MG: 20 TABLET, FILM COATED ORAL at 10:07

## 2020-07-09 RX ADMIN — SODIUM CHLORIDE, SODIUM LACTATE, POTASSIUM CHLORIDE, AND CALCIUM CHLORIDE: 600; 310; 30; 20 INJECTION, SOLUTION INTRAVENOUS at 11:07

## 2020-07-09 RX ADMIN — PROPOFOL 200 MG: 10 INJECTION, EMULSION INTRAVENOUS at 12:07

## 2020-07-09 RX ADMIN — GENTAMICIN SULFATE 80 MG: 40 INJECTION, SOLUTION INTRAMUSCULAR; INTRAVENOUS at 12:07

## 2020-07-09 RX ADMIN — CARBOXYMETHYLCELLULOSE SODIUM 2 DROP: 2.5 SOLUTION/ DROPS OPHTHALMIC at 12:07

## 2020-07-09 RX ADMIN — LIDOCAINE HYDROCHLORIDE 40 MG: 20 INJECTION, SOLUTION INTRAVENOUS at 12:07

## 2020-07-09 RX ADMIN — ACETAMINOPHEN 1000 MG: 500 TABLET, FILM COATED ORAL at 10:07

## 2020-07-09 RX ADMIN — SODIUM CHLORIDE, SODIUM LACTATE, POTASSIUM CHLORIDE, AND CALCIUM CHLORIDE: .6; .31; .03; .02 INJECTION, SOLUTION INTRAVENOUS at 06:07

## 2020-07-09 NOTE — NURSING
Received pt from PACU via stretcher.  VSS and afebrile.  CBI infusing along with IV fluids.  No complaints of pain at this moment.  Diabetic diet in place.  Will continue to monitor.

## 2020-07-09 NOTE — ANESTHESIA POSTPROCEDURE EVALUATION
Anesthesia Post Evaluation    Patient: Ruben Macedo Jr.    Procedure(s) Performed: Procedure(s) (LRB):  TURBT,WITH BLUE LIGHT CYSTOSCOPY AND CYSVIEW (N/A)    Final Anesthesia Type: general    Patient location during evaluation: PACU  Patient participation: Yes- Able to Participate  Level of consciousness: awake and alert  Post-procedure vital signs: reviewed and stable  Pain management: adequate  Airway patency: patent    PONV status at discharge: No PONV  Anesthetic complications: no      Cardiovascular status: blood pressure returned to baseline  Respiratory status: unassisted and room air  Hydration status: euvolemic  Follow-up not needed.          Vitals Value Taken Time   /63 07/09/20 1545   Temp 36.8 °C (98.2 °F) 07/09/20 1545   Pulse 67 07/09/20 1558   Resp 17 07/09/20 1530   SpO2 100 % 07/09/20 1558   Vitals shown include unvalidated device data.      No case tracking events are documented in the log.      Pain/Gudelia Score: Pain Rating Prior to Med Admin: 5 (7/9/2020  3:29 PM)  Pain Rating Post Med Admin: 4 (7/9/2020  3:45 PM)  Gudelia Score: 10 (7/9/2020  3:45 PM)

## 2020-07-09 NOTE — OP NOTE
[facility]  Operative Note    Date: 07/09/2020    Pre-Op Diagnosis: bladder tumor    Post-Op Diagnosis: same    Procedure(s) Performed:   1.  TURBT of >5cm large sized bladder tumor  2.  Cystoscopy    Specimen(s): Bladder tumor    Staff Surgeon: Gelacio Sanchez MD    Assistant Surgeon: Neville Morrison MD    Anesthesia: General endotracheal anesthesia    Indications: Ruben Macedo Jr. is a 74 y.o. male with a bladder tumor that was incompletely resected due to large tumor burden at his last TURBT, pathology from that resection revealed HGTa urothelial cell carcinoma.  He presents today for surgical resection and completion TURBT.    Findings:   Large sessile bladder tumor covering most of anterior and left lateral wall of bladder and extending into the naterior bladder neck, resected completely    Estimated Blood Loss: min    Drains: 24 Fr 3-way isabel catheter    Procedure in detail:  After the risks, benefits and possible complications of the procedure were explained, consents were obtained. The patient was taken to the operating room and placed under anesthesia. Pre-operative antibiotics were administered 30 minutes prior to expected start time. The patient was placed in the dorsal lithotomy position and prepped and draped in the normal and sterile fashion. Time out was performed.      A rigid cystoscope in a 22 Fr sheath was introduced into the bladder per urethra. This passed easily.  The entire urethra was visualized which showed no masses or strictures.  The right and left ureteral orifices were identified in the normal anatomic position and were seen effluxing clear urine.  Formal cystoscopy was performed using both a 30 and 70 degree lens which revealed a large sessile bladder tumor continuously covering most of anterior and left lateral wall of bladder, no trabeculations, no bladder stones and no bladder diverticuli.      The resectoscope was then assembled with the visual obturator. This was  placed into the bladder via the urethra and the visual obturator was exchanged for the resecting mechanism.  The tumor was then resected, starting anteriorly and working left laterally.  Specimens were then removed and passed off the field for pathologic analysis. An Elik evacuator was utilized.     The bladder was drained and hemostasis was achieved.  The resectoscope was removed.      The cystoscope was then reinserted and the bladder visualized with both a 30 and 70 degree lens. There was found to be several oozing areas and the cystoscope was removed. The resectoscope was then assembled with the visual obturator. This was placed into the bladder via the urethra and the visual obturator was exchanged for the resecting mechanism. The bleeding areas were then cauterized and complete hemostasis was achieved. The resectoscope was then removed.    A 24 Fr isabel catheter was placed with 30 cc in the balloon. The bladder was then irrigated and the patient was placed on CBI    The patient tolerated the procedure well and was transferred to recovery in stable condition.    Disposition:  The patient will be admitted overnight and placed on CBI, we will likely perform a voiding trial in the morning.    Neville Morrison MD      Addendum  Hexaminolexulinic acid was administered preop per standard protocol.  Both white light and Blue light were utilized during the procedure to ensure complete resection.

## 2020-07-09 NOTE — PLAN OF CARE
Urojet instilled into urethra    Straight catheterization performed under sterile technique    Cysview instilled at 1138    Catheter removed and perineal area cleaned with Obstetric towelette    Patient instructed to retain Cysview in bladder for at least 1 hour.  Call the nurse to get out of bed.

## 2020-07-09 NOTE — INTERVAL H&P NOTE
The patient has been examined and the H&P has been reviewed:    I concur with the findings and changes have been noted since the H&P was written: enterococcus UTI; on amoxil po; will give ampicillin and gent with surgery    Anesthesia/Surgery risks, benefits and alternative options discussed and understood by patient/family.          Active Hospital Problems    Diagnosis  POA    Bladder cancer [C67.9]  Yes      Resolved Hospital Problems   No resolved problems to display.

## 2020-07-09 NOTE — TRANSFER OF CARE
Anesthesia Transfer of Care Note    Patient: Ruben Macedo Jr.    Procedure(s) Performed: Procedure(s) (LRB):  TURBT,WITH BLUE LIGHT CYSTOSCOPY AND CYSVIEW (N/A)    Patient location: PACU    Anesthesia Type: general    Transport from OR: Transported from OR on 2-3 L/min O2 by NC with adequate spontaneous ventilation    Post pain: pain needs to be addressed    Post assessment: no apparent anesthetic complications    Post vital signs: stable    Level of consciousness: awake, alert and oriented    Nausea/Vomiting: no nausea/vomiting    Complications: none    Transfer of care protocol was followed      Last vitals:   Visit Vitals  /63 (BP Location: Left arm, Patient Position: Lying)   Pulse 67   Temp 37.1 °C (98.7 °F) (Oral)   Resp 16   Wt 88.5 kg (195 lb)   SpO2 97%   BMI 27.98 kg/m²

## 2020-07-10 LAB
POCT GLUCOSE: 124 MG/DL (ref 70–110)
POCT GLUCOSE: 135 MG/DL (ref 70–110)
POCT GLUCOSE: 137 MG/DL (ref 70–110)
POCT GLUCOSE: 99 MG/DL (ref 70–110)

## 2020-07-10 PROCEDURE — 99900035 HC TECH TIME PER 15 MIN (STAT)

## 2020-07-10 PROCEDURE — 94799 UNLISTED PULMONARY SVC/PX: CPT

## 2020-07-10 PROCEDURE — G0378 HOSPITAL OBSERVATION PER HR: HCPCS

## 2020-07-10 PROCEDURE — 63600175 PHARM REV CODE 636 W HCPCS: Performed by: STUDENT IN AN ORGANIZED HEALTH CARE EDUCATION/TRAINING PROGRAM

## 2020-07-10 PROCEDURE — 25000003 PHARM REV CODE 250: Performed by: STUDENT IN AN ORGANIZED HEALTH CARE EDUCATION/TRAINING PROGRAM

## 2020-07-10 PROCEDURE — 94761 N-INVAS EAR/PLS OXIMETRY MLT: CPT

## 2020-07-10 RX ORDER — POLYETHYLENE GLYCOL 3350 17 G/17G
17 POWDER, FOR SOLUTION ORAL 2 TIMES DAILY
Status: DISCONTINUED | OUTPATIENT
Start: 2020-07-10 | End: 2020-07-11 | Stop reason: HOSPADM

## 2020-07-10 RX ORDER — POLYETHYLENE GLYCOL 3350 17 G/17G
17 POWDER, FOR SOLUTION ORAL DAILY
Status: DISCONTINUED | OUTPATIENT
Start: 2020-07-10 | End: 2020-07-10

## 2020-07-10 RX ADMIN — METOPROLOL SUCCINATE 25 MG: 25 TABLET, EXTENDED RELEASE ORAL at 09:07

## 2020-07-10 RX ADMIN — OXYCODONE HYDROCHLORIDE 10 MG: 5 TABLET ORAL at 08:07

## 2020-07-10 RX ADMIN — OXYBUTYNIN CHLORIDE 5 MG: 5 TABLET ORAL at 05:07

## 2020-07-10 RX ADMIN — EZETIMIBE 10 MG: 10 TABLET ORAL at 09:07

## 2020-07-10 RX ADMIN — OXYCODONE HYDROCHLORIDE 10 MG: 5 TABLET ORAL at 05:07

## 2020-07-10 RX ADMIN — FINASTERIDE 5 MG: 5 TABLET, FILM COATED ORAL at 09:07

## 2020-07-10 RX ADMIN — POLYETHYLENE GLYCOL 3350 17 G: 17 POWDER, FOR SOLUTION ORAL at 08:07

## 2020-07-10 RX ADMIN — SODIUM CHLORIDE, SODIUM LACTATE, POTASSIUM CHLORIDE, AND CALCIUM CHLORIDE: .6; .31; .03; .02 INJECTION, SOLUTION INTRAVENOUS at 07:07

## 2020-07-10 RX ADMIN — ALLOPURINOL 300 MG: 300 TABLET ORAL at 09:07

## 2020-07-10 RX ADMIN — OXYCODONE HYDROCHLORIDE 10 MG: 5 TABLET ORAL at 01:07

## 2020-07-10 RX ADMIN — VALSARTAN 160 MG: 80 TABLET, FILM COATED ORAL at 09:07

## 2020-07-10 RX ADMIN — POLYETHYLENE GLYCOL 3350 17 G: 17 POWDER, FOR SOLUTION ORAL at 09:07

## 2020-07-10 RX ADMIN — ROSUVASTATIN CALCIUM 40 MG: 10 TABLET, FILM COATED ORAL at 09:07

## 2020-07-10 RX ADMIN — OXYCODONE HYDROCHLORIDE 10 MG: 5 TABLET ORAL at 12:07

## 2020-07-10 NOTE — PROGRESS NOTES
[facility]  Urology  Progress Note    Patient Name: Ruben Macedo Jr.  MRN: [id number]  Admission Date: 7/9/2020  Hospital Length of Stay: 0 days  Code Status: Full Code   Attending Provider: Gelacio Sanchez MD   Primary Care Physician: Cedric Holder MD    Subjective:     HPI:  No notes on file    Interval History:   No acute events  Pain controlled  Has not ambulated yet  Tolerating diet    Review of Systems  Objective:     Temp:  [97.3 °F (36.3 °C)-98.7 °F (37.1 °C)] 98 °F (36.7 °C)  Pulse:  [65-80] 71  Resp:  [16-20] 18  SpO2:  [92 %-100 %] 98 %  BP: ()/(55-70) 108/65     Body mass index is 27.99 kg/m².    Date 07/10/20 0700 - 07/11/20 0659   Shift 3154-7129 2633-4773 8311-7693 24 Hour Total   INTAKE   I.V.(mL/kg) 75(0.8)   75(0.8)   Shift Total(mL/kg) 75(0.8)   75(0.8)   OUTPUT   Urine(mL/kg/hr) 950   950   Shift Total(mL/kg) 950(10.7)   950(10.7)   Weight (kg) 88.5 88.5 88.5 88.5          Drains     Drain                 Urethral Catheter 05/14/20 1400 22 Fr. 56 days         Urethral Catheter 07/09/20 1320 Triple-lumen 24 Fr. less than 1 day                Physical Exam   Nursing note and vitals reviewed.  Constitutional: He is oriented to person, place, and time. He appears well-developed. No distress.   Cardiovascular: Normal rate.    Pulmonary/Chest: Effort normal. No accessory muscle usage. No respiratory distress.   Abdominal: Soft. He exhibits no distension. There is no abdominal tenderness.   Genitourinary:    Genitourinary Comments: 24 3-way in place  Urine pink on slow CBI     Neurological: He is alert and oriented to person, place, and time.       Significant Labs:    BMP:  No results for input(s): NA, K, CL, CO2, BUN, CREATININE, LABGLOM, GLUCOSE, CALCIUM in the last 168 hours.    CBC:   No results for input(s): WBC, HGB, HCT, PLT in the last 168 hours.    Urine Culture: No results for input(s): LABURIN in the last 168 hours.  Urine Studies: No results for input(s):  COLORU, APPEARANCEUA, PHUR, SPECGRAV, PROTEINUA, GLUCUA, KETONESU, BILIRUBINUA, OCCULTUA, NITRITE, UROBILINOGEN, LEUKOCYTESUR, RBCUA, WBCUA, BACTERIA, SQUAMEPITHEL, HYALINECASTS in the last 168 hours.    Invalid input(s): MARCO  All pertinent labs results from the past 24 hours have been reviewed.    Significant Imaging:  All pertinent imaging results/findings from the past 24 hours have been reviewed.                  Assessment/Plan:     Bladder cancer  73 yo M 1 Day Post-Op from large TURBT    -IVF Hep lock  -PO pain meds  -Regular diet  -maintain Elizabeth on CBI today, titrate to light pink  -ambulate TID  -DVT ppx: SCDs  -Holding plavix    Will plan to keep on CBI until tomorrow, possible VT and D/C tomorrow            VTE Risk Mitigation (From admission, onward)         Ordered     IP VTE HIGH RISK PATIENT  Once      07/09/20 1825     Place sequential compression device  Until discontinued      07/09/20 1825                Neville Morrison MD  Urology  [facility]

## 2020-07-10 NOTE — PLAN OF CARE
provided a compassionate presence, prayer support and reflective listening for patient and family.  A note communicating patient's request for a visit from a  was also written for .

## 2020-07-10 NOTE — PLAN OF CARE
Plan of care reviewed with pt, verbalizes understanding. VSS see flowsheets. Pt AAOX4, respirations even and unlabored, skin warm and dry to touch. NAD noted. CBI maintained this shift, draining light pink urine. Pt independent and ambulatory. Blood glucose maintained this shift. Bed low and locked, call bell and personal items within reach, will continue to monitor.

## 2020-07-10 NOTE — ASSESSMENT & PLAN NOTE
75 yo M 1 Day Post-Op from large TURBT    -IVF Hep lock  -PO pain meds  -Regular diet  -maintain Elizabeth on CBI today, titrate to light pink  -ambulate TID  -DVT ppx: SCDs  -Holding plavix    Will plan to keep on CBI until tomorrow, possible VT and D/C tomorrow

## 2020-07-10 NOTE — PLAN OF CARE
Initial Discharge Planning Assessment:  Patient admitted on 7-10-20  LOS- day 0  Chart reviewed, Care plan discussed with treatment team,  attending Dr Sanchez   PCP updated in Epic: yes  Pharmacy, updated in Epic: [facility] [address]   DME at home: n/a  Current dispo: home tomorrow or Sunday  Self reports no needs   Transportation: has reliable   Case management  to follow.         07/10/20 1619   Discharge Assessment   Assessment Type Discharge Planning Assessment   Confirmed/corrected address and phone number on facesheet? Yes   Assessment information obtained from? Patient;Caregiver;Medical Record   Communicated expected length of stay with patient/caregiver yes   Prior to hospitilization cognitive status: Alert/Oriented   Prior to hospitalization functional status: Independent   Current cognitive status: Alert/Oriented   Current Functional Status: Independent   Lives With spouse   Able to Return to Prior Arrangements yes   Is patient able to care for self after discharge? Yes   Patient currently being followed by outpatient case management? No   Patient currently receives any other outside agency services? No   Equipment Currently Used at Home none   Do you have any problems affording any of your prescribed medications? No   Is the patient taking medications as prescribed? yes   Does the patient have transportation home? Yes   Transportation Anticipated family or friend will provide   Discharge Plan A Home with family   DME Needed Upon Discharge  none   Patient/Family in Agreement with Plan yes

## 2020-07-10 NOTE — SUBJECTIVE & OBJECTIVE
Interval History:   No acute events  Pain controlled  Has not ambulated yet  Tolerating diet    Review of Systems  Objective:     Temp:  [97.3 °F (36.3 °C)-98.7 °F (37.1 °C)] 98 °F (36.7 °C)  Pulse:  [65-80] 71  Resp:  [16-20] 18  SpO2:  [92 %-100 %] 98 %  BP: ()/(55-70) 108/65     Body mass index is 27.99 kg/m².    Date 07/10/20 0700 - 07/11/20 0659   Shift 4670-8444 0947-8703 3079-2281 24 Hour Total   INTAKE   I.V.(mL/kg) 75(0.8)   75(0.8)   Shift Total(mL/kg) 75(0.8)   75(0.8)   OUTPUT   Urine(mL/kg/hr) 950   950   Shift Total(mL/kg) 950(10.7)   950(10.7)   Weight (kg) 88.5 88.5 88.5 88.5          Drains     Drain                 Urethral Catheter 05/14/20 1400 22 Fr. 56 days         Urethral Catheter 07/09/20 1320 Triple-lumen 24 Fr. less than 1 day                Physical Exam   Nursing note and vitals reviewed.  Constitutional: He is oriented to person, place, and time. He appears well-developed. No distress.   Cardiovascular: Normal rate.    Pulmonary/Chest: Effort normal. No accessory muscle usage. No respiratory distress.   Abdominal: Soft. He exhibits no distension. There is no abdominal tenderness.   Genitourinary:    Genitourinary Comments: 24 3-way in place  Urine pink on slow CBI     Neurological: He is alert and oriented to person, place, and time.       Significant Labs:    BMP:  No results for input(s): NA, K, CL, CO2, BUN, CREATININE, LABGLOM, GLUCOSE, CALCIUM in the last 168 hours.    CBC:   No results for input(s): WBC, HGB, HCT, PLT in the last 168 hours.    Urine Culture: No results for input(s): LABURIN in the last 168 hours.  Urine Studies: No results for input(s): COLORU, APPEARANCEUA, PHUR, SPECGRAV, PROTEINUA, GLUCUA, KETONESU, BILIRUBINUA, OCCULTUA, NITRITE, UROBILINOGEN, LEUKOCYTESUR, RBCUA, WBCUA, BACTERIA, SQUAMEPITHEL, HYALINECASTS in the last 168 hours.    Invalid input(s): WRIGHTSUR  All pertinent labs results from the past 24 hours have been reviewed.    Significant  Imaging:  All pertinent imaging results/findings from the past 24 hours have been reviewed.

## 2020-07-11 VITALS
SYSTOLIC BLOOD PRESSURE: 153 MMHG | OXYGEN SATURATION: 94 % | BODY MASS INDEX: 27.94 KG/M2 | DIASTOLIC BLOOD PRESSURE: 74 MMHG | HEART RATE: 89 BPM | HEIGHT: 70 IN | TEMPERATURE: 99 F | WEIGHT: 195.13 LBS | RESPIRATION RATE: 18 BRPM

## 2020-07-11 LAB
POCT GLUCOSE: 122 MG/DL (ref 70–110)
POCT GLUCOSE: 124 MG/DL (ref 70–110)

## 2020-07-11 PROCEDURE — G0378 HOSPITAL OBSERVATION PER HR: HCPCS

## 2020-07-11 PROCEDURE — 99213 PR OFFICE/OUTPT VISIT, EST, LEVL III, 20-29 MIN: ICD-10-PCS | Mod: ,,, | Performed by: UROLOGY

## 2020-07-11 PROCEDURE — 25000003 PHARM REV CODE 250: Performed by: STUDENT IN AN ORGANIZED HEALTH CARE EDUCATION/TRAINING PROGRAM

## 2020-07-11 PROCEDURE — 99213 OFFICE O/P EST LOW 20 MIN: CPT | Mod: ,,, | Performed by: UROLOGY

## 2020-07-11 RX ADMIN — ALLOPURINOL 300 MG: 300 TABLET ORAL at 08:07

## 2020-07-11 RX ADMIN — FINASTERIDE 5 MG: 5 TABLET, FILM COATED ORAL at 08:07

## 2020-07-11 RX ADMIN — ROSUVASTATIN CALCIUM 40 MG: 10 TABLET, FILM COATED ORAL at 08:07

## 2020-07-11 RX ADMIN — POLYETHYLENE GLYCOL 3350 17 G: 17 POWDER, FOR SOLUTION ORAL at 08:07

## 2020-07-11 NOTE — PLAN OF CARE
Discharge instruction and education provided. Patient voices understanding. IV site removed cath tip intact. Elizabeth catheter removed and patient ws able to void Patient shows no acute distress.

## 2020-07-11 NOTE — SUBJECTIVE & OBJECTIVE
Interval History: No events. Urine clear this AM.    Review of Systems  Objective:     Temp:  [98 °F (36.7 °C)-99.2 °F (37.3 °C)] 98.8 °F (37.1 °C)  Pulse:  [70-95] 88  Resp:  [16-20] 18  SpO2:  [92 %-97 %] 94 %  BP: ()/(56-68) 113/68     Body mass index is 27.99 kg/m².           Drains     Drain                 Urethral Catheter 05/14/20 1400 22 Fr. 57 days         Urethral Catheter 07/09/20 1320 Triple-lumen 24 Fr. 1 day                Physical Exam   Constitutional: He is oriented to person, place, and time. He appears well-developed. No distress.   HENT:   Head: Normocephalic and atraumatic.   Eyes: Pupils are equal, round, and reactive to light.   Neck: Normal range of motion. Neck supple.   Pulmonary/Chest: Effort normal. No respiratory distress.   Abdominal: He exhibits no distension.   Genitourinary:    Genitourinary Comments: Elizabeth in place with clear yellow urine      Neurological: He is alert and oriented to person, place, and time.   Psychiatric: His behavior is normal.

## 2020-07-11 NOTE — PLAN OF CARE
Patient cleared for discharge from case management standpoint.       07/11/20 8829   Final Note   Assessment Type Final Discharge Note   Anticipated Discharge Disposition Home   Right Care Referral Info   Post Acute Recommendation No Care

## 2020-07-11 NOTE — PLAN OF CARE
VSS and afebrile, AAOx4. Pain controlled by PO pain meds, tolerated well. Regular diet tolerated well. CBI maintained, urine is light pink color. Plan of care reviewed with patient. Purposeful rounding done, call light at bed side, bed at lowest position, brakes on, non-skid socks on, will continue to monitor.

## 2020-07-11 NOTE — PLAN OF CARE
Patient on RA with sats as documented.  Pt doing IS @ 1000  L. Will cont to encourage deep breathing and coughing. No apparent respiratory distress noted and will cont to monitor

## 2020-07-11 NOTE — PROGRESS NOTES
[facility]  Urology  Progress Note    Patient Name: Ruben Macedo Jr.  MRN: [id number]  Admission Date: 7/9/2020  Hospital Length of Stay: 0 days  Code Status: Full Code   Attending Provider: Gelacio Sanchez MD   Primary Care Physician: Cedric Holder MD    Subjective:     HPI:  No notes on file    Interval History: No events. Urine clear this AM.    Review of Systems  Objective:     Temp:  [98 °F (36.7 °C)-99.2 °F (37.3 °C)] 98.8 °F (37.1 °C)  Pulse:  [70-95] 88  Resp:  [16-20] 18  SpO2:  [92 %-97 %] 94 %  BP: ()/(56-68) 113/68     Body mass index is 27.99 kg/m².           Drains     Drain                 Urethral Catheter 05/14/20 1400 22 Fr. 57 days         Urethral Catheter 07/09/20 1320 Triple-lumen 24 Fr. 1 day                Physical Exam   Constitutional: He is oriented to person, place, and time. He appears well-developed. No distress.   HENT:   Head: Normocephalic and atraumatic.   Eyes: Pupils are equal, round, and reactive to light.   Neck: Normal range of motion. Neck supple.   Pulmonary/Chest: Effort normal. No respiratory distress.   Abdominal: He exhibits no distension.   Genitourinary:    Genitourinary Comments: Isabel in place with clear yellow urine      Neurological: He is alert and oriented to person, place, and time.   Psychiatric: His behavior is normal.         Assessment/Plan:     Bladder cancer  75 yo M 2 Days Post-Op from large TURBT    - DC isabel this AM  - DC home after he voids        VTE Risk Mitigation (From admission, onward)         Ordered     IP VTE HIGH RISK PATIENT  Once      07/09/20 1825     Place sequential compression device  Until discontinued      07/09/20 1825                Danny Davila MD  Urology  [facility]

## 2020-07-13 NOTE — TELEPHONE ENCOUNTER
----- Message from Baljit Valente sent at 7/13/2020  3:52 PM CDT -----  Can the clinic reply in [facility]: no     Please refill the medication(s) listed below. The patient can be reached at this phone number once it is called into the pharmacy.    Medication #1oxyCODONE-acetaminophen (PERCOCET) 5-325 mg per tablet    Medication #2    Preferred Pharmacy:[id number] - [place] - [address] AT [facility]

## 2020-07-13 NOTE — TELEPHONE ENCOUNTER
Patient c/o 9/10 dysuria, worse w/urination, since discharge on the 11th. Patient also c/o pink to bright red urine and clots and occasional slow stream. Denies inability to urinate or fever. Please advise. Thanks!

## 2020-07-14 ENCOUNTER — PATIENT OUTREACH (OUTPATIENT)
Dept: ADMINISTRATIVE | Facility: OTHER | Age: 75
End: 2020-07-14

## 2020-07-14 ENCOUNTER — OFFICE VISIT (OUTPATIENT)
Dept: UROLOGY | Facility: CLINIC | Age: 75
End: 2020-07-14
Payer: MEDICARE

## 2020-07-14 ENCOUNTER — TELEPHONE (OUTPATIENT)
Dept: UROLOGY | Facility: CLINIC | Age: 75
End: 2020-07-14

## 2020-07-14 VITALS
BODY MASS INDEX: 27.92 KG/M2 | DIASTOLIC BLOOD PRESSURE: 72 MMHG | HEART RATE: 81 BPM | HEIGHT: 70 IN | WEIGHT: 195 LBS | SYSTOLIC BLOOD PRESSURE: 123 MMHG

## 2020-07-14 DIAGNOSIS — R30.0 DYSURIA: ICD-10-CM

## 2020-07-14 DIAGNOSIS — N32.89 BLADDER SPASMS: Primary | ICD-10-CM

## 2020-07-14 DIAGNOSIS — C67.9 MALIGNANT NEOPLASM OF URINARY BLADDER, UNSPECIFIED SITE: ICD-10-CM

## 2020-07-14 DIAGNOSIS — R35.0 URINARY FREQUENCY: ICD-10-CM

## 2020-07-14 LAB
FINAL PATHOLOGIC DIAGNOSIS: NORMAL
GROSS: NORMAL

## 2020-07-14 PROCEDURE — 3008F PR BODY MASS INDEX (BMI) DOCUMENTED: ICD-10-PCS | Mod: CPTII,S$GLB,, | Performed by: NURSE PRACTITIONER

## 2020-07-14 PROCEDURE — 3078F PR MOST RECENT DIASTOLIC BLOOD PRESSURE < 80 MM HG: ICD-10-PCS | Mod: CPTII,S$GLB,, | Performed by: NURSE PRACTITIONER

## 2020-07-14 PROCEDURE — 3008F BODY MASS INDEX DOCD: CPT | Mod: CPTII,S$GLB,, | Performed by: NURSE PRACTITIONER

## 2020-07-14 PROCEDURE — 3074F PR MOST RECENT SYSTOLIC BLOOD PRESSURE < 130 MM HG: ICD-10-PCS | Mod: CPTII,S$GLB,, | Performed by: NURSE PRACTITIONER

## 2020-07-14 PROCEDURE — 1159F PR MEDICATION LIST DOCUMENTED IN MEDICAL RECORD: ICD-10-PCS | Mod: S$GLB,,, | Performed by: NURSE PRACTITIONER

## 2020-07-14 PROCEDURE — 87086 URINE CULTURE/COLONY COUNT: CPT

## 2020-07-14 PROCEDURE — 1125F AMNT PAIN NOTED PAIN PRSNT: CPT | Mod: S$GLB,,, | Performed by: NURSE PRACTITIONER

## 2020-07-14 PROCEDURE — 81002 POCT URINE DIPSTICK WITHOUT MICROSCOPE: ICD-10-PCS | Mod: S$GLB,,, | Performed by: NURSE PRACTITIONER

## 2020-07-14 PROCEDURE — 81002 URINALYSIS NONAUTO W/O SCOPE: CPT | Mod: S$GLB,,, | Performed by: NURSE PRACTITIONER

## 2020-07-14 PROCEDURE — 51798 POCT BLADDER SCAN: ICD-10-PCS | Mod: S$GLB,,, | Performed by: NURSE PRACTITIONER

## 2020-07-14 PROCEDURE — 1125F PR PAIN SEVERITY QUANTIFIED, PAIN PRESENT: ICD-10-PCS | Mod: S$GLB,,, | Performed by: NURSE PRACTITIONER

## 2020-07-14 PROCEDURE — 1159F MED LIST DOCD IN RCRD: CPT | Mod: S$GLB,,, | Performed by: NURSE PRACTITIONER

## 2020-07-14 PROCEDURE — 99214 OFFICE O/P EST MOD 30 MIN: CPT | Mod: 25,S$GLB,, | Performed by: NURSE PRACTITIONER

## 2020-07-14 PROCEDURE — 99214 PR OFFICE/OUTPT VISIT, EST, LEVL IV, 30-39 MIN: ICD-10-PCS | Mod: 25,S$GLB,, | Performed by: NURSE PRACTITIONER

## 2020-07-14 PROCEDURE — 1101F PR PT FALLS ASSESS DOC 0-1 FALLS W/OUT INJ PAST YR: ICD-10-PCS | Mod: CPTII,S$GLB,, | Performed by: NURSE PRACTITIONER

## 2020-07-14 PROCEDURE — 3078F DIAST BP <80 MM HG: CPT | Mod: CPTII,S$GLB,, | Performed by: NURSE PRACTITIONER

## 2020-07-14 PROCEDURE — 1101F PT FALLS ASSESS-DOCD LE1/YR: CPT | Mod: CPTII,S$GLB,, | Performed by: NURSE PRACTITIONER

## 2020-07-14 PROCEDURE — 51798 US URINE CAPACITY MEASURE: CPT | Mod: S$GLB,,, | Performed by: NURSE PRACTITIONER

## 2020-07-14 PROCEDURE — 3074F SYST BP LT 130 MM HG: CPT | Mod: CPTII,S$GLB,, | Performed by: NURSE PRACTITIONER

## 2020-07-14 RX ORDER — OXYCODONE AND ACETAMINOPHEN 5; 325 MG/1; MG/1
1 TABLET ORAL EVERY 4 HOURS PRN
Qty: 12 EACH | Refills: 0 | Status: SHIPPED | OUTPATIENT
Start: 2020-07-14 | End: 2020-08-12

## 2020-07-14 RX ORDER — AMOXICILLIN AND CLAVULANATE POTASSIUM 875; 125 MG/1; MG/1
1 TABLET, FILM COATED ORAL 2 TIMES DAILY
Qty: 14 TABLET | Refills: 0 | Status: SHIPPED | OUTPATIENT
Start: 2020-07-14 | End: 2020-07-21

## 2020-07-14 NOTE — PROGRESS NOTES
Subjective:      Ruben Macedo Jr. is a 74 y.o. male who returns today regarding his bladder pain.     The patient is s/p TURBT by Dr. Sanchez on 7/10/20 for a large bladder tumor. Pathology results are pending.     He returns today reporting dysuria, frequency, urgency, and suprapubic pain with a full bladder and at the end of urination. States that he just started ditropan yesterday without significant improvement. Denies difficulty voiding. Remains on proscar. Denies further hematuria. Denies fever/chills. Requests narcotics for the pain.     The following portions of the patient's history were reviewed and updated as appropriate: allergies, current medications, past family history, past medical history, past social history, past surgical history and problem list.    Review of Systems  Constitutional: no fever or chills  ENT: no nasal congestion or sore throat  Respiratory: no cough or shortness of breath  Cardiovascular: no chest pain or palpitations  Gastrointestinal: no nausea or vomiting, tolerating diet  Genitourinary: as per HPI  Hematologic/Lymphatic: no easy bruising or lymphadenopathy  Musculoskeletal: no arthralgias or myalgias  Neurological: no seizures or tremors  Behavioral/Psych: no auditory or visual hallucinations     Objective:   Vitals:   Vitals:    07/14/20 1318   BP: 123/72   Pulse: 81       Physical Exam   General: alert and oriented, no acute distress  Head: normocephalic, atraumatic  Neck: supple, no lymphadenopathy, normal ROM, no masses  Respiratory: Symmetric expansion, non-labored breathing  Cardiovascular: regular rate and rhythm, nomal pulses, no peripheral edema  Abdomen: soft, non tender, non distended, no palpable masses, no hernias, no hepatomegaly or splenomegaly  Genitourinary: deferred  Lymphatic: no inguinal nodes  Skin: normal coloration and turgor, no rashes, no suspicious skin lesions noted  Neuro: alert and oriented x3, no gross deficits  Psych: normal judgment and  insight, normal mood/affect and non-anxious  No CVA tenderness    Lab Review   Urinalysis demonstrates positive for leukocytes (+), red blood cells (250 katelin/mL), protein (++), glucose   PVR: 28 mL  Lab Results   Component Value Date    WBC 4.73 05/14/2020    HGB 9.9 (L) 05/14/2020    HCT 32.1 (L) 05/14/2020    MCV 95 05/14/2020     (L) 05/14/2020     Lab Results   Component Value Date    CREATININE 1.2 05/14/2020    BUN 20 05/14/2020     Lab Results   Component Value Date    PSA 0.67 12/14/2015     Imaging   None    Assessment:   Bladder spasms  Urinary frequency  Dysuria  Malignant neoplasm of urinary bladder    Plan:   Ruben was seen today for follow-up.    Diagnoses and all orders for this visit:    Bladder spasms  -     POCT URINE DIPSTICK WITHOUT MICROSCOPE  -     POCT Bladder Scan  -     Urine culture  -     amoxicillin-clavulanate 875-125mg (AUGMENTIN) 875-125 mg per tablet; Take 1 tablet by mouth 2 (two) times daily. for 7 days  -     methen-m.blue-s.phos-phsal-hyo (URIBEL) 118-10-40.8-36 mg Cap; Take 1 capsule by mouth 3 (three) times daily as needed.    Urinary frequency  -     Urine culture    Dysuria    Malignant neoplasm of urinary bladder, unspecified site      Plan:  --Urine culture today  --Start Augmentin, will adjust based on culture results  --Ditropan TID PRN, instructed to d/c if difficult to void  --Uribel TID   --Follow up as scheduled with Dr. Sanchez

## 2020-07-15 LAB
BACTERIA UR CULT: NO GROWTH
BILIRUB SERPL-MCNC: ABNORMAL MG/DL
BLOOD URINE, POC: 250
CLARITY, POC UA: ABNORMAL
COLOR, POC UA: ABNORMAL
GLUCOSE UR QL STRIP: 100
KETONES UR QL STRIP: ABNORMAL
LEUKOCYTE ESTERASE URINE, POC: ABNORMAL
NITRITE, POC UA: ABNORMAL
PH, POC UA: 5
POC RESIDUAL URINE VOLUME: 28 ML (ref 0–100)
PROTEIN, POC: ABNORMAL
SPECIFIC GRAVITY, POC UA: 1.02
UROBILINOGEN, POC UA: 4

## 2020-07-17 ENCOUNTER — OFFICE VISIT (OUTPATIENT)
Dept: UROLOGY | Facility: CLINIC | Age: 75
End: 2020-07-17
Payer: MEDICARE

## 2020-07-17 ENCOUNTER — TELEPHONE (OUTPATIENT)
Dept: UROLOGY | Facility: CLINIC | Age: 75
End: 2020-07-17

## 2020-07-17 VITALS
SYSTOLIC BLOOD PRESSURE: 129 MMHG | DIASTOLIC BLOOD PRESSURE: 76 MMHG | HEART RATE: 74 BPM | WEIGHT: 195 LBS | HEIGHT: 70 IN | BODY MASS INDEX: 27.92 KG/M2

## 2020-07-17 DIAGNOSIS — C68.9 UROTHELIAL CARCINOMA: Primary | ICD-10-CM

## 2020-07-17 PROCEDURE — 1125F AMNT PAIN NOTED PAIN PRSNT: CPT | Mod: S$GLB,,, | Performed by: UROLOGY

## 2020-07-17 PROCEDURE — 99499 RISK ADDL DX/OHS AUDIT: ICD-10-PCS | Mod: S$GLB,,, | Performed by: UROLOGY

## 2020-07-17 PROCEDURE — 1159F PR MEDICATION LIST DOCUMENTED IN MEDICAL RECORD: ICD-10-PCS | Mod: S$GLB,,, | Performed by: UROLOGY

## 2020-07-17 PROCEDURE — 99214 PR OFFICE/OUTPT VISIT, EST, LEVL IV, 30-39 MIN: ICD-10-PCS | Mod: S$GLB,,, | Performed by: UROLOGY

## 2020-07-17 PROCEDURE — 3074F PR MOST RECENT SYSTOLIC BLOOD PRESSURE < 130 MM HG: ICD-10-PCS | Mod: CPTII,S$GLB,, | Performed by: UROLOGY

## 2020-07-17 PROCEDURE — 3078F DIAST BP <80 MM HG: CPT | Mod: CPTII,S$GLB,, | Performed by: UROLOGY

## 2020-07-17 PROCEDURE — 3008F BODY MASS INDEX DOCD: CPT | Mod: CPTII,S$GLB,, | Performed by: UROLOGY

## 2020-07-17 PROCEDURE — 3008F PR BODY MASS INDEX (BMI) DOCUMENTED: ICD-10-PCS | Mod: CPTII,S$GLB,, | Performed by: UROLOGY

## 2020-07-17 PROCEDURE — 99499 UNLISTED E&M SERVICE: CPT | Mod: S$GLB,,, | Performed by: UROLOGY

## 2020-07-17 PROCEDURE — 1101F PR PT FALLS ASSESS DOC 0-1 FALLS W/OUT INJ PAST YR: ICD-10-PCS | Mod: CPTII,S$GLB,, | Performed by: UROLOGY

## 2020-07-17 PROCEDURE — 1101F PT FALLS ASSESS-DOCD LE1/YR: CPT | Mod: CPTII,S$GLB,, | Performed by: UROLOGY

## 2020-07-17 PROCEDURE — 99214 OFFICE O/P EST MOD 30 MIN: CPT | Mod: S$GLB,,, | Performed by: UROLOGY

## 2020-07-17 PROCEDURE — 1125F PR PAIN SEVERITY QUANTIFIED, PAIN PRESENT: ICD-10-PCS | Mod: S$GLB,,, | Performed by: UROLOGY

## 2020-07-17 PROCEDURE — 3078F PR MOST RECENT DIASTOLIC BLOOD PRESSURE < 80 MM HG: ICD-10-PCS | Mod: CPTII,S$GLB,, | Performed by: UROLOGY

## 2020-07-17 PROCEDURE — 1159F MED LIST DOCD IN RCRD: CPT | Mod: S$GLB,,, | Performed by: UROLOGY

## 2020-07-17 PROCEDURE — 3074F SYST BP LT 130 MM HG: CPT | Mod: CPTII,S$GLB,, | Performed by: UROLOGY

## 2020-07-17 RX ORDER — PHENAZOPYRIDINE HYDROCHLORIDE 100 MG/1
200 TABLET, FILM COATED ORAL 3 TIMES DAILY PRN
Qty: 30 TABLET | Refills: 3 | Status: SHIPPED | OUTPATIENT
Start: 2020-07-17 | End: 2020-07-27

## 2020-07-17 NOTE — PROGRESS NOTES
"Subjective:      Ruben Macedo Jr. is a 74 y.o. male who returns today regarding his     Follow up to discuss path.    The following portions of the patient's history were reviewed and updated as appropriate: allergies, current medications, past family history, past medical history, past social history, past surgical history and problem list.    Review of Systems  Pertinent items are noted in HPI.  A comprehensive multipoint review of systems was negative except as otherwise stated in the HPI.     Objective:   Vitals: /76 (BP Location: Right arm, Patient Position: Sitting, BP Method: Large (Automatic))   Pulse 74   Ht 5' 10" (1.778 m)   Wt 88.5 kg (195 lb)   BMI 27.98 kg/m²     Physical Exam   General: alert and oriented, no acute distress  Respiratory: Symmetric expansion, non-labored breathing  Cardiovascular: normal to inspection  Abdomen: non distended   Bladder not distended  Skin: normal coloration and turgor, no rashes, no suspicious skin lesions noted  Neuro: no gross deficits  Psych: normal judgment and insight, normal mood/affect and non-anxious    Physical Exam    Lab Review   Urinalysis demonstrates     Urine cs neg 7/14    Lab Results   Component Value Date    WBC 4.73 05/14/2020    HGB 9.9 (L) 05/14/2020    HCT 32.1 (L) 05/14/2020    MCV 95 05/14/2020     (L) 05/14/2020     Lab Results   Component Value Date    CREATININE 1.2 05/14/2020    BUN 20 05/14/2020     Final Pathologic Diagnosis Bladder tumor, biopsy:   -High-grade papillary urothelial carcinoma   -Negative for lamina propria invasion   -Muscularis propria is present and is uninvolved by tumor    Comment: Interpreted by: Frances Ray M.D., Signed on 05/12/2020 at 16:18           Specimen to Pathology, Surgery Urology  Order: [id number]  Status:  Final result   Visible to patient:  Yes (Patient Portal) Next appt:  None  Component 8d ago   Final Pathologic Diagnosis Fragments of urothelial mucosa (submitted as bladder " tumor):   -Papillary urothelial carcinoma, high-grade   -Smooth muscle bundles suggestive of muscularis mucosae are free of   carcinoma.  Larger, deep-lying smooth muscle bundles suggestive of detrusor   are free of malignancy.    Comment: Interpreted by: Loki Murphy M.D., Signed on 07/14/2020 at 14:08               Imaging  ABDOMEN:     - Liver: No focal mass.     - Gallbladder: No calcified gallstones.     - Bile Ducts: No evidence of dilated ducts.     - Spleen: Negative.     - Adrenals: Unremarkable.     - Pancreas: No mass or peripancreatic fat stranding.     - Retroperitoneum:  No significant adenopathy.     - Abdominal wall:  Unremarkable.     :     - Kidneys: No obstructing stones or solid masses are present.There is no obstructive uropathy.  Nonobstructive bilateral renal calculi identified.  Tiny renal cysts.     - Ureters: The ureters taper normally without filling defects.There is no ureteral duplication.     - Bladder: The bladder is smooth walled without filling defects.     PELVIS: No pelvic masses, adenopathy, or free fluid.     BOWEL/MESENTERY: No evidence of bowel obstruction or inflammation.     BONES: No acute fracture or bony destructive process.     Impression:     Nonobstructive renal calculi.  No evidence for solid renal mass lesions.        Electronically signed by: Cole Nagel MD  Date:                                            03/03/2020  Time:                                           10:06  Assessment and Plan:   Urothelial carcinoma  High grade multifocal Ta    BCG*6 then maintenance*3 years at [facility]; begin in 2 weeks    Cysto and cytology in 3 months then Q 3months* 2 years    Dysuria  Stop antibiotics  Cont ditropan and uribel  Add pyridium prn

## 2020-07-17 NOTE — TELEPHONE ENCOUNTER
----- Message from Gelacio Sanchez MD sent at 7/17/2020 12:02 PM CDT -----  Please schedule BCG#6 with one of the NPs at [facility]    Thanks    Sc

## 2020-07-24 DIAGNOSIS — E11.9 TYPE 2 DIABETES MELLITUS WITHOUT COMPLICATION: ICD-10-CM

## 2020-07-29 NOTE — DISCHARGE SUMMARY
[facility]  Urology  Discharge Summary      Patient Name: Ruben Macedo Jr.  MRN: [id number]  Admission Date: 7/9/2020  Hospital Length of Stay: 0 days  Discharge Date and Time:  07/29/2020 9:34 AM  Attending Physician: No att. providers found   Discharging Provider: Danny Davila MD  Primary Care Physician: Cedric Holder MD    HPI:   No notes on file    Procedure(s) (LRB):  TURBT,WITH BLUE LIGHT CYSTOSCOPY AND CYSVIEW (N/A)     Indwelling Lines/Drains at time of discharge:   Lines/Drains/Airways     None                 Hospital Course (synopsis of major diagnoses, care, treatment, and services provided during the course of the hospital stay): Patient was admitted for planned elective procedure.  See op note for details.  Post-op course was uncomplicated with expected progression in activity and diet and with good pain control at time of discharge. His catheter was maintained on CBI for 48 hours with expected clearing of urine. On POD2 the catheter was removed and he voided without difficulty.He remained AF and HDS throughout.     Consults:     Significant Diagnostic Studies:     Pending Diagnostic Studies:     None          Final Active Diagnoses:    Diagnosis Date Noted POA    PRINCIPAL PROBLEM:  Urothelial carcinoma [C68.9] 07/09/2020 Yes    Bladder cancer [C67.9] 06/19/2020 Yes      Problems Resolved During this Admission:         Discharged Condition: good    Disposition: Home or Self Care    Follow Up:  Follow-up Information     Gelacio Sanchez MD In 1 week.    Specialty: Urology  Why: For post-operative follow-up  Contact information:  [address] [place] [address] [id number] [phone]                 Patient Instructions:      Diet Adult Regular     Lifting restrictions   Order Comments: 10 lbs for 1 week     Notify your health care provider if you experience any of the following:  temperature >100.4     Medications:  Reconciled Home Medications:      Medication List       CONTINUE taking these medications    allopurinoL 300 MG tablet  Commonly known as: ZYLOPRIM  TAKE ONE TABLET BY MOUTH EVERY DAY     aspirin 81 MG EC tablet  Commonly known as: ECOTRIN  Take 81 mg by mouth once daily.     betamethasone valerate 0.1% 0.1 % Crea  Commonly known as: VALISONE     clopidogreL 75 mg tablet  Commonly known as: PLAVIX  Take 75 mg by mouth once daily.     ezetimibe 10 mg tablet  Commonly known as: ZETIA  once daily.     finasteride 5 mg tablet  Commonly known as: PROSCAR  TAKE ONE TABLET BY MOUTH EVERY DAY     JANUVIA 100 MG Tab  Generic drug: SITagliptin  TAKE ONE TABLET BY MOUTH DAILY     LORazepam 0.5 MG tablet  Commonly known as: ATIVAN  TAKE 1-2 TABLETS BY MOUTH NIGHTLY AS NEEDED     metoprolol succinate 25 MG 24 hr tablet  Commonly known as: TOPROL-XL  Take 25 mg by mouth once daily.     oxybutynin 5 MG Tab  Commonly known as: DITROPAN  Take 1 tablet (5 mg total) by mouth 3 (three) times daily as needed (bladder spasms).     pioglitazone 30 MG tablet  Commonly known as: ACTOS  Take 1 tablet (30 mg total) by mouth every evening.     rosuvastatin 40 MG Tab  Commonly known as: CRESTOR  40 mg once daily. Take 1 tablet by mouth daily        STOP taking these medications    valsartan 160 MG tablet  Commonly known as: DIOVAN            Time spent on the discharge of patient: 10 minutes    Danny Davila MD  Urology  [facility]

## 2020-07-31 DIAGNOSIS — E11.9 TYPE 2 DIABETES MELLITUS WITHOUT COMPLICATION, UNSPECIFIED WHETHER LONG TERM INSULIN USE: ICD-10-CM

## 2020-07-31 NOTE — TELEPHONE ENCOUNTER
----- Message from Sasha Jade sent at 7/31/2020  1:34 PM CDT -----  Type: Patient Call Back    Who called: Self     What is the request in detail: calling in regards to medication JANUVIA 100 mg Tab cost being to high. Requesting a more affordable medication     Can the clinic reply by [facility]? Call back     Would the patient rather a call back or a response via [facility]? Call back     Best call back number: [phone]

## 2020-08-06 ENCOUNTER — TELEPHONE (OUTPATIENT)
Dept: FAMILY MEDICINE | Facility: CLINIC | Age: 75
End: 2020-08-06

## 2020-08-06 ENCOUNTER — PATIENT MESSAGE (OUTPATIENT)
Dept: FAMILY MEDICINE | Facility: CLINIC | Age: 75
End: 2020-08-06

## 2020-08-06 NOTE — TELEPHONE ENCOUNTER
"----- Message from Scarlet Naranjo sent at 8/6/2020  9:58 AM CDT -----  Type: Patient Call Back    Who called: Spouse/ Sofia Macedo    What is the request in detail: asking for orders for CBC b/c starting chemo treatment next week. She states his stools have been dark.    Can the clinic reply by [facility]? No     Would the patient rather a call back or a response via [facility]? Call back     Best call back number: [phone] "[facility] ask for Sofia Macedo"    Additional Information:                "

## 2020-08-12 ENCOUNTER — OFFICE VISIT (OUTPATIENT)
Dept: UROLOGY | Facility: CLINIC | Age: 75
End: 2020-08-12
Payer: MEDICARE

## 2020-08-12 ENCOUNTER — PATIENT OUTREACH (OUTPATIENT)
Dept: ADMINISTRATIVE | Facility: OTHER | Age: 75
End: 2020-08-12

## 2020-08-12 VITALS — HEART RATE: 72 BPM | SYSTOLIC BLOOD PRESSURE: 125 MMHG | DIASTOLIC BLOOD PRESSURE: 74 MMHG

## 2020-08-12 DIAGNOSIS — C67.8 MALIGNANT NEOPLASM OF OVERLAPPING SITES OF BLADDER: Primary | ICD-10-CM

## 2020-08-12 DIAGNOSIS — C67.3 MALIGNANT NEOPLASM OF ANTERIOR WALL OF URINARY BLADDER: ICD-10-CM

## 2020-08-12 DIAGNOSIS — C67.2 MALIGNANT NEOPLASM OF LATERAL WALL OF URINARY BLADDER: ICD-10-CM

## 2020-08-12 LAB
BILIRUB SERPL-MCNC: ABNORMAL MG/DL
BLOOD URINE, POC: 250
CLARITY, POC UA: CLEAR
COLOR, POC UA: YELLOW
GLUCOSE UR QL STRIP: 500
KETONES UR QL STRIP: ABNORMAL
LEUKOCYTE ESTERASE URINE, POC: ABNORMAL
NITRITE, POC UA: ABNORMAL
PH, POC UA: 5
PROTEIN, POC: ABNORMAL
SPECIFIC GRAVITY, POC UA: 1.02
UROBILINOGEN, POC UA: ABNORMAL

## 2020-08-12 PROCEDURE — 1159F MED LIST DOCD IN RCRD: CPT | Mod: S$GLB,,, | Performed by: NURSE PRACTITIONER

## 2020-08-12 PROCEDURE — 81002 POCT URINE DIPSTICK WITHOUT MICROSCOPE: ICD-10-PCS | Mod: S$GLB,,, | Performed by: NURSE PRACTITIONER

## 2020-08-12 PROCEDURE — 99214 PR OFFICE/OUTPT VISIT, EST, LEVL IV, 30-39 MIN: ICD-10-PCS | Mod: 25,S$GLB,, | Performed by: NURSE PRACTITIONER

## 2020-08-12 PROCEDURE — 99999 PR PBB SHADOW E&M-EST. PATIENT-LVL IV: CPT | Mod: PBBFAC,,, | Performed by: NURSE PRACTITIONER

## 2020-08-12 PROCEDURE — 3078F PR MOST RECENT DIASTOLIC BLOOD PRESSURE < 80 MM HG: ICD-10-PCS | Mod: CPTII,S$GLB,, | Performed by: NURSE PRACTITIONER

## 2020-08-12 PROCEDURE — 99214 OFFICE O/P EST MOD 30 MIN: CPT | Mod: 25,S$GLB,, | Performed by: NURSE PRACTITIONER

## 2020-08-12 PROCEDURE — 1101F PT FALLS ASSESS-DOCD LE1/YR: CPT | Mod: CPTII,S$GLB,, | Performed by: NURSE PRACTITIONER

## 2020-08-12 PROCEDURE — 3074F SYST BP LT 130 MM HG: CPT | Mod: CPTII,S$GLB,, | Performed by: NURSE PRACTITIONER

## 2020-08-12 PROCEDURE — 51720 TREATMENT OF BLADDER LESION: CPT | Mod: S$GLB,,, | Performed by: NURSE PRACTITIONER

## 2020-08-12 PROCEDURE — 1159F PR MEDICATION LIST DOCUMENTED IN MEDICAL RECORD: ICD-10-PCS | Mod: S$GLB,,, | Performed by: NURSE PRACTITIONER

## 2020-08-12 PROCEDURE — 81002 URINALYSIS NONAUTO W/O SCOPE: CPT | Mod: S$GLB,,, | Performed by: NURSE PRACTITIONER

## 2020-08-12 PROCEDURE — 99999 PR PBB SHADOW E&M-EST. PATIENT-LVL IV: ICD-10-PCS | Mod: PBBFAC,,, | Performed by: NURSE PRACTITIONER

## 2020-08-12 PROCEDURE — 51720 PR INSTILL, ANTICANCER AGENT, BLADDER: ICD-10-PCS | Mod: S$GLB,,, | Performed by: NURSE PRACTITIONER

## 2020-08-12 PROCEDURE — 3074F PR MOST RECENT SYSTOLIC BLOOD PRESSURE < 130 MM HG: ICD-10-PCS | Mod: CPTII,S$GLB,, | Performed by: NURSE PRACTITIONER

## 2020-08-12 PROCEDURE — 1101F PR PT FALLS ASSESS DOC 0-1 FALLS W/OUT INJ PAST YR: ICD-10-PCS | Mod: CPTII,S$GLB,, | Performed by: NURSE PRACTITIONER

## 2020-08-12 PROCEDURE — 3078F DIAST BP <80 MM HG: CPT | Mod: CPTII,S$GLB,, | Performed by: NURSE PRACTITIONER

## 2020-08-12 NOTE — PROGRESS NOTES
Subjective:       Patient ID: Ruben Macedo Jr. is a 74 y.o. male.    Chief Complaint: Bladder Cancer (BCG 1 of 5)    Ruben Macedo Jr. is a 74 y.o. male with Bladder Cancer.  05/11/2020 initial TURBT with Dr. Gelacio Sanchez  -High-grade papillary urothelial carcinoma     S/p 2nd TURBT 07/09/2020 with Dr. Gelacio Sanchez:  -Papillary urothelial carcinoma, high-grade      He is here today to begin induction BCG 1 of 5.    He denies any urinary complaints.  No gross hematuria.            Past Medical History:  No date: Anticoagulant long-term use      Comment:  plavix  No date: Anxiety  2/2/2015: BPH (benign prostatic hypertrophy)  No date: Cataract  2005: Coronary artery disease      Comment:  stent x 1      No date: Diabetes mellitus, type 2  4/11/2017: History of colonic polyps      Comment:  2/13 --3 yrs  2005: History of MI (myocardial infarction)      Comment:  Followed by cardiology, Dr. Acevedo (at [facility])  November 2005: History of stroke  No date: Hyperlipidemia  No date: Hypertension  2/2/2015: Incomplete bladder emptying  2006: Myocardial infarction  No date: Overweight(278.02)  No date: Personal history of kidney stones  2/10/2015: Renal cyst  2005: Stroke      Comment:  loss of peripheral vision    Past Surgical History:  No date: APPENDECTOMY  No date: BACK SURGERY  5/11/2020: BLADDER FULGURATION      Comment:  Procedure: FULGURATION, BLADDER;  Surgeon: Gelacio Sanchez MD;  Location: [facility];  Service: Urology;;  No date: CATARACT EXTRACTION      Comment:  od  2000: CORONARY ANGIOPLASTY WITH STENT PLACEMENT  5/11/2020: CYSTOSCOPY      Comment:  Procedure: CYSTOSCOPY;  Surgeon: Gelacio Sanchez MD;                 Location: [facility];  Service: Urology;;  No date: CYSTOSCOPY W/ URETERAL STENT PLACEMENT      Comment:  stents placed x2  No date: EYE SURGERY      Comment:  cataract right eye  5/11/2020: REMOVAL OF BLOOD CLOT      Comment:  Procedure: REMOVAL,  BLOOD CLOT;  Surgeon: Gelacio Sanchez MD;  Location: [facility];  Service: Urology;;  No date: SPINE SURGERY  No date: TONSILLECTOMY  7/9/2020: TURBT, WITH BLUE LIGHT CYSTOSCOPY AND CYSVIEW; N/A      Comment:  Procedure: TURBT,WITH BLUE LIGHT CYSTOSCOPY AND CYSVIEW;               Surgeon: Gelacio Sanchez MD;  Location: [facility];                 Service: Urology;  Laterality: N/A;    Review of patient's family history indicates:  Problem: Hyperlipidemia      Relation: Father          Age of Onset: (Not Specified)  Problem: Cancer      Relation: Father          Age of Onset: (Not Specified)  Problem: Cataracts      Relation: Father          Age of Onset: (Not Specified)  Problem: Cataracts      Relation: Mother          Age of Onset: (Not Specified)  Problem: Hyperlipidemia      Relation: Sister          Age of Onset: (Not Specified)  Problem: Alzheimer's disease      Relation: Unknown          Age of Onset: (Not Specified)  Problem: Diabetes      Relation: Neg Hx          Age of Onset: (Not Specified)  Problem: Amblyopia      Relation: Neg Hx          Age of Onset: (Not Specified)  Problem: Blindness      Relation: Neg Hx          Age of Onset: (Not Specified)  Problem: Glaucoma      Relation: Neg Hx          Age of Onset: (Not Specified)  Problem: Hypertension      Relation: Neg Hx          Age of Onset: (Not Specified)  Problem: Macular degeneration      Relation: Neg Hx          Age of Onset: (Not Specified)  Problem: Retinal detachment      Relation: Neg Hx          Age of Onset: (Not Specified)  Problem: Strabismus      Relation: Neg Hx          Age of Onset: (Not Specified)  Problem: Stroke      Relation: Neg Hx          Age of Onset: (Not Specified)  Problem: Thyroid disease      Relation: Neg Hx          Age of Onset: (Not Specified)      Social History    Socioeconomic History      Marital status:       Spouse name: Not on file      Number of children: 2      Years of education: Not on  file      Highest education level: Not on file    Occupational History      Occupation: Retired - chemical industry    Social Needs      Financial resource strain: Not hard at all      Food insecurity        Worry: Never true        Inability: Never true      Transportation needs        Medical: No        Non-medical: No    Tobacco Use      Smoking status: Never Smoker      Smokeless tobacco: Never Used    Substance and Sexual Activity      Alcohol use: Not Currently        Frequency: 2-4 times a month        Drinks per session: 1 or 2        Binge frequency: Never        Comment: occasional      Drug use: No      Sexual activity: Yes        Partners: Female    Lifestyle      Physical activity        Days per week: 3 days        Minutes per session: 90 min      Stress: Not at all    Relationships      Social connections        Talks on phone: Once a week        Gets together: Once a week        Attends Protestant service: Not on file        Active member of club or organization: Yes        Attends meetings of clubs or organizations: More than 4 times per year        Relationship status:     Other Topics      Concerns:        Not on file    Social History Narrative      Not on file      Allergies:  Morphine, Metformin, and Tramadol    Medications:  Current Outpatient Medications:   allopurinoL (ZYLOPRIM) 300 MG tablet, TAKE ONE TABLET BY MOUTH EVERY DAY, Disp: 30 tablet, Rfl: 11  aspirin (ECOTRIN) 81 MG EC tablet, Take 81 mg by mouth once daily., Disp: , Rfl:   betamethasone valerate 0.1% (VALISONE) 0.1 % Crea, , Disp: , Rfl:   clopidogreL (PLAVIX) 75 mg tablet, Take 75 mg by mouth once daily., Disp: , Rfl:   ezetimibe (ZETIA) 10 mg tablet, once daily. , Disp: , Rfl:   finasteride (PROSCAR) 5 mg tablet, TAKE ONE TABLET BY MOUTH EVERY DAY, Disp: 90 tablet, Rfl: 3  LORazepam (ATIVAN) 0.5 MG tablet, TAKE 1-2 TABLETS BY MOUTH NIGHTLY AS NEEDED, Disp: 20 tablet, Rfl: 4  [facility]hen[facility]  (URIBEL) 118-10-40.8-36 mg Cap, Take 1 capsule by mouth 3 (three) times daily as needed., Disp: 20 capsule, Rfl: 11  metoprolol succinate (TOPROL-XL) 25 MG 24 hr tablet, Take 25 mg by mouth once daily.  , Disp: , Rfl:   oxybutynin (DITROPAN) 5 MG Tab, Take 1 tablet (5 mg total) by mouth 3 (three) times daily as needed (bladder spasms)., Disp: 30 tablet, Rfl: 3  pioglitazone (ACTOS) 30 MG tablet, Take 1 tablet (30 mg total) by mouth every evening., Disp: 90 tablet, Rfl: 0  rosuvastatin (CRESTOR) 40 MG Tab, 40 mg once daily. Take 1 tablet by mouth daily, Disp: , Rfl:   SITagliptin (JANUVIA) 100 MG Tab, Take 1 tablet (100 mg total) by mouth once daily., Disp: 30 tablet, Rfl: 3  valsartan (DIOVAN) 160 MG tablet, TAKE 2 TABLETS BY MOUTH EVERY DAY, Disp: 60 tablet, Rfl: 2  Current Facility-Administered Medications:   BCG live (ZACH) 50 mg in sodium chloride 0.9% 50 mL bladder instillation, 50 mg, Bladder Instillation, Q7 Days, Alea Sanchez NP, 50 mg at 08/12/20 0932                  Review of Systems   Constitutional: Negative for activity change, appetite change, chills and fever.   HENT: Negative for facial swelling and trouble swallowing.    Eyes: Negative for visual disturbance.   Respiratory: Negative for chest tightness and shortness of breath.    Cardiovascular: Negative for chest pain and palpitations.   Gastrointestinal: Negative.  Negative for abdominal pain, constipation, diarrhea, nausea and vomiting.   Genitourinary: Negative for difficulty urinating, dysuria, flank pain, hematuria, penile pain, penile swelling, scrotal swelling and testicular pain.        No urinary complaints.  No visible blood in the urine.       Musculoskeletal: Negative for back pain, gait problem, myalgias and neck stiffness.   Skin: Negative for rash.   Neurological: Negative for dizziness and speech difficulty.   Hematological: Does not bruise/bleed easily.   Psychiatric/Behavioral: Negative for behavioral problems.        Objective:      Physical Exam   Nursing note and vitals reviewed.  Constitutional: He is oriented to person, place, and time. He appears well-developed. He is cooperative.  Non-toxic appearance.   Urine dipped clear of infection and visible blood.     HENT:   Head: Normocephalic and atraumatic.   Right Ear: External ear normal.   Left Ear: External ear normal.   Nose: Nose normal.   Eyes: Conjunctivae and lids are normal. No scleral icterus.   Neck: Trachea normal, normal range of motion and full passive range of motion without pain. Neck supple. No JVD present. No tracheal deviation present.   Cardiovascular: Normal rate, S1 normal and S2 normal.    Pulmonary/Chest: Effort normal. No respiratory distress. He exhibits no tenderness.   Abdominal: Soft. Normal appearance. There is no abdominal tenderness.   Genitourinary:    Testes and penis normal.   Circumcised.   Musculoskeletal: Normal range of motion.   Neurological: He is alert and oriented to person, place, and time.   Skin: Skin is warm and dry.     Psychiatric: His behavior is normal. Judgment and thought content normal.       Assessment:       1. Malignant neoplasm of overlapping sites of bladder    2. Malignant neoplasm of lateral wall of urinary bladder    3. Malignant neoplasm of anterior wall of urinary bladder        BCG 1 of 5    Plan:         I spent 30 minutes with the patient of which more than half was spent in direct consultation with the patient in regards to our treatment and plan.    Education and recommendations of today's plan of care including home remedies.  We discussed his BCG therapy and expectations  I reviewed with him post installation instructions and he was given educational materials  (especially hold for 2 hours; about sitting to urinate then applying 2 cups bleach to water then flush after 15 minutes x 6 hours after initially urination).  Diet modifications; no coffee/tea/caffeine soda morning of BCG; increase water intake  after initial urination  Let us know if seeing visible blood when urinating.   BCG 1 of 5 (25mg) was instilled using sterile and BCG precautions.  Tolerated well.  RTC one week for BCG 2 of 5

## 2020-08-12 NOTE — PATIENT INSTRUCTIONS
Understanding Bladder Cancer    The urinary system includes the kidneys, ureters, bladder, and urethra. It makes, stores, and gets rid of liquid waste called urine. The two kidneys filter blood to collect waste and make urine. The ureters are small tubes that carry urine from each kidney to the bladder. The bladder is where urine is stored before it leaves the body. The urethra is the tube urine goes through to leave the body.   Bladder cancer means that certain cells in the bladder have changed in ways that aren't normal.  When bladder cancer forms  Cancer is a disease that causes cells to change and multiply out of control. The multiplying cells may form a lump of tissue (tumor). With time, the cancer cells destroy healthy tissue. They may spread to other parts of the body. Why some cells become cancerous is not always clear. But bladder cancer is strongly linked to cigarette smoking. The longer a person smokes and the more a person smokes, the greater that persons chances of developing bladder cancer.  Types of cancer that may form  Bladder cancer may grow in different ways:  · Papillary tumors stick out from the bladder lining on a stalk. They tend to grow into the bladder cavity, away from the bladder wall, instead of deeper into the layers of the bladder wall.  · Flat tumors do not stick out from the bladder lining. These tumors are much more likely than papillary tumors to grow deeper into the layers of the bladder wall.  · Carcinoma in situ (CIS) is a cancerous patch of cells that is only in the inner layer of the bladder lining and has not spread to deeper tissue. The patch may look almost normal or may look inflamed.    Each type of tumor can be present in one or more areas of the bladder, and more than one type can be present at the same time.  Date Last Reviewed: 2/17/2016  © 5522-3870 [employer]. [address] [place] [address], [place] [address]. All rights reserved. This information is not  intended as a substitute for professional medical care. Always follow your healthcare professional's instructions.        Treating Bladder Cancer: Intravesical Therapy    Some types of bladder tumors are hard to remove completely with surgery. These tumors tend to be high grade. This means they are more likely to grow and spread quickly. They may happen in more than one area and they may be flat against the bladder wall. They may have come back after treatment. In these cases, special medicines that kill cancer cells may be put right inside the bladder. This is called intravesical therapy. It may be a choice if you have a hard-to-remove tumor. Or it may be done after surgery to help keep the cancer from coming back.  Medicine inside your bladder  Intravesical therapy is often done in a healthcare providers office or outpatient clinic. A flexible tube (catheter) is passed through the urethra and into the bladder. The catheter is used to fill the bladder with a liquid medicine. This may be a liquid chemotherapy medicine. It kills cancer cells. Or it may be BCG (Bacillus of Calmette and Birdie). This is a type of bacteria that helps boost your bodys immune system so that it kills the cancer cells.  During treatment  You will need to hold the medicine in your bladder for 2 hours. In some cases, the catheter may be left in and the medicine is removed through it when treatment is done. In other cases, the catheter is taken out after the medicine is put in. Then you will urinate after the 2 hours are over. If BCG is used, you may need to pour bleach in the toilet after you urinate. This kills bacteria that may be left over. Intravesical therapy is given weekly for 6 to 8 weeks. During this time, you may be given antibiotics. This is to help prevent infection. It will depend on the medicine that is used for you.   After treatment  After your initial BCG treatment, you may need to have follow-up treatments for up to a year  or more. This is called maintenance BCG. These help keep the cancer from coming back. When all the treatments are done, you may have tests done every few months to help check for cancer cells.  Risks and possible complications  Be aware of the following:  · Bladder infection  · Blood in the urine  · Bladder irritation (burning, need to urinate often, pain on urination)  · Changes in your blood cell counts (with certain chemotherapy medicines)  · Scarring of the bladder (rare)  · General infection (with BCG) (very rare)   Call your healthcare provider right away if you develop a fever of 100.4°F (38°C) or higher. Be sure you know what other problems you should watch for, and know how to get help any time, including after office hours, on weekends, and on holidays.    Date Last Reviewed: 2/27/2016 © 2000-2017 [employer]. [address] [place] [address]. All rights reserved. This information is not intended as a substitute for professional medical care. Always follow your healthcare professional's instructions.        Bacillus Calmette-Birdie Live, BCG intravesical solution  What is this medicine?  BACILLUS CALMETTE-BIRDIE LIVE, BCG (ba MARK us WHIT met holt RAYN) is a bacteria solution. This medicine stimulates the immune system to salas off cancer cells. It is used to treat bladder cancer.  How should I use this medicine?  This drug is given as a catheter infusion into the bladder. It is administered in a hospital or clinic by a specially trained health care professional. You will be given directions to follow before the treatment. Follow your doctor's directions carefully. Try to hold this medicine in your bladder for 2 hours after treatment.  Talk to your pediatrician regarding the use of this medicine in children. Special care may be needed.  What side effects may I notice from receiving this medicine?  Side effects that you should report to your doctor or health care professional as soon as  possible:  · allergic reactions like skin rash, itching or hives, swelling of the face, lips, or tongue  · signs of infection - fever or chills, cough, sore throat, pain or difficulty passing urine  · signs of decreased red blood cells - unusually weak or tired, fainting spells, lightheadedness  · blood in urine  · breathing problems  · cough  · eye pain, redness  · flu-like symptoms  · joint pain  · bladder-area pain for more than 2 days after treatment  · trouble passing urine or change in the amount of urine  · vomiting  · yellowing of the eyes or skin  Side effects that usually do not require immediate medical attention (report these side effects to your doctor or health care professional if they continue or are bothersome):  · bladder spasm  · burning when passing urine within 2 days of treatment  · feel need to pass urine often or wake up at night to pass urine  · loss of appetite  What may interact with this medicine?  · antibiotics  · medicines to suppress your immune system like chemotherapy agents or corticosteroids  · medicine to treat tuberculosis  What if I miss a dose?  It is important not to miss your dose. Call your doctor or health care professional if you are unable to keep an appointment.  Where should I keep my medicine?  This drug is given in a hospital or clinic and will not be stored at home.  What should I tell my health care provider before I take this medicine?  They need to know if you have any of these conditions:  · aneurysm  · blood in the urine  · bladder biopsy within 2 weeks  · fever or infection  · immune system problems  · leukemia  · lymphoma  · myasthenia gravis  · need organ transplant  · prosthetic device like arterial graft, artificial joint, prosthetic heart valve  · recent or ongoing radiation therapy  · tuberculosis  · an unusual or allergic reaction to Bacillus Calmette-Birdie Live, BCG, latex, other medicines, foods, dyes, or preservatives  · pregnant or trying to get  pregnant  · breast-feeding  What should I watch for while using this medicine?  Visit your doctor for checks on your progress. This drug may make you feel generally unwell. Contact your doctor if your symptoms last more than 2 days or if they get worse. Call your doctor right away if you have a severe or unusual symptom.  Infection can be spread to others through contact with this medicine. To prevent the spread of infection follow your doctor's directions carefully after treatment. For the first 6 hours after each treatment, sit down on the toilet to urinate. After urinating, add 2 cups of bleach to the toilet bowl and let set for 15 minutes before flushing. Wash your hands before and after using the restroom.  Drink water or other fluids as directed after treatment with this medicine.  Do not become pregnant while taking this medicine. Women should inform their doctor if they wish to become pregnant or think they might be pregnant. There is a potential for serious side effects to an unborn child. Talk to your health care professional or pharmacist for more information. Do not breast-feed an infant while taking this medicine.  NOTE:This sheet is a summary. It may not cover all possible information. If you have questions about this medicine, talk to your doctor, pharmacist, or health care provider. Copyright© 2017 [employer]

## 2020-08-12 NOTE — PROGRESS NOTES
LINKS immunization registry updated  Care Everywhere updated  Health Maintenance updated  Chart reviewed for overdue Proactive [facility] health maintenance testing  Chart/Media searched: outside diabetic eye exam and colon cancer screening results  Orders entered:N/A

## 2020-08-14 DIAGNOSIS — E11.9 TYPE 2 DIABETES MELLITUS WITHOUT COMPLICATION, UNSPECIFIED WHETHER LONG TERM INSULIN USE: ICD-10-CM

## 2020-08-19 ENCOUNTER — OFFICE VISIT (OUTPATIENT)
Dept: UROLOGY | Facility: CLINIC | Age: 75
End: 2020-08-19
Payer: MEDICARE

## 2020-08-19 VITALS
HEIGHT: 70 IN | BODY MASS INDEX: 27.92 KG/M2 | WEIGHT: 195 LBS | SYSTOLIC BLOOD PRESSURE: 106 MMHG | DIASTOLIC BLOOD PRESSURE: 69 MMHG | HEART RATE: 69 BPM

## 2020-08-19 DIAGNOSIS — C67.8 MALIGNANT NEOPLASM OF OVERLAPPING SITES OF BLADDER: Primary | ICD-10-CM

## 2020-08-19 DIAGNOSIS — C68.9 UROTHELIAL CARCINOMA: ICD-10-CM

## 2020-08-19 LAB
BILIRUB SERPL-MCNC: ABNORMAL MG/DL
BLOOD URINE, POC: 250
CLARITY, POC UA: ABNORMAL
COLOR, POC UA: YELLOW
GLUCOSE UR QL STRIP: ABNORMAL
KETONES UR QL STRIP: ABNORMAL
LEUKOCYTE ESTERASE URINE, POC: ABNORMAL
NITRITE, POC UA: ABNORMAL
PH, POC UA: 5
PROTEIN, POC: 30
SPECIFIC GRAVITY, POC UA: 1.02
UROBILINOGEN, POC UA: ABNORMAL

## 2020-08-19 PROCEDURE — 99499 UNLISTED E&M SERVICE: CPT | Mod: S$GLB,,, | Performed by: NURSE PRACTITIONER

## 2020-08-19 PROCEDURE — 1126F PR PAIN SEVERITY QUANTIFIED, NO PAIN PRESENT: ICD-10-PCS | Mod: S$GLB,,, | Performed by: NURSE PRACTITIONER

## 2020-08-19 PROCEDURE — 99999 PR PBB SHADOW E&M-EST. PATIENT-LVL IV: ICD-10-PCS | Mod: PBBFAC,,, | Performed by: NURSE PRACTITIONER

## 2020-08-19 PROCEDURE — 1101F PR PT FALLS ASSESS DOC 0-1 FALLS W/OUT INJ PAST YR: ICD-10-PCS | Mod: CPTII,S$GLB,, | Performed by: NURSE PRACTITIONER

## 2020-08-19 PROCEDURE — 99499 NO LOS: ICD-10-PCS | Mod: S$GLB,,, | Performed by: NURSE PRACTITIONER

## 2020-08-19 PROCEDURE — 3074F SYST BP LT 130 MM HG: CPT | Mod: CPTII,S$GLB,, | Performed by: NURSE PRACTITIONER

## 2020-08-19 PROCEDURE — 51720 PR INSTILL, ANTICANCER AGENT, BLADDER: ICD-10-PCS | Mod: S$GLB,,, | Performed by: NURSE PRACTITIONER

## 2020-08-19 PROCEDURE — 3078F PR MOST RECENT DIASTOLIC BLOOD PRESSURE < 80 MM HG: ICD-10-PCS | Mod: CPTII,S$GLB,, | Performed by: NURSE PRACTITIONER

## 2020-08-19 PROCEDURE — 81002 URINALYSIS NONAUTO W/O SCOPE: CPT | Mod: S$GLB,,, | Performed by: NURSE PRACTITIONER

## 2020-08-19 PROCEDURE — 1159F MED LIST DOCD IN RCRD: CPT | Mod: S$GLB,,, | Performed by: NURSE PRACTITIONER

## 2020-08-19 PROCEDURE — 3078F DIAST BP <80 MM HG: CPT | Mod: CPTII,S$GLB,, | Performed by: NURSE PRACTITIONER

## 2020-08-19 PROCEDURE — 99999 PR PBB SHADOW E&M-EST. PATIENT-LVL IV: CPT | Mod: PBBFAC,,, | Performed by: NURSE PRACTITIONER

## 2020-08-19 PROCEDURE — 1126F AMNT PAIN NOTED NONE PRSNT: CPT | Mod: S$GLB,,, | Performed by: NURSE PRACTITIONER

## 2020-08-19 PROCEDURE — 3074F PR MOST RECENT SYSTOLIC BLOOD PRESSURE < 130 MM HG: ICD-10-PCS | Mod: CPTII,S$GLB,, | Performed by: NURSE PRACTITIONER

## 2020-08-19 PROCEDURE — 51720 TREATMENT OF BLADDER LESION: CPT | Mod: S$GLB,,, | Performed by: NURSE PRACTITIONER

## 2020-08-19 PROCEDURE — 1159F PR MEDICATION LIST DOCUMENTED IN MEDICAL RECORD: ICD-10-PCS | Mod: S$GLB,,, | Performed by: NURSE PRACTITIONER

## 2020-08-19 PROCEDURE — 3008F BODY MASS INDEX DOCD: CPT | Mod: CPTII,S$GLB,, | Performed by: NURSE PRACTITIONER

## 2020-08-19 PROCEDURE — 1101F PT FALLS ASSESS-DOCD LE1/YR: CPT | Mod: CPTII,S$GLB,, | Performed by: NURSE PRACTITIONER

## 2020-08-19 PROCEDURE — 81002 POCT URINE DIPSTICK WITHOUT MICROSCOPE: ICD-10-PCS | Mod: S$GLB,,, | Performed by: NURSE PRACTITIONER

## 2020-08-19 PROCEDURE — 3008F PR BODY MASS INDEX (BMI) DOCUMENTED: ICD-10-PCS | Mod: CPTII,S$GLB,, | Performed by: NURSE PRACTITIONER

## 2020-08-19 NOTE — PROGRESS NOTES
Subjective:       Patient ID: Ruben Macedo Jr. is a 74 y.o. male.    Chief Complaint: Bladder Cancer (BCG 2 of 5)    Ruben Macedo Jr. is a 74 y.o. male with Bladder Cancer.  05/11/2020 initial TURBT with Dr. Gelacio Sanchez  -High-grade papillary urothelial carcinoma     S/p 2nd TURBT 07/09/2020 with Dr. Gelacio Sanchez:  -Papillary urothelial carcinoma, high-grade    08/12/2020 started induction BCG 1 of 5.    He is here today for dose 2 of 5  Tolerated initial dose.   He denies any urinary complaints.  No gross hematuria.              Past Medical History:  No date: Anticoagulant long-term use      Comment:  plavix  No date: Anxiety  2/2/2015: BPH (benign prostatic hypertrophy)  No date: Cataract  2005: Coronary artery disease      Comment:  stent x 1      No date: Diabetes mellitus, type 2  4/11/2017: History of colonic polyps      Comment:  2/13 --3 yrs  2005: History of MI (myocardial infarction)      Comment:  Followed by cardiology, Dr. Acevedo (at [facility])  November 2005: History of stroke  No date: Hyperlipidemia  No date: Hypertension  2/2/2015: Incomplete bladder emptying  2006: Myocardial infarction  No date: Overweight(278.02)  No date: Personal history of kidney stones  2/10/2015: Renal cyst  2005: Stroke      Comment:  loss of peripheral vision    Past Surgical History:  No date: APPENDECTOMY  No date: BACK SURGERY  5/11/2020: BLADDER FULGURATION      Comment:  Procedure: FULGURATION, BLADDER;  Surgeon: Gelacio Sanchez MD;  Location: [facility];  Service: Urology;;  No date: CATARACT EXTRACTION      Comment:  od  2000: CORONARY ANGIOPLASTY WITH STENT PLACEMENT  5/11/2020: CYSTOSCOPY      Comment:  Procedure: CYSTOSCOPY;  Surgeon: Gelacio Sanchez MD;                 Location: [facility];  Service: Urology;;  No date: CYSTOSCOPY W/ URETERAL STENT PLACEMENT      Comment:  stents placed x2  No date: EYE SURGERY      Comment:  cataract right eye  5/11/2020:  REMOVAL OF BLOOD CLOT      Comment:  Procedure: REMOVAL, BLOOD CLOT;  Surgeon: Gelacio Sanchez MD;  Location: [facility];  Service: Urology;;  No date: SPINE SURGERY  No date: TONSILLECTOMY  7/9/2020: TURBT, WITH BLUE LIGHT CYSTOSCOPY AND CYSVIEW; N/A      Comment:  Procedure: TURBT,WITH BLUE LIGHT CYSTOSCOPY AND CYSVIEW;               Surgeon: Gelacio Sanchez MD;  Location: [facility];                 Service: Urology;  Laterality: N/A;    Review of patient's family history indicates:  Problem: Hyperlipidemia      Relation: Father          Age of Onset: (Not Specified)  Problem: Cancer      Relation: Father          Age of Onset: (Not Specified)  Problem: Cataracts      Relation: Father          Age of Onset: (Not Specified)  Problem: Cataracts      Relation: Mother          Age of Onset: (Not Specified)  Problem: Hyperlipidemia      Relation: Sister          Age of Onset: (Not Specified)  Problem: Alzheimer's disease      Relation: Unknown          Age of Onset: (Not Specified)  Problem: Diabetes      Relation: Neg Hx          Age of Onset: (Not Specified)  Problem: Amblyopia      Relation: Neg Hx          Age of Onset: (Not Specified)  Problem: Blindness      Relation: Neg Hx          Age of Onset: (Not Specified)  Problem: Glaucoma      Relation: Neg Hx          Age of Onset: (Not Specified)  Problem: Hypertension      Relation: Neg Hx          Age of Onset: (Not Specified)  Problem: Macular degeneration      Relation: Neg Hx          Age of Onset: (Not Specified)  Problem: Retinal detachment      Relation: Neg Hx          Age of Onset: (Not Specified)  Problem: Strabismus      Relation: Neg Hx          Age of Onset: (Not Specified)  Problem: Stroke      Relation: Neg Hx          Age of Onset: (Not Specified)  Problem: Thyroid disease      Relation: Neg Hx          Age of Onset: (Not Specified)      Social History    Socioeconomic History      Marital status:       Spouse name: Not on file       Number of children: 2      Years of education: Not on file      Highest education level: Not on file    Occupational History      Occupation: Retired - chemical industry    Social Needs      Financial resource strain: Not hard at all      Food insecurity        Worry: Never true        Inability: Never true      Transportation needs        Medical: No        Non-medical: No    Tobacco Use      Smoking status: Never Smoker      Smokeless tobacco: Never Used    Substance and Sexual Activity      Alcohol use: Not Currently        Frequency: 2-4 times a month        Drinks per session: 1 or 2        Binge frequency: Never        Comment: occasional      Drug use: No      Sexual activity: Yes        Partners: Female    Lifestyle      Physical activity        Days per week: 3 days        Minutes per session: 90 min      Stress: Not at all    Relationships      Social connections        Talks on phone: Once a week        Gets together: Once a week        Attends [employer] service: Not on file        Active member of club or organization: Yes        Attends meetings of clubs or organizations: More than 4 times per year        Relationship status:     Other Topics      Concerns:        Not on file    Social History Narrative      Not on file      Allergies:  Morphine, Metformin, and Tramadol    Medications:  Current Outpatient Medications:   allopurinoL (ZYLOPRIM) 300 MG tablet, TAKE ONE TABLET BY MOUTH EVERY DAY, Disp: 30 tablet, Rfl: 11  aspirin (ECOTRIN) 81 MG EC tablet, Take 81 mg by mouth once daily., Disp: , Rfl:   betamethasone valerate 0.1% (VALISONE) 0.1 % Crea, , Disp: , Rfl:   clopidogreL (PLAVIX) 75 mg tablet, Take 75 mg by mouth once daily., Disp: , Rfl:   ezetimibe (ZETIA) 10 mg tablet, once daily. , Disp: , Rfl:   finasteride (PROSCAR) 5 mg tablet, TAKE ONE TABLET BY MOUTH EVERY DAY, Disp: 90 tablet, Rfl: 3  LORazepam (ATIVAN) 0.5 MG tablet, TAKE 1-2 TABLETS BY MOUTH NIGHTLY AS NEEDED, Disp: 20  tablet, Rfl: 4  methen-m.blue-s.phos-phsal-hyo (URIBEL) 118-10-40.8-36 mg Cap, Take 1 capsule by mouth 3 (three) times daily as needed., Disp: 20 capsule, Rfl: 11  metoprolol succinate (TOPROL-XL) 25 MG 24 hr tablet, Take 25 mg by mouth once daily.  , Disp: , Rfl:   oxybutynin (DITROPAN) 5 MG Tab, Take 1 tablet (5 mg total) by mouth 3 (three) times daily as needed (bladder spasms)., Disp: 30 tablet, Rfl: 3  pioglitazone (ACTOS) 30 MG tablet, Take 1 tablet (30 mg total) by mouth every evening., Disp: 90 tablet, Rfl: 0  rosuvastatin (CRESTOR) 40 MG Tab, 40 mg once daily. Take 1 tablet by mouth daily, Disp: , Rfl:   SITagliptin (JANUVIA) 100 MG Tab, Take 1 tablet (100 mg total) by mouth once daily., Disp: 30 tablet, Rfl: 3  valsartan (DIOVAN) 160 MG tablet, TAKE 2 TABLETS BY MOUTH EVERY DAY, Disp: 60 tablet, Rfl: 2  Current Facility-Administered Medications:   BCG live (ZACH) 50 mg in sodium chloride 0.9% 50 mL bladder instillation, 50 mg, Bladder Instillation, Q7 Days, Alea Sanchez NP, 50 mg at 08/12/20 0932                  Review of Systems   Constitutional: Negative for activity change, appetite change, chills and fever.   HENT: Negative for facial swelling and trouble swallowing.    Eyes: Negative for visual disturbance.   Respiratory: Negative for chest tightness and shortness of breath.    Cardiovascular: Negative for chest pain and palpitations.   Gastrointestinal: Negative.  Negative for abdominal pain, constipation, diarrhea, nausea and vomiting.   Genitourinary: Negative for difficulty urinating, dysuria, flank pain, hematuria, penile pain, penile swelling, scrotal swelling and testicular pain.   Musculoskeletal: Negative for back pain, gait problem, myalgias and neck stiffness.   Skin: Negative for rash.   Neurological: Negative for dizziness and speech difficulty.   Hematological: Does not bruise/bleed easily.   Psychiatric/Behavioral: Negative for behavioral problems.       Objective:       Physical Exam   Nursing note and vitals reviewed.  Constitutional: He is oriented to person, place, and time. He appears well-developed.  Non-toxic appearance.   Urine dipped clear of infection; no gross hematuria.     HENT:   Head: Normocephalic and atraumatic.   Right Ear: External ear normal.   Left Ear: External ear normal.   Nose: Nose normal.   Eyes: Conjunctivae and lids are normal. No scleral icterus.   Neck: Trachea normal, normal range of motion and full passive range of motion without pain. Neck supple. No JVD present. No tracheal deviation present.   Cardiovascular: Normal rate, S1 normal and S2 normal.    Pulmonary/Chest: Effort normal. No respiratory distress. He exhibits no tenderness.   Abdominal: Soft. Normal appearance. There is no abdominal tenderness.   Genitourinary:    Testes and penis normal.     Musculoskeletal: Normal range of motion.   Neurological: He is alert and oriented to person, place, and time.   Skin: Skin is warm and dry.     Psychiatric: His behavior is normal. Judgment and thought content normal.       Assessment:       1. Malignant neoplasm of overlapping sites of bladder    2. Urothelial carcinoma        BCG 2 of 5    Plan:         I spent 25 minutes with the patient of which more than half was spent in direct consultation with the patient in regards to our treatment and plan.    Education and recommendations of today's plan of care including home remedies.  We discussed his BCG therapy and expectations  I reviewed with him post installation instructions and he was given educational materials  (especially hold for 2 hours; about sitting to urinate then applying 2 cups bleach to water then flush after 15 minutes x 6 hours after initially urination).  Diet modifications; no coffee/tea/caffeine soda morning of BCG; increase water intake after initial urination  Let us know if seeing visible blood when urinating.   BCG 2 of 5 (25mg) was instilled using sterile and BCG  precautions.  Tolerated well.  RTC one week for BCG 3 of 5

## 2020-08-19 NOTE — PATIENT INSTRUCTIONS
Understanding Bladder Cancer    The urinary system includes the kidneys, ureters, bladder, and urethra. It makes, stores, and gets rid of liquid waste called urine. The two kidneys filter blood to collect waste and make urine. The ureters are small tubes that carry urine from each kidney to the bladder. The bladder is where urine is stored before it leaves the body. The urethra is the tube urine goes through to leave the body.   Bladder cancer means that certain cells in the bladder have changed in ways that aren't normal.  When bladder cancer forms  Cancer is a disease that causes cells to change and multiply out of control. The multiplying cells may form a lump of tissue (tumor). With time, the cancer cells destroy healthy tissue. They may spread to other parts of the body. Why some cells become cancerous is not always clear. But bladder cancer is strongly linked to cigarette smoking. The longer a person smokes and the more a person smokes, the greater that persons chances of developing bladder cancer.  Types of cancer that may form  Bladder cancer may grow in different ways:  · Papillary tumors stick out from the bladder lining on a stalk. They tend to grow into the bladder cavity, away from the bladder wall, instead of deeper into the layers of the bladder wall.  · Flat tumors do not stick out from the bladder lining. These tumors are much more likely than papillary tumors to grow deeper into the layers of the bladder wall.  · Carcinoma in situ (CIS) is a cancerous patch of cells that is only in the inner layer of the bladder lining and has not spread to deeper tissue. The patch may look almost normal or may look inflamed.    Each type of tumor can be present in one or more areas of the bladder, and more than one type can be present at the same time.  Date Last Reviewed: 2/17/2016  © 8514-5939 [employer]. [address] [place] [address], [place] [address]. All rights reserved. This information is not  intended as a substitute for professional medical care. Always follow your healthcare professional's instructions.        Treating Bladder Cancer: Intravesical Therapy    Some types of bladder tumors are hard to remove completely with surgery. These tumors tend to be high grade. This means they are more likely to grow and spread quickly. They may happen in more than one area and they may be flat against the bladder wall. They may have come back after treatment. In these cases, special medicines that kill cancer cells may be put right inside the bladder. This is called intravesical therapy. It may be a choice if you have a hard-to-remove tumor. Or it may be done after surgery to help keep the cancer from coming back.  Medicine inside your bladder  Intravesical therapy is often done in a healthcare providers office or outpatient clinic. A flexible tube (catheter) is passed through the urethra and into the bladder. The catheter is used to fill the bladder with a liquid medicine. This may be a liquid chemotherapy medicine. It kills cancer cells. Or it may be BCG (Bacillus of Calmette and Birdie). This is a type of bacteria that helps boost your bodys immune system so that it kills the cancer cells.  During treatment  You will need to hold the medicine in your bladder for 2 hours. In some cases, the catheter may be left in and the medicine is removed through it when treatment is done. In other cases, the catheter is taken out after the medicine is put in. Then you will urinate after the 2 hours are over. If BCG is used, you may need to pour bleach in the toilet after you urinate. This kills bacteria that may be left over. Intravesical therapy is given weekly for 6 to 8 weeks. During this time, you may be given antibiotics. This is to help prevent infection. It will depend on the medicine that is used for you.   After treatment  After your initial BCG treatment, you may need to have follow-up treatments for up to a year  or more. This is called maintenance BCG. These help keep the cancer from coming back. When all the treatments are done, you may have tests done every few months to help check for cancer cells.  Risks and possible complications  Be aware of the following:  · Bladder infection  · Blood in the urine  · Bladder irritation (burning, need to urinate often, pain on urination)  · Changes in your blood cell counts (with certain chemotherapy medicines)  · Scarring of the bladder (rare)  · General infection (with BCG) (very rare)   Call your healthcare provider right away if you develop a fever of 100.4°F (38°C) or higher. Be sure you know what other problems you should watch for, and know how to get help any time, including after office hours, on weekends, and on holidays.    Date Last Reviewed: 2/27/2016 © 2000-2017 [employer]. [address] [place] [address]. All rights reserved. This information is not intended as a substitute for professional medical care. Always follow your healthcare professional's instructions.        Bacillus Calmette-Birdie Live, BCG intravesical solution  What is this medicine?  BACILLUS CALMETTE-BIRDIE LIVE, BCG (ba MARK us WHIT met holt RAYN) is a bacteria solution. This medicine stimulates the immune system to salas off cancer cells. It is used to treat bladder cancer.  How should I use this medicine?  This drug is given as a catheter infusion into the bladder. It is administered in a hospital or clinic by a specially trained health care professional. You will be given directions to follow before the treatment. Follow your doctor's directions carefully. Try to hold this medicine in your bladder for 2 hours after treatment.  Talk to your pediatrician regarding the use of this medicine in children. Special care may be needed.  What side effects may I notice from receiving this medicine?  Side effects that you should report to your doctor or health care professional as soon as  possible:  · allergic reactions like skin rash, itching or hives, swelling of the face, lips, or tongue  · signs of infection - fever or chills, cough, sore throat, pain or difficulty passing urine  · signs of decreased red blood cells - unusually weak or tired, fainting spells, lightheadedness  · blood in urine  · breathing problems  · cough  · eye pain, redness  · flu-like symptoms  · joint pain  · bladder-area pain for more than 2 days after treatment  · trouble passing urine or change in the amount of urine  · vomiting  · yellowing of the eyes or skin  Side effects that usually do not require immediate medical attention (report these side effects to your doctor or health care professional if they continue or are bothersome):  · bladder spasm  · burning when passing urine within 2 days of treatment  · feel need to pass urine often or wake up at night to pass urine  · loss of appetite  What may interact with this medicine?  · antibiotics  · medicines to suppress your immune system like chemotherapy agents or corticosteroids  · medicine to treat tuberculosis  What if I miss a dose?  It is important not to miss your dose. Call your doctor or health care professional if you are unable to keep an appointment.  Where should I keep my medicine?  This drug is given in a hospital or clinic and will not be stored at home.  What should I tell my health care provider before I take this medicine?  They need to know if you have any of these conditions:  · aneurysm  · blood in the urine  · bladder biopsy within 2 weeks  · fever or infection  · immune system problems  · leukemia  · lymphoma  · myasthenia gravis  · need organ transplant  · prosthetic device like arterial graft, artificial joint, prosthetic heart valve  · recent or ongoing radiation therapy  · tuberculosis  · an unusual or allergic reaction to Bacillus Calmette-Birdie Live, BCG, latex, other medicines, foods, dyes, or preservatives  · pregnant or trying to get  pregnant  · breast-feeding  What should I watch for while using this medicine?  Visit your doctor for checks on your progress. This drug may make you feel generally unwell. Contact your doctor if your symptoms last more than 2 days or if they get worse. Call your doctor right away if you have a severe or unusual symptom.  Infection can be spread to others through contact with this medicine. To prevent the spread of infection follow your doctor's directions carefully after treatment. For the first 6 hours after each treatment, sit down on the toilet to urinate. After urinating, add 2 cups of bleach to the toilet bowl and let set for 15 minutes before flushing. Wash your hands before and after using the restroom.  Drink water or other fluids as directed after treatment with this medicine.  Do not become pregnant while taking this medicine. Women should inform their doctor if they wish to become pregnant or think they might be pregnant. There is a potential for serious side effects to an unborn child. Talk to your health care professional or pharmacist for more information. Do not breast-feed an infant while taking this medicine.  NOTE:This sheet is a summary. It may not cover all possible information. If you have questions about this medicine, talk to your doctor, pharmacist, or health care provider. Copyright© 2017 [employer]

## 2020-08-20 ENCOUNTER — PATIENT OUTREACH (OUTPATIENT)
Dept: ADMINISTRATIVE | Facility: HOSPITAL | Age: 75
End: 2020-08-20

## 2020-08-21 ENCOUNTER — OFFICE VISIT (OUTPATIENT)
Dept: FAMILY MEDICINE | Facility: CLINIC | Age: 75
End: 2020-08-21
Payer: MEDICARE

## 2020-08-21 VITALS
SYSTOLIC BLOOD PRESSURE: 110 MMHG | WEIGHT: 196.63 LBS | HEART RATE: 87 BPM | DIASTOLIC BLOOD PRESSURE: 60 MMHG | TEMPERATURE: 97 F | BODY MASS INDEX: 28.15 KG/M2 | HEIGHT: 70 IN | RESPIRATION RATE: 20 BRPM | OXYGEN SATURATION: 96 %

## 2020-08-21 DIAGNOSIS — F39 MOOD DISORDER: ICD-10-CM

## 2020-08-21 DIAGNOSIS — F13.20 BENZODIAZEPINE DEPENDENCE: ICD-10-CM

## 2020-08-21 DIAGNOSIS — C68.9 UROTHELIAL CARCINOMA: ICD-10-CM

## 2020-08-21 DIAGNOSIS — I10 ESSENTIAL HYPERTENSION: ICD-10-CM

## 2020-08-21 DIAGNOSIS — C67.9 MALIGNANT NEOPLASM OF URINARY BLADDER, UNSPECIFIED SITE: ICD-10-CM

## 2020-08-21 DIAGNOSIS — L03.011 PARONYCHIA OF FINGER OF RIGHT HAND: ICD-10-CM

## 2020-08-21 DIAGNOSIS — R60.9 EDEMA, UNSPECIFIED TYPE: Primary | ICD-10-CM

## 2020-08-21 DIAGNOSIS — D69.6 THROMBOCYTOPENIA: ICD-10-CM

## 2020-08-21 DIAGNOSIS — Z11.59 NEED FOR HEPATITIS C SCREENING TEST: ICD-10-CM

## 2020-08-21 DIAGNOSIS — F41.9 ANXIETY: ICD-10-CM

## 2020-08-21 PROCEDURE — 1126F PR PAIN SEVERITY QUANTIFIED, NO PAIN PRESENT: ICD-10-PCS | Mod: S$GLB,,, | Performed by: INTERNAL MEDICINE

## 2020-08-21 PROCEDURE — 99215 OFFICE O/P EST HI 40 MIN: CPT | Mod: S$GLB,,, | Performed by: INTERNAL MEDICINE

## 2020-08-21 PROCEDURE — 1101F PR PT FALLS ASSESS DOC 0-1 FALLS W/OUT INJ PAST YR: ICD-10-PCS | Mod: CPTII,S$GLB,, | Performed by: INTERNAL MEDICINE

## 2020-08-21 PROCEDURE — 1126F AMNT PAIN NOTED NONE PRSNT: CPT | Mod: S$GLB,,, | Performed by: INTERNAL MEDICINE

## 2020-08-21 PROCEDURE — 3008F PR BODY MASS INDEX (BMI) DOCUMENTED: ICD-10-PCS | Mod: CPTII,S$GLB,, | Performed by: INTERNAL MEDICINE

## 2020-08-21 PROCEDURE — 3008F BODY MASS INDEX DOCD: CPT | Mod: CPTII,S$GLB,, | Performed by: INTERNAL MEDICINE

## 2020-08-21 PROCEDURE — 99499 UNLISTED E&M SERVICE: CPT | Mod: S$GLB,,, | Performed by: INTERNAL MEDICINE

## 2020-08-21 PROCEDURE — 1101F PT FALLS ASSESS-DOCD LE1/YR: CPT | Mod: CPTII,S$GLB,, | Performed by: INTERNAL MEDICINE

## 2020-08-21 PROCEDURE — 3051F HG A1C>EQUAL 7.0%<8.0%: CPT | Mod: CPTII,S$GLB,, | Performed by: INTERNAL MEDICINE

## 2020-08-21 PROCEDURE — 3074F PR MOST RECENT SYSTOLIC BLOOD PRESSURE < 130 MM HG: ICD-10-PCS | Mod: CPTII,S$GLB,, | Performed by: INTERNAL MEDICINE

## 2020-08-21 PROCEDURE — 1159F PR MEDICATION LIST DOCUMENTED IN MEDICAL RECORD: ICD-10-PCS | Mod: S$GLB,,, | Performed by: INTERNAL MEDICINE

## 2020-08-21 PROCEDURE — 1159F MED LIST DOCD IN RCRD: CPT | Mod: S$GLB,,, | Performed by: INTERNAL MEDICINE

## 2020-08-21 PROCEDURE — 3078F PR MOST RECENT DIASTOLIC BLOOD PRESSURE < 80 MM HG: ICD-10-PCS | Mod: CPTII,S$GLB,, | Performed by: INTERNAL MEDICINE

## 2020-08-21 PROCEDURE — 3078F DIAST BP <80 MM HG: CPT | Mod: CPTII,S$GLB,, | Performed by: INTERNAL MEDICINE

## 2020-08-21 PROCEDURE — 99499 RISK ADDL DX/OHS AUDIT: ICD-10-PCS | Mod: S$GLB,,, | Performed by: INTERNAL MEDICINE

## 2020-08-21 PROCEDURE — 99215 PR OFFICE/OUTPT VISIT, EST, LEVL V, 40-54 MIN: ICD-10-PCS | Mod: S$GLB,,, | Performed by: INTERNAL MEDICINE

## 2020-08-21 PROCEDURE — 99999 PR PBB SHADOW E&M-EST. PATIENT-LVL III: ICD-10-PCS | Mod: PBBFAC,,, | Performed by: INTERNAL MEDICINE

## 2020-08-21 PROCEDURE — 99999 PR PBB SHADOW E&M-EST. PATIENT-LVL III: CPT | Mod: PBBFAC,,, | Performed by: INTERNAL MEDICINE

## 2020-08-21 PROCEDURE — 3051F PR MOST RECENT HEMOGLOBIN A1C LEVEL 7.0 - < 8.0%: ICD-10-PCS | Mod: CPTII,S$GLB,, | Performed by: INTERNAL MEDICINE

## 2020-08-21 PROCEDURE — 3074F SYST BP LT 130 MM HG: CPT | Mod: CPTII,S$GLB,, | Performed by: INTERNAL MEDICINE

## 2020-08-21 RX ORDER — GLIMEPIRIDE 4 MG/1
4 TABLET ORAL DAILY
Qty: 30 TABLET | Refills: 3 | Status: SHIPPED | OUTPATIENT
Start: 2020-08-21 | End: 2020-12-16 | Stop reason: SDUPTHER

## 2020-08-21 RX ORDER — LORAZEPAM 0.5 MG/1
TABLET ORAL
Qty: 20 TABLET | Refills: 4 | Status: SHIPPED | OUTPATIENT
Start: 2020-08-21 | End: 2021-03-26 | Stop reason: SDUPTHER

## 2020-08-21 RX ORDER — PIOGLITAZONEHYDROCHLORIDE 30 MG/1
30 TABLET ORAL NIGHTLY
Qty: 90 TABLET | Refills: 0 | Status: SHIPPED | OUTPATIENT
Start: 2020-08-21 | End: 2020-08-21

## 2020-08-21 RX ORDER — CLINDAMYCIN HYDROCHLORIDE 150 MG/1
150 CAPSULE ORAL 3 TIMES DAILY
Qty: 30 CAPSULE | Refills: 3 | Status: ON HOLD | OUTPATIENT
Start: 2020-08-21 | End: 2020-11-12

## 2020-08-21 RX ORDER — MUPIROCIN 20 MG/G
OINTMENT TOPICAL 3 TIMES DAILY
Qty: 30 G | Refills: 12 | Status: SHIPPED | OUTPATIENT
Start: 2020-08-21 | End: 2023-01-25

## 2020-08-21 NOTE — PROGRESS NOTES
Chief complaint:  Follow-up on several conditions, swelling    74-year-old white male .  Patient here with his wife who is in the medical field in some fashion.  In the interval he was diagnosed with bladder cancer.  He is receiving BCG treatment.  Lately his left ankle has swollen on occasion but then it goes down with elevation.  No pain.  Obviously we discussed the possibility of increased risk of DVT given the underlying cancer with his symptoms do not represent that.  Should he get any persistent swelling or pain they know to go to the emergency room for a more immediate ultrasound.    We also reviewed his labs.  His A1c went from 7.9 down to 7.3 but is due.  He has also developed some thrombocytopenia and some anemia.  He did have significant bleeding due to the bladder cancer.  He will discuss with urology whether not the BCG treatment would be expected to cause some thrombocytopenia.  He has had fatty liver on prior imaging but spleen on CT scans stated to be normal.  He does take Plavix but reassured that would not affect the platelet count but might affect platelet function.  Many years ago he did have one episode of thrombocytopenia but then normalized in the interval.  Wife was concerned and she did have an unofficial CBC done and by her verbal report sounds similar to what we have recorded in our system.  She also did an unofficial stool for blood which was negative.    Regarding diabetes A1c hopefully improved.  He had itching with metformin.  We discussed potential increased risk association of bladder cancer and Actos so it is time to discontinue.  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  He was on Januvia but expensive so has been off.  We discussed many new medications are very expensive and will probably not be sustainable with his current insurance.  We discussed Amaryl and the potential for hypoglycemia.  We will start with 4 mg  daily, reduce accordingly but obviously if he follow the diet and lost diabetes with improved as well.  Patient counseled at great length regarding the multitude these issues.Total time over 45 minutes with over 50% counseling.    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  It looks like he does have some paronychia.  He applied Neosporin without success and has been there for weeks.  We discussed using some clindamycin for a couple of days but also the more important thing is going to be local care since the cuticle appears to be chronically damaged and he would be at risk for further infection.  The abrasion on the top may well have also initiated the infection.  Rather than use Neosporin all call him in some mupirocin    Patient does feel drained all the time and tired.  Discussed monitoring for symptoms of sleep apnea  ROS:   CONST: weight stable. EYES: no vision change. ENT: no sore throat. CV: no chest pain w/ exertion. RESP: no shortness of breath. GI: no nausea, vomiting, diarrhea. No dysphagia. : no urinary issues. MUSCULOSKELETAL: no new myalgias or arthralgias other than some chronic left neck pain with movement. SKIN: no new changes. NEURO: no focal deficits. PSYCH: no new issues. ENDOCRINE: no polyuria. HEME: no lymph nodes. ALLERGY: no general pruritis.    Past Medical History:   Diagnosis Date    Anxiety     BPH (benign prostatic hypertrophy) 2/2/2015    Cataract     Coronary artery disease     Diabetes mellitus, type 2 - with PROTIENURIA     History of colonic polyps 4/11/2017 2/13 -- polyps 6/16, 5 years     History of MI (myocardial infarction) 2005    Followed by cardiology, Dr. Acevedo (at [facility])     History of stroke November 2005    Hyperlipidemia     Hypertension     Incomplete bladder emptying 2/2/2015    Overweight     Personal history of  kidney stones     Renal cyst 2/10/2015   History of cervical radiculopathy on the right  Cervical arthropathy on x-ray  Anemia- chronic    Past Surgical History:   Procedure Laterality Date    APPENDECTOMY      BACK SURGERY      BLADDER FULGURATION  5/11/2020    Procedure: FULGURATION, BLADDER;  Surgeon: Gelacio Sanchez MD;  [facility];  Service: Urology;;    CATARACT EXTRACTION      od    CORONARY ANGIOPLASTY WITH STENT PLACEMENT  2000    CYSTOSCOPY  5/11/2020    Procedure: CYSTOSCOPY;  Surgeon: Gelacio Sanchez MD;  [facility];  Service: Urology;;    CYSTOSCOPY W/ URETERAL STENT PLACEMENT      stents placed x2    EYE SURGERY      cataract right eye    REMOVAL OF BLOOD CLOT  5/11/2020    Procedure: REMOVAL, BLOOD CLOT;  Surgeon: Gelacio Sanchez MD;  [facility];  Service: Urology;;    SPINE SURGERY      TONSILLECTOMY      TURBT, WITH BLUE LIGHT CYSTOSCOPY AND CYSVIEW N/A 7/9/2020    Procedure: TURBT,WITH BLUE LIGHT CYSTOSCOPY AND CYSVIEW;  Surgeon: Gelacio Sanchez MD;  [facility];  Service: Urology;  Laterality: N/A;     Social History     Socioeconomic History    Marital status:      Spouse name: Not on file    Number of children: 2    Years of education: Not on file    Highest education level: Not on file   Occupational History    Occupation: Retired - chemical industry   Social Needs    Financial resource strain: Not hard at all    Food insecurity     Worry: Never true     Inability: Never true    Transportation needs     Medical: No     Non-medical: No   Tobacco Use    Smoking status: Never Smoker    Smokeless tobacco: Never Used   Substance and Sexual Activity    Alcohol use: Not Currently     Frequency: 2-4 times a month     Drinks per session: 1 or 2     Binge frequency: Never     Comment: occasional    Drug use: No    Sexual activity: Yes     Partners: Female   Lifestyle    Physical activity     Days per week: 3 days     Minutes per session: 90  min    Stress: Not at all   Relationships    Social connections     Talks on phone: Once a week     Gets together: Once a week     Attends Sikhism service: Not on file     Active member of club or organization: Yes     Attends meetings of clubs or organizations: More than 4 times per year     Relationship status:    Other Topics Concern    Not on file   Social History Narrative    Not on file       family history includes Alzheimer's disease in his unknown relative; Cancer in his father; Cataracts in his father and mother; Hyperlipidemia in his father and sister.     Gen: no distress  The left lower extremity is a little bit bigger than the right but soft and really no pitting edema.  He has visible diffuse varicose veins involving the entire left lower extremity and purple veins on either side of the ankle.  These are not new.  He has no reproducible soft-tissue tenderness in the lower extremity or in the thigh suspicious for any DVT.  The right lower extremity has less varicose veins and again also no edema.  Skin no rashes, warm to touch.  The right 3rd finger has an abnormal fingernail from prior trauma and there is redness around the base of the fingernail with there is damage cuticle.  There is an abrasion on the top of the DI P as well.  It is involved in the redness.  No tenderness or fluctuance or heat noted and no pus expressed.              Diagnoses and all orders for this visit:    Edema, unspecified type, likely secondary to the venous insufficiency and varicose veins in the left leg.  Where a stocking if it is recurrent, elevate, obviously consider DVT if persistent or associated with pain    Anxiety  -     LORazepam (ATIVAN) 0.5 MG tablet; TAKE 1-2 TABLETS BY MOUTH NIGHTLY AS NEEDED    Essential hypertension, good control    Mood disorder, good control    Benzodiazepine dependence, Ativan refill    Uncontrolled type 2 diabetes mellitus with microalbuminuria, without long-term current use  "of insulin, intolerant to metformin, need to discontinue Actos given the current bladder cancer, initiate Amaryl and discussed all the issues related to it.  -     Hemoglobin A1C; Future  -     Comprehensive metabolic panel; Future    Urothelial carcinoma    Malignant neoplasm of urinary bladder, unspecified site    Thrombocytopenia, appears to be recent and may coincide with BCG treatment.  We discussed that if indeed urology not quite certain it might relate to the BCG treatment, we might get Hematology involved.  -     CBC auto differential; Future    Paronychia of finger of right hand, local care and clindamycin    Other orders  -     Discontinue: pioglitazone (ACTOS) 30 MG tablet; Take 1 tablet (30 mg total) by mouth every evening.  -     mupirocin (BACTROBAN) 2 % ointment; Apply topically 3 (three) times daily.  -     glimepiride (AMARYL) 4 MG tablet; Take 1 tablet (4 mg total) by mouth once daily.  -     clindamycin (CLEOCIN) 150 MG capsule; Take 1 capsule (150 mg total) by mouth 3 (three) times daily.       Patient and wife counseled at length today.Total time over 45 minutes with over 50% counseling.                  "This note will not be shared with the patient."  "

## 2020-08-31 ENCOUNTER — TELEPHONE (OUTPATIENT)
Dept: UROLOGY | Facility: CLINIC | Age: 75
End: 2020-08-31

## 2020-08-31 ENCOUNTER — TELEPHONE (OUTPATIENT)
Dept: FAMILY MEDICINE | Facility: CLINIC | Age: 75
End: 2020-08-31

## 2020-08-31 ENCOUNTER — LAB VISIT (OUTPATIENT)
Dept: LAB | Facility: HOSPITAL | Age: 75
End: 2020-08-31
Attending: INTERNAL MEDICINE
Payer: MEDICARE

## 2020-08-31 DIAGNOSIS — D69.6 THROMBOCYTOPENIA: ICD-10-CM

## 2020-08-31 LAB
ALBUMIN SERPL BCP-MCNC: 4.2 G/DL (ref 3.5–5.2)
ALP SERPL-CCNC: 54 U/L (ref 55–135)
ALT SERPL W/O P-5'-P-CCNC: 30 U/L (ref 10–44)
ANION GAP SERPL CALC-SCNC: 9 MMOL/L (ref 8–16)
AST SERPL-CCNC: 36 U/L (ref 10–40)
BASOPHILS # BLD AUTO: 0.03 K/UL (ref 0–0.2)
BASOPHILS NFR BLD: 0.6 % (ref 0–1.9)
BILIRUB SERPL-MCNC: 0.7 MG/DL (ref 0.1–1)
BUN SERPL-MCNC: 14 MG/DL (ref 8–23)
CALCIUM SERPL-MCNC: 9.2 MG/DL (ref 8.7–10.5)
CHLORIDE SERPL-SCNC: 109 MMOL/L (ref 95–110)
CO2 SERPL-SCNC: 20 MMOL/L (ref 23–29)
CREAT SERPL-MCNC: 1 MG/DL (ref 0.5–1.4)
DIFFERENTIAL METHOD: ABNORMAL
EOSINOPHIL # BLD AUTO: 0.2 K/UL (ref 0–0.5)
EOSINOPHIL NFR BLD: 3.8 % (ref 0–8)
ERYTHROCYTE [DISTWIDTH] IN BLOOD BY AUTOMATED COUNT: 14.9 % (ref 11.5–14.5)
EST. GFR  (AFRICAN AMERICAN): >60 ML/MIN/1.73 M^2
EST. GFR  (NON AFRICAN AMERICAN): >60 ML/MIN/1.73 M^2
ESTIMATED AVG GLUCOSE: 140 MG/DL (ref 68–131)
GLUCOSE SERPL-MCNC: 102 MG/DL (ref 70–110)
HBA1C MFR BLD HPLC: 6.5 % (ref 4–5.6)
HCT VFR BLD AUTO: 35.9 % (ref 40–54)
HGB BLD-MCNC: 11.1 G/DL (ref 14–18)
IMM GRANULOCYTES # BLD AUTO: 0.01 K/UL (ref 0–0.04)
IMM GRANULOCYTES NFR BLD AUTO: 0.2 % (ref 0–0.5)
LYMPHOCYTES # BLD AUTO: 1.2 K/UL (ref 1–4.8)
LYMPHOCYTES NFR BLD: 22 % (ref 18–48)
MCH RBC QN AUTO: 29.8 PG (ref 27–31)
MCHC RBC AUTO-ENTMCNC: 30.9 G/DL (ref 32–36)
MCV RBC AUTO: 96 FL (ref 82–98)
MONOCYTES # BLD AUTO: 0.6 K/UL (ref 0.3–1)
MONOCYTES NFR BLD: 10.5 % (ref 4–15)
NEUTROPHILS # BLD AUTO: 3.3 K/UL (ref 1.8–7.7)
NEUTROPHILS NFR BLD: 62.9 % (ref 38–73)
NRBC BLD-RTO: 0 /100 WBC
PLATELET # BLD AUTO: 156 K/UL (ref 150–350)
PMV BLD AUTO: 10.9 FL (ref 9.2–12.9)
POTASSIUM SERPL-SCNC: 3.9 MMOL/L (ref 3.5–5.1)
PROT SERPL-MCNC: 7.4 G/DL (ref 6–8.4)
RBC # BLD AUTO: 3.73 M/UL (ref 4.6–6.2)
SODIUM SERPL-SCNC: 138 MMOL/L (ref 136–145)
WBC # BLD AUTO: 5.22 K/UL (ref 3.9–12.7)

## 2020-08-31 PROCEDURE — 36415 COLL VENOUS BLD VENIPUNCTURE: CPT | Mod: PO

## 2020-08-31 PROCEDURE — 80053 COMPREHEN METABOLIC PANEL: CPT

## 2020-08-31 PROCEDURE — 83036 HEMOGLOBIN GLYCOSYLATED A1C: CPT

## 2020-08-31 PROCEDURE — 85025 COMPLETE CBC W/AUTO DIFF WBC: CPT

## 2020-08-31 NOTE — TELEPHONE ENCOUNTER
Labs scheduled per patient request.    ----- Message from Christina Maher sent at 8/31/2020  8:57 AM CDT -----  Name of Who is Calling: CHUCKIE SIMONS JR. [[id number]]      What is the request in detail: Pt is calling to speak to staff in regards to scheduling a urine sample .... Please call to further assist .       Can the clinic reply by [facility]: N      What Number to Call Back if not in [facility]: [phone]

## 2020-08-31 NOTE — TELEPHONE ENCOUNTER
----- Message from Christina Maher sent at 8/31/2020  8:58 AM CDT -----  Name of Who is Calling: CHUCKIE SIMONS JR. [[id number]]      What is the request in detail: Pt is calling to speak to staff in regards to a urine culture being placed in system.... Please call to further assist .       Can the clinic reply by [facility]: N      What Number to Call Back if not in [facility]: [phone]

## 2020-09-02 ENCOUNTER — OFFICE VISIT (OUTPATIENT)
Dept: UROLOGY | Facility: CLINIC | Age: 75
End: 2020-09-02
Payer: MEDICARE

## 2020-09-02 VITALS
DIASTOLIC BLOOD PRESSURE: 80 MMHG | BODY MASS INDEX: 28.44 KG/M2 | HEART RATE: 81 BPM | WEIGHT: 198.19 LBS | SYSTOLIC BLOOD PRESSURE: 136 MMHG

## 2020-09-02 DIAGNOSIS — C67.8 MALIGNANT NEOPLASM OF OVERLAPPING SITES OF BLADDER: Primary | ICD-10-CM

## 2020-09-02 LAB
BILIRUB SERPL-MCNC: ABNORMAL MG/DL
BLOOD URINE, POC: 250
CLARITY, POC UA: ABNORMAL
COLOR, POC UA: YELLOW
GLUCOSE UR QL STRIP: ABNORMAL
KETONES UR QL STRIP: ABNORMAL
LEUKOCYTE ESTERASE URINE, POC: ABNORMAL
NITRITE, POC UA: ABNORMAL
PH, POC UA: 5
PROTEIN, POC: ABNORMAL
SPECIFIC GRAVITY, POC UA: 1.02
UROBILINOGEN, POC UA: ABNORMAL

## 2020-09-02 PROCEDURE — 1101F PR PT FALLS ASSESS DOC 0-1 FALLS W/OUT INJ PAST YR: ICD-10-PCS | Mod: CPTII,S$GLB,, | Performed by: NURSE PRACTITIONER

## 2020-09-02 PROCEDURE — 81002 POCT URINE DIPSTICK WITHOUT MICROSCOPE: ICD-10-PCS | Mod: S$GLB,,, | Performed by: NURSE PRACTITIONER

## 2020-09-02 PROCEDURE — 3008F BODY MASS INDEX DOCD: CPT | Mod: CPTII,S$GLB,, | Performed by: NURSE PRACTITIONER

## 2020-09-02 PROCEDURE — 3079F PR MOST RECENT DIASTOLIC BLOOD PRESSURE 80-89 MM HG: ICD-10-PCS | Mod: CPTII,S$GLB,, | Performed by: NURSE PRACTITIONER

## 2020-09-02 PROCEDURE — 3079F DIAST BP 80-89 MM HG: CPT | Mod: CPTII,S$GLB,, | Performed by: NURSE PRACTITIONER

## 2020-09-02 PROCEDURE — 3008F PR BODY MASS INDEX (BMI) DOCUMENTED: ICD-10-PCS | Mod: CPTII,S$GLB,, | Performed by: NURSE PRACTITIONER

## 2020-09-02 PROCEDURE — 51720 TREATMENT OF BLADDER LESION: CPT | Mod: S$GLB,,, | Performed by: NURSE PRACTITIONER

## 2020-09-02 PROCEDURE — 3075F SYST BP GE 130 - 139MM HG: CPT | Mod: CPTII,S$GLB,, | Performed by: NURSE PRACTITIONER

## 2020-09-02 PROCEDURE — 3075F PR MOST RECENT SYSTOLIC BLOOD PRESS GE 130-139MM HG: ICD-10-PCS | Mod: CPTII,S$GLB,, | Performed by: NURSE PRACTITIONER

## 2020-09-02 PROCEDURE — 51720 PR INSTILL, ANTICANCER AGENT, BLADDER: ICD-10-PCS | Mod: S$GLB,,, | Performed by: NURSE PRACTITIONER

## 2020-09-02 PROCEDURE — 99999 PR PBB SHADOW E&M-EST. PATIENT-LVL IV: ICD-10-PCS | Mod: PBBFAC,,, | Performed by: NURSE PRACTITIONER

## 2020-09-02 PROCEDURE — 1101F PT FALLS ASSESS-DOCD LE1/YR: CPT | Mod: CPTII,S$GLB,, | Performed by: NURSE PRACTITIONER

## 2020-09-02 PROCEDURE — 1159F PR MEDICATION LIST DOCUMENTED IN MEDICAL RECORD: ICD-10-PCS | Mod: S$GLB,,, | Performed by: NURSE PRACTITIONER

## 2020-09-02 PROCEDURE — 1159F MED LIST DOCD IN RCRD: CPT | Mod: S$GLB,,, | Performed by: NURSE PRACTITIONER

## 2020-09-02 PROCEDURE — 81002 URINALYSIS NONAUTO W/O SCOPE: CPT | Mod: S$GLB,,, | Performed by: NURSE PRACTITIONER

## 2020-09-02 PROCEDURE — 99499 NO LOS: ICD-10-PCS | Mod: S$GLB,,, | Performed by: NURSE PRACTITIONER

## 2020-09-02 PROCEDURE — 99999 PR PBB SHADOW E&M-EST. PATIENT-LVL IV: CPT | Mod: PBBFAC,,, | Performed by: NURSE PRACTITIONER

## 2020-09-02 PROCEDURE — 99499 UNLISTED E&M SERVICE: CPT | Mod: S$GLB,,, | Performed by: NURSE PRACTITIONER

## 2020-09-02 NOTE — PATIENT INSTRUCTIONS
Understanding Bladder Cancer    The urinary system includes the kidneys, ureters, bladder, and urethra. It makes, stores, and gets rid of liquid waste called urine. The two kidneys filter blood to collect waste and make urine. The ureters are small tubes that carry urine from each kidney to the bladder. The bladder is where urine is stored before it leaves the body. The urethra is the tube urine goes through to leave the body.   Bladder cancer means that certain cells in the bladder have changed in ways that aren't normal.  When bladder cancer forms  Cancer is a disease that causes cells to change and multiply out of control. The multiplying cells may form a lump of tissue (tumor). With time, the cancer cells destroy healthy tissue. They may spread to other parts of the body. Why some cells become cancerous is not always clear. But bladder cancer is strongly linked to cigarette smoking. The longer a person smokes and the more a person smokes, the greater that persons chances of developing bladder cancer.  Types of cancer that may form  Bladder cancer may grow in different ways:  · Papillary tumors stick out from the bladder lining on a stalk. They tend to grow into the bladder cavity, away from the bladder wall, instead of deeper into the layers of the bladder wall.  · Flat tumors do not stick out from the bladder lining. These tumors are much more likely than papillary tumors to grow deeper into the layers of the bladder wall.  · Carcinoma in situ (CIS) is a cancerous patch of cells that is only in the inner layer of the bladder lining and has not spread to deeper tissue. The patch may look almost normal or may look inflamed.    Each type of tumor can be present in one or more areas of the bladder, and more than one type can be present at the same time.  Date Last Reviewed: 2/17/2016  © 8508-1971 [employer]. [address] [place] [address], [place] [address]. All rights reserved. This information is not  intended as a substitute for professional medical care. Always follow your healthcare professional's instructions.

## 2020-09-02 NOTE — PROGRESS NOTES
CHIEF COMPLAINT:    Ruben Macedo Jr. is a 74 y.o. male with Bladder Cancer; BCG dose 3 of 5 today.     HISTORY OF PRESENTING ILLINESS:    Ruben Macedo Jr. is a 74 y.o. male with Bladder Cancer.  05/11/2020 initial TURBT with Dr. Gelacio Sanchez  -High-grade papillary urothelial carcinoma      S/p 2nd TURBT 07/09/2020 with Dr. Gelacio Sanchez:  -Papillary urothelial carcinoma, high-grade     08/12/2020 started induction BCG 1 of 5.     He is here today for dose 3 of 5  Tolerated dose 2.  He denies any urinary complaints.  No gross hematuria.        REVIEW OF SYSTEMS:  Review of Systems   Constitutional: Negative.  Negative for chills, diaphoresis and fever.   HENT: Negative for congestion and sore throat.    Eyes: Negative.  Negative for double vision.   Respiratory: Negative.  Negative for cough, shortness of breath and wheezing.    Cardiovascular: Negative.  Negative for chest pain, palpitations and leg swelling.   Gastrointestinal: Negative.  Negative for abdominal pain, blood in stool, constipation, diarrhea, nausea and vomiting.   Genitourinary: Negative.  Negative for dysuria, flank pain and hematuria.        Ok with urination     Musculoskeletal: Negative.  Negative for back pain, falls and neck pain.   Skin: Negative.  Negative for rash.   Neurological: Negative.  Negative for dizziness and seizures.   Endo/Heme/Allergies: Does not bruise/bleed easily.   Psychiatric/Behavioral: Negative.  Negative for depression. The patient is not nervous/anxious.          PATIENT HISTORY:    Past Medical History:   Diagnosis Date    Anticoagulant long-term use     plavix    Anxiety     BPH (benign prostatic hypertrophy) 2/2/2015    Cataract     Coronary artery disease 2005    stent x 1        Diabetes mellitus, type 2     History of colonic polyps 4/11/2017 2/13 --3 yrs    History of MI (myocardial infarction) 2005    Followed by cardiology, Dr. Acevedo (at [facility])    History of stroke  November 2005    Hyperlipidemia     Hypertension     Incomplete bladder emptying 2/2/2015    Myocardial infarction 2006    Overweight(278.02)     Personal history of kidney stones     Renal cyst 2/10/2015    Stroke 2005    loss of peripheral vision       Past Surgical History:   Procedure Laterality Date    APPENDECTOMY      BACK SURGERY      BLADDER FULGURATION  5/11/2020    Procedure: FULGURATION, BLADDER;  Surgeon: Gelacio Sanchez MD;  Location: [facility];  Service: Urology;;    CATARACT EXTRACTION      od    CORONARY ANGIOPLASTY WITH STENT PLACEMENT  2000    CYSTOSCOPY  5/11/2020    Procedure: CYSTOSCOPY;  Surgeon: Gelacio Sanchez MD;  Location: [facility];  Service: Urology;;    CYSTOSCOPY W/ URETERAL STENT PLACEMENT      stents placed x2    EYE SURGERY      cataract right eye    REMOVAL OF BLOOD CLOT  5/11/2020    Procedure: REMOVAL, BLOOD CLOT;  Surgeon: Gelacio Sanchez MD;  Location: [facility];  Service: Urology;;    SPINE SURGERY      TONSILLECTOMY      TURBT, WITH BLUE LIGHT CYSTOSCOPY AND CYSVIEW N/A 7/9/2020    Procedure: TURBT,WITH BLUE LIGHT CYSTOSCOPY AND CYSVIEW;  Surgeon: Gelacio Sanchez MD;  Location: [facility];  Service: Urology;  Laterality: N/A;       Family History   Problem Relation Age of Onset    Hyperlipidemia Father     Cancer Father     Cataracts Father     Cataracts Mother     Hyperlipidemia Sister     Alzheimer's disease Unknown     Diabetes Neg Hx     Amblyopia Neg Hx     Blindness Neg Hx     Glaucoma Neg Hx     Hypertension Neg Hx     Macular degeneration Neg Hx     Retinal detachment Neg Hx     Strabismus Neg Hx     Stroke Neg Hx     Thyroid disease Neg Hx        Social History     Socioeconomic History    Marital status:      Spouse name: Not on file    Number of children: 2    Years of education: Not on file    Highest education level: Not on file   Occupational History    Occupation: Retired - chemical industry   Social Needs     Financial resource strain: Not hard at all    Food insecurity     Worry: Never true     Inability: Never true    Transportation needs     Medical: No     Non-medical: No   Tobacco Use    Smoking status: Never Smoker    Smokeless tobacco: Never Used   Substance and Sexual Activity    Alcohol use: Not Currently     Frequency: 2-4 times a month     Drinks per session: 1 or 2     Binge frequency: Never     Comment: occasional    Drug use: No    Sexual activity: Yes     Partners: Female   Lifestyle    Physical activity     Days per week: 3 days     Minutes per session: 90 min    Stress: Not at all   Relationships    Social connections     Talks on phone: Once a week     Gets together: Once a week     Attends Pentecostalism service: Not on file     Active member of club or organization: Yes     Attends meetings of clubs or organizations: More than 4 times per year     Relationship status:    Other Topics Concern    Not on file   Social History Narrative    Not on file       Allergies:  Morphine, Metformin, Tramadol, and Morphine (pf)    Medications:    Current Outpatient Medications:     allopurinoL (ZYLOPRIM) 300 MG tablet, TAKE ONE TABLET BY MOUTH EVERY DAY, Disp: 30 tablet, Rfl: 11    aspirin (ECOTRIN) 81 MG EC tablet, Take 81 mg by mouth once daily., Disp: , Rfl:     betamethasone valerate 0.1% (VALISONE) 0.1 % Crea, , Disp: , Rfl:     clindamycin (CLEOCIN) 150 MG capsule, Take 1 capsule (150 mg total) by mouth 3 (three) times daily., Disp: 30 capsule, Rfl: 3    clopidogreL (PLAVIX) 75 mg tablet, Take 75 mg by mouth once daily., Disp: , Rfl:     ezetimibe (ZETIA) 10 mg tablet, once daily. , Disp: , Rfl:     finasteride (PROSCAR) 5 mg tablet, TAKE ONE TABLET BY MOUTH EVERY DAY, Disp: 90 tablet, Rfl: 3    glimepiride (AMARYL) 4 MG tablet, Take 1 tablet (4 mg total) by mouth once daily., Disp: 30 tablet, Rfl: 3    LORazepam (ATIVAN) 0.5 MG tablet, TAKE 1-2 TABLETS BY MOUTH NIGHTLY AS NEEDED, Disp:  20 tablet, Rfl: 4    methen-m.blue-s.phos-phsal-hyo (URIBEL) 118-10-40.8-36 mg Cap, Take 1 capsule by mouth 3 (three) times daily as needed., Disp: 20 capsule, Rfl: 11    metoprolol succinate (TOPROL-XL) 25 MG 24 hr tablet, Take 25 mg by mouth once daily.  , Disp: , Rfl:     mupirocin (BACTROBAN) 2 % ointment, Apply topically 3 (three) times daily., Disp: 30 g, Rfl: 12    oxybutynin (DITROPAN) 5 MG Tab, Take 1 tablet (5 mg total) by mouth 3 (three) times daily as needed (bladder spasms)., Disp: 30 tablet, Rfl: 3    rosuvastatin (CRESTOR) 40 MG Tab, 40 mg once daily. Take 1 tablet by mouth daily, Disp: , Rfl:     valsartan (DIOVAN) 160 MG tablet, TAKE 2 TABLETS BY MOUTH EVERY DAY, Disp: 60 tablet, Rfl: 2    Current Facility-Administered Medications:     BCG live (ZACH) 50 mg in sodium chloride 0.9% 50 mL bladder instillation, 50 mg, Bladder Instillation, Q7 Days, Alea Sanhcez NP, 50 mg at 09/02/20 0909    PHYSICAL EXAMINATION:  Physical Exam  Vitals signs and nursing note reviewed.   Constitutional:       Appearance: Normal appearance. He is normal weight.      Comments: Urine dipped clear of infection. No gross hematuria.     HENT:      Head: Normocephalic.      Right Ear: External ear normal.      Left Ear: External ear normal.      Nose: Nose normal.   Eyes:      Conjunctiva/sclera: Conjunctivae normal.   Neck:      Musculoskeletal: Normal range of motion.   Cardiovascular:      Rate and Rhythm: Normal rate.   Pulmonary:      Effort: Pulmonary effort is normal. No respiratory distress.   Abdominal:      General: There is no distension.      Tenderness: There is no abdominal tenderness. There is no right CVA tenderness, left CVA tenderness or rebound.   Genitourinary:     Penis: Normal.       Scrotum/Testes: Normal.   Musculoskeletal: Normal range of motion.   Skin:     General: Skin is warm and dry.   Neurological:      General: No focal deficit present.      Mental Status: He is alert and oriented to  person, place, and time.   Psychiatric:         Mood and Affect: Mood normal.           LABS:      In office UA today was clear of infection.       Lab Results   Component Value Date    PSA 0.67 12/14/2015    PSA 1.86 06/18/2013    PSADIAG 0.41 02/28/2020    PSADIAG 0.42 07/25/2018    PSADIAG 0.55 04/12/2017             IMPRESSION:    Encounter Diagnoses   Name Primary?    Malignant neoplasm of overlapping sites of bladder Yes     BCG 3 of 5    Assessment:       1. Malignant neoplasm of overlapping sites of bladder        Plan:         I spent 20 minutes with the patient of which more than half was spent in direct consultation with the patient in regards to our treatment and plan.    Education and recommendations of today's plan of care including home remedies.  BCG was instilled using sterile and BCG precautions.   Tolerated well.  Post installation instructions.   RTC one week for dose 4 of 5

## 2020-09-09 ENCOUNTER — OFFICE VISIT (OUTPATIENT)
Dept: UROLOGY | Facility: CLINIC | Age: 75
End: 2020-09-09
Payer: MEDICARE

## 2020-09-09 VITALS
HEIGHT: 70 IN | SYSTOLIC BLOOD PRESSURE: 145 MMHG | HEART RATE: 81 BPM | BODY MASS INDEX: 28.35 KG/M2 | WEIGHT: 198 LBS | DIASTOLIC BLOOD PRESSURE: 84 MMHG

## 2020-09-09 DIAGNOSIS — C67.9 UROTHELIAL CARCINOMA OF BLADDER: ICD-10-CM

## 2020-09-09 DIAGNOSIS — C67.8 MALIGNANT NEOPLASM OF OVERLAPPING SITES OF BLADDER: Primary | ICD-10-CM

## 2020-09-09 LAB
BILIRUB SERPL-MCNC: ABNORMAL MG/DL
BLOOD URINE, POC: 250
CLARITY, POC UA: CLEAR
COLOR, POC UA: YELLOW
GLUCOSE UR QL STRIP: 250
KETONES UR QL STRIP: ABNORMAL
LEUKOCYTE ESTERASE URINE, POC: ABNORMAL
NITRITE, POC UA: ABNORMAL
PH, POC UA: 5
PROTEIN, POC: ABNORMAL
SPECIFIC GRAVITY, POC UA: 1.02
UROBILINOGEN, POC UA: ABNORMAL

## 2020-09-09 PROCEDURE — 3008F BODY MASS INDEX DOCD: CPT | Mod: CPTII,S$GLB,, | Performed by: NURSE PRACTITIONER

## 2020-09-09 PROCEDURE — 3079F DIAST BP 80-89 MM HG: CPT | Mod: CPTII,S$GLB,, | Performed by: NURSE PRACTITIONER

## 2020-09-09 PROCEDURE — 99499 UNLISTED E&M SERVICE: CPT | Mod: S$GLB,,, | Performed by: NURSE PRACTITIONER

## 2020-09-09 PROCEDURE — 51720 TREATMENT OF BLADDER LESION: CPT | Mod: S$GLB,,, | Performed by: NURSE PRACTITIONER

## 2020-09-09 PROCEDURE — 1126F AMNT PAIN NOTED NONE PRSNT: CPT | Mod: S$GLB,,, | Performed by: NURSE PRACTITIONER

## 2020-09-09 PROCEDURE — 3008F PR BODY MASS INDEX (BMI) DOCUMENTED: ICD-10-PCS | Mod: CPTII,S$GLB,, | Performed by: NURSE PRACTITIONER

## 2020-09-09 PROCEDURE — 1101F PR PT FALLS ASSESS DOC 0-1 FALLS W/OUT INJ PAST YR: ICD-10-PCS | Mod: CPTII,S$GLB,, | Performed by: NURSE PRACTITIONER

## 2020-09-09 PROCEDURE — 99999 PR PBB SHADOW E&M-EST. PATIENT-LVL IV: CPT | Mod: PBBFAC,,, | Performed by: NURSE PRACTITIONER

## 2020-09-09 PROCEDURE — 1101F PT FALLS ASSESS-DOCD LE1/YR: CPT | Mod: CPTII,S$GLB,, | Performed by: NURSE PRACTITIONER

## 2020-09-09 PROCEDURE — 3077F PR MOST RECENT SYSTOLIC BLOOD PRESSURE >= 140 MM HG: ICD-10-PCS | Mod: CPTII,S$GLB,, | Performed by: NURSE PRACTITIONER

## 2020-09-09 PROCEDURE — 1126F PR PAIN SEVERITY QUANTIFIED, NO PAIN PRESENT: ICD-10-PCS | Mod: S$GLB,,, | Performed by: NURSE PRACTITIONER

## 2020-09-09 PROCEDURE — 3079F PR MOST RECENT DIASTOLIC BLOOD PRESSURE 80-89 MM HG: ICD-10-PCS | Mod: CPTII,S$GLB,, | Performed by: NURSE PRACTITIONER

## 2020-09-09 PROCEDURE — 99499 NO LOS: ICD-10-PCS | Mod: S$GLB,,, | Performed by: NURSE PRACTITIONER

## 2020-09-09 PROCEDURE — 99999 PR PBB SHADOW E&M-EST. PATIENT-LVL IV: ICD-10-PCS | Mod: PBBFAC,,, | Performed by: NURSE PRACTITIONER

## 2020-09-09 PROCEDURE — 3077F SYST BP >= 140 MM HG: CPT | Mod: CPTII,S$GLB,, | Performed by: NURSE PRACTITIONER

## 2020-09-09 PROCEDURE — 81002 POCT URINE DIPSTICK WITHOUT MICROSCOPE: ICD-10-PCS | Mod: S$GLB,,, | Performed by: NURSE PRACTITIONER

## 2020-09-09 PROCEDURE — 1159F PR MEDICATION LIST DOCUMENTED IN MEDICAL RECORD: ICD-10-PCS | Mod: S$GLB,,, | Performed by: NURSE PRACTITIONER

## 2020-09-09 PROCEDURE — 1159F MED LIST DOCD IN RCRD: CPT | Mod: S$GLB,,, | Performed by: NURSE PRACTITIONER

## 2020-09-09 PROCEDURE — 81002 URINALYSIS NONAUTO W/O SCOPE: CPT | Mod: S$GLB,,, | Performed by: NURSE PRACTITIONER

## 2020-09-09 PROCEDURE — 51720 PR INSTILL, ANTICANCER AGENT, BLADDER: ICD-10-PCS | Mod: S$GLB,,, | Performed by: NURSE PRACTITIONER

## 2020-09-09 NOTE — PROGRESS NOTES
CHIEF COMPLAINT:    Ruben Macedo Jr. is a 74 y.o. male with Bladder Cancer.  Here today for BCG dose 4 of 5    HISTORY OF PRESENTING ILLINESS:    Ruben Macedo Jr. is a 74 y.o. male with Bladder Cancer.  05/11/2020 initial TURBT with Dr. Gelacio Sanchez  -High-grade papillary urothelial carcinoma      S/p 2nd TURBT 07/09/2020 with Dr. Gelacio Sanchez:  -Papillary urothelial carcinoma, high-grade     08/12/2020 started induction BCG 1 of 5.     He is here today for dose 4 of 5  Tolerated dose 3.   He states has urgency (when it is time to go, I have to go).  No dysuria or bladder pain.   No gross hematuria.           REVIEW OF SYSTEMS:  Review of Systems   Constitutional: Negative.  Negative for chills, diaphoresis and fever.   HENT: Negative for congestion and sore throat.    Eyes: Negative.  Negative for double vision.   Respiratory: Negative.  Negative for cough, shortness of breath and wheezing.    Cardiovascular: Negative.  Negative for chest pain, palpitations and leg swelling.   Gastrointestinal: Negative.  Negative for abdominal pain, blood in stool, constipation, diarrhea, nausea and vomiting.   Genitourinary: Positive for urgency. Negative for dysuria, flank pain and hematuria.        FOS is good  (+) urgency  No bladder pain     Musculoskeletal: Negative.  Negative for back pain, falls and neck pain.   Skin: Negative.  Negative for rash.   Neurological: Negative.  Negative for dizziness and seizures.   Endo/Heme/Allergies: Does not bruise/bleed easily.   Psychiatric/Behavioral: Negative.  Negative for depression. The patient is not nervous/anxious.          PATIENT HISTORY:    Past Medical History:   Diagnosis Date    Anticoagulant long-term use     plavix    Anxiety     BPH (benign prostatic hypertrophy) 2/2/2015    Cataract     Coronary artery disease 2005    stent x 1        Diabetes mellitus, type 2     History of colonic polyps 4/11/2017 2/13 --3 yrs    History of MI (myocardial infarction)  2005    Followed by cardiology, Dr. Acevedo (at [facility])    History of stroke November 2005    Hyperlipidemia     Hypertension     Incomplete bladder emptying 2/2/2015    Myocardial infarction 2006    Overweight(278.02)     Personal history of kidney stones     Renal cyst 2/10/2015    Stroke 2005    loss of peripheral vision       Past Surgical History:   Procedure Laterality Date    APPENDECTOMY      BACK SURGERY      BLADDER FULGURATION  5/11/2020    Procedure: FULGURATION, BLADDER;  Surgeon: Gelacio Sanchez MD;  [facility];  Service: Urology;;    CATARACT EXTRACTION      od    CORONARY ANGIOPLASTY WITH STENT PLACEMENT  2000    CYSTOSCOPY  5/11/2020    Procedure: CYSTOSCOPY;  Surgeon: Gelacio Sanchez MD;  Location: [facility];  Service: Urology;;    CYSTOSCOPY W/ URETERAL STENT PLACEMENT      stents placed x2    EYE SURGERY      cataract right eye    REMOVAL OF BLOOD CLOT  5/11/2020    Procedure: REMOVAL, BLOOD CLOT;  Surgeon: Gelacio Sanchez MD;  Location: [facility];  Service: Urology;;    SPINE SURGERY      TONSILLECTOMY      TURBT, WITH BLUE LIGHT CYSTOSCOPY AND CYSVIEW N/A 7/9/2020    Procedure: TURBT,WITH BLUE LIGHT CYSTOSCOPY AND CYSVIEW;  Surgeon: Gelacio Sanchez MD;  Location: [facility];  Service: Urology;  Laterality: N/A;       Family History   Problem Relation Age of Onset    Hyperlipidemia Father     Cancer Father     Cataracts Father     Cataracts Mother     Hyperlipidemia Sister     Alzheimer's disease Unknown     Diabetes Neg Hx     Amblyopia Neg Hx     Blindness Neg Hx     Glaucoma Neg Hx     Hypertension Neg Hx     Macular degeneration Neg Hx     Retinal detachment Neg Hx     Strabismus Neg Hx     Stroke Neg Hx     Thyroid disease Neg Hx        Social History     Socioeconomic History    Marital status:      Spouse name: Not on file    Number of children: 2    Years of education: Not on file    Highest education level: Not on  file   Occupational History    Occupation: Retired - chemical industry   Social Needs    Financial resource strain: Not hard at all    Food insecurity     Worry: Never true     Inability: Never true    Transportation needs     Medical: No     Non-medical: No   Tobacco Use    Smoking status: Never Smoker    Smokeless tobacco: Never Used   Substance and Sexual Activity    Alcohol use: Not Currently     Frequency: 2-4 times a month     Drinks per session: 1 or 2     Binge frequency: Never     Comment: occasional    Drug use: No    Sexual activity: Yes     Partners: Female   Lifestyle    Physical activity     Days per week: 3 days     Minutes per session: 90 min    Stress: Not at all   Relationships    Social connections     Talks on phone: Once a week     Gets together: Once a week     Attends Hindu service: Not on file     Active member of club or organization: Yes     Attends meetings of clubs or organizations: More than 4 times per year     Relationship status:    Other Topics Concern    Not on file   Social History Narrative    Not on file       Allergies:  Morphine, Metformin, Tramadol, and Morphine (pf)    Medications:    Current Outpatient Medications:     allopurinoL (ZYLOPRIM) 300 MG tablet, TAKE ONE TABLET BY MOUTH EVERY DAY, Disp: 30 tablet, Rfl: 11    aspirin (ECOTRIN) 81 MG EC tablet, Take 81 mg by mouth once daily., Disp: , Rfl:     betamethasone valerate 0.1% (VALISONE) 0.1 % Crea, , Disp: , Rfl:     clindamycin (CLEOCIN) 150 MG capsule, Take 1 capsule (150 mg total) by mouth 3 (three) times daily., Disp: 30 capsule, Rfl: 3    clopidogreL (PLAVIX) 75 mg tablet, Take 75 mg by mouth once daily., Disp: , Rfl:     ezetimibe (ZETIA) 10 mg tablet, once daily. , Disp: , Rfl:     finasteride (PROSCAR) 5 mg tablet, TAKE ONE TABLET BY MOUTH EVERY DAY, Disp: 90 tablet, Rfl: 3    glimepiride (AMARYL) 4 MG tablet, Take 1 tablet (4 mg total) by mouth once daily., Disp: 30 tablet, Rfl:  3    LORazepam (ATIVAN) 0.5 MG tablet, TAKE 1-2 TABLETS BY MOUTH NIGHTLY AS NEEDED, Disp: 20 tablet, Rfl: 4    methen-m.blue-s.phos-phsal-hyo (URIBEL) 118-10-40.8-36 mg Cap, Take 1 capsule by mouth 3 (three) times daily as needed., Disp: 20 capsule, Rfl: 11    metoprolol succinate (TOPROL-XL) 25 MG 24 hr tablet, Take 25 mg by mouth once daily.  , Disp: , Rfl:     mupirocin (BACTROBAN) 2 % ointment, Apply topically 3 (three) times daily., Disp: 30 g, Rfl: 12    oxybutynin (DITROPAN) 5 MG Tab, Take 1 tablet (5 mg total) by mouth 3 (three) times daily as needed (bladder spasms)., Disp: 30 tablet, Rfl: 3    rosuvastatin (CRESTOR) 40 MG Tab, 40 mg once daily. Take 1 tablet by mouth daily, Disp: , Rfl:     valsartan (DIOVAN) 160 MG tablet, TAKE 2 TABLETS BY MOUTH EVERY DAY, Disp: 60 tablet, Rfl: 2    Current Facility-Administered Medications:     BCG live (ZACH) 50 mg in sodium chloride 0.9% 50 mL bladder instillation, 50 mg, Bladder Instillation, Q7 Days, Alea Sanchez NP, 50 mg at 09/02/20 0909    PHYSICAL EXAMINATION:  Physical Exam  Vitals signs and nursing note reviewed.   Constitutional:       Appearance: Normal appearance. He is normal weight.   HENT:      Head: Normocephalic.      Right Ear: External ear normal.      Left Ear: External ear normal.      Nose: Nose normal.   Eyes:      Conjunctiva/sclera: Conjunctivae normal.   Neck:      Musculoskeletal: Normal range of motion.   Cardiovascular:      Rate and Rhythm: Normal rate.   Pulmonary:      Effort: Pulmonary effort is normal. No respiratory distress.   Abdominal:      General: There is no distension.      Tenderness: There is no abdominal tenderness. There is no right CVA tenderness, left CVA tenderness or rebound.   Genitourinary:     Penis: Normal.       Scrotum/Testes: Normal.   Musculoskeletal: Normal range of motion.   Skin:     General: Skin is warm and dry.   Neurological:      General: No focal deficit present.      Mental Status: He is  alert and oriented to person, place, and time.   Psychiatric:         Mood and Affect: Mood normal.           LABS:      In office UA today was clear of infection; no visible blood        Lab Results   Component Value Date    PSA 0.67 12/14/2015    PSA 1.86 06/18/2013    PSADIAG 0.41 02/28/2020    PSADIAG 0.42 07/25/2018    PSADIAG 0.55 04/12/2017             IMPRESSION:    Encounter Diagnoses   Name Primary?    Malignant neoplasm of overlapping sites of bladder Yes    Urothelial carcinoma of bladder      BCG 4 of 5    Assessment:       1. Malignant neoplasm of overlapping sites of bladder    2. Urothelial carcinoma of bladder        Plan:         I spent 25 minutes with the patient of which more than half was spent in direct consultation with the patient in regards to our treatment and plan.    Education and recommendations of today's plan of care including home remedies.  We discussed his LUTS and the expectations.  Diet modifications; good water intake; flush out bladder.  BCG dose 4 of 5 was instilled using sterile and BCG precautions.  He tolerated well.  RTC one week for dose 5 of 5.

## 2020-09-14 ENCOUNTER — PATIENT OUTREACH (OUTPATIENT)
Dept: ADMINISTRATIVE | Facility: OTHER | Age: 75
End: 2020-09-14

## 2020-09-14 NOTE — PROGRESS NOTES
LINKS immunization registry updated  Care Everywhere updated  Health Maintenance updated  Chart reviewed for overdue Proactive [facility] Encounters (ELLY) health maintenance testing (CRS, Breast Ca, Diabetic Eye Exam)   Orders entered:N/A

## 2020-09-16 ENCOUNTER — OFFICE VISIT (OUTPATIENT)
Dept: UROLOGY | Facility: CLINIC | Age: 75
End: 2020-09-16
Payer: MEDICARE

## 2020-09-16 VITALS
WEIGHT: 199.94 LBS | SYSTOLIC BLOOD PRESSURE: 126 MMHG | BODY MASS INDEX: 28.62 KG/M2 | HEART RATE: 84 BPM | DIASTOLIC BLOOD PRESSURE: 78 MMHG | HEIGHT: 70 IN

## 2020-09-16 DIAGNOSIS — C67.8 MALIGNANT NEOPLASM OF OVERLAPPING SITES OF BLADDER: Primary | ICD-10-CM

## 2020-09-16 LAB
BILIRUB SERPL-MCNC: NORMAL MG/DL
BLOOD URINE, POC: NORMAL
CLARITY, POC UA: CLEAR
COLOR, POC UA: YELLOW
GLUCOSE UR QL STRIP: NORMAL
KETONES UR QL STRIP: NORMAL
LEUKOCYTE ESTERASE URINE, POC: NORMAL
NITRITE, POC UA: NORMAL
PH, POC UA: 5
PROTEIN, POC: NORMAL
SPECIFIC GRAVITY, POC UA: 1.01
UROBILINOGEN, POC UA: NORMAL

## 2020-09-16 PROCEDURE — 3078F PR MOST RECENT DIASTOLIC BLOOD PRESSURE < 80 MM HG: ICD-10-PCS | Mod: CPTII,S$GLB,, | Performed by: NURSE PRACTITIONER

## 2020-09-16 PROCEDURE — 3078F DIAST BP <80 MM HG: CPT | Mod: CPTII,S$GLB,, | Performed by: NURSE PRACTITIONER

## 2020-09-16 PROCEDURE — 81002 URINALYSIS NONAUTO W/O SCOPE: CPT | Mod: S$GLB,,, | Performed by: NURSE PRACTITIONER

## 2020-09-16 PROCEDURE — 1126F PR PAIN SEVERITY QUANTIFIED, NO PAIN PRESENT: ICD-10-PCS | Mod: S$GLB,,, | Performed by: NURSE PRACTITIONER

## 2020-09-16 PROCEDURE — 81002 POCT URINE DIPSTICK WITHOUT MICROSCOPE: ICD-10-PCS | Mod: S$GLB,,, | Performed by: NURSE PRACTITIONER

## 2020-09-16 PROCEDURE — 99999 PR PBB SHADOW E&M-EST. PATIENT-LVL IV: ICD-10-PCS | Mod: PBBFAC,,, | Performed by: NURSE PRACTITIONER

## 2020-09-16 PROCEDURE — 1101F PT FALLS ASSESS-DOCD LE1/YR: CPT | Mod: CPTII,S$GLB,, | Performed by: NURSE PRACTITIONER

## 2020-09-16 PROCEDURE — 1159F PR MEDICATION LIST DOCUMENTED IN MEDICAL RECORD: ICD-10-PCS | Mod: S$GLB,,, | Performed by: NURSE PRACTITIONER

## 2020-09-16 PROCEDURE — 3074F PR MOST RECENT SYSTOLIC BLOOD PRESSURE < 130 MM HG: ICD-10-PCS | Mod: CPTII,S$GLB,, | Performed by: NURSE PRACTITIONER

## 2020-09-16 PROCEDURE — 99999 PR PBB SHADOW E&M-EST. PATIENT-LVL IV: CPT | Mod: PBBFAC,,, | Performed by: NURSE PRACTITIONER

## 2020-09-16 PROCEDURE — 99499 UNLISTED E&M SERVICE: CPT | Mod: S$GLB,,, | Performed by: NURSE PRACTITIONER

## 2020-09-16 PROCEDURE — 1101F PR PT FALLS ASSESS DOC 0-1 FALLS W/OUT INJ PAST YR: ICD-10-PCS | Mod: CPTII,S$GLB,, | Performed by: NURSE PRACTITIONER

## 2020-09-16 PROCEDURE — 3074F SYST BP LT 130 MM HG: CPT | Mod: CPTII,S$GLB,, | Performed by: NURSE PRACTITIONER

## 2020-09-16 PROCEDURE — 1126F AMNT PAIN NOTED NONE PRSNT: CPT | Mod: S$GLB,,, | Performed by: NURSE PRACTITIONER

## 2020-09-16 PROCEDURE — 99499 NO LOS: ICD-10-PCS | Mod: S$GLB,,, | Performed by: NURSE PRACTITIONER

## 2020-09-16 PROCEDURE — 1159F MED LIST DOCD IN RCRD: CPT | Mod: S$GLB,,, | Performed by: NURSE PRACTITIONER

## 2020-09-16 PROCEDURE — 51720 TREATMENT OF BLADDER LESION: CPT | Mod: S$GLB,,, | Performed by: NURSE PRACTITIONER

## 2020-09-16 PROCEDURE — 3008F BODY MASS INDEX DOCD: CPT | Mod: CPTII,S$GLB,, | Performed by: NURSE PRACTITIONER

## 2020-09-16 PROCEDURE — 3008F PR BODY MASS INDEX (BMI) DOCUMENTED: ICD-10-PCS | Mod: CPTII,S$GLB,, | Performed by: NURSE PRACTITIONER

## 2020-09-16 PROCEDURE — 51720 PR INSTILL, ANTICANCER AGENT, BLADDER: ICD-10-PCS | Mod: S$GLB,,, | Performed by: NURSE PRACTITIONER

## 2020-09-16 RX ORDER — PIOGLITAZONEHYDROCHLORIDE 15 MG/1
TABLET ORAL
Status: ON HOLD | COMMUNITY
Start: 2020-08-09 | End: 2020-11-12

## 2020-09-16 RX ORDER — A/SINGAPORE/GP1908/2015 IVR-180 (AN A/MICHIGAN/45/2015 (H1N1)PDM09-LIKE VIRUS, A/HONG KONG/4801/2014, NYMC X-263B (H3N2) (AN A/HONG KONG/4801/2014-LIKE VIRUS), AND B/BRISBANE/60/2008, WILD TYPE (A B/BRISBANE/60/2008-LIKE VIRUS) 15; 15; 15 UG/.5ML; UG/.5ML; UG/.5ML
INJECTION, SUSPENSION INTRAMUSCULAR
COMMUNITY
Start: 2020-09-04

## 2020-09-16 NOTE — PROGRESS NOTES
CHIEF COMPLAINT:    Ruben Macedo Jr. is a 74 y.o. male with Bladder Cancer.  Here today for BCG 5 of 5    HISTORY OF PRESENTING ILLINESS:    Ruben Macedo Jr. is a 74 y.o. male with Bladder Cancer.  05/11/2020 initial TURBT with Dr. Gelacio Sanchez  -High-grade papillary urothelial carcinoma      S/p 2nd TURBT 07/09/2020 with Dr. Gelacio Sanchez:  -Papillary urothelial carcinoma, high-grade     08/12/2020 started induction BCG 1 of 5.     He is here today for dose 5 of 5  Tolerated dose 4.   He states has urgency (when it is time to go, I have to go).  No dysuria or bladder pain.   No gross hematuria.              REVIEW OF SYSTEMS:  Review of Systems   Constitutional: Negative.  Negative for chills, diaphoresis and fever.   HENT: Negative for congestion and sore throat.    Eyes: Negative.  Negative for double vision.   Respiratory: Negative.  Negative for cough, shortness of breath and wheezing.    Cardiovascular: Negative.  Negative for chest pain, palpitations and leg swelling.   Gastrointestinal: Negative.  Negative for abdominal pain, blood in stool, constipation, diarrhea, nausea and vomiting.   Genitourinary: Negative.  Negative for dysuria, flank pain and hematuria.        Ok with urination  No visible blood     Musculoskeletal: Negative.  Negative for back pain, falls and neck pain.   Skin: Negative.  Negative for rash.   Neurological: Negative.  Negative for dizziness and seizures.   Endo/Heme/Allergies: Does not bruise/bleed easily.   Psychiatric/Behavioral: Negative.  Negative for depression. The patient is not nervous/anxious.          PATIENT HISTORY:    Past Medical History:   Diagnosis Date    Anticoagulant long-term use     plavix    Anxiety     BPH (benign prostatic hypertrophy) 2/2/2015    Cataract     Coronary artery disease 2005    stent x 1        Diabetes mellitus, type 2     History of colonic polyps 4/11/2017 2/13 --3 yrs    History of MI (myocardial infarction) 2005    Followed by  cardiology, Dr. Acevedo (at [facility])    History of stroke November 2005    Hyperlipidemia     Hypertension     Incomplete bladder emptying 2/2/2015    Myocardial infarction 2006    Overweight(278.02)     Personal history of kidney stones     Renal cyst 2/10/2015    Stroke 2005    loss of peripheral vision       Past Surgical History:   Procedure Laterality Date    APPENDECTOMY      BACK SURGERY      BLADDER FULGURATION  5/11/2020    Procedure: FULGURATION, BLADDER;  Surgeon: Gelacio Sanchez MD;  Location: [facility];  Service: Urology;;    CATARACT EXTRACTION      od    CORONARY ANGIOPLASTY WITH STENT PLACEMENT  2000    CYSTOSCOPY  5/11/2020    Procedure: CYSTOSCOPY;  Surgeon: Gelacio Sanchez MD;  Location: [facility];  Service: Urology;;    CYSTOSCOPY W/ URETERAL STENT PLACEMENT      stents placed x2    EYE SURGERY      cataract right eye    REMOVAL OF BLOOD CLOT  5/11/2020    Procedure: REMOVAL, BLOOD CLOT;  Surgeon: Gelacio Sanchez MD;  Location: [facility];  Service: Urology;;    SPINE SURGERY      TONSILLECTOMY      TURBT, WITH BLUE LIGHT CYSTOSCOPY AND CYSVIEW N/A 7/9/2020    Procedure: TURBT,WITH BLUE LIGHT CYSTOSCOPY AND CYSVIEW;  Surgeon: Gelacio Sanchez MD;  Location: [facility];  Service: Urology;  Laterality: N/A;       Family History   Problem Relation Age of Onset    Hyperlipidemia Father     Cancer Father     Cataracts Father     Cataracts Mother     Hyperlipidemia Sister     Alzheimer's disease Unknown     Diabetes Neg Hx     Amblyopia Neg Hx     Blindness Neg Hx     Glaucoma Neg Hx     Hypertension Neg Hx     Macular degeneration Neg Hx     Retinal detachment Neg Hx     Strabismus Neg Hx     Stroke Neg Hx     Thyroid disease Neg Hx        Social History     Socioeconomic History    Marital status:      Spouse name: Not on file    Number of children: 2    Years of education: Not on file    Highest education level: Not on file   Occupational  History    Occupation: Retired - chemical industry   Social Needs    Financial resource strain: Not hard at all    Food insecurity     Worry: Never true     Inability: Never true    Transportation needs     Medical: No     Non-medical: No   Tobacco Use    Smoking status: Never Smoker    Smokeless tobacco: Never Used   Substance and Sexual Activity    Alcohol use: Not Currently     Frequency: 2-4 times a month     Drinks per session: 1 or 2     Binge frequency: Never     Comment: occasional    Drug use: No    Sexual activity: Yes     Partners: Female   Lifestyle    Physical activity     Days per week: 3 days     Minutes per session: 90 min    Stress: Not at all   Relationships    Social connections     Talks on phone: Once a week     Gets together: Once a week     Attends Confucianist service: Not on file     Active member of club or organization: Yes     Attends meetings of clubs or organizations: More than 4 times per year     Relationship status:    Other Topics Concern    Not on file   Social History Narrative    Not on file       Allergies:  Morphine, Metformin, Tramadol, and Morphine (pf)    Medications:    Current Outpatient Medications:     allopurinoL (ZYLOPRIM) 300 MG tablet, TAKE ONE TABLET BY MOUTH EVERY DAY, Disp: 30 tablet, Rfl: 11    aspirin (ECOTRIN) 81 MG EC tablet, Take 81 mg by mouth once daily., Disp: , Rfl:     betamethasone valerate 0.1% (VALISONE) 0.1 % Crea, , Disp: , Rfl:     clindamycin (CLEOCIN) 150 MG capsule, Take 1 capsule (150 mg total) by mouth 3 (three) times daily., Disp: 30 capsule, Rfl: 3    clopidogreL (PLAVIX) 75 mg tablet, Take 75 mg by mouth once daily., Disp: , Rfl:     ezetimibe (ZETIA) 10 mg tablet, once daily. , Disp: , Rfl:     finasteride (PROSCAR) 5 mg tablet, TAKE ONE TABLET BY MOUTH EVERY DAY, Disp: 90 tablet, Rfl: 3    FLUAD QUAD 2020-21,65Y UP,,PF, 60 mcg (15 mcg x 4)/0.5 mL Syrg, ADMINISTER 0.5ML IN THE MUSCLE AS DIRECTED, Disp: , Rfl:      glimepiride (AMARYL) 4 MG tablet, Take 1 tablet (4 mg total) by mouth once daily., Disp: 30 tablet, Rfl: 3    LORazepam (ATIVAN) 0.5 MG tablet, TAKE 1-2 TABLETS BY MOUTH NIGHTLY AS NEEDED, Disp: 20 tablet, Rfl: 4    methen-m.blue-s.phos-phsal-hyo (URIBEL) 118-10-40.8-36 mg Cap, Take 1 capsule by mouth 3 (three) times daily as needed., Disp: 20 capsule, Rfl: 11    metoprolol succinate (TOPROL-XL) 25 MG 24 hr tablet, Take 25 mg by mouth once daily.  , Disp: , Rfl:     mupirocin (BACTROBAN) 2 % ointment, Apply topically 3 (three) times daily., Disp: 30 g, Rfl: 12    oxybutynin (DITROPAN) 5 MG Tab, Take 1 tablet (5 mg total) by mouth 3 (three) times daily as needed (bladder spasms)., Disp: 30 tablet, Rfl: 3    pioglitazone (ACTOS) 15 MG tablet, , Disp: , Rfl:     rosuvastatin (CRESTOR) 40 MG Tab, 40 mg once daily. Take 1 tablet by mouth daily, Disp: , Rfl:     valsartan (DIOVAN) 160 MG tablet, TAKE 2 TABLETS BY MOUTH EVERY DAY, Disp: 60 tablet, Rfl: 2  No current facility-administered medications for this visit.     PHYSICAL EXAMINATION:  Physical Exam  Vitals signs and nursing note reviewed.   Constitutional:       Appearance: Normal appearance. He is normal weight.   HENT:      Head: Normocephalic.      Right Ear: External ear normal.      Left Ear: External ear normal.      Nose: Nose normal.   Eyes:      Conjunctiva/sclera: Conjunctivae normal.   Neck:      Musculoskeletal: Normal range of motion.   Cardiovascular:      Rate and Rhythm: Normal rate.   Pulmonary:      Effort: Pulmonary effort is normal. No respiratory distress.   Abdominal:      General: There is no distension.      Tenderness: There is no abdominal tenderness. There is no right CVA tenderness, left CVA tenderness or rebound.   Genitourinary:     Penis: Normal.       Scrotum/Testes: Normal.   Musculoskeletal: Normal range of motion.   Skin:     General: Skin is warm and dry.   Neurological:      General: No focal deficit present.      Mental  Status: He is alert and oriented to person, place, and time.   Psychiatric:         Mood and Affect: Mood normal.           LABS:      In office UA today was clear of bacteria and visible blood.      Lab Results   Component Value Date    PSA 0.67 12/14/2015    PSA 1.86 06/18/2013    PSADIAG 0.41 02/28/2020    PSADIAG 0.42 07/25/2018    PSADIAG 0.55 04/12/2017             IMPRESSION:    Encounter Diagnoses   Name Primary?    Malignant neoplasm of overlapping sites of bladder Yes     BCG 5 of 5    Assessment:       1. Malignant neoplasm of overlapping sites of bladder        Plan:         I spent 25 minutes with the patient of which more than half was spent in direct consultation with the patient in regards to our treatment and plan.    Education and recommendations of today's plan of care including home remedies.  BCG 5 of 5 was given using sterile and BCG precautions.  Tolerated well.  Post installation instructions.  Has nurse visit scheduled visit 10/13/2020 for Upap  Cysto with Dr. Sanchez 10/20/2020  We discussed his upcoming visits/cysto.  He was given appt sheets.

## 2020-10-13 ENCOUNTER — CLINICAL SUPPORT (OUTPATIENT)
Dept: UROLOGY | Facility: CLINIC | Age: 75
End: 2020-10-13
Payer: MEDICARE

## 2020-10-13 DIAGNOSIS — C68.9 UROTHELIAL CARCINOMA: ICD-10-CM

## 2020-10-13 PROCEDURE — 88112 CYTOPATH CELL ENHANCE TECH: CPT | Performed by: PATHOLOGY

## 2020-10-13 PROCEDURE — 88112 PR  CYTOPATH, CELL ENHANCE TECH: ICD-10-PCS | Mod: 26,,, | Performed by: PATHOLOGY

## 2020-10-13 PROCEDURE — 88112 CYTOPATH CELL ENHANCE TECH: CPT | Mod: 26,,, | Performed by: PATHOLOGY

## 2020-10-13 RX ORDER — FINASTERIDE 5 MG/1
5 TABLET, FILM COATED ORAL DAILY
Qty: 90 TABLET | Refills: 3 | Status: SHIPPED | OUTPATIENT
Start: 2020-10-13 | End: 2021-10-11

## 2020-10-13 NOTE — PROGRESS NOTES
Patient here for urine cytology. Specimen collected and sent to lab. Also requests Finasteride refill. Thanks!

## 2020-10-14 LAB — FINAL PATHOLOGIC DIAGNOSIS: NORMAL

## 2020-10-19 ENCOUNTER — PATIENT OUTREACH (OUTPATIENT)
Dept: ADMINISTRATIVE | Facility: OTHER | Age: 75
End: 2020-10-19

## 2020-10-19 NOTE — PROGRESS NOTES
Health Maintenance Due   Topic Date Due    Hepatitis C Screening  1945    Foot Exam  09/25/1955    TETANUS VACCINE  09/25/1963    Shingles Vaccine (1 of 2) 09/25/1995    Colorectal Cancer Screening  05/20/2019    Eye Exam  04/30/2020     Updates were requested from care everywhere.  Chart was reviewed for overdue Proactive [facility] Encounters ([facility]) topics (CRS, Breast Cancer Screening, Eye exam)  Health Maintenance has been updated.  LINKS immunization registry triggered.  Immunizations were reconciled.

## 2020-10-20 ENCOUNTER — PROCEDURE VISIT (OUTPATIENT)
Dept: UROLOGY | Facility: CLINIC | Age: 75
End: 2020-10-20
Payer: MEDICARE

## 2020-10-20 VITALS
BODY MASS INDEX: 28.62 KG/M2 | SYSTOLIC BLOOD PRESSURE: 159 MMHG | HEART RATE: 77 BPM | WEIGHT: 199.94 LBS | HEIGHT: 70 IN | DIASTOLIC BLOOD PRESSURE: 91 MMHG

## 2020-10-20 DIAGNOSIS — C68.9 UROTHELIAL CARCINOMA: Primary | ICD-10-CM

## 2020-10-20 DIAGNOSIS — R82.71 BACTERIURIA: ICD-10-CM

## 2020-10-20 LAB
BILIRUB SERPL-MCNC: NEGATIVE MG/DL
BLOOD URINE, POC: NORMAL
CLARITY, POC UA: CLEAR
COLOR, POC UA: YELLOW
GLUCOSE UR QL STRIP: 50
KETONES UR QL STRIP: NEGATIVE
LEUKOCYTE ESTERASE URINE, POC: NEGATIVE
NITRITE, POC UA: NEGATIVE
PH, POC UA: 5
PROTEIN, POC: NORMAL
SPECIFIC GRAVITY, POC UA: 1.02
UROBILINOGEN, POC UA: NORMAL

## 2020-10-20 PROCEDURE — 52000 CYSTOURETHROSCOPY: CPT | Mod: S$GLB,,, | Performed by: UROLOGY

## 2020-10-20 PROCEDURE — 52000 CYSTOSCOPY: ICD-10-PCS | Mod: S$GLB,,, | Performed by: UROLOGY

## 2020-10-20 PROCEDURE — 81002 POCT URINE DIPSTICK WITHOUT MICROSCOPE: ICD-10-PCS | Mod: S$GLB,,, | Performed by: UROLOGY

## 2020-10-20 PROCEDURE — 87086 URINE CULTURE/COLONY COUNT: CPT

## 2020-10-20 PROCEDURE — 81002 URINALYSIS NONAUTO W/O SCOPE: CPT | Mod: S$GLB,,, | Performed by: UROLOGY

## 2020-10-20 RX ORDER — LIDOCAINE HYDROCHLORIDE 20 MG/ML
JELLY TOPICAL
Status: COMPLETED | OUTPATIENT
Start: 2020-10-20 | End: 2020-10-20

## 2020-10-20 RX ORDER — LIDOCAINE HYDROCHLORIDE 20 MG/ML
JELLY TOPICAL ONCE
Status: CANCELLED | OUTPATIENT
Start: 2020-10-20 | End: 2020-10-20

## 2020-10-20 RX ORDER — CEPHALEXIN 500 MG/1
500 CAPSULE ORAL
Status: COMPLETED | OUTPATIENT
Start: 2020-10-20 | End: 2020-10-20

## 2020-10-20 RX ADMIN — CEPHALEXIN 500 MG: 500 CAPSULE ORAL at 09:10

## 2020-10-20 RX ADMIN — LIDOCAINE HYDROCHLORIDE: 20 JELLY TOPICAL at 10:10

## 2020-10-20 NOTE — PROCEDURES
"Cystoscopy    Date/Time: 10/20/2020 10:39 AM  Performed by: Gelacio Sanchez MD  Authorized by: Gelacio Sanchez MD     Consent Done?:  Yes (Written)  Time out: Immediately prior to procedure a "time out" was called to verify the correct patient, procedure, equipment, support staff and site/side marked as required.    Indications: history bladder cancer    Position:  Supine  Anesthesia:  Lidocaine jelly  Preparation: Patient was prepped and draped in usual sterile fashion      Scope type:  Flexible cystoscope  External exam normal: Yes    Urethra normal: Yes  Bladder neck normal: Bladder neck normal   Bladder normal: No         Number of tumors:  1       Tumor 1:          Size (mm):  25         Anatomy:  Sessile         Location:  [address]    Patient tolerance:  Patient tolerated the procedure well with no immediate complications     Cytology negative    High-grade Ta urothelial cancer of the bladder with recurrence status post induction BCG    Schedule blue light cystoscopy with TURBT and bilateral retrograde pyelograms  Hold Plavix 1 week preop if okay with cardiology, Dr Noah Acevedo  Okay to continue taking aspirin      "

## 2020-10-21 LAB — BACTERIA UR CULT: NORMAL

## 2020-10-25 ENCOUNTER — CLINICAL SUPPORT (OUTPATIENT)
Dept: URGENT CARE | Facility: CLINIC | Age: 75
End: 2020-10-25
Payer: MEDICARE

## 2020-10-25 DIAGNOSIS — C68.9 UROTHELIAL CARCINOMA: ICD-10-CM

## 2020-10-25 PROCEDURE — U0003 INFECTIOUS AGENT DETECTION BY NUCLEIC ACID (DNA OR RNA); SEVERE ACUTE RESPIRATORY SYNDROME CORONAVIRUS 2 (SARS-COV-2) (CORONAVIRUS DISEASE [COVID-19]), AMPLIFIED PROBE TECHNIQUE, MAKING USE OF HIGH THROUGHPUT TECHNOLOGIES AS DESCRIBED BY CMS-2020-01-R: HCPCS

## 2020-10-25 PROCEDURE — 99211 PR OFFICE/OUTPT VISIT, EST, LEVL I: ICD-10-PCS | Mod: S$GLB,,, | Performed by: INTERNAL MEDICINE

## 2020-10-25 PROCEDURE — 99211 OFF/OP EST MAY X REQ PHY/QHP: CPT | Mod: S$GLB,,, | Performed by: INTERNAL MEDICINE

## 2020-10-26 LAB — SARS-COV-2 RNA RESP QL NAA+PROBE: NOT DETECTED

## 2020-10-27 ENCOUNTER — TELEPHONE (OUTPATIENT)
Dept: UROLOGY | Facility: CLINIC | Age: 75
End: 2020-10-27

## 2020-10-27 ENCOUNTER — NURSE TRIAGE (OUTPATIENT)
Dept: ADMINISTRATIVE | Facility: CLINIC | Age: 75
End: 2020-10-27

## 2020-10-27 ENCOUNTER — ANESTHESIA EVENT (OUTPATIENT)
Dept: SURGERY | Facility: OTHER | Age: 75
End: 2020-10-27
Payer: MEDICARE

## 2020-10-27 ENCOUNTER — HOSPITAL ENCOUNTER (OUTPATIENT)
Dept: PREADMISSION TESTING | Facility: OTHER | Age: 75
Discharge: HOME | End: 2020-10-27
Attending: UROLOGY
Payer: MEDICARE

## 2020-10-27 VITALS
BODY MASS INDEX: 27.92 KG/M2 | TEMPERATURE: 97 F | RESPIRATION RATE: 16 BRPM | SYSTOLIC BLOOD PRESSURE: 150 MMHG | WEIGHT: 195 LBS | HEART RATE: 72 BPM | DIASTOLIC BLOOD PRESSURE: 75 MMHG | HEIGHT: 70 IN | OXYGEN SATURATION: 98 %

## 2020-10-27 RX ORDER — FAMOTIDINE 20 MG/1
20 TABLET, FILM COATED ORAL
Status: CANCELLED | OUTPATIENT
Start: 2020-10-27 | End: 2020-10-27

## 2020-10-27 RX ORDER — SODIUM CHLORIDE, SODIUM LACTATE, POTASSIUM CHLORIDE, CALCIUM CHLORIDE 600; 310; 30; 20 MG/100ML; MG/100ML; MG/100ML; MG/100ML
INJECTION, SOLUTION INTRAVENOUS CONTINUOUS
Status: CANCELLED | OUTPATIENT
Start: 2020-10-27

## 2020-10-27 RX ORDER — PREGABALIN 50 MG/1
50 CAPSULE ORAL ONCE
Status: CANCELLED | OUTPATIENT
Start: 2020-10-27 | End: 2020-10-27

## 2020-10-27 RX ORDER — LIDOCAINE HYDROCHLORIDE 10 MG/ML
0.5 INJECTION, SOLUTION EPIDURAL; INFILTRATION; INTRACAUDAL; PERINEURAL ONCE
Status: CANCELLED | OUTPATIENT
Start: 2020-10-27 | End: 2020-10-27

## 2020-10-27 RX ORDER — ACETAMINOPHEN 500 MG
1000 TABLET ORAL
Status: CANCELLED | OUTPATIENT
Start: 2020-10-27 | End: 2020-10-27

## 2020-10-27 NOTE — DISCHARGE INSTRUCTIONS
Information to Prepare you for your Surgery    PRE-ADMIT TESTING -  [phone]    [address]  [place] [facility]          Your surgery has been scheduled at [facility]. We are pleased to have the opportunity to serve you. For Further Information please call [phone].    On the day of surgery please report to the Information Desk on the 1st floor.    · CONTACT YOUR PHYSICIAN'S OFFICE THE DAY PRIOR TO YOUR SURGERY TO OBTAIN YOUR ARRIVAL TIME.     · The evening before surgery do not eat anything after 9 p.m. ( this includes hard candy, chewing gum and mints).  You may only have GATORADE, POWERADE AND WATER  from 9 p.m. until you leave your home.   DO NOT DRINK ANY LIQUIDS ON THE WAY TO THE HOSPITAL.      SPECIAL MEDICATION INSTRUCTIONS: TAKE medications checked off by the Anesthesiologist on your Medication List.    Angiogram Patients: Take medications as instructed by your physician, including aspirin.     Surgery Patients:    If you take ASPIRIN - Your PHYSICIAN/SURGEON will need to inform you IF/OR when you need to stop taking aspirin prior to your surgery.     Do Not take any medications containing IBUPROFEN.  Do Not Wear any make-up or dark nail polish   (especially eye make-up) to surgery. If you come to surgery with makeup on you will be required to remove the makeup or nail polish.    Do not shave your surgical area at least 5 days prior to your surgery. The surgical prep will be performed at the hospital according to Infection Control regulations.    Leave all valuables at home.   Do Not wear any jewelry or watches, including any metal in body piercings. Jewelry must be removed prior to coming to the hospital.  There is a possibility that rings that are unable to be removed may be cut off if they are on the surgical extremity.    Contact Lens must be removed before surgery. Either do not wear the contact lens or bring a case and solution for  storage.  Please bring a container for eyeglasses or dentures as required.  Bring any paperwork your physician has provided, such as consent forms,  history and physicals, doctor's orders, etc.   Bring comfortable clothes that are loose fitting to wear upon discharge. Take into consideration the type of surgery being performed.  Maintain your diet as advised per your physician the day prior to surgery.      Adequate rest the night before surgery is advised.   Park in the Parking lot behind the hospital or in the [facility] across the street from the parking lot. Parking is complimentary.  If you will be discharged the same day as your procedure, please arrange for a responsible adult to drive you home or to accompany you if traveling by taxi.   YOU WILL NOT BE PERMITTED TO DRIVE OR TO LEAVE THE HOSPITAL ALONE AFTER SURGERY.   If you are being discharged the same day, it is strongly recommended that you arrange for someone to remain with you for the first 24 hrs following your surgery.    The Surgeon will speak to your family/visitor after your surgery regarding the outcome of your surgery and post op care.  The Surgeon may speak to you after your surgery, but there is a possibility you may not remember the details.  Please check with your family members regarding the conversation with the Surgeon.    We strongly recommend whoever is bringing you home be present for discharge instructions.  This will ensure a thorough understanding for your post op home care.    ALL CHILDREN MUST ALWAYS BE ACCOMPANIED BY AN ADULT.    Visitors-Refer to current Visitor policy handouts.    Thank you for your cooperation.  The Staff of [facility].                Bathing Instructions with Hibiclens     Shower the evening before and morning of your procedure with Hibiclens:   Wash your face with water and your regular face wash/soap   Apply Hibiclens directly on your skin or on a wet washcloth and wash  gently. When showering: Move away from the shower stream when applying Hibiclens to avoid rinsing off too soon.   Rinse thoroughly with warm water   Do not dilute Hibiclens         Dry off as usual, do not use any deodorant, powder, body lotions, perfume, after shave or cologne.

## 2020-10-27 NOTE — ANESTHESIA PREPROCEDURE EVALUATION
10/27/2020  Ruben Macedo Jr. is a 75 y.o., male.    Anesthesia Evaluation    I have reviewed the Patient Summary Reports.    I have reviewed the Nursing Notes. I have reviewed the NPO Status.   I have reviewed the Medications.     Review of Systems  Anesthesia Hx:  No problems with previous Anesthesia  History of prior surgery of interest to airway management or planning: Previous anesthesia: General 7/20 TURBT with general anesthesia.  Airway issues documented on chart review include GETA, videolaryngoscope used  Denies Family Hx of Anesthesia complications.    Social:  Non-Smoker    Hematology/Oncology:     Oncology Normal    -- Anemia (borderline):   EENT/Dental:EENT/Dental Normal   Cardiovascular:   Hypertension Past MI CAD asymptomatic CABG/stent (2005)  ECG has been reviewed. Dr. Acevedo, sees pt yearly, last EST 3 yrs ago, neg   Pulmonary:  Pulmonary Normal    Renal/:   Chronic Renal Disease (very mild), CRI renal calculi BPH    Hepatic/GI:  Hepatic/GI Normal    Musculoskeletal:  Musculoskeletal Normal    Neurological:   CVA (2005), no residual symptoms    Endocrine:   Diabetes, poorly controlled, type 2    Dermatological:  Skin Normal    Psych:   Psychiatric History anxiety          Physical Exam  General:  Well nourished    Airway/Jaw/Neck:  Jaw/Neck Findings: Neck ROM: Normal ROM      Dental:  Dental Findings: In tact, Molar caps        Mental Status:  Mental Status Findings:  Cooperative, Alert and Oriented         Anesthesia Plan  Type of Anesthesia, risks & benefits discussed:  Anesthesia Type:  general  Patient's Preference:   Intra-op Monitoring Plan: standard ASA monitors  Intra-op Monitoring Plan Comments:   Post Op Pain Control Plan: multimodal analgesia and per primary service following discharge from PACU  Post Op Pain Control Plan Comments:   Induction:   IV  Beta Blocker:          Informed Consent: Patient understands risks and agrees with Anesthesia plan.  Questions answered. Anesthesia consent signed with patient.  ASA Score: 3     Day of Surgery Review of History & Physical:    H&P update referred to the surgeon.     Anesthesia Plan Notes: Pt known to ACS. No new labs. Off plavix one week            Ready For Surgery From Anesthesia Perspective.

## 2020-10-28 RX ORDER — VALSARTAN 160 MG/1
TABLET ORAL
Qty: 60 TABLET | Refills: 12 | Status: SHIPPED | OUTPATIENT
Start: 2020-10-28 | End: 2020-12-02 | Stop reason: SDUPTHER
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
## 2020-10-29 ENCOUNTER — TELEPHONE (OUTPATIENT)
Dept: UROLOGY | Facility: CLINIC | Age: 75
End: 2020-10-29

## 2020-10-29 DIAGNOSIS — C68.9 UROTHELIAL CARCINOMA: Primary | ICD-10-CM

## 2020-10-29 NOTE — TELEPHONE ENCOUNTER
SPOKE TO THE PATIENT SPOUSE WHEN CAN HE GET BACK ON HIS PLAVIX.WHEN WOULD YOU LIKE TO RESCHEDULE THE SURGERY,

## 2020-10-29 NOTE — TELEPHONE ENCOUNTER
----- Message from Yael Ch sent at 10/29/2020 12:29 PM CDT -----  Contact: Wife Sofia  Patients wife is requesting a call back to get his procedure rescheduled that they had to cancel.  Call back at [phone].   Thanks

## 2020-11-09 ENCOUNTER — LAB VISIT (OUTPATIENT)
Dept: URGENT CARE | Facility: CLINIC | Age: 75
End: 2020-11-09
Payer: MEDICARE

## 2020-11-09 DIAGNOSIS — U07.1 COVID-19: Primary | ICD-10-CM

## 2020-11-09 DIAGNOSIS — C68.9 UROTHELIAL CARCINOMA: ICD-10-CM

## 2020-11-09 DIAGNOSIS — Z01.818 PRE-OP TESTING: ICD-10-CM

## 2020-11-09 PROCEDURE — 99211 OFF/OP EST MAY X REQ PHY/QHP: CPT | Mod: S$GLB,,, | Performed by: NURSE PRACTITIONER

## 2020-11-09 PROCEDURE — U0003 INFECTIOUS AGENT DETECTION BY NUCLEIC ACID (DNA OR RNA); SEVERE ACUTE RESPIRATORY SYNDROME CORONAVIRUS 2 (SARS-COV-2) (CORONAVIRUS DISEASE [COVID-19]), AMPLIFIED PROBE TECHNIQUE, MAKING USE OF HIGH THROUGHPUT TECHNOLOGIES AS DESCRIBED BY CMS-2020-01-R: HCPCS

## 2020-11-09 PROCEDURE — 99211 PR OFFICE/OUTPT VISIT, EST, LEVL I: ICD-10-PCS | Mod: S$GLB,,, | Performed by: NURSE PRACTITIONER

## 2020-11-10 ENCOUNTER — TELEPHONE (OUTPATIENT)
Dept: UROLOGY | Facility: CLINIC | Age: 75
End: 2020-11-10

## 2020-11-10 NOTE — TELEPHONE ENCOUNTER
----- Message from Anisha Kahn MA sent at 11/10/2020  2:34 PM CST -----  Regarding: FW: appointment    ----- Message -----  From: Viky Guzman  Sent: 11/10/2020   2:20 PM CST  To: [facility] Staff  Subject: appointment                                      Name of Who is Calling: Mrs. Macedo, spouse wife What is the request in detail: is requesting to have a call back in reference to the procedure on the 12th. Can the clinic reply by [facility]: No What Number to Call Back if not in [facility]: [phone]

## 2020-11-11 ENCOUNTER — TELEPHONE (OUTPATIENT)
Dept: UROLOGY | Facility: CLINIC | Age: 75
End: 2020-11-11

## 2020-11-11 LAB — SARS-COV-2 RNA RESP QL NAA+PROBE: NOT DETECTED

## 2020-11-11 NOTE — PROGRESS NOTES
Dr Sanchez forwarded these results to the patient via [facility].  He will discuss the results with the patient in person at the next appointment.  The patient was instructed to make an appointment if he does not already have one scheduled.

## 2020-11-12 ENCOUNTER — ANESTHESIA (OUTPATIENT)
Dept: SURGERY | Facility: OTHER | Age: 75
End: 2020-11-12
Payer: MEDICARE

## 2020-11-12 ENCOUNTER — HOSPITAL ENCOUNTER (OUTPATIENT)
Facility: OTHER | Age: 75
Discharge: HOME | End: 2020-11-13
Attending: UROLOGY | Admitting: UROLOGY
Payer: MEDICARE

## 2020-11-12 DIAGNOSIS — I10 ESSENTIAL HYPERTENSION: ICD-10-CM

## 2020-11-12 DIAGNOSIS — I25.10 CORONARY ARTERY DISEASE INVOLVING NATIVE CORONARY ARTERY OF NATIVE HEART WITHOUT ANGINA PECTORIS: ICD-10-CM

## 2020-11-12 DIAGNOSIS — C68.9 UROTHELIAL CARCINOMA: Primary | ICD-10-CM

## 2020-11-12 DIAGNOSIS — E11.9 TYPE 2 DIABETES MELLITUS WITHOUT COMPLICATION, WITHOUT LONG-TERM CURRENT USE OF INSULIN: ICD-10-CM

## 2020-11-12 LAB
POCT GLUCOSE: 130 MG/DL (ref 70–110)
POCT GLUCOSE: 162 MG/DL (ref 70–110)
POCT GLUCOSE: 163 MG/DL (ref 70–110)
POCT GLUCOSE: 267 MG/DL (ref 70–110)

## 2020-11-12 PROCEDURE — C1758 CATHETER, URETERAL: HCPCS | Performed by: UROLOGY

## 2020-11-12 PROCEDURE — 52234 CYSTOSCOPY AND TREATMENT: CPT | Mod: ,,, | Performed by: UROLOGY

## 2020-11-12 PROCEDURE — 99219 PR INITIAL OBSERVATION CARE,LEVL II: ICD-10-PCS | Mod: ,,, | Performed by: INTERNAL MEDICINE

## 2020-11-12 PROCEDURE — 82962 GLUCOSE BLOOD TEST: CPT | Performed by: UROLOGY

## 2020-11-12 PROCEDURE — 27000221 HC OXYGEN, UP TO 24 HOURS

## 2020-11-12 PROCEDURE — 25000003 PHARM REV CODE 250: Performed by: ANESTHESIOLOGY

## 2020-11-12 PROCEDURE — 25000003 PHARM REV CODE 250: Performed by: NURSE ANESTHETIST, CERTIFIED REGISTERED

## 2020-11-12 PROCEDURE — 63600175 PHARM REV CODE 636 W HCPCS: Performed by: NURSE ANESTHETIST, CERTIFIED REGISTERED

## 2020-11-12 PROCEDURE — 74420 PR  X-RAY RETROGRADE PYELOGRAM: ICD-10-PCS | Mod: 26,,, | Performed by: UROLOGY

## 2020-11-12 PROCEDURE — 99219 PR INITIAL OBSERVATION CARE,LEVL II: CPT | Mod: ,,, | Performed by: INTERNAL MEDICINE

## 2020-11-12 PROCEDURE — 88305 TISSUE EXAM BY PATHOLOGIST: ICD-10-PCS | Mod: 26,,, | Performed by: PATHOLOGY

## 2020-11-12 PROCEDURE — C1769 GUIDE WIRE: HCPCS | Performed by: UROLOGY

## 2020-11-12 PROCEDURE — 36000707: Performed by: UROLOGY

## 2020-11-12 PROCEDURE — 25000003 PHARM REV CODE 250: Performed by: UROLOGY

## 2020-11-12 PROCEDURE — 88305 TISSUE EXAM BY PATHOLOGIST: CPT | Performed by: PATHOLOGY

## 2020-11-12 PROCEDURE — 25500020 PHARM REV CODE 255: Performed by: UROLOGY

## 2020-11-12 PROCEDURE — 71000039 HC RECOVERY, EACH ADD'L HOUR: Performed by: UROLOGY

## 2020-11-12 PROCEDURE — 36000706: Performed by: UROLOGY

## 2020-11-12 PROCEDURE — 88305 TISSUE EXAM BY PATHOLOGIST: CPT | Mod: 26,,, | Performed by: PATHOLOGY

## 2020-11-12 PROCEDURE — 25000003 PHARM REV CODE 250: Performed by: STUDENT IN AN ORGANIZED HEALTH CARE EDUCATION/TRAINING PROGRAM

## 2020-11-12 PROCEDURE — 37000008 HC ANESTHESIA 1ST 15 MINUTES: Performed by: UROLOGY

## 2020-11-12 PROCEDURE — 63600175 PHARM REV CODE 636 W HCPCS: Performed by: UROLOGY

## 2020-11-12 PROCEDURE — 52234 PR CYSTOURETHROSCOPY,FULGUR 0.5-2 CM LESN: ICD-10-PCS | Mod: ,,, | Performed by: UROLOGY

## 2020-11-12 PROCEDURE — 71000033 HC RECOVERY, INTIAL HOUR: Performed by: UROLOGY

## 2020-11-12 PROCEDURE — 63600175 PHARM REV CODE 636 W HCPCS: Performed by: ANESTHESIOLOGY

## 2020-11-12 PROCEDURE — 25000003 PHARM REV CODE 250: Performed by: INTERNAL MEDICINE

## 2020-11-12 PROCEDURE — 94761 N-INVAS EAR/PLS OXIMETRY MLT: CPT

## 2020-11-12 PROCEDURE — 74420 UROGRAPHY RTRGR +-KUB: CPT | Mod: 26,,, | Performed by: UROLOGY

## 2020-11-12 PROCEDURE — 27201423 OPTIME MED/SURG SUP & DEVICES STERILE SUPPLY: Performed by: UROLOGY

## 2020-11-12 PROCEDURE — A9589 INSTI HEXAMINOLEVULINATE HCL: HCPCS | Performed by: UROLOGY

## 2020-11-12 PROCEDURE — 37000009 HC ANESTHESIA EA ADD 15 MINS: Performed by: UROLOGY

## 2020-11-12 RX ORDER — INSULIN ASPART 100 [IU]/ML
0-5 INJECTION, SOLUTION INTRAVENOUS; SUBCUTANEOUS
Status: DISCONTINUED | OUTPATIENT
Start: 2020-11-12 | End: 2020-11-13 | Stop reason: HOSPADM

## 2020-11-12 RX ORDER — SODIUM CHLORIDE 0.9 % (FLUSH) 0.9 %
3 SYRINGE (ML) INJECTION
Status: DISCONTINUED | OUTPATIENT
Start: 2020-11-12 | End: 2020-11-13 | Stop reason: HOSPADM

## 2020-11-12 RX ORDER — NEOSTIGMINE METHYLSULFATE 1 MG/ML
INJECTION, SOLUTION INTRAVENOUS
Status: DISCONTINUED | OUTPATIENT
Start: 2020-11-12 | End: 2020-11-12

## 2020-11-12 RX ORDER — METOPROLOL SUCCINATE 25 MG/1
25 TABLET, EXTENDED RELEASE ORAL DAILY
Status: DISCONTINUED | OUTPATIENT
Start: 2020-11-12 | End: 2020-11-13 | Stop reason: HOSPADM

## 2020-11-12 RX ORDER — MEPERIDINE HYDROCHLORIDE 25 MG/ML
12.5 INJECTION INTRAMUSCULAR; INTRAVENOUS; SUBCUTANEOUS ONCE AS NEEDED
Status: DISCONTINUED | OUTPATIENT
Start: 2020-11-12 | End: 2020-11-12 | Stop reason: HOSPADM

## 2020-11-12 RX ORDER — HYDROMORPHONE HYDROCHLORIDE 2 MG/ML
0.4 INJECTION, SOLUTION INTRAMUSCULAR; INTRAVENOUS; SUBCUTANEOUS EVERY 5 MIN PRN
Status: DISCONTINUED | OUTPATIENT
Start: 2020-11-12 | End: 2020-11-12 | Stop reason: HOSPADM

## 2020-11-12 RX ORDER — GLUCAGON 1 MG
1 KIT INJECTION
Status: DISCONTINUED | OUTPATIENT
Start: 2020-11-12 | End: 2020-11-12

## 2020-11-12 RX ORDER — GLIMEPIRIDE 2 MG/1
4 TABLET ORAL DAILY
Status: DISCONTINUED | OUTPATIENT
Start: 2020-11-12 | End: 2020-11-13 | Stop reason: HOSPADM

## 2020-11-12 RX ORDER — IBUPROFEN 200 MG
16 TABLET ORAL
Status: DISCONTINUED | OUTPATIENT
Start: 2020-11-12 | End: 2020-11-12

## 2020-11-12 RX ORDER — PHENAZOPYRIDINE HYDROCHLORIDE 100 MG/1
200 TABLET, FILM COATED ORAL
Status: DISCONTINUED | OUTPATIENT
Start: 2020-11-12 | End: 2020-11-13 | Stop reason: HOSPADM

## 2020-11-12 RX ORDER — ALLOPURINOL 300 MG/1
300 TABLET ORAL DAILY
Status: DISCONTINUED | OUTPATIENT
Start: 2020-11-12 | End: 2020-11-13 | Stop reason: HOSPADM

## 2020-11-12 RX ORDER — KETAMINE HCL IN 0.9 % NACL 50 MG/5 ML
SYRINGE (ML) INTRAVENOUS
Status: DISCONTINUED | OUTPATIENT
Start: 2020-11-12 | End: 2020-11-12

## 2020-11-12 RX ORDER — LIDOCAINE HYDROCHLORIDE 20 MG/ML
JELLY TOPICAL
Status: DISCONTINUED | OUTPATIENT
Start: 2020-11-12 | End: 2020-11-12 | Stop reason: HOSPADM

## 2020-11-12 RX ORDER — ROCURONIUM BROMIDE 10 MG/ML
INJECTION, SOLUTION INTRAVENOUS
Status: DISCONTINUED | OUTPATIENT
Start: 2020-11-12 | End: 2020-11-12

## 2020-11-12 RX ORDER — ACETAMINOPHEN 500 MG
1000 TABLET ORAL
Status: COMPLETED | OUTPATIENT
Start: 2020-11-12 | End: 2020-11-12

## 2020-11-12 RX ORDER — OXYBUTYNIN CHLORIDE 5 MG/1
5 TABLET ORAL 3 TIMES DAILY PRN
Status: DISCONTINUED | OUTPATIENT
Start: 2020-11-12 | End: 2020-11-13 | Stop reason: HOSPADM

## 2020-11-12 RX ORDER — PROPOFOL 10 MG/ML
VIAL (ML) INTRAVENOUS
Status: DISCONTINUED | OUTPATIENT
Start: 2020-11-12 | End: 2020-11-12

## 2020-11-12 RX ORDER — PREGABALIN 50 MG/1
50 CAPSULE ORAL ONCE
Status: COMPLETED | OUTPATIENT
Start: 2020-11-12 | End: 2020-11-12

## 2020-11-12 RX ORDER — PHENYLEPHRINE HYDROCHLORIDE 10 MG/ML
INJECTION INTRAVENOUS
Status: DISCONTINUED | OUTPATIENT
Start: 2020-11-12 | End: 2020-11-12

## 2020-11-12 RX ORDER — ACETAMINOPHEN 325 MG/1
650 TABLET ORAL EVERY 6 HOURS PRN
Status: DISCONTINUED | OUTPATIENT
Start: 2020-11-12 | End: 2020-11-13 | Stop reason: HOSPADM

## 2020-11-12 RX ORDER — IBUPROFEN 200 MG
16 TABLET ORAL
Status: DISCONTINUED | OUTPATIENT
Start: 2020-11-12 | End: 2020-11-13 | Stop reason: HOSPADM

## 2020-11-12 RX ORDER — IBUPROFEN 200 MG
24 TABLET ORAL
Status: DISCONTINUED | OUTPATIENT
Start: 2020-11-12 | End: 2020-11-12

## 2020-11-12 RX ORDER — GLUCAGON 1 MG
1 KIT INJECTION
Status: DISCONTINUED | OUTPATIENT
Start: 2020-11-12 | End: 2020-11-13 | Stop reason: HOSPADM

## 2020-11-12 RX ORDER — FINASTERIDE 5 MG/1
5 TABLET, FILM COATED ORAL DAILY
Status: DISCONTINUED | OUTPATIENT
Start: 2020-11-12 | End: 2020-11-13 | Stop reason: HOSPADM

## 2020-11-12 RX ORDER — ROSUVASTATIN CALCIUM 10 MG/1
40 TABLET, COATED ORAL NIGHTLY
Status: DISCONTINUED | OUTPATIENT
Start: 2020-11-12 | End: 2020-11-13 | Stop reason: HOSPADM

## 2020-11-12 RX ORDER — LIDOCAINE HYDROCHLORIDE 20 MG/ML
JELLY TOPICAL ONCE
Status: COMPLETED | OUTPATIENT
Start: 2020-11-12 | End: 2020-11-12

## 2020-11-12 RX ORDER — SODIUM CHLORIDE, SODIUM LACTATE, POTASSIUM CHLORIDE, CALCIUM CHLORIDE 600; 310; 30; 20 MG/100ML; MG/100ML; MG/100ML; MG/100ML
INJECTION, SOLUTION INTRAVENOUS CONTINUOUS
Status: DISCONTINUED | OUTPATIENT
Start: 2020-11-12 | End: 2020-11-12

## 2020-11-12 RX ORDER — CEFAZOLIN SODIUM 1 G/3ML
2 INJECTION, POWDER, FOR SOLUTION INTRAMUSCULAR; INTRAVENOUS
Status: COMPLETED | OUTPATIENT
Start: 2020-11-12 | End: 2020-11-12

## 2020-11-12 RX ORDER — FENTANYL CITRATE 50 UG/ML
INJECTION, SOLUTION INTRAMUSCULAR; INTRAVENOUS
Status: DISCONTINUED | OUTPATIENT
Start: 2020-11-12 | End: 2020-11-12

## 2020-11-12 RX ORDER — IBUPROFEN 200 MG
24 TABLET ORAL
Status: DISCONTINUED | OUTPATIENT
Start: 2020-11-12 | End: 2020-11-13 | Stop reason: HOSPADM

## 2020-11-12 RX ORDER — OXYCODONE HYDROCHLORIDE 5 MG/1
5 TABLET ORAL
Status: DISCONTINUED | OUTPATIENT
Start: 2020-11-12 | End: 2020-11-12 | Stop reason: HOSPADM

## 2020-11-12 RX ORDER — VALSARTAN 80 MG/1
320 TABLET ORAL DAILY
Status: DISCONTINUED | OUTPATIENT
Start: 2020-11-12 | End: 2020-11-13 | Stop reason: HOSPADM

## 2020-11-12 RX ORDER — FAMOTIDINE 20 MG/1
20 TABLET, FILM COATED ORAL
Status: COMPLETED | OUTPATIENT
Start: 2020-11-12 | End: 2020-11-12

## 2020-11-12 RX ORDER — ONDANSETRON 2 MG/ML
4 INJECTION INTRAMUSCULAR; INTRAVENOUS DAILY PRN
Status: DISCONTINUED | OUTPATIENT
Start: 2020-11-12 | End: 2020-11-12 | Stop reason: HOSPADM

## 2020-11-12 RX ORDER — ONDANSETRON 2 MG/ML
INJECTION INTRAMUSCULAR; INTRAVENOUS
Status: DISCONTINUED | OUTPATIENT
Start: 2020-11-12 | End: 2020-11-12

## 2020-11-12 RX ORDER — LIDOCAINE HCL/PF 100 MG/5ML
SYRINGE (ML) INTRAVENOUS
Status: DISCONTINUED | OUTPATIENT
Start: 2020-11-12 | End: 2020-11-12

## 2020-11-12 RX ORDER — LIDOCAINE HYDROCHLORIDE 10 MG/ML
0.5 INJECTION, SOLUTION EPIDURAL; INFILTRATION; INTRACAUDAL; PERINEURAL ONCE
Status: DISCONTINUED | OUTPATIENT
Start: 2020-11-12 | End: 2020-11-12 | Stop reason: HOSPADM

## 2020-11-12 RX ORDER — ASPIRIN 81 MG/1
81 TABLET ORAL DAILY
Status: DISCONTINUED | OUTPATIENT
Start: 2020-11-12 | End: 2020-11-13 | Stop reason: HOSPADM

## 2020-11-12 RX ADMIN — SODIUM CHLORIDE, SODIUM LACTATE, POTASSIUM CHLORIDE, AND CALCIUM CHLORIDE: 600; 310; 30; 20 INJECTION, SOLUTION INTRAVENOUS at 07:11

## 2020-11-12 RX ADMIN — CEFAZOLIN 2 G: 330 INJECTION, POWDER, FOR SOLUTION INTRAMUSCULAR; INTRAVENOUS at 07:11

## 2020-11-12 RX ADMIN — CARBOXYMETHYLCELLULOSE SODIUM 2 DROP: 2.5 SOLUTION/ DROPS OPHTHALMIC at 07:11

## 2020-11-12 RX ADMIN — PHENAZOPYRIDINE HYDROCHLORIDE 200 MG: 100 TABLET ORAL at 05:11

## 2020-11-12 RX ADMIN — PREGABALIN 50 MG: 50 CAPSULE ORAL at 05:11

## 2020-11-12 RX ADMIN — HEXAMINOLEVULINATE HYDROCHLORIDE 100 MG: KIT at 06:11

## 2020-11-12 RX ADMIN — ROCURONIUM BROMIDE 50 MG: 10 INJECTION, SOLUTION INTRAVENOUS at 07:11

## 2020-11-12 RX ADMIN — SODIUM CHLORIDE, SODIUM LACTATE, POTASSIUM CHLORIDE, AND CALCIUM CHLORIDE: 600; 310; 30; 20 INJECTION, SOLUTION INTRAVENOUS at 08:11

## 2020-11-12 RX ADMIN — HYDROMORPHONE HYDROCHLORIDE 0.4 MG: 2 INJECTION, SOLUTION INTRAMUSCULAR; INTRAVENOUS; SUBCUTANEOUS at 09:11

## 2020-11-12 RX ADMIN — OXYBUTYNIN CHLORIDE 5 MG: 5 TABLET ORAL at 12:11

## 2020-11-12 RX ADMIN — ROSUVASTATIN CALCIUM 40 MG: 10 TABLET, FILM COATED ORAL at 08:11

## 2020-11-12 RX ADMIN — PHENYLEPHRINE HYDROCHLORIDE 100 MCG: 10 INJECTION INTRAVENOUS at 07:11

## 2020-11-12 RX ADMIN — VALSARTAN 320 MG: 80 TABLET, FILM COATED ORAL at 11:11

## 2020-11-12 RX ADMIN — ACETAMINOPHEN 650 MG: 325 TABLET, FILM COATED ORAL at 07:11

## 2020-11-12 RX ADMIN — ONDANSETRON HYDROCHLORIDE 4 MG: 2 INJECTION INTRAMUSCULAR; INTRAVENOUS at 08:11

## 2020-11-12 RX ADMIN — PHENYLEPHRINE HYDROCHLORIDE 100 MCG: 10 INJECTION INTRAVENOUS at 08:11

## 2020-11-12 RX ADMIN — FAMOTIDINE 20 MG: 20 TABLET ORAL at 05:11

## 2020-11-12 RX ADMIN — PHENYLEPHRINE HYDROCHLORIDE 200 MCG: 10 INJECTION INTRAVENOUS at 07:11

## 2020-11-12 RX ADMIN — PROPOFOL 200 MG: 10 INJECTION, EMULSION INTRAVENOUS at 07:11

## 2020-11-12 RX ADMIN — GLYCOPYRROLATE 0.6 MG: 0.2 INJECTION, SOLUTION INTRAMUSCULAR; INTRAVITREAL at 08:11

## 2020-11-12 RX ADMIN — OXYCODONE 5 MG: 5 TABLET ORAL at 09:11

## 2020-11-12 RX ADMIN — ASPIRIN 81 MG: 81 TABLET, COATED ORAL at 11:11

## 2020-11-12 RX ADMIN — LIDOCAINE HYDROCHLORIDE 40 MG: 20 INJECTION, SOLUTION INTRAVENOUS at 07:11

## 2020-11-12 RX ADMIN — GLYCOPYRROLATE 0.2 MG: 0.2 INJECTION, SOLUTION INTRAMUSCULAR; INTRAVITREAL at 07:11

## 2020-11-12 RX ADMIN — ALLOPURINOL 300 MG: 300 TABLET ORAL at 11:11

## 2020-11-12 RX ADMIN — FENTANYL CITRATE 100 MCG: 50 INJECTION, SOLUTION INTRAMUSCULAR; INTRAVENOUS at 07:11

## 2020-11-12 RX ADMIN — Medication 30 MG: at 07:11

## 2020-11-12 RX ADMIN — GLIMEPIRIDE 4 MG: 2 TABLET ORAL at 11:11

## 2020-11-12 RX ADMIN — PHENAZOPYRIDINE HYDROCHLORIDE 200 MG: 100 TABLET ORAL at 11:11

## 2020-11-12 RX ADMIN — LIDOCAINE HYDROCHLORIDE 10 ML: 20 JELLY TOPICAL at 05:11

## 2020-11-12 RX ADMIN — ACETAMINOPHEN 1000 MG: 500 TABLET, FILM COATED ORAL at 05:11

## 2020-11-12 RX ADMIN — NEOSTIGMINE METHYLSULFATE 5 MG: 1 INJECTION INTRAVENOUS at 08:11

## 2020-11-12 NOTE — HPI
75-year-old male with past medical history of CAD status post stents, non-insulin-dependent diabetes mellitus, stroke, hypertension, Bladder cancer, status post TURBT , HAD TURBT WITH BLUE LIGHT CYSTOSCOPY, with removal of the papillary lesion,  BILATERAL RETROGRADE PYELOGRAM AND URETHRAL DILATION ON 11/12/20.  Hospital medicine services consulted for medical comanagement.  The patient currently complains of some discomfort in the lower abdomen but otherwise doing well.

## 2020-11-12 NOTE — ASSESSMENT & PLAN NOTE
Lab Results   Component Value Date    HGBA1C 6.5 (H) 08/31/2020     Patient on Amaryl 4 mg daily at home which was restarted  Monitor sugars  Continue diabetic diet

## 2020-11-12 NOTE — OP NOTE
[facility]  Surgery Department  Operative Note    SUMMARY     Date of Procedure: 11/12/2020     Procedure: Procedure(s) (LRB):  TURBT,WITH BLUE LIGHT CYSTOSCOPY AND CYSVIEW; bilateral retrograde pyelogram (Bilateral)  DILATION, URETHRA (N/A)     Surgeon(s) and Role:     * Gelacio Sanchez MD - Primary        Assistant: Amol Merida MD PGY2    Pre-Operative Diagnosis: Urothelial carcinoma [C68.9]  Low-grade pathologic TA urothelial cancer of the bladder status post induction BCG with recurrence of 3 months    Post-Operative Diagnosis: Post-Op Diagnosis Codes:     * Urothelial carcinoma [C68.9]  Pathology pending    Anesthesia: General    Complications: No    Estimated Blood Loss (EBL): * No values recorded between 11/12/2020  7:55 AM and 11/12/2020  8:51 AM *           Specimens:   Specimen (12h ago, onward)    None                  Condition: Good    Disposition: PACU - hemodynamically stable.    Attestation: I performed the procedure.    History:  75-year-old male with history of urothelial cancer of the bladder recently diagnosed.  He had a low-grade papillary urothelial cancer near the left ureteral orifice.  Upper tracts were negative.  This was resected in its entirety.  He had 5 does induction BCG which he tolerated well.  Three month cystoscopy in the office reveals a sessile 2 and 0.5 cm lesion on the left lateral wall.  He presents for cystoscopy retrograde pyelograms and TURBT.  Informed consent obtained    Procedure In Detail:  The patient is brought to cysto and undergoes general endotracheal anesthesia.  Sterile prep and drape was performed.  Preop antibiotics were administered and tense and SCDs were applied.  The patient's Plavix was held preoperatively with the permission of his cardiologist.  Cystoscopy was performed.  There was a stenotic area in the bulbar urethra.  A wire was passed through this in the cystoscope was advanced into the bladder with minimal resistance.   Cystoscopy was performed with 30 and 70 degree lenses.  Blue light and white light were utilized.  There was a papillary lesion again noted on the left lateral wall.  No other lesions were identified.  The lesion appeared smaller than 2 and 0.5 cm.  Rather appeared approximately 1/2 cm.  Bilateral retrograde pyelograms were performed.  There was no hydronephrosis.  There were no filling defects.  The ureters were normal in course and caliber.  Both sides drained well.  The cystoscope was removed and the urethral meatus was dilated to 28 Pakistani.  The 26 Pakistani bipolar resectoscope was advanced into the bladder.  The lesion was resected in its entirety.  There was no visible residual disease with white light nor blue light.  The chips were removed and sent for pathologic analysis.  Hemostasis was achieved.  There continued to be clear efflux from both ureteral orifices.  There was a very small amount of superficial fat visible through the resection bed but the muscle fibers of the bladder wall were intact and there was no perforation.  There was no bleeding at the end of the case with the bladder decompressed.  The scope was removed and a 24 Pakistani 2 way Elizabeth catheter was placed.  The patient tolerated the procedure well    He will be monitored overnight.  We plan to instill mitomycin tomorrow morning and perform a voiding trial later tomorrow

## 2020-11-12 NOTE — ANESTHESIA PROCEDURE NOTES
Intubation  Performed by: Yvonne Foreman CRNA  Authorized by: Martínez Portillo MD     Intubation:     Induction:  Intravenous    Intubated:  Postinduction    Mask Ventilation:  Easy with oral airway    Attempts:  1    Attempted By:  CRNA    Method of Intubation:  Video laryngoscopy    Blade:  Paredes 3    Laryngeal View Grade: Grade I - full view of chords      Difficult Airway Encountered?: No      Complications:  None    Airway Device:  Oral endotracheal tube    Airway Device Size:  7.5    Style/Cuff Inflation:  Cuffed (inflated to minimal occlusive pressure)    Inflation Amount (mL):  4    Tube secured:  21    Secured at:  The lips    Placement Verified By:  Capnometry    Findings Post-Intubation:  BS equal bilateral and atraumatic/condition of teeth unchanged

## 2020-11-12 NOTE — PLAN OF CARE
Urojet instilled into urethra    Straight catheterization performed under sterile technique    Cysview instilled at 0600     Catheter removed and perineal area cleaned with Obstetric towelette    Patient instructed to retain Cysview in bladder for at least 1 hour.  Call the nurse to get out of bed.

## 2020-11-12 NOTE — CONSULTS
[facility] Hospital Medicine  Consult Note    Patient Name: Ruben Macedo Jr.  MRN: [id number]  Admission Date: 11/12/2020  Hospital Length of Stay: 0 days  Attending Physician: Gelacio Sanchez MD   Primary Care Provider: Cedric Holder MD           Patient information was obtained from patient and ER records.     Consults  Subjective:     Principal Problem: Urothelial carcinoma    Chief Complaint: No chief complaint on file.       HPI: 75-year-old male with past medical history of CAD status post stents, non-insulin-dependent diabetes mellitus, stroke, hypertension, Bladder cancer, status post TURBT , HAD TURBT WITH BLUE LIGHT CYSTOSCOPY, with removal of the papillary lesion,  BILATERAL RETROGRADE PYELOGRAM AND URETHRAL DILATION ON 11/12/20.  Hospital medicine services consulted for medical comanagement.  The patient currently complains of some discomfort in the lower abdomen but otherwise doing well.    Past Medical History:   Diagnosis Date    Anticoagulant long-term use     plavix    Anxiety     BPH (benign prostatic hypertrophy) 2/2/2015    Cataract     Coronary artery disease 2005    stent x 1        Diabetes mellitus, type 2     History of colonic polyps 4/11/2017 2/13 --3 yrs    History of MI (myocardial infarction) 2005    Followed by cardiology, Dr. Acevedo (at [facility])    History of stroke November 2005    Hyperlipidemia     Hypertension     Incomplete bladder emptying 2/2/2015    Myocardial infarction 2006    Overweight(278.02)     Personal history of kidney stones     Renal cyst 2/10/2015    Stroke 2005    loss of peripheral vision       Past Surgical History:   Procedure Laterality Date    APPENDECTOMY      BACK SURGERY      BLADDER FULGURATION  5/11/2020    Procedure: FULGURATION, BLADDER;  Surgeon: Gelacio Sanchez MD;  Location: [facility];  Service: Urology;;    CATARACT EXTRACTION      od    CORONARY ANGIOPLASTY WITH STENT PLACEMENT   2000    CYSTOSCOPY  5/11/2020    Procedure: CYSTOSCOPY;  Surgeon: Gelacio Sanchez MD;  Location: [facility] OR;  Service: Urology;;    CYSTOSCOPY W/ URETERAL STENT PLACEMENT      stents placed x2    EYE SURGERY      cataract right eye    REMOVAL OF BLOOD CLOT  5/11/2020    Procedure: REMOVAL, BLOOD CLOT;  Surgeon: Gelacio Sanchez MD;  [facility];  Service: Urology;;    SPINE SURGERY      TONSILLECTOMY      TURBT, WITH BLUE LIGHT CYSTOSCOPY AND CYSVIEW N/A 7/9/2020    Procedure: TURBT,WITH BLUE LIGHT CYSTOSCOPY AND CYSVIEW;  Surgeon: Gelacio Sanchez MD;  Location[facility];  Service: Urology;  Laterality: N/A;       Review of patient's allergies indicates:   Allergen Reactions    Morphine Nausea And Vomiting     Can take Dilaudid, Demerol, Percocet & Norco    Metformin Itching    Tramadol Nausea And Vomiting    Morphine (pf) Nausea And Vomiting       No current facility-administered medications on file prior to encounter.      Current Outpatient Medications on File Prior to Encounter   Medication Sig    allopurinoL (ZYLOPRIM) 300 MG tablet TAKE ONE TABLET BY MOUTH EVERY DAY    aspirin (ECOTRIN) 81 MG EC tablet Take 81 mg by mouth once daily.    ezetimibe (ZETIA) 10 mg tablet once daily.     finasteride (PROSCAR) 5 mg tablet Take 1 tablet (5 mg total) by mouth once daily.    glimepiride (AMARYL) 4 MG tablet Take 1 tablet (4 mg total) by mouth once daily.    LORazepam (ATIVAN) 0.5 MG tablet TAKE 1-2 TABLETS BY MOUTH NIGHTLY AS NEEDED    metoprolol succinate (TOPROL-XL) 25 MG 24 hr tablet Take 25 mg by mouth once daily.      mupirocin (BACTROBAN) 2 % ointment Apply topically 3 (three) times daily.    rosuvastatin (CRESTOR) 40 MG Tab 40 mg once daily. Take 1 tablet by mouth daily    betamethasone valerate 0.1% (VALISONE) 0.1 % Crea     clopidogreL (PLAVIX) 75 mg tablet Take 75 mg by mouth once daily.    FLUAD QUAD 2020-21,65Y UP,,PF, 60 mcg (15 mcg x 4)/0.5 mL Syrg ADMINISTER 0.5ML IN THE MUSCLE  AS DIRECTED    [DISCONTINUED] clindamycin (CLEOCIN) 150 MG capsule Take 1 capsule (150 mg total) by mouth 3 (three) times daily.    [DISCONTINUED] methen-m.blue-s.phos-phsal-hyo (URIBEL) 118-10-40.8-36 mg Cap Take 1 capsule by mouth 3 (three) times daily as needed. (Patient not taking: Reported on 10/20/2020)    [DISCONTINUED] oxybutynin (DITROPAN) 5 MG Tab Take 1 tablet (5 mg total) by mouth 3 (three) times daily as needed (bladder spasms). (Patient not taking: Reported on 10/20/2020)    [DISCONTINUED] pioglitazone (ACTOS) 15 MG tablet      Family History     Problem Relation (Age of Onset)    Alzheimer's disease     Cancer Father    Cataracts Father, Mother    Hyperlipidemia Father, Sister        Tobacco Use    Smoking status: Never Smoker    Smokeless tobacco: Never Used   Substance and Sexual Activity    Alcohol use: Not Currently     Frequency: 2-4 times a month     Drinks per session: 1 or 2     Binge frequency: Never     Comment: occasional    Drug use: No    Sexual activity: Yes     Partners: Female     Review of Systems   Constitutional: Negative for chills and fever.   HENT: Negative for trouble swallowing.    Respiratory: Negative for cough and shortness of breath.    Cardiovascular: Negative for chest pain and leg swelling.   Gastrointestinal: Positive for abdominal pain. Negative for blood in stool, nausea and vomiting.   Genitourinary: Negative for dysuria and hematuria.   Musculoskeletal: Negative for gait problem.   Skin: Negative for rash.   Neurological: Negative for headaches.   Psychiatric/Behavioral: Negative for confusion.     Objective:     Vital Signs (Most Recent):  Temp: 97.7 °F (36.5 °C) (11/12/20 0946)  Pulse: 60 (11/12/20 0946)  Resp: 17 (11/12/20 0946)  BP: (!) 159/70 (11/12/20 0946)  SpO2: 99 % (11/12/20 0946) Vital Signs (24h Range):  Temp:  [97.4 °F (36.3 °C)-97.7 °F (36.5 °C)] 97.7 °F (36.5 °C)  Pulse:  [60-76] 60  Resp:  [16-18] 17  SpO2:  [93 %-100 %] 99 %  BP:  (148-159)/(56-87) 159/70     Weight: 88.5 kg (195 lb)  Body mass index is 27.98 kg/m².    Physical Exam  Vitals signs reviewed.   Constitutional:       General: He is not in acute distress.     Appearance: He is well-developed.   HENT:      Head: Normocephalic and atraumatic.   Eyes:      Extraocular Movements: Extraocular movements intact.      Pupils: Pupils are equal, round, and reactive to light.   Neck:      Musculoskeletal: Normal range of motion and neck supple.   Cardiovascular:      Rate and Rhythm: Normal rate and regular rhythm.   Pulmonary:      Effort: Pulmonary effort is normal. No respiratory distress.      Breath sounds: Normal breath sounds.   Abdominal:      General: Bowel sounds are normal. There is no distension.      Palpations: Abdomen is soft.      Tenderness: There is no abdominal tenderness.      Comments: Mild supra pubic pain   Musculoskeletal: Normal range of motion.         General: No swelling.   Skin:     General: Skin is warm.      Findings: No rash.   Neurological:      General: No focal deficit present.      Mental Status: He is alert and oriented to person, place, and time.   Psychiatric:         Behavior: Behavior normal.         Significant Labs: All pertinent labs within the past 24 hours have been reviewed.    Significant Imaging: I have reviewed all pertinent imaging results/findings within the past 24 hours.    Assessment/Plan:     * Urothelial carcinoma  Plan per primary      Type 2 diabetes mellitus without complication, without long-term current use of insulin  Lab Results   Component Value Date    HGBA1C 6.5 (H) 08/31/2020     Patient on Amaryl 4 mg daily at home which was restarted  Monitor sugars  Continue diabetic diet      HTN (hypertension)  Continue valsartan 320 mg daily and monitor blood pressure      CAD (coronary artery disease)  History of stent  Follows with Dr. Acevedo at [facility]  Patient on aspirin and Plavix at home  Plavix has been on  hold for about a week for the procedure  Continue aspirin, statin,toprol        VTE Risk Mitigation (From admission, onward)         Ordered     IP VTE LOW RISK PATIENT  Once      11/12/20 1059     Place ITALIA hose  Until discontinued      11/12/20 1059     Place sequential compression device  Until discontinued      11/12/20 1059                    Thank you for your consult. I will follow-up with patient. Please contact us if you have any additional questions.    Alma Delia Dunaway MD  Department of Hospital Medicine   [facility]

## 2020-11-12 NOTE — SUBJECTIVE & OBJECTIVE
Past Medical History:   Diagnosis Date    Anticoagulant long-term use     plavix    Anxiety     BPH (benign prostatic hypertrophy) 2/2/2015    Cataract     Coronary artery disease 2005    stent x 1        Diabetes mellitus, type 2     History of colonic polyps 4/11/2017 2/13 --3 yrs    History of MI (myocardial infarction) 2005    Followed by cardiology, Dr. Acevedo (at [facility])    History of stroke November 2005    Hyperlipidemia     Hypertension     Incomplete bladder emptying 2/2/2015    Myocardial infarction 2006    Overweight(278.02)     Personal history of kidney stones     Renal cyst 2/10/2015    Stroke 2005    loss of peripheral vision       Past Surgical History:   Procedure Laterality Date    APPENDECTOMY      BACK SURGERY      BLADDER FULGURATION  5/11/2020    Procedure: FULGURATION, BLADDER;  Surgeon: Gelacio Sanchez MD;  Location: [facility];  Service: Urology;;    CATARACT EXTRACTION      od    CORONARY ANGIOPLASTY WITH STENT PLACEMENT  2000    CYSTOSCOPY  5/11/2020    Procedure: CYSTOSCOPY;  Surgeon: Gelacio Sanchez MD;  Location: [facility];  Service: Urology;;    CYSTOSCOPY W/ URETERAL STENT PLACEMENT      stents placed x2    EYE SURGERY      cataract right eye    REMOVAL OF BLOOD CLOT  5/11/2020    Procedure: REMOVAL, BLOOD CLOT;  Surgeon: Gelacio Sanchez MD;  Location: [facility];  Service: Urology;;    SPINE SURGERY      TONSILLECTOMY      TURBT, WITH BLUE LIGHT CYSTOSCOPY AND CYSVIEW N/A 7/9/2020    Procedure: TURBT,WITH BLUE LIGHT CYSTOSCOPY AND CYSVIEW;  Surgeon: Gelacio Sanchez MD;  Location: [facility];  Service: Urology;  Laterality: N/A;       Review of patient's allergies indicates:   Allergen Reactions    Morphine Nausea And Vomiting     Can take Dilaudid, Demerol, Percocet & Norco    Metformin Itching    Tramadol Nausea And Vomiting    Morphine (pf) Nausea And Vomiting       No current facility-administered medications on file prior to  encounter.      Current Outpatient Medications on File Prior to Encounter   Medication Sig    allopurinoL (ZYLOPRIM) 300 MG tablet TAKE ONE TABLET BY MOUTH EVERY DAY    aspirin (ECOTRIN) 81 MG EC tablet Take 81 mg by mouth once daily.    ezetimibe (ZETIA) 10 mg tablet once daily.     finasteride (PROSCAR) 5 mg tablet Take 1 tablet (5 mg total) by mouth once daily.    glimepiride (AMARYL) 4 MG tablet Take 1 tablet (4 mg total) by mouth once daily.    LORazepam (ATIVAN) 0.5 MG tablet TAKE 1-2 TABLETS BY MOUTH NIGHTLY AS NEEDED    metoprolol succinate (TOPROL-XL) 25 MG 24 hr tablet Take 25 mg by mouth once daily.      mupirocin (BACTROBAN) 2 % ointment Apply topically 3 (three) times daily.    rosuvastatin (CRESTOR) 40 MG Tab 40 mg once daily. Take 1 tablet by mouth daily    betamethasone valerate 0.1% (VALISONE) 0.1 % Crea     clopidogreL (PLAVIX) 75 mg tablet Take 75 mg by mouth once daily.    FLUAD QUAD 2020-21,65Y UP,,PF, 60 mcg (15 mcg x 4)/0.5 mL Syrg ADMINISTER 0.5ML IN THE MUSCLE AS DIRECTED    [DISCONTINUED] clindamycin (CLEOCIN) 150 MG capsule Take 1 capsule (150 mg total) by mouth 3 (three) times daily.    [DISCONTINUED] methen-m.blue-s.phos-phsal-hyo (URIBEL) 118-10-40.8-36 mg Cap Take 1 capsule by mouth 3 (three) times daily as needed. (Patient not taking: Reported on 10/20/2020)    [DISCONTINUED] oxybutynin (DITROPAN) 5 MG Tab Take 1 tablet (5 mg total) by mouth 3 (three) times daily as needed (bladder spasms). (Patient not taking: Reported on 10/20/2020)    [DISCONTINUED] pioglitazone (ACTOS) 15 MG tablet      Family History     Problem Relation (Age of Onset)    Alzheimer's disease     Cancer Father    Cataracts Father, Mother    Hyperlipidemia Father, Sister        Tobacco Use    Smoking status: Never Smoker    Smokeless tobacco: Never Used   Substance and Sexual Activity    Alcohol use: Not Currently     Frequency: 2-4 times a month     Drinks per session: 1 or 2     Binge  frequency: Never     Comment: occasional    Drug use: No    Sexual activity: Yes     Partners: Female     Review of Systems   Constitutional: Negative for chills and fever.   HENT: Negative for trouble swallowing.    Respiratory: Negative for cough and shortness of breath.    Cardiovascular: Negative for chest pain and leg swelling.   Gastrointestinal: Positive for abdominal pain. Negative for blood in stool, nausea and vomiting.   Genitourinary: Negative for dysuria and hematuria.   Musculoskeletal: Negative for gait problem.   Skin: Negative for rash.   Neurological: Negative for headaches.   Psychiatric/Behavioral: Negative for confusion.     Objective:     Vital Signs (Most Recent):  Temp: 97.7 °F (36.5 °C) (11/12/20 0946)  Pulse: 60 (11/12/20 0946)  Resp: 17 (11/12/20 0946)  BP: (!) 159/70 (11/12/20 0946)  SpO2: 99 % (11/12/20 0946) Vital Signs (24h Range):  Temp:  [97.4 °F (36.3 °C)-97.7 °F (36.5 °C)] 97.7 °F (36.5 °C)  Pulse:  [60-76] 60  Resp:  [16-18] 17  SpO2:  [93 %-100 %] 99 %  BP: (148-159)/(56-87) 159/70     Weight: 88.5 kg (195 lb)  Body mass index is 27.98 kg/m².    Physical Exam  Vitals signs reviewed.   Constitutional:       General: He is not in acute distress.     Appearance: He is well-developed.   HENT:      Head: Normocephalic and atraumatic.   Eyes:      Extraocular Movements: Extraocular movements intact.      Pupils: Pupils are equal, round, and reactive to light.   Neck:      Musculoskeletal: Normal range of motion and neck supple.   Cardiovascular:      Rate and Rhythm: Normal rate and regular rhythm.   Pulmonary:      Effort: Pulmonary effort is normal. No respiratory distress.      Breath sounds: Normal breath sounds.   Abdominal:      General: Bowel sounds are normal. There is no distension.      Palpations: Abdomen is soft.      Tenderness: There is no abdominal tenderness.      Comments: Mild supra pubic pain   Musculoskeletal: Normal range of motion.         General: No swelling.    Skin:     General: Skin is warm.      Findings: No rash.   Neurological:      General: No focal deficit present.      Mental Status: He is alert and oriented to person, place, and time.   Psychiatric:         Behavior: Behavior normal.         Significant Labs: All pertinent labs within the past 24 hours have been reviewed.    Significant Imaging: I have reviewed all pertinent imaging results/findings within the past 24 hours.

## 2020-11-12 NOTE — ASSESSMENT & PLAN NOTE
History of stent  Follows with Dr. Acevedo at [facility]  Patient on aspirin and Plavix at home  Plavix has been on hold for about a week for the procedure  Continue aspirin, statin,toprol

## 2020-11-12 NOTE — H&P
CHIEF COMPLAINT:     Ruben Macedo Jr. is a 74 y.o. male with Bladder Cancer.     HISTORY OF PRESENTING ILLINESS:     Ruben Macedo Jr. is a 74 y.o. male with Bladder Cancer.  05/11/2020 initial TURBT with Dr. Gelacio Sanchez  -High-grade papillary urothelial carcinoma      S/p 2nd TURBT 07/09/2020 with Dr. Gelacio Sanchez:  -Papillary urothelial carcinoma, high-grade     08/12/2020 started induction BCG 1 of 5.     He is here today for dose 5 of 5  Tolerated dose 4.   He states has urgency (when it is time to go, I have to go).  No dysuria or bladder pain.   No gross hematuria.     He is here today for bluelight TURBT with retrograde pyelograms. Feeling well. Negative urine culture.         REVIEW OF SYSTEMS:  Review of Systems   Constitutional: Negative.  Negative for chills, diaphoresis and fever.   HENT: Negative for congestion and sore throat.    Eyes: Negative.  Negative for double vision.   Respiratory: Negative.  Negative for cough, shortness of breath and wheezing.    Cardiovascular: Negative.  Negative for chest pain, palpitations and leg swelling.   Gastrointestinal: Negative.  Negative for abdominal pain, blood in stool, constipation, diarrhea, nausea and vomiting.   Genitourinary: Negative.  Negative for dysuria, flank pain and hematuria.        Ok with urination  No visible blood     Musculoskeletal: Negative.  Negative for back pain, falls and neck pain.   Skin: Negative.  Negative for rash.   Neurological: Negative.  Negative for dizziness and seizures.   Endo/Heme/Allergies: Does not bruise/bleed easily.   Psychiatric/Behavioral: Negative.  Negative for depression. The patient is not nervous/anxious.             PATIENT HISTORY:          Past Medical History:   Diagnosis Date    Anticoagulant long-term use       plavix    Anxiety      BPH (benign prostatic hypertrophy) 2/2/2015    Cataract      Coronary artery disease 2005     stent x 1        Diabetes mellitus, type 2      History of colonic  polyps 4/11/2017 2/13 --3 yrs    History of MI (myocardial infarction) 2005     Followed by cardiology, Dr. Acevedo (at [facility])    History of stroke November 2005    Hyperlipidemia      Hypertension      Incomplete bladder emptying 2/2/2015    Myocardial infarction 2006    Overweight(278.02)      Personal history of kidney stones      Renal cyst 2/10/2015    Stroke 2005     loss of peripheral vision               Past Surgical History:   Procedure Laterality Date    APPENDECTOMY        BACK SURGERY        BLADDER FULGURATION   5/11/2020     Procedure: FULGURATION, BLADDER;  Surgeon: Gelacio Sanchez MD;  Location: [facility];  Service: Urology;;    CATARACT EXTRACTION         od    CORONARY ANGIOPLASTY WITH STENT PLACEMENT   2000    CYSTOSCOPY   5/11/2020     Procedure: CYSTOSCOPY;  Surgeon: Gelacio Sanchez MD;  Location: [facility];  Service: Urology;;    CYSTOSCOPY W/ URETERAL STENT PLACEMENT         stents placed x2    EYE SURGERY         cataract right eye    REMOVAL OF BLOOD CLOT   5/11/2020     Procedure: REMOVAL, BLOOD CLOT;  Surgeon: Gelacio Sanchez MD;  Location: [facility];  Service: Urology;;    SPINE SURGERY        TONSILLECTOMY        TURBT, WITH BLUE LIGHT CYSTOSCOPY AND CYSVIEW N/A 7/9/2020     Procedure: TURBT,WITH BLUE LIGHT CYSTOSCOPY AND CYSVIEW;  Surgeon: Gelacio Sanchez MD;  Location: [facility];  Service: Urology;  Laterality: N/A;               Family History   Problem Relation Age of Onset    Hyperlipidemia Father      Cancer Father      Cataracts Father      Cataracts Mother      Hyperlipidemia Sister      Alzheimer's disease Unknown      Diabetes Neg Hx      Amblyopia Neg Hx      Blindness Neg Hx      Glaucoma Neg Hx      Hypertension Neg Hx      Macular degeneration Neg Hx      Retinal detachment Neg Hx      Strabismus Neg Hx      Stroke Neg Hx      Thyroid disease Neg Hx           Social History               Socioeconomic History     Marital status:        Spouse name: Not on file    Number of children: 2    Years of education: Not on file    Highest education level: Not on file   Occupational History    Occupation: Retired - chemical industry   Social Needs    Financial resource strain: Not hard at all    Food insecurity       Worry: Never true       Inability: Never true    Transportation needs       Medical: No       Non-medical: No   Tobacco Use    Smoking status: Never Smoker    Smokeless tobacco: Never Used   Substance and Sexual Activity    Alcohol use: Not Currently       Frequency: 2-4 times a month       Drinks per session: 1 or 2       Binge frequency: Never       Comment: occasional    Drug use: No    Sexual activity: Yes       Partners: Female   Lifestyle    Physical activity       Days per week: 3 days       Minutes per session: 90 min    Stress: Not at all   Relationships    Social connections       Talks on phone: Once a week       Gets together: Once a week       Attends [employer] service: Not on file       Active member of club or organization: Yes       Attends meetings of clubs or organizations: More than 4 times per year       Relationship status:    Other Topics Concern    Not on file   Social History Narrative    Not on file           Allergies:  Morphine, Metformin, Tramadol, and Morphine (pf)     Medications:     Current Outpatient Medications:     allopurinoL (ZYLOPRIM) 300 MG tablet, TAKE ONE TABLET BY MOUTH EVERY DAY, Disp: 30 tablet, Rfl: 11    aspirin (ECOTRIN) 81 MG EC tablet, Take 81 mg by mouth once daily., Disp: , Rfl:     betamethasone valerate 0.1% (VALISONE) 0.1 % Crea, , Disp: , Rfl:     clindamycin (CLEOCIN) 150 MG capsule, Take 1 capsule (150 mg total) by mouth 3 (three) times daily., Disp: 30 capsule, Rfl: 3    clopidogreL (PLAVIX) 75 mg tablet, Take 75 mg by mouth once daily., Disp: , Rfl:     ezetimibe (ZETIA) 10 mg tablet, once daily. , Disp: , Rfl:     finasteride  (PROSCAR) 5 mg tablet, TAKE ONE TABLET BY MOUTH EVERY DAY, Disp: 90 tablet, Rfl: 3    FLUAD QUAD 2020-21,65Y UP,,PF, 60 mcg (15 mcg x 4)/0.5 mL Syrg, ADMINISTER 0.5ML IN THE MUSCLE AS DIRECTED, Disp: , Rfl:     glimepiride (AMARYL) 4 MG tablet, Take 1 tablet (4 mg total) by mouth once daily., Disp: 30 tablet, Rfl: 3    LORazepam (ATIVAN) 0.5 MG tablet, TAKE 1-2 TABLETS BY MOUTH NIGHTLY AS NEEDED, Disp: 20 tablet, Rfl: 4    sebastian-m.blue-s.phos-phsal-hyo (URIBEL) 118-10-40.8-36 mg Cap, Take 1 capsule by mouth 3 (three) times daily as needed., Disp: 20 capsule, Rfl: 11    metoprolol succinate (TOPROL-XL) 25 MG 24 hr tablet, Take 25 mg by mouth once daily.  , Disp: , Rfl:     mupirocin (BACTROBAN) 2 % ointment, Apply topically 3 (three) times daily., Disp: 30 g, Rfl: 12    oxybutynin (DITROPAN) 5 MG Tab, Take 1 tablet (5 mg total) by mouth 3 (three) times daily as needed (bladder spasms)., Disp: 30 tablet, Rfl: 3    pioglitazone (ACTOS) 15 MG tablet, , Disp: , Rfl:     rosuvastatin (CRESTOR) 40 MG Tab, 40 mg once daily. Take 1 tablet by mouth daily, Disp: , Rfl:     valsartan (DIOVAN) 160 MG tablet, TAKE 2 TABLETS BY MOUTH EVERY DAY, Disp: 60 tablet, Rfl: 2  No current facility-administered medications for this visit.      PHYSICAL EXAMINATION:  Physical Exam  Vitals signs and nursing note reviewed.   Constitutional:       Appearance: Normal appearance. He is normal weight.   HENT:      Head: Normocephalic.      Right Ear: External ear normal.      Left Ear: External ear normal.      Nose: Nose normal.   Eyes:      Conjunctiva/sclera: Conjunctivae normal.   Neck:      Musculoskeletal: Normal range of motion.   Cardiovascular:      Rate and Rhythm: Normal rate.   Pulmonary:      Effort: Pulmonary effort is normal. No respiratory distress.   Abdominal:      General: There is no distension.      Tenderness: There is no abdominal tenderness. There is no right CVA tenderness, left CVA tenderness or rebound.    Genitourinary:     Penis: Normal.       Scrotum/Testes: Normal.   Musculoskeletal: Normal range of motion.   Skin:     General: Skin is warm and dry.   Neurological:      General: No focal deficit present.      Mental Status: He is alert and oriented to person, place, and time.   Psychiatric:         Mood and Affect: Mood normal.               LABS:           Lab Results   Component Value Date     PSA 0.67 12/14/2015     PSA 1.86 06/18/2013     PSADIAG 0.41 02/28/2020     PSADIAG 0.42 07/25/2018     PSADIAG 0.55 04/12/2017          Plan:  - To OR today for TURBT with RPG  - Consent signed

## 2020-11-12 NOTE — ANESTHESIA POSTPROCEDURE EVALUATION
Anesthesia Post Evaluation    Patient: Ruben Macedo Jr.    Procedure(s) Performed: Procedure(s) (LRB):  TURBT,WITH BLUE LIGHT CYSTOSCOPY AND CYSVIEW; bilateral retrograde pyelogram (Bilateral)  DILATION, URETHRA (N/A)    Final Anesthesia Type: general    Patient location during evaluation: PACU  Patient participation: Yes- Able to Participate  Level of consciousness: awake and alert  Post-procedure vital signs: reviewed and stable  Pain management: adequate  Airway patency: patent    PONV status at discharge: No PONV  Anesthetic complications: no      Cardiovascular status: blood pressure returned to baseline  Respiratory status: unassisted, spontaneous ventilation and room air  Hydration status: euvolemic  Follow-up not needed.          Vitals Value Taken Time   /87 11/12/20 0945   Temp 36.5 °C (97.7 °F) 11/12/20 0945   Pulse 63 11/12/20 0946   Resp 17 11/12/20 0946   SpO2 100 % 11/12/20 0946   Vitals shown include unvalidated device data.      Event Time   Out of Recovery 09:49:00         Pain/Gudelia Score: Pain Rating Prior to Med Admin: 5 (11/12/2020  9:46 AM)  Pain Rating Post Med Admin: 5 (11/12/2020  9:45 AM)  Gudelia Score: 9 (11/12/2020  9:45 AM)

## 2020-11-13 ENCOUNTER — TELEPHONE (OUTPATIENT)
Dept: UROLOGY | Facility: CLINIC | Age: 75
End: 2020-11-13

## 2020-11-13 VITALS
HEART RATE: 80 BPM | BODY MASS INDEX: 27.92 KG/M2 | WEIGHT: 195 LBS | OXYGEN SATURATION: 96 % | HEIGHT: 70 IN | DIASTOLIC BLOOD PRESSURE: 79 MMHG | SYSTOLIC BLOOD PRESSURE: 127 MMHG | TEMPERATURE: 99 F | RESPIRATION RATE: 18 BRPM

## 2020-11-13 LAB
ANION GAP SERPL CALC-SCNC: 8 MMOL/L (ref 8–16)
BASOPHILS # BLD AUTO: 0.03 K/UL (ref 0–0.2)
BASOPHILS NFR BLD: 0.5 % (ref 0–1.9)
BUN SERPL-MCNC: 17 MG/DL (ref 8–23)
CALCIUM SERPL-MCNC: 8.7 MG/DL (ref 8.7–10.5)
CHLORIDE SERPL-SCNC: 107 MMOL/L (ref 95–110)
CO2 SERPL-SCNC: 23 MMOL/L (ref 23–29)
CREAT SERPL-MCNC: 1.1 MG/DL (ref 0.5–1.4)
DIFFERENTIAL METHOD: ABNORMAL
EOSINOPHIL # BLD AUTO: 0.2 K/UL (ref 0–0.5)
EOSINOPHIL NFR BLD: 4 % (ref 0–8)
ERYTHROCYTE [DISTWIDTH] IN BLOOD BY AUTOMATED COUNT: 13.9 % (ref 11.5–14.5)
EST. GFR  (AFRICAN AMERICAN): >60 ML/MIN/1.73 M^2
EST. GFR  (NON AFRICAN AMERICAN): >60 ML/MIN/1.73 M^2
GLUCOSE SERPL-MCNC: 137 MG/DL (ref 70–110)
HCT VFR BLD AUTO: 35.7 % (ref 40–54)
HGB BLD-MCNC: 11.1 G/DL (ref 14–18)
IMM GRANULOCYTES # BLD AUTO: 0.02 K/UL (ref 0–0.04)
IMM GRANULOCYTES NFR BLD AUTO: 0.3 % (ref 0–0.5)
LYMPHOCYTES # BLD AUTO: 1.1 K/UL (ref 1–4.8)
LYMPHOCYTES NFR BLD: 18.7 % (ref 18–48)
MCH RBC QN AUTO: 29.1 PG (ref 27–31)
MCHC RBC AUTO-ENTMCNC: 31.1 G/DL (ref 32–36)
MCV RBC AUTO: 94 FL (ref 82–98)
MONOCYTES # BLD AUTO: 0.7 K/UL (ref 0.3–1)
MONOCYTES NFR BLD: 11.3 % (ref 4–15)
NEUTROPHILS # BLD AUTO: 3.9 K/UL (ref 1.8–7.7)
NEUTROPHILS NFR BLD: 65.2 % (ref 38–73)
NRBC BLD-RTO: 0 /100 WBC
PLATELET # BLD AUTO: 131 K/UL (ref 150–350)
PMV BLD AUTO: 10.6 FL (ref 9.2–12.9)
POCT GLUCOSE: 228 MG/DL (ref 70–110)
POTASSIUM SERPL-SCNC: 4.6 MMOL/L (ref 3.5–5.1)
RBC # BLD AUTO: 3.81 M/UL (ref 4.6–6.2)
SODIUM SERPL-SCNC: 138 MMOL/L (ref 136–145)
WBC # BLD AUTO: 5.94 K/UL (ref 3.9–12.7)

## 2020-11-13 PROCEDURE — 85025 COMPLETE CBC W/AUTO DIFF WBC: CPT

## 2020-11-13 PROCEDURE — 99225 PR SUBSEQUENT OBSERVATION CARE,LEVEL II: CPT | Mod: ,,, | Performed by: INTERNAL MEDICINE

## 2020-11-13 PROCEDURE — 94761 N-INVAS EAR/PLS OXIMETRY MLT: CPT

## 2020-11-13 PROCEDURE — 99213 OFFICE O/P EST LOW 20 MIN: CPT | Mod: ,,, | Performed by: UROLOGY

## 2020-11-13 PROCEDURE — 36415 COLL VENOUS BLD VENIPUNCTURE: CPT

## 2020-11-13 PROCEDURE — 80048 BASIC METABOLIC PNL TOTAL CA: CPT

## 2020-11-13 PROCEDURE — 25000003 PHARM REV CODE 250: Performed by: STUDENT IN AN ORGANIZED HEALTH CARE EDUCATION/TRAINING PROGRAM

## 2020-11-13 PROCEDURE — 99225 PR SUBSEQUENT OBSERVATION CARE,LEVEL II: ICD-10-PCS | Mod: ,,, | Performed by: INTERNAL MEDICINE

## 2020-11-13 PROCEDURE — 99213 PR OFFICE/OUTPT VISIT, EST, LEVL III, 20-29 MIN: ICD-10-PCS | Mod: ,,, | Performed by: UROLOGY

## 2020-11-13 RX ORDER — PHENAZOPYRIDINE HYDROCHLORIDE 100 MG/1
100 TABLET, FILM COATED ORAL 3 TIMES DAILY PRN
Qty: 30 TABLET | Refills: 1 | Status: SHIPPED | OUTPATIENT
Start: 2020-11-13 | End: 2020-11-23

## 2020-11-13 RX ORDER — OXYBUTYNIN CHLORIDE 5 MG/1
5 TABLET ORAL 3 TIMES DAILY
Qty: 30 TABLET | Refills: 0 | Status: SHIPPED | OUTPATIENT
Start: 2020-11-13 | End: 2020-12-15 | Stop reason: SDUPTHER

## 2020-11-13 RX ADMIN — ASPIRIN 81 MG: 81 TABLET, COATED ORAL at 09:11

## 2020-11-13 RX ADMIN — FINASTERIDE 5 MG: 5 TABLET, FILM COATED ORAL at 09:11

## 2020-11-13 RX ADMIN — ALLOPURINOL 300 MG: 300 TABLET ORAL at 09:11

## 2020-11-13 RX ADMIN — VALSARTAN 320 MG: 80 TABLET, FILM COATED ORAL at 09:11

## 2020-11-13 RX ADMIN — METOPROLOL SUCCINATE 25 MG: 25 TABLET, EXTENDED RELEASE ORAL at 09:11

## 2020-11-13 RX ADMIN — PHENAZOPYRIDINE HYDROCHLORIDE 200 MG: 100 TABLET ORAL at 09:11

## 2020-11-13 RX ADMIN — ACETAMINOPHEN 650 MG: 325 TABLET, FILM COATED ORAL at 03:11

## 2020-11-13 RX ADMIN — GLIMEPIRIDE 4 MG: 2 TABLET ORAL at 09:11

## 2020-11-13 NOTE — ASSESSMENT & PLAN NOTE
History of stent  Follows with Dr. Acevedo at [facility]  Patient on aspirin and Plavix at home  Plavix has been on hold for about a week for the procedure  Continue aspirin, statin,toprol  Restart plavix when ok with urology

## 2020-11-13 NOTE — PLAN OF CARE
No significant events overnight. Remains free from fall, injury, skin breakdown. VSS on RA throughout the night. Positions self-independently. Pain controled w/ PO meds. F/c draing pink urine to gravity. Pt ambulated x 2. All alarms active and auduble. TEDs/SCDs in place. Plan of care reviewed w/ pt and all questions answered. Bed locked and in lowest position. Call light w/I reach. No needs at this time. Purposeful hourly rounding. Will continue to monitor.

## 2020-11-13 NOTE — ASSESSMENT & PLAN NOTE
Lab Results   Component Value Date    HGBA1C 6.5 (H) 08/31/2020     Patient on Amaryl 4 mg daily at home which was restarted  Monitor sugars, have been running high   Last hba1c pretty well controlled  Continue amaryl on dc and monitor sugars at home  Continue diabetic diet

## 2020-11-13 NOTE — ASSESSMENT & PLAN NOTE
76 yo male with bladder mass s/p TURBT on 11/12/20. Doing well.    - Diet: regular  - Encourage ambulation  - Maintain isabel  - f/u AM labs  - Mitomycin ordered for intravesical instillation. Will instill medication and plug for 1 hour.  - Plan for isabel removal after mitomycin    Dispo: home after mitomycin

## 2020-11-13 NOTE — PROGRESS NOTES
[facility]  Urology  Progress Note    Patient Name: Ruben Macedo Jr.  MRN: [id number]  Admission Date: 11/12/2020  Hospital Length of Stay: 0 days  Code Status: Full Code   Attending Provider: Gelacio Sanchez MD   Primary Care Physician: Cedric Holder MD    Subjective:     HPI:  No notes on file    Interval History: NAEON. AFVSS.  Tolerating diet.  Ambulating without difficulty.  Elizabeth clear orange from pyridium meds. No blood or clots in tubing.  Having some catheter discomfort but otherwise denies pain.      Objective:     Temp:  [97.4 °F (36.3 °C)-98.2 °F (36.8 °C)] 97.5 °F (36.4 °C)  Pulse:  [60-76] 75  Resp:  [15-18] 15  SpO2:  [93 %-100 %] 94 %  BP: (115-159)/(56-87) 119/63     Body mass index is 27.98 kg/m².    Date 11/13/20 0700 - 11/14/20 0659   Shift 0360-1722 6773-1852 6100-2290 24 Hour Total   INTAKE   Shift Total(mL/kg)       OUTPUT   Urine(mL/kg/hr) 625   625   Shift Total(mL/kg) 625(7.1)   625(7.1)   Weight (kg) 88.4 88.4 88.4 88.4          Drains     Drain                 Urethral Catheter 11/12/20 0840 Double-lumen 24 Fr. less than 1 day                Physical Exam  Constitutional:       General: He is not in acute distress.     Appearance: He is well-developed.   Cardiovascular:      Rate and Rhythm: Normal rate.   Pulmonary:      Effort: Pulmonary effort is normal. No respiratory distress.   Abdominal:      General: There is no distension.      Palpations: Abdomen is soft.      Tenderness: There is no abdominal tenderness.   Genitourinary:     Comments: Circumsized penis. Orthotopic meatus, no stenosis. No plaques, ulcers, or lesions along penis. 24 Fr 2 way catheter in place with clear urine. No clots.  Musculoskeletal: Normal range of motion.         General: No tenderness.   Skin:     General: Skin is warm and dry.      Findings: No rash.   Neurological:      Mental Status: He is alert and oriented to person, place, and time.   Psychiatric:         Judgment: Judgment  normal.         Significant Labs:    BMP:  No results for input(s): NA, K, CL, CO2, BUN, CREATININE, LABGLOM, GLUCOSE, CALCIUM in the last 168 hours.    CBC:   Recent Labs   Lab 11/13/20  0701   WBC 5.94   HGB 11.1*   HCT 35.7*   *       All pertinent labs results from the past 24 hours have been reviewed.    Significant Imaging:  All pertinent imaging results/findings from the past 24 hours have been reviewed.                  Assessment/Plan:     * Urothelial carcinoma  76 yo male with bladder mass s/p TURBT on 11/12/20. Doing well.    - Diet: regular  - Encourage ambulation  - Maintain isabel  - f/u AM labs  - Mitomycin ordered for intravesical instillation. Will instill medication and plug for 1 hour.  - Plan for isabel removal after mitomycin    Dispo: home after mitomycin        VTE Risk Mitigation (From admission, onward)         Ordered     IP VTE LOW RISK PATIENT  Once      11/12/20 1059     Place ITALIA hose  Until discontinued      11/12/20 1059     Place sequential compression device  Until discontinued      11/12/20 1059                Amol Merida MD  Urology  [facility]

## 2020-11-13 NOTE — DISCHARGE INSTRUCTIONS
Post Cystoscopy and Transurethral resection of Bladder Tumor Instructions  Do not strain to have a bowel movement  No strenuous exercise x 7 days  No driving while you are on narcotic pain medications or if your isabel  catheter is in place    You can expect:  To see blood in your urine.    Go to the ER if:  Your catheter stops draining  You are having severe abdominal pain  Inability to void if you do not have a catheter    Call the doctor if:   Temperature is greater than 101F   Persistent vomiting and inability to keep food down

## 2020-11-13 NOTE — NURSING
Pt blood sugar 267. On call hospitalist notified. Order received for low dose s/s insulin. Pt refused 1 unit of insulin. Pt states he doesn't take insulin.

## 2020-11-13 NOTE — TELEPHONE ENCOUNTER
----- Message from Anisha Kahn MA sent at 11/13/2020 11:28 AM CST -----    ----- Message -----  From: Amol Merida MD  Sent: 11/13/2020  11:11 AM CST  To: [facility] Staff    Please schedule this patient for an office visit with Dr. Sanchez in 2 weeks for path review. He requested to not be scheduled for a virtual visit. Thanks.    Amol Merida

## 2020-11-13 NOTE — DISCHARGE SUMMARY
[facility]  Urology  Discharge Summary      Patient Name: Chuckie Simons Jr.  MRN: [id number]  Admission Date: 11/12/2020  Hospital Length of Stay: 0 days  Discharge Date and Time:  11/13/2020 11:09 AM  Attending Physician: Gelacio Sanchez MD   Discharging Provider: Girish Merida MD  Primary Care Physician: Cedric Holder MD    HPI:   No notes on file    Procedure(s) (LRB):  TURBT,WITH BLUE LIGHT CYSTOSCOPY AND CYSVIEW; bilateral retrograde pyelogram (Bilateral)  DILATION, URETHRA (N/A)     Indwelling Lines/Drains at time of discharge:   Lines/Drains/Airways     Drain                 Urethral Catheter 11/12/20 0840 Double-lumen 24 Fr. 1 day                Hospital Course (synopsis of major diagnoses, care, treatment, and services provided during the course of the hospital stay): CHUCKIE SIMONS JR. 75 y.o.male underwent: Procedure(s) (LRB):  TURBT,WITH BLUE LIGHT CYSTOSCOPY AND CYSVIEW; bilateral retrograde pyelogram (Bilateral)  DILATION, URETHRA (N/A). The patient tolerated the procedure well, was transferred to recovery post-op, and then transferred to the floor for continuation of medical care. The patient's clinical condition progressively improved. By the time of discharge, he was tolerating a diet without nausea or vomiting, pain was well controlled with oral medications, and he was ambulating without difficulty. On POD 1 the patient was discharged to home after instillation of mitomycin for 1 hour. The bladder was then drained and the isabel removed. The patient will follow up in [place] clinic in 2 weeks.       Consults:   Consults (From admission, onward)        Status Ordering Provider     Inpatient consult to Hospital Medicine  Once     Provider:  (Not yet assigned)    Acknowledged GIRISH MERIDA          Significant Diagnostic Studies:    Pending Diagnostic Studies:     Procedure Component Value Units Date/Time    Specimen to Pathology, Surgery Urology [[id number]] Collected:  11/12/20 0847    Order Status: Sent Lab Status: In process Updated: 11/12/20 1116          Final Active Diagnoses:    Diagnosis Date Noted POA    PRINCIPAL PROBLEM:  Urothelial carcinoma [C68.9] 07/09/2020 Yes    Type 2 diabetes mellitus without complication, without long-term current use of insulin [E11.9] 11/12/2020 Yes    CAD (coronary artery disease) [I25.10] 11/12/2012 Yes    HTN (hypertension) [I10] 11/12/2012 Yes      Problems Resolved During this Admission:         Discharged Condition: good    Disposition:     Follow Up:  Follow-up Information     Gelacio Sanchez MD In 10 days.    Specialty: Urology  Why: f/u path  Contact information:  [address] [id number] [phone]                 Patient Instructions:      Diet Adult Regular     Activity as tolerated     Medications:  Reconciled Home Medications:      Medication List      START taking these medications    oxybutynin 5 MG Tab  Commonly known as: DITROPAN  Take 1 tablet (5 mg total) by mouth 3 (three) times daily. for 10 days     phenazopyridine 100 MG tablet  Commonly known as: PYRIDIUM  Take 1 tablet (100 mg total) by mouth 3 (three) times daily as needed for Pain.        CONTINUE taking these medications    allopurinoL 300 MG tablet  Commonly known as: ZYLOPRIM  TAKE ONE TABLET BY MOUTH EVERY DAY     aspirin 81 MG EC tablet  Commonly known as: ECOTRIN  Take 81 mg by mouth once daily.     betamethasone valerate 0.1% 0.1 % Crea  Commonly known as: VALISONE     clopidogreL 75 mg tablet  Commonly known as: PLAVIX  Take 75 mg by mouth once daily.     ezetimibe 10 mg tablet  Commonly known as: ZETIA  once daily.     finasteride 5 mg tablet  Commonly known as: PROSCAR  Take 1 tablet (5 mg total) by mouth once daily.     FLUAD QUAD 2020-21(65Y UP)(PF) 60 mcg (15 mcg x 4)/0.5 mL Syrg  Generic drug: flu vac 2020 [id number](PF)  ADMINISTER 0.5ML IN THE MUSCLE AS DIRECTED     glimepiride 4 MG tablet  Commonly known as:  AMARYL  Take 1 tablet (4 mg total) by mouth once daily.     LORazepam 0.5 MG tablet  Commonly known as: ATIVAN  TAKE 1-2 TABLETS BY MOUTH NIGHTLY AS NEEDED     metoprolol succinate 25 MG 24 hr tablet  Commonly known as: TOPROL-XL  Take 25 mg by mouth once daily.     mupirocin 2 % ointment  Commonly known as: BACTROBAN  Apply topically 3 (three) times daily.     rosuvastatin 40 MG Tab  Commonly known as: CRESTOR  40 mg once daily. Take 1 tablet by mouth daily     valsartan 160 MG tablet  Commonly known as: DIOVAN  TAKE 2 TABLETS BY MOUTH EVERY DAY            Time spent on the discharge of patient: 15 minutes    Amol Merida MD  Urology  [facility]

## 2020-11-13 NOTE — PROGRESS NOTES
[facility] Hospital Medicine  Progress Note    Patient Name: Ruben Macedo Jr.  MRN: [id number]  Patient Class: OP- Outpatient Recovery   Admission Date: 11/12/2020  Length of Stay: 0 days  Attending Physician: Gelacio Sanchez MD  Primary Care Provider: Cedric Holder MD        Subjective:     Principal Problem:Urothelial carcinoma        HPI:  75-year-old male with past medical history of CAD status post stents, non-insulin-dependent diabetes mellitus, stroke, hypertension, Bladder cancer, status post TURBT , HAD TURBT WITH BLUE LIGHT CYSTOSCOPY, with removal of the papillary lesion,  BILATERAL RETROGRADE PYELOGRAM AND URETHRAL DILATION ON 11/12/20.  Hospital medicine services consulted for medical comanagement.  The patient currently complains of some discomfort in the lower abdomen but otherwise doing well.    Overview/Hospital Course:  Had intravesical mitomycin instillation.    Interval History: doing ok.    Review of Systems   Constitutional: Negative for chills and fever.   HENT: Negative for trouble swallowing.    Respiratory: Negative for cough and shortness of breath.    Cardiovascular: Negative for chest pain and leg swelling.   Gastrointestinal: Positive for abdominal pain. Negative for blood in stool, nausea and vomiting.   Genitourinary: Negative for dysuria and hematuria.   Musculoskeletal: Negative for gait problem.   Skin: Negative for rash.   Neurological: Negative for headaches.   Psychiatric/Behavioral: Negative for confusion.     Objective:     Vital Signs (Most Recent):  Temp: 98.5 °F (36.9 °C) (11/13/20 0810)  Pulse: 80 (11/13/20 0810)  Resp: 18 (11/13/20 0810)  BP: 127/79 (11/13/20 0810)  SpO2: 96 % (11/13/20 0810) Vital Signs (24h Range):  Temp:  [97.5 °F (36.4 °C)-98.5 °F (36.9 °C)] 98.5 °F (36.9 °C)  Pulse:  [72-80] 80  Resp:  [15-18] 18  SpO2:  [93 %-96 %] 96 %  BP: (115-127)/(57-79) 127/79     Weight: 88.5 kg (195 lb)  Body mass index is 27.98  kg/m².    Intake/Output Summary (Last 24 hours) at 11/13/2020 1213  Last data filed at 11/13/2020 0700  Gross per 24 hour   Intake --   Output 1700 ml   Net -1700 ml      Physical Exam  Vitals signs reviewed.   Constitutional:       General: He is not in acute distress.     Appearance: He is well-developed.   HENT:      Head: Normocephalic and atraumatic.   Eyes:      Extraocular Movements: Extraocular movements intact.      Pupils: Pupils are equal, round, and reactive to light.   Neck:      Musculoskeletal: Normal range of motion and neck supple.   Cardiovascular:      Rate and Rhythm: Normal rate and regular rhythm.   Pulmonary:      Effort: Pulmonary effort is normal. No respiratory distress.      Breath sounds: Normal breath sounds.   Abdominal:      General: Bowel sounds are normal. There is no distension.      Palpations: Abdomen is soft.      Tenderness: There is no abdominal tenderness.      Comments: Mild supra pubic pain   Musculoskeletal: Normal range of motion.         General: No swelling.   Skin:     General: Skin is warm.      Findings: No rash.   Neurological:      General: No focal deficit present.      Mental Status: He is alert and oriented to person, place, and time.   Psychiatric:         Behavior: Behavior normal.         Significant Labs:   BMP:   Recent Labs   Lab 11/13/20  0701   *      K 4.6      CO2 23   BUN 17   CREATININE 1.1   CALCIUM 8.7     CBC:   Recent Labs   Lab 11/13/20  0701   WBC 5.94   HGB 11.1*   HCT 35.7*   *       Significant Imaging: I have reviewed all pertinent imaging results/findings within the past 24 hours.      Assessment/Plan:      * Urothelial carcinoma  Plan per primary      Type 2 diabetes mellitus without complication, without long-term current use of insulin  Lab Results   Component Value Date    HGBA1C 6.5 (H) 08/31/2020     Patient on Amaryl 4 mg daily at home which was restarted  Monitor sugars, have been running high   Last hba1c  pretty well controlled  Continue amaryl on dc and monitor sugars at home  Continue diabetic diet      HTN (hypertension)  Continue valsartan 320 mg daily and monitor blood pressure  stable    CAD (coronary artery disease)  History of stent  Follows with Dr. Acevedo at [facility]  Patient on aspirin and Plavix at home  Plavix has been on hold for about a week for the procedure  Continue aspirin, statin,toprol  Restart plavix when ok with urology        VTE Risk Mitigation (From admission, onward)         Ordered     IP VTE LOW RISK PATIENT  Once      11/12/20 1059     Place ITALIA hose  Until discontinued      11/12/20 1059     Place sequential compression device  Until discontinued      11/12/20 1059                Discharge Planning   ROCAEL: 11/13/2020     Code Status: Full Code   Is the patient medically ready for discharge?:     Reason for patient still in hospital (select all that apply): Patient trending condition, Treatment and Pending disposition                     Alma Delia Dunaway MD  Department of Hospital Medicine   [facility]

## 2020-11-13 NOTE — SUBJECTIVE & OBJECTIVE
Interval History: NAEON. AFVSS.  Tolerating diet.  Ambulating without difficulty.  Elizabeth clear orange from pyridium meds. No blood or clots in tubing.  Having some catheter discomfort but otherwise denies pain.      Objective:     Temp:  [97.4 °F (36.3 °C)-98.2 °F (36.8 °C)] 97.5 °F (36.4 °C)  Pulse:  [60-76] 75  Resp:  [15-18] 15  SpO2:  [93 %-100 %] 94 %  BP: (115-159)/(56-87) 119/63     Body mass index is 27.98 kg/m².    Date 11/13/20 0700 - 11/14/20 0659   Shift 7136-7763 5995-5430 4407-2580 24 Hour Total   INTAKE   Shift Total(mL/kg)       OUTPUT   Urine(mL/kg/hr) 625   625   Shift Total(mL/kg) 625(7.1)   625(7.1)   Weight (kg) 88.4 88.4 88.4 88.4          Drains     Drain                 Urethral Catheter 11/12/20 0840 Double-lumen 24 Fr. less than 1 day                Physical Exam  Constitutional:       General: He is not in acute distress.     Appearance: He is well-developed.   Cardiovascular:      Rate and Rhythm: Normal rate.   Pulmonary:      Effort: Pulmonary effort is normal. No respiratory distress.   Abdominal:      General: There is no distension.      Palpations: Abdomen is soft.      Tenderness: There is no abdominal tenderness.   Genitourinary:     Comments: Circumsized penis. Orthotopic meatus, no stenosis. No plaques, ulcers, or lesions along penis. 24 Fr 2 way catheter in place with clear urine. No clots.  Musculoskeletal: Normal range of motion.         General: No tenderness.   Skin:     General: Skin is warm and dry.      Findings: No rash.   Neurological:      Mental Status: He is alert and oriented to person, place, and time.   Psychiatric:         Judgment: Judgment normal.         Significant Labs:    BMP:  No results for input(s): NA, K, CL, CO2, BUN, CREATININE, LABGLOM, GLUCOSE, CALCIUM in the last 168 hours.    CBC:   Recent Labs   Lab 11/13/20  0701   WBC 5.94   HGB 11.1*   HCT 35.7*   *       All pertinent labs results from the past 24 hours have been  reviewed.    Significant Imaging:  All pertinent imaging results/findings from the past 24 hours have been reviewed.

## 2020-11-13 NOTE — SUBJECTIVE & OBJECTIVE
Interval History: doing ok.    Review of Systems   Constitutional: Negative for chills and fever.   HENT: Negative for trouble swallowing.    Respiratory: Negative for cough and shortness of breath.    Cardiovascular: Negative for chest pain and leg swelling.   Gastrointestinal: Positive for abdominal pain. Negative for blood in stool, nausea and vomiting.   Genitourinary: Negative for dysuria and hematuria.   Musculoskeletal: Negative for gait problem.   Skin: Negative for rash.   Neurological: Negative for headaches.   Psychiatric/Behavioral: Negative for confusion.     Objective:     Vital Signs (Most Recent):  Temp: 98.5 °F (36.9 °C) (11/13/20 0810)  Pulse: 80 (11/13/20 0810)  Resp: 18 (11/13/20 0810)  BP: 127/79 (11/13/20 0810)  SpO2: 96 % (11/13/20 0810) Vital Signs (24h Range):  Temp:  [97.5 °F (36.4 °C)-98.5 °F (36.9 °C)] 98.5 °F (36.9 °C)  Pulse:  [72-80] 80  Resp:  [15-18] 18  SpO2:  [93 %-96 %] 96 %  BP: (115-127)/(57-79) 127/79     Weight: 88.5 kg (195 lb)  Body mass index is 27.98 kg/m².    Intake/Output Summary (Last 24 hours) at 11/13/2020 1213  Last data filed at 11/13/2020 0700  Gross per 24 hour   Intake --   Output 1700 ml   Net -1700 ml      Physical Exam  Vitals signs reviewed.   Constitutional:       General: He is not in acute distress.     Appearance: He is well-developed.   HENT:      Head: Normocephalic and atraumatic.   Eyes:      Extraocular Movements: Extraocular movements intact.      Pupils: Pupils are equal, round, and reactive to light.   Neck:      Musculoskeletal: Normal range of motion and neck supple.   Cardiovascular:      Rate and Rhythm: Normal rate and regular rhythm.   Pulmonary:      Effort: Pulmonary effort is normal. No respiratory distress.      Breath sounds: Normal breath sounds.   Abdominal:      General: Bowel sounds are normal. There is no distension.      Palpations: Abdomen is soft.      Tenderness: There is no abdominal tenderness.      Comments: Mild supra pubic  pain   Musculoskeletal: Normal range of motion.         General: No swelling.   Skin:     General: Skin is warm.      Findings: No rash.   Neurological:      General: No focal deficit present.      Mental Status: He is alert and oriented to person, place, and time.   Psychiatric:         Behavior: Behavior normal.         Significant Labs:   BMP:   Recent Labs   Lab 11/13/20  0701   *      K 4.6      CO2 23   BUN 17   CREATININE 1.1   CALCIUM 8.7     CBC:   Recent Labs   Lab 11/13/20  0701   WBC 5.94   HGB 11.1*   HCT 35.7*   *       Significant Imaging: I have reviewed all pertinent imaging results/findings within the past 24 hours.

## 2020-11-16 ENCOUNTER — TELEPHONE (OUTPATIENT)
Dept: UROLOGY | Facility: CLINIC | Age: 75
End: 2020-11-16

## 2020-11-17 LAB
FINAL PATHOLOGIC DIAGNOSIS: NORMAL
GROSS: NORMAL

## 2020-11-18 ENCOUNTER — TELEPHONE (OUTPATIENT)
Dept: UROLOGY | Facility: CLINIC | Age: 75
End: 2020-11-18

## 2020-11-19 DIAGNOSIS — R82.71 BACTERIURIA: Primary | ICD-10-CM

## 2020-11-19 NOTE — PROGRESS NOTES
Dr Sanchez called pt with these results    Will stop BCG and plan cysto and cytology in office in 3 months

## 2020-12-02 ENCOUNTER — OFFICE VISIT (OUTPATIENT)
Dept: CARDIOLOGY | Facility: CLINIC | Age: 75
End: 2020-12-02
Payer: MEDICARE

## 2020-12-02 ENCOUNTER — PATIENT OUTREACH (OUTPATIENT)
Dept: ADMINISTRATIVE | Facility: OTHER | Age: 75
End: 2020-12-02

## 2020-12-02 VITALS
SYSTOLIC BLOOD PRESSURE: 136 MMHG | HEART RATE: 88 BPM | OXYGEN SATURATION: 97 % | HEIGHT: 70 IN | WEIGHT: 199.75 LBS | DIASTOLIC BLOOD PRESSURE: 80 MMHG | BODY MASS INDEX: 28.6 KG/M2

## 2020-12-02 DIAGNOSIS — I10 ESSENTIAL HYPERTENSION: ICD-10-CM

## 2020-12-02 DIAGNOSIS — E78.5 HYPERLIPIDEMIA, UNSPECIFIED HYPERLIPIDEMIA TYPE: ICD-10-CM

## 2020-12-02 DIAGNOSIS — I25.10 CORONARY ARTERY DISEASE INVOLVING NATIVE CORONARY ARTERY OF NATIVE HEART WITHOUT ANGINA PECTORIS: Primary | ICD-10-CM

## 2020-12-02 DIAGNOSIS — I10 HYPERTENSION: ICD-10-CM

## 2020-12-02 PROCEDURE — 3288F FALL RISK ASSESSMENT DOCD: CPT | Mod: CPTII,S$GLB,, | Performed by: INTERNAL MEDICINE

## 2020-12-02 PROCEDURE — 1126F PR PAIN SEVERITY QUANTIFIED, NO PAIN PRESENT: ICD-10-PCS | Mod: S$GLB,,, | Performed by: INTERNAL MEDICINE

## 2020-12-02 PROCEDURE — 99499 RISK ADDL DX/OHS AUDIT: ICD-10-PCS | Mod: S$GLB,,, | Performed by: INTERNAL MEDICINE

## 2020-12-02 PROCEDURE — 93000 EKG 12-LEAD: ICD-10-PCS | Mod: S$GLB,,, | Performed by: INTERNAL MEDICINE

## 2020-12-02 PROCEDURE — 3288F PR FALLS RISK ASSESSMENT DOCUMENTED: ICD-10-PCS | Mod: CPTII,S$GLB,, | Performed by: INTERNAL MEDICINE

## 2020-12-02 PROCEDURE — 3079F DIAST BP 80-89 MM HG: CPT | Mod: CPTII,S$GLB,, | Performed by: INTERNAL MEDICINE

## 2020-12-02 PROCEDURE — 99204 OFFICE O/P NEW MOD 45 MIN: CPT | Mod: 25,S$GLB,, | Performed by: INTERNAL MEDICINE

## 2020-12-02 PROCEDURE — 99999 PR PBB SHADOW E&M-EST. PATIENT-LVL IV: CPT | Mod: PBBFAC,,, | Performed by: INTERNAL MEDICINE

## 2020-12-02 PROCEDURE — 99999 PR PBB SHADOW E&M-EST. PATIENT-LVL IV: ICD-10-PCS | Mod: PBBFAC,,, | Performed by: INTERNAL MEDICINE

## 2020-12-02 PROCEDURE — 1101F PR PT FALLS ASSESS DOC 0-1 FALLS W/OUT INJ PAST YR: ICD-10-PCS | Mod: CPTII,S$GLB,, | Performed by: INTERNAL MEDICINE

## 2020-12-02 PROCEDURE — 3075F SYST BP GE 130 - 139MM HG: CPT | Mod: CPTII,S$GLB,, | Performed by: INTERNAL MEDICINE

## 2020-12-02 PROCEDURE — 1159F PR MEDICATION LIST DOCUMENTED IN MEDICAL RECORD: ICD-10-PCS | Mod: S$GLB,,, | Performed by: INTERNAL MEDICINE

## 2020-12-02 PROCEDURE — 1126F AMNT PAIN NOTED NONE PRSNT: CPT | Mod: S$GLB,,, | Performed by: INTERNAL MEDICINE

## 2020-12-02 PROCEDURE — 99499 UNLISTED E&M SERVICE: CPT | Mod: S$GLB,,, | Performed by: INTERNAL MEDICINE

## 2020-12-02 PROCEDURE — 93000 ELECTROCARDIOGRAM COMPLETE: CPT | Mod: S$GLB,,, | Performed by: INTERNAL MEDICINE

## 2020-12-02 PROCEDURE — 3075F PR MOST RECENT SYSTOLIC BLOOD PRESS GE 130-139MM HG: ICD-10-PCS | Mod: CPTII,S$GLB,, | Performed by: INTERNAL MEDICINE

## 2020-12-02 PROCEDURE — 1101F PT FALLS ASSESS-DOCD LE1/YR: CPT | Mod: CPTII,S$GLB,, | Performed by: INTERNAL MEDICINE

## 2020-12-02 PROCEDURE — 3079F PR MOST RECENT DIASTOLIC BLOOD PRESSURE 80-89 MM HG: ICD-10-PCS | Mod: CPTII,S$GLB,, | Performed by: INTERNAL MEDICINE

## 2020-12-02 PROCEDURE — 99204 PR OFFICE/OUTPT VISIT, NEW, LEVL IV, 45-59 MIN: ICD-10-PCS | Mod: 25,S$GLB,, | Performed by: INTERNAL MEDICINE

## 2020-12-02 PROCEDURE — 1159F MED LIST DOCD IN RCRD: CPT | Mod: S$GLB,,, | Performed by: INTERNAL MEDICINE

## 2020-12-02 RX ORDER — CLOPIDOGREL BISULFATE 75 MG/1
75 TABLET ORAL DAILY
Qty: 90 TABLET | Refills: 3 | Status: SHIPPED | OUTPATIENT
Start: 2020-12-02 | End: 2021-12-07

## 2020-12-02 RX ORDER — METOPROLOL SUCCINATE 25 MG/1
25 TABLET, EXTENDED RELEASE ORAL DAILY
Qty: 90 TABLET | Refills: 3 | Status: SHIPPED | OUTPATIENT
Start: 2020-12-02 | End: 2021-12-29

## 2020-12-02 RX ORDER — EZETIMIBE 10 MG/1
10 TABLET ORAL DAILY
Qty: 90 TABLET | Refills: 3 | Status: SHIPPED | OUTPATIENT
Start: 2020-12-02 | End: 2022-04-01

## 2020-12-02 RX ORDER — VALSARTAN 160 MG/1
320 TABLET ORAL DAILY
Qty: 180 TABLET | Refills: 3 | Status: SHIPPED | OUTPATIENT
Start: 2020-12-02 | End: 2021-12-13

## 2020-12-02 RX ORDER — ROSUVASTATIN CALCIUM 40 MG/1
40 TABLET, COATED ORAL DAILY
Qty: 90 TABLET | Refills: 3 | Status: SHIPPED | OUTPATIENT
Start: 2020-12-02 | End: 2021-12-14

## 2020-12-02 NOTE — PROGRESS NOTES
Subjective:    Patient ID:  Ruben Macedo Jr. is a 75 y.o. male who presents for evaluation of Establish Care      HPI     CAD - stent 2000, HTN, HLD, bladder CA    [facility]    Followed by Dr Holder  74-year-old white male .  Patient here with his wife who is in the medical field in some fashion.  In the interval he was diagnosed with bladder cancer.  He is receiving BCG treatment.  Lately his left ankle has swollen on occasion but then it goes down with elevation.  No pain.  Obviously we discussed the possibility of increased risk of DVT given the underlying cancer with his symptoms do not represent that.  Should he get any persistent swelling or pain they know to go to the emergency room for a more immediate ultrasound.     We also reviewed his labs.  His A1c went from 7.9 down to 7.3 but is due.  He has also developed some thrombocytopenia and some anemia.  He did have significant bleeding due to the bladder cancer.  He will discuss with urology whether not the BCG treatment would be expected to cause some thrombocytopenia.  He has had fatty liver on prior imaging but spleen on CT scans stated to be normal.  He does take Plavix but reassured that would not affect the platelet count but might affect platelet function.  Many years ago he did have one episode of thrombocytopenia but then normalized in the interval.  Wife was concerned and she did have an unofficial CBC done and by her verbal report sounds similar to what we have recorded in our system.  She also did an unofficial stool for blood which was negative.     Regarding diabetes A1c hopefully improved.  He had itching with metformin.  We discussed potential increased risk association of bladder cancer and Actos so it is time to discontinue.  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  He was on Januvia but expensive so has been off.  We discussed many new  medications are very expensive and will probably not be sustainable with his current insurance.  We discussed Amaryl and the potential for hypoglycemia.  We will start with 4 mg daily, reduce accordingly but obviously if he follow the diet and lost diabetes with improved as well.  Patient counseled at great length regarding the multitude these issues.Total time over 45 minutes with over 50% counseling.     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  It looks like he does have some paronychia.  He applied Neosporin without success and has been there for weeks.  We discussed using some clindamycin for a couple of days but also the more important thing is going to be local care since the cuticle appears to be chronically damaged and he would be at risk for further infection.  The abrasion on the top may well have also initiated the infection.  Rather than use Neosporin all call him in some mupirocin     12/2/20 Denies recent CP or SOB. Has not seen a cardiologist in years  EKG NSR old IMI    Review of Systems   Constitution: Negative for decreased appetite.   HENT: Negative for ear discharge.    Eyes: Negative for blurred vision.   Endocrine: Negative for polyphagia.   Skin: Negative for nail changes.   Genitourinary: Negative for bladder incontinence.   Neurological: Negative for aphonia.   Psychiatric/Behavioral: Negative for hallucinations.   Allergic/Immunologic: Negative for hives.        Objective:    Physical Exam   Constitutional: He is oriented to person, place, and time. He appears well-developed and well-nourished.   HENT:   Head: Normocephalic and atraumatic.   Eyes: Pupils are equal, round, and reactive to light. Conjunctivae are normal.   Neck: Normal range of motion. Neck supple.   Cardiovascular: Normal rate, normal heart sounds and intact distal pulses.   Pulmonary/Chest: Effort normal and  breath sounds normal.   Abdominal: Soft. Bowel sounds are normal.   Musculoskeletal: Normal range of motion.   Neurological: He is alert and oriented to person, place, and time.   Skin: Skin is warm and dry.         Assessment:       1. Coronary artery disease involving native coronary artery of native heart without angina pectoris    2. Essential hypertension    3. Hyperlipidemia, unspecified hyperlipidemia type         Plan:           Echo and lexiscan myoview for CAD  Continue Rx for CAD, HTN, HLD

## 2020-12-15 RX ORDER — OXYBUTYNIN CHLORIDE 5 MG/1
5 TABLET ORAL 3 TIMES DAILY
Qty: 30 TABLET | Refills: 0 | Status: SHIPPED | OUTPATIENT
Start: 2020-12-15 | End: 2023-01-25

## 2020-12-15 NOTE — TELEPHONE ENCOUNTER
----- Message from Silvana Montesinos sent at 12/15/2020 10:01 AM CST -----      Can the clinic reply in [facility]:N        Please refill the medication(s) listed below. Please call the patient when the prescription(s) is ready for  at this phone number         Medication #1 oxybutynin (DITROPAN) 5 MG Tab    Medication #2 URO-mp capsule      Preferred Pharmacy: [facility]/pharmacy #[id number] - [place] - [address] [phone] (Phone)  [phone] (Fax)

## 2020-12-28 ENCOUNTER — TELEPHONE (OUTPATIENT)
Dept: FAMILY MEDICINE | Facility: CLINIC | Age: 75
End: 2020-12-28

## 2020-12-28 NOTE — TELEPHONE ENCOUNTER
----- Message from Ivy Morales sent at 12/28/2020  9:08 AM CST -----  Type: Patient Call Back    Who called:self    What is the request in detail:an nurse call pt    Can the clinic reply by [facility]?no    Would the patient rather a call back or a response via [facility]? call    Best call back number:

## 2021-01-05 ENCOUNTER — TELEPHONE (OUTPATIENT)
Dept: FAMILY MEDICINE | Facility: CLINIC | Age: 76
End: 2021-01-05

## 2021-01-05 ENCOUNTER — TELEPHONE (OUTPATIENT)
Dept: UROLOGY | Facility: CLINIC | Age: 76
End: 2021-01-05

## 2021-01-05 DIAGNOSIS — Z13.220 SCREENING FOR HYPERLIPIDEMIA: Primary | ICD-10-CM

## 2021-01-05 DIAGNOSIS — E11.9 TYPE 2 DIABETES MELLITUS WITHOUT COMPLICATION, WITHOUT LONG-TERM CURRENT USE OF INSULIN: ICD-10-CM

## 2021-01-05 DIAGNOSIS — Z13.6 ENCOUNTER FOR LIPID SCREENING FOR CARDIOVASCULAR DISEASE: ICD-10-CM

## 2021-01-05 DIAGNOSIS — Z13.220 ENCOUNTER FOR LIPID SCREENING FOR CARDIOVASCULAR DISEASE: ICD-10-CM

## 2021-01-05 DIAGNOSIS — I10 ESSENTIAL HYPERTENSION: ICD-10-CM

## 2021-01-07 ENCOUNTER — OFFICE VISIT (OUTPATIENT)
Dept: FAMILY MEDICINE | Facility: CLINIC | Age: 76
End: 2021-01-07
Payer: MEDICARE

## 2021-01-07 ENCOUNTER — LAB VISIT (OUTPATIENT)
Dept: LAB | Facility: HOSPITAL | Age: 76
End: 2021-01-07
Attending: FAMILY MEDICINE
Payer: MEDICARE

## 2021-01-07 VITALS
HEART RATE: 81 BPM | WEIGHT: 200 LBS | OXYGEN SATURATION: 97 % | TEMPERATURE: 99 F | SYSTOLIC BLOOD PRESSURE: 136 MMHG | HEIGHT: 70 IN | BODY MASS INDEX: 28.63 KG/M2 | DIASTOLIC BLOOD PRESSURE: 74 MMHG

## 2021-01-07 DIAGNOSIS — I10 ESSENTIAL HYPERTENSION: ICD-10-CM

## 2021-01-07 DIAGNOSIS — M46.92 UNSPECIFIED INFLAMMATORY SPONDYLOPATHY, CERVICAL REGION: ICD-10-CM

## 2021-01-07 DIAGNOSIS — E11.9 TYPE 2 DIABETES MELLITUS WITHOUT COMPLICATION, WITHOUT LONG-TERM CURRENT USE OF INSULIN: ICD-10-CM

## 2021-01-07 DIAGNOSIS — S61.209S OPEN WOUND OF FINGER, SEQUELA: Primary | ICD-10-CM

## 2021-01-07 DIAGNOSIS — F39 MOOD DISORDER: ICD-10-CM

## 2021-01-07 DIAGNOSIS — Z13.220 ENCOUNTER FOR LIPID SCREENING FOR CARDIOVASCULAR DISEASE: ICD-10-CM

## 2021-01-07 DIAGNOSIS — Z13.6 ENCOUNTER FOR LIPID SCREENING FOR CARDIOVASCULAR DISEASE: ICD-10-CM

## 2021-01-07 DIAGNOSIS — F13.20 BENZODIAZEPINE DEPENDENCE: ICD-10-CM

## 2021-01-07 DIAGNOSIS — D69.6 THROMBOCYTOPENIA: ICD-10-CM

## 2021-01-07 DIAGNOSIS — C67.9 MALIGNANT NEOPLASM OF URINARY BLADDER, UNSPECIFIED SITE: ICD-10-CM

## 2021-01-07 LAB
ALBUMIN SERPL BCP-MCNC: 4.2 G/DL (ref 3.5–5.2)
ALP SERPL-CCNC: 67 U/L (ref 55–135)
ALT SERPL W/O P-5'-P-CCNC: 40 U/L (ref 10–44)
ANION GAP SERPL CALC-SCNC: 10 MMOL/L (ref 8–16)
AST SERPL-CCNC: 33 U/L (ref 10–40)
BASOPHILS # BLD AUTO: 0.04 K/UL (ref 0–0.2)
BASOPHILS NFR BLD: 0.7 % (ref 0–1.9)
BILIRUB SERPL-MCNC: 0.7 MG/DL (ref 0.1–1)
BUN SERPL-MCNC: 30 MG/DL (ref 8–23)
CALCIUM SERPL-MCNC: 9.3 MG/DL (ref 8.7–10.5)
CHLORIDE SERPL-SCNC: 109 MMOL/L (ref 95–110)
CHOLEST SERPL-MCNC: 133 MG/DL (ref 120–199)
CHOLEST/HDLC SERPL: 2.4 {RATIO} (ref 2–5)
CO2 SERPL-SCNC: 22 MMOL/L (ref 23–29)
CREAT SERPL-MCNC: 1.1 MG/DL (ref 0.5–1.4)
DIFFERENTIAL METHOD: ABNORMAL
EOSINOPHIL # BLD AUTO: 0.2 K/UL (ref 0–0.5)
EOSINOPHIL NFR BLD: 3.3 % (ref 0–8)
ERYTHROCYTE [DISTWIDTH] IN BLOOD BY AUTOMATED COUNT: 14.1 % (ref 11.5–14.5)
EST. GFR  (AFRICAN AMERICAN): >60 ML/MIN/1.73 M^2
EST. GFR  (NON AFRICAN AMERICAN): >60 ML/MIN/1.73 M^2
ESTIMATED AVG GLUCOSE: 174 MG/DL (ref 68–131)
GLUCOSE SERPL-MCNC: 183 MG/DL (ref 70–110)
HBA1C MFR BLD HPLC: 7.7 % (ref 4–5.6)
HCT VFR BLD AUTO: 38.9 % (ref 40–54)
HDLC SERPL-MCNC: 55 MG/DL (ref 40–75)
HDLC SERPL: 41.4 % (ref 20–50)
HGB BLD-MCNC: 12.4 G/DL (ref 14–18)
IMM GRANULOCYTES # BLD AUTO: 0.01 K/UL (ref 0–0.04)
IMM GRANULOCYTES NFR BLD AUTO: 0.2 % (ref 0–0.5)
LDLC SERPL CALC-MCNC: 56.4 MG/DL (ref 63–159)
LYMPHOCYTES # BLD AUTO: 1.4 K/UL (ref 1–4.8)
LYMPHOCYTES NFR BLD: 25.2 % (ref 18–48)
MCH RBC QN AUTO: 29.7 PG (ref 27–31)
MCHC RBC AUTO-ENTMCNC: 31.9 G/DL (ref 32–36)
MCV RBC AUTO: 93 FL (ref 82–98)
MONOCYTES # BLD AUTO: 0.6 K/UL (ref 0.3–1)
MONOCYTES NFR BLD: 11.2 % (ref 4–15)
NEUTROPHILS # BLD AUTO: 3.2 K/UL (ref 1.8–7.7)
NEUTROPHILS NFR BLD: 59.4 % (ref 38–73)
NONHDLC SERPL-MCNC: 78 MG/DL
NRBC BLD-RTO: 0 /100 WBC
PLATELET # BLD AUTO: 159 K/UL (ref 150–350)
PMV BLD AUTO: 11.2 FL (ref 9.2–12.9)
POTASSIUM SERPL-SCNC: 4.4 MMOL/L (ref 3.5–5.1)
PROT SERPL-MCNC: 7.4 G/DL (ref 6–8.4)
RBC # BLD AUTO: 4.17 M/UL (ref 4.6–6.2)
SODIUM SERPL-SCNC: 141 MMOL/L (ref 136–145)
TRIGL SERPL-MCNC: 108 MG/DL (ref 30–150)
WBC # BLD AUTO: 5.44 K/UL (ref 3.9–12.7)

## 2021-01-07 PROCEDURE — 1126F AMNT PAIN NOTED NONE PRSNT: CPT | Mod: S$GLB,,, | Performed by: FAMILY MEDICINE

## 2021-01-07 PROCEDURE — 3078F DIAST BP <80 MM HG: CPT | Mod: CPTII,S$GLB,, | Performed by: FAMILY MEDICINE

## 2021-01-07 PROCEDURE — 1159F MED LIST DOCD IN RCRD: CPT | Mod: S$GLB,,, | Performed by: FAMILY MEDICINE

## 2021-01-07 PROCEDURE — 99499 RISK ADDL DX/OHS AUDIT: ICD-10-PCS | Mod: ,,, | Performed by: FAMILY MEDICINE

## 2021-01-07 PROCEDURE — 80053 COMPREHEN METABOLIC PANEL: CPT

## 2021-01-07 PROCEDURE — 3075F SYST BP GE 130 - 139MM HG: CPT | Mod: CPTII,S$GLB,, | Performed by: FAMILY MEDICINE

## 2021-01-07 PROCEDURE — 99499 UNLISTED E&M SERVICE: CPT | Mod: ,,, | Performed by: FAMILY MEDICINE

## 2021-01-07 PROCEDURE — 99214 PR OFFICE/OUTPT VISIT, EST, LEVL IV, 30-39 MIN: ICD-10-PCS | Mod: S$GLB,,, | Performed by: FAMILY MEDICINE

## 2021-01-07 PROCEDURE — 1101F PT FALLS ASSESS-DOCD LE1/YR: CPT | Mod: CPTII,S$GLB,, | Performed by: FAMILY MEDICINE

## 2021-01-07 PROCEDURE — 36415 COLL VENOUS BLD VENIPUNCTURE: CPT | Mod: PO

## 2021-01-07 PROCEDURE — 99999 PR PBB SHADOW E&M-EST. PATIENT-LVL IV: ICD-10-PCS | Mod: PBBFAC,,, | Performed by: FAMILY MEDICINE

## 2021-01-07 PROCEDURE — 80061 LIPID PANEL: CPT

## 2021-01-07 PROCEDURE — 1159F PR MEDICATION LIST DOCUMENTED IN MEDICAL RECORD: ICD-10-PCS | Mod: S$GLB,,, | Performed by: FAMILY MEDICINE

## 2021-01-07 PROCEDURE — 3051F HG A1C>EQUAL 7.0%<8.0%: CPT | Mod: CPTII,S$GLB,, | Performed by: FAMILY MEDICINE

## 2021-01-07 PROCEDURE — 83036 HEMOGLOBIN GLYCOSYLATED A1C: CPT

## 2021-01-07 PROCEDURE — 99214 OFFICE O/P EST MOD 30 MIN: CPT | Mod: S$GLB,,, | Performed by: FAMILY MEDICINE

## 2021-01-07 PROCEDURE — 3051F PR MOST RECENT HEMOGLOBIN A1C LEVEL 7.0 - < 8.0%: ICD-10-PCS | Mod: CPTII,S$GLB,, | Performed by: FAMILY MEDICINE

## 2021-01-07 PROCEDURE — 3078F PR MOST RECENT DIASTOLIC BLOOD PRESSURE < 80 MM HG: ICD-10-PCS | Mod: CPTII,S$GLB,, | Performed by: FAMILY MEDICINE

## 2021-01-07 PROCEDURE — 3288F FALL RISK ASSESSMENT DOCD: CPT | Mod: CPTII,S$GLB,, | Performed by: FAMILY MEDICINE

## 2021-01-07 PROCEDURE — 99499 UNLISTED E&M SERVICE: CPT | Mod: S$GLB,,, | Performed by: FAMILY MEDICINE

## 2021-01-07 PROCEDURE — 99999 PR PBB SHADOW E&M-EST. PATIENT-LVL IV: CPT | Mod: PBBFAC,,, | Performed by: FAMILY MEDICINE

## 2021-01-07 PROCEDURE — 99499 RISK ADDL DX/OHS AUDIT: ICD-10-PCS | Mod: S$GLB,,, | Performed by: FAMILY MEDICINE

## 2021-01-07 PROCEDURE — 1101F PR PT FALLS ASSESS DOC 0-1 FALLS W/OUT INJ PAST YR: ICD-10-PCS | Mod: CPTII,S$GLB,, | Performed by: FAMILY MEDICINE

## 2021-01-07 PROCEDURE — 85025 COMPLETE CBC W/AUTO DIFF WBC: CPT

## 2021-01-07 PROCEDURE — 3288F PR FALLS RISK ASSESSMENT DOCUMENTED: ICD-10-PCS | Mod: CPTII,S$GLB,, | Performed by: FAMILY MEDICINE

## 2021-01-07 PROCEDURE — 1126F PR PAIN SEVERITY QUANTIFIED, NO PAIN PRESENT: ICD-10-PCS | Mod: S$GLB,,, | Performed by: FAMILY MEDICINE

## 2021-01-07 PROCEDURE — 3075F PR MOST RECENT SYSTOLIC BLOOD PRESS GE 130-139MM HG: ICD-10-PCS | Mod: CPTII,S$GLB,, | Performed by: FAMILY MEDICINE

## 2021-01-07 RX ORDER — DOXYCYCLINE 100 MG/1
100 CAPSULE ORAL EVERY 12 HOURS
Qty: 20 CAPSULE | Refills: 0 | Status: SHIPPED | OUTPATIENT
Start: 2021-01-07 | End: 2021-01-07 | Stop reason: SDUPTHER

## 2021-01-07 RX ORDER — GLIMEPIRIDE 4 MG/1
6 TABLET ORAL
Qty: 135 TABLET | Refills: 0 | Status: SHIPPED | OUTPATIENT
Start: 2021-01-07 | End: 2021-04-01

## 2021-01-07 RX ORDER — DOXYCYCLINE 100 MG/1
100 CAPSULE ORAL EVERY 12 HOURS
Qty: 20 CAPSULE | Refills: 0 | Status: SHIPPED | OUTPATIENT
Start: 2021-01-07 | End: 2021-01-17

## 2021-01-07 RX ORDER — GLIMEPIRIDE 4 MG/1
6 TABLET ORAL
Qty: 135 TABLET | Refills: 0 | Status: SHIPPED | OUTPATIENT
Start: 2021-01-07 | End: 2021-01-07 | Stop reason: SDUPTHER

## 2021-01-08 ENCOUNTER — IMMUNIZATION (OUTPATIENT)
Dept: OBSTETRICS AND GYNECOLOGY | Facility: CLINIC | Age: 76
End: 2021-01-08
Payer: MEDICARE

## 2021-01-08 DIAGNOSIS — Z23 NEED FOR VACCINATION: ICD-10-CM

## 2021-01-08 PROCEDURE — 91300 COVID-19, MRNA, LNP-S, PF, 30 MCG/0.3 ML DOSE VACCINE: CPT | Mod: PBBFAC | Performed by: FAMILY MEDICINE

## 2021-01-10 PROBLEM — D69.6 THROMBOCYTOPENIA: Status: ACTIVE | Noted: 2021-01-10

## 2021-01-19 ENCOUNTER — OFFICE VISIT (OUTPATIENT)
Dept: PODIATRY | Facility: CLINIC | Age: 76
End: 2021-01-19
Payer: MEDICARE

## 2021-01-19 VITALS — WEIGHT: 200 LBS | HEIGHT: 70 IN | BODY MASS INDEX: 28.63 KG/M2

## 2021-01-19 DIAGNOSIS — M20.11 HALLUX ABDUCTO VALGUS, RIGHT: ICD-10-CM

## 2021-01-19 DIAGNOSIS — M20.12 HALLUX ABDUCTO VALGUS, LEFT: ICD-10-CM

## 2021-01-19 DIAGNOSIS — M20.42 HAMMER TOES OF BOTH FEET: ICD-10-CM

## 2021-01-19 DIAGNOSIS — M21.42 PES PLANUS OF BOTH FEET: ICD-10-CM

## 2021-01-19 DIAGNOSIS — M21.41 PES PLANUS OF BOTH FEET: ICD-10-CM

## 2021-01-19 DIAGNOSIS — M20.41 HAMMER TOES OF BOTH FEET: ICD-10-CM

## 2021-01-19 DIAGNOSIS — E11.9 COMPREHENSIVE DIABETIC FOOT EXAMINATION, TYPE 2 DM, ENCOUNTER FOR: Primary | ICD-10-CM

## 2021-01-19 PROCEDURE — 1126F PR PAIN SEVERITY QUANTIFIED, NO PAIN PRESENT: ICD-10-PCS | Mod: S$GLB,,, | Performed by: PODIATRIST

## 2021-01-19 PROCEDURE — 99214 PR OFFICE/OUTPT VISIT, EST, LEVL IV, 30-39 MIN: ICD-10-PCS | Mod: S$GLB,,, | Performed by: PODIATRIST

## 2021-01-19 PROCEDURE — 3051F PR MOST RECENT HEMOGLOBIN A1C LEVEL 7.0 - < 8.0%: ICD-10-PCS | Mod: CPTII,S$GLB,, | Performed by: PODIATRIST

## 2021-01-19 PROCEDURE — 1159F MED LIST DOCD IN RCRD: CPT | Mod: S$GLB,,, | Performed by: PODIATRIST

## 2021-01-19 PROCEDURE — 99999 PR PBB SHADOW E&M-EST. PATIENT-LVL IV: CPT | Mod: PBBFAC,,, | Performed by: PODIATRIST

## 2021-01-19 PROCEDURE — 1101F PT FALLS ASSESS-DOCD LE1/YR: CPT | Mod: CPTII,S$GLB,, | Performed by: PODIATRIST

## 2021-01-19 PROCEDURE — 99999 PR PBB SHADOW E&M-EST. PATIENT-LVL IV: ICD-10-PCS | Mod: PBBFAC,,, | Performed by: PODIATRIST

## 2021-01-19 PROCEDURE — 1159F PR MEDICATION LIST DOCUMENTED IN MEDICAL RECORD: ICD-10-PCS | Mod: S$GLB,,, | Performed by: PODIATRIST

## 2021-01-19 PROCEDURE — 3288F PR FALLS RISK ASSESSMENT DOCUMENTED: ICD-10-PCS | Mod: CPTII,S$GLB,, | Performed by: PODIATRIST

## 2021-01-19 PROCEDURE — 1101F PR PT FALLS ASSESS DOC 0-1 FALLS W/OUT INJ PAST YR: ICD-10-PCS | Mod: CPTII,S$GLB,, | Performed by: PODIATRIST

## 2021-01-19 PROCEDURE — 3051F HG A1C>EQUAL 7.0%<8.0%: CPT | Mod: CPTII,S$GLB,, | Performed by: PODIATRIST

## 2021-01-19 PROCEDURE — 3288F FALL RISK ASSESSMENT DOCD: CPT | Mod: CPTII,S$GLB,, | Performed by: PODIATRIST

## 2021-01-19 PROCEDURE — 1126F AMNT PAIN NOTED NONE PRSNT: CPT | Mod: S$GLB,,, | Performed by: PODIATRIST

## 2021-01-19 PROCEDURE — 99214 OFFICE O/P EST MOD 30 MIN: CPT | Mod: S$GLB,,, | Performed by: PODIATRIST

## 2021-01-29 ENCOUNTER — IMMUNIZATION (OUTPATIENT)
Dept: OBSTETRICS AND GYNECOLOGY | Facility: CLINIC | Age: 76
End: 2021-01-29
Payer: MEDICARE

## 2021-01-29 DIAGNOSIS — Z23 NEED FOR VACCINATION: Primary | ICD-10-CM

## 2021-01-29 PROCEDURE — 91300 COVID-19, MRNA, LNP-S, PF, 30 MCG/0.3 ML DOSE VACCINE: CPT | Mod: PBBFAC | Performed by: FAMILY MEDICINE

## 2021-01-29 PROCEDURE — 0002A COVID-19, MRNA, LNP-S, PF, 30 MCG/0.3 ML DOSE VACCINE: CPT | Mod: PBBFAC | Performed by: FAMILY MEDICINE

## 2021-02-09 ENCOUNTER — CLINICAL SUPPORT (OUTPATIENT)
Dept: UROLOGY | Facility: CLINIC | Age: 76
End: 2021-02-09
Payer: MEDICARE

## 2021-02-09 DIAGNOSIS — C68.9 UROTHELIAL CARCINOMA: Primary | ICD-10-CM

## 2021-02-09 PROCEDURE — 88112 PR  CYTOPATH, CELL ENHANCE TECH: ICD-10-PCS | Mod: 26,,, | Performed by: PATHOLOGY

## 2021-02-09 PROCEDURE — 88112 CYTOPATH CELL ENHANCE TECH: CPT | Performed by: PATHOLOGY

## 2021-02-09 PROCEDURE — 88112 CYTOPATH CELL ENHANCE TECH: CPT | Mod: 26,,, | Performed by: PATHOLOGY

## 2021-02-11 LAB — FINAL PATHOLOGIC DIAGNOSIS: NORMAL

## 2021-02-22 ENCOUNTER — PATIENT OUTREACH (OUTPATIENT)
Dept: ADMINISTRATIVE | Facility: OTHER | Age: 76
End: 2021-02-22

## 2021-02-23 ENCOUNTER — PROCEDURE VISIT (OUTPATIENT)
Dept: UROLOGY | Facility: CLINIC | Age: 76
End: 2021-02-23
Payer: MEDICARE

## 2021-02-23 VITALS
SYSTOLIC BLOOD PRESSURE: 146 MMHG | DIASTOLIC BLOOD PRESSURE: 71 MMHG | HEIGHT: 70 IN | WEIGHT: 200 LBS | BODY MASS INDEX: 28.63 KG/M2 | HEART RATE: 83 BPM

## 2021-02-23 DIAGNOSIS — C68.9 UROTHELIAL CARCINOMA: Primary | ICD-10-CM

## 2021-02-23 DIAGNOSIS — R82.71 BACTERIURIA: ICD-10-CM

## 2021-02-23 PROCEDURE — 52000 CYSTOSCOPY: ICD-10-PCS | Mod: S$GLB,,, | Performed by: UROLOGY

## 2021-02-23 PROCEDURE — 52000 CYSTOURETHROSCOPY: CPT | Mod: S$GLB,,, | Performed by: UROLOGY

## 2021-02-23 RX ORDER — LIDOCAINE HYDROCHLORIDE 20 MG/ML
JELLY TOPICAL
Status: COMPLETED | OUTPATIENT
Start: 2021-02-23 | End: 2021-02-23

## 2021-02-23 RX ORDER — CEPHALEXIN 500 MG/1
500 CAPSULE ORAL
Status: COMPLETED | OUTPATIENT
Start: 2021-02-23 | End: 2021-02-23

## 2021-02-23 RX ADMIN — CEPHALEXIN 500 MG: 500 CAPSULE ORAL at 10:02

## 2021-02-23 RX ADMIN — LIDOCAINE HYDROCHLORIDE 5 ML: 20 JELLY TOPICAL at 10:02

## 2021-03-09 ENCOUNTER — OFFICE VISIT (OUTPATIENT)
Dept: OPTOMETRY | Facility: CLINIC | Age: 76
End: 2021-03-09
Payer: MEDICARE

## 2021-03-09 DIAGNOSIS — H52.13 MYOPIA OF BOTH EYES: ICD-10-CM

## 2021-03-09 DIAGNOSIS — Z13.5 GLAUCOMA SCREENING: ICD-10-CM

## 2021-03-09 DIAGNOSIS — Z96.1 PSEUDOPHAKIA OF BOTH EYES: Primary | ICD-10-CM

## 2021-03-09 LAB
LEFT EYE DM RETINOPATHY: NEGATIVE
RIGHT EYE DM RETINOPATHY: NEGATIVE

## 2021-03-09 PROCEDURE — 2023F DILAT RTA XM W/O RTNOPTHY: CPT | Mod: S$GLB,,, | Performed by: OPTOMETRIST

## 2021-03-09 PROCEDURE — 92004 COMPRE OPH EXAM NEW PT 1/>: CPT | Mod: S$GLB,,, | Performed by: OPTOMETRIST

## 2021-03-09 PROCEDURE — 99999 PR PBB SHADOW E&M-EST. PATIENT-LVL II: ICD-10-PCS | Mod: PBBFAC,,, | Performed by: OPTOMETRIST

## 2021-03-09 PROCEDURE — 99999 PR PBB SHADOW E&M-EST. PATIENT-LVL II: CPT | Mod: PBBFAC,,, | Performed by: OPTOMETRIST

## 2021-03-09 PROCEDURE — 2023F PR DILATED RETINAL EXAM W/O EVID OF RETINOPATHY: ICD-10-PCS | Mod: S$GLB,,, | Performed by: OPTOMETRIST

## 2021-03-09 PROCEDURE — 92004 PR EYE EXAM, NEW PATIENT,COMPREHESV: ICD-10-PCS | Mod: S$GLB,,, | Performed by: OPTOMETRIST

## 2021-03-26 DIAGNOSIS — F41.9 ANXIETY: ICD-10-CM

## 2021-03-26 RX ORDER — LORAZEPAM 0.5 MG/1
TABLET ORAL
Qty: 10 TABLET | Refills: 0 | Status: SHIPPED | OUTPATIENT
Start: 2021-03-26 | End: 2021-04-07 | Stop reason: SDUPTHER

## 2021-03-29 ENCOUNTER — PATIENT OUTREACH (OUTPATIENT)
Dept: ADMINISTRATIVE | Facility: HOSPITAL | Age: 76
End: 2021-03-29

## 2021-04-07 ENCOUNTER — OFFICE VISIT (OUTPATIENT)
Dept: FAMILY MEDICINE | Facility: CLINIC | Age: 76
End: 2021-04-07
Payer: MEDICARE

## 2021-04-07 ENCOUNTER — LAB VISIT (OUTPATIENT)
Dept: LAB | Facility: HOSPITAL | Age: 76
End: 2021-04-07
Attending: INTERNAL MEDICINE
Payer: MEDICARE

## 2021-04-07 VITALS
DIASTOLIC BLOOD PRESSURE: 72 MMHG | HEART RATE: 80 BPM | HEIGHT: 70 IN | TEMPERATURE: 98 F | WEIGHT: 198.88 LBS | BODY MASS INDEX: 28.47 KG/M2 | SYSTOLIC BLOOD PRESSURE: 126 MMHG | OXYGEN SATURATION: 96 %

## 2021-04-07 DIAGNOSIS — N40.0 BENIGN PROSTATIC HYPERPLASIA, UNSPECIFIED WHETHER LOWER URINARY TRACT SYMPTOMS PRESENT: ICD-10-CM

## 2021-04-07 DIAGNOSIS — D69.6 THROMBOCYTOPENIA: ICD-10-CM

## 2021-04-07 DIAGNOSIS — C68.9 UROTHELIAL CARCINOMA: ICD-10-CM

## 2021-04-07 DIAGNOSIS — F13.20 BENZODIAZEPINE DEPENDENCE: ICD-10-CM

## 2021-04-07 DIAGNOSIS — F41.9 ANXIETY: ICD-10-CM

## 2021-04-07 DIAGNOSIS — F39 MOOD DISORDER: ICD-10-CM

## 2021-04-07 DIAGNOSIS — R80.9 MICROALBUMINURIA: ICD-10-CM

## 2021-04-07 DIAGNOSIS — I10 ESSENTIAL HYPERTENSION: ICD-10-CM

## 2021-04-07 DIAGNOSIS — Z12.5 SCREENING FOR PROSTATE CANCER: ICD-10-CM

## 2021-04-07 DIAGNOSIS — I25.10 CORONARY ARTERY DISEASE INVOLVING NATIVE CORONARY ARTERY OF NATIVE HEART WITHOUT ANGINA PECTORIS: ICD-10-CM

## 2021-04-07 DIAGNOSIS — D64.9 ANEMIA, UNSPECIFIED TYPE: ICD-10-CM

## 2021-04-07 LAB
BASOPHILS # BLD AUTO: 0.03 K/UL (ref 0–0.2)
BASOPHILS NFR BLD: 0.6 % (ref 0–1.9)
DIFFERENTIAL METHOD: ABNORMAL
EOSINOPHIL # BLD AUTO: 0.2 K/UL (ref 0–0.5)
EOSINOPHIL NFR BLD: 3.2 % (ref 0–8)
ERYTHROCYTE [DISTWIDTH] IN BLOOD BY AUTOMATED COUNT: 13.4 % (ref 11.5–14.5)
ESTIMATED AVG GLUCOSE: 200 MG/DL (ref 68–131)
HBA1C MFR BLD: 8.6 % (ref 4–5.6)
HCT VFR BLD AUTO: 37.3 % (ref 40–54)
HGB BLD-MCNC: 12.2 G/DL (ref 14–18)
IMM GRANULOCYTES # BLD AUTO: 0.01 K/UL (ref 0–0.04)
IMM GRANULOCYTES NFR BLD AUTO: 0.2 % (ref 0–0.5)
LYMPHOCYTES # BLD AUTO: 0.9 K/UL (ref 1–4.8)
LYMPHOCYTES NFR BLD: 19.1 % (ref 18–48)
MCH RBC QN AUTO: 30.6 PG (ref 27–31)
MCHC RBC AUTO-ENTMCNC: 32.7 G/DL (ref 32–36)
MCV RBC AUTO: 94 FL (ref 82–98)
MONOCYTES # BLD AUTO: 0.5 K/UL (ref 0.3–1)
MONOCYTES NFR BLD: 11.3 % (ref 4–15)
NEUTROPHILS # BLD AUTO: 3.1 K/UL (ref 1.8–7.7)
NEUTROPHILS NFR BLD: 65.6 % (ref 38–73)
NRBC BLD-RTO: 0 /100 WBC
PLATELET # BLD AUTO: 139 K/UL (ref 150–450)
PMV BLD AUTO: 10.9 FL (ref 9.2–12.9)
RBC # BLD AUTO: 3.99 M/UL (ref 4.6–6.2)
WBC # BLD AUTO: 4.67 K/UL (ref 3.9–12.7)

## 2021-04-07 PROCEDURE — 3288F FALL RISK ASSESSMENT DOCD: CPT | Mod: CPTII,S$GLB,, | Performed by: INTERNAL MEDICINE

## 2021-04-07 PROCEDURE — 99214 PR OFFICE/OUTPT VISIT, EST, LEVL IV, 30-39 MIN: ICD-10-PCS | Mod: S$GLB,,, | Performed by: INTERNAL MEDICINE

## 2021-04-07 PROCEDURE — 3078F DIAST BP <80 MM HG: CPT | Mod: CPTII,S$GLB,, | Performed by: INTERNAL MEDICINE

## 2021-04-07 PROCEDURE — 3051F HG A1C>EQUAL 7.0%<8.0%: CPT | Mod: CPTII,S$GLB,, | Performed by: INTERNAL MEDICINE

## 2021-04-07 PROCEDURE — 1159F PR MEDICATION LIST DOCUMENTED IN MEDICAL RECORD: ICD-10-PCS | Mod: S$GLB,,, | Performed by: INTERNAL MEDICINE

## 2021-04-07 PROCEDURE — 1101F PR PT FALLS ASSESS DOC 0-1 FALLS W/OUT INJ PAST YR: ICD-10-PCS | Mod: CPTII,S$GLB,, | Performed by: INTERNAL MEDICINE

## 2021-04-07 PROCEDURE — 3051F PR MOST RECENT HEMOGLOBIN A1C LEVEL 7.0 - < 8.0%: ICD-10-PCS | Mod: CPTII,S$GLB,, | Performed by: INTERNAL MEDICINE

## 2021-04-07 PROCEDURE — 99499 UNLISTED E&M SERVICE: CPT | Mod: S$GLB,,, | Performed by: INTERNAL MEDICINE

## 2021-04-07 PROCEDURE — 1101F PT FALLS ASSESS-DOCD LE1/YR: CPT | Mod: CPTII,S$GLB,, | Performed by: INTERNAL MEDICINE

## 2021-04-07 PROCEDURE — 3288F PR FALLS RISK ASSESSMENT DOCUMENTED: ICD-10-PCS | Mod: CPTII,S$GLB,, | Performed by: INTERNAL MEDICINE

## 2021-04-07 PROCEDURE — 80048 BASIC METABOLIC PNL TOTAL CA: CPT | Performed by: INTERNAL MEDICINE

## 2021-04-07 PROCEDURE — 85025 COMPLETE CBC W/AUTO DIFF WBC: CPT | Performed by: INTERNAL MEDICINE

## 2021-04-07 PROCEDURE — 3078F PR MOST RECENT DIASTOLIC BLOOD PRESSURE < 80 MM HG: ICD-10-PCS | Mod: CPTII,S$GLB,, | Performed by: INTERNAL MEDICINE

## 2021-04-07 PROCEDURE — 99999 PR PBB SHADOW E&M-EST. PATIENT-LVL III: ICD-10-PCS | Mod: PBBFAC,,, | Performed by: INTERNAL MEDICINE

## 2021-04-07 PROCEDURE — 84153 ASSAY OF PSA TOTAL: CPT | Performed by: INTERNAL MEDICINE

## 2021-04-07 PROCEDURE — 36415 COLL VENOUS BLD VENIPUNCTURE: CPT | Mod: PO | Performed by: INTERNAL MEDICINE

## 2021-04-07 PROCEDURE — 99499 RISK ADDL DX/OHS AUDIT: ICD-10-PCS | Mod: S$GLB,,, | Performed by: INTERNAL MEDICINE

## 2021-04-07 PROCEDURE — 99999 PR PBB SHADOW E&M-EST. PATIENT-LVL III: CPT | Mod: PBBFAC,,, | Performed by: INTERNAL MEDICINE

## 2021-04-07 PROCEDURE — 99214 OFFICE O/P EST MOD 30 MIN: CPT | Mod: S$GLB,,, | Performed by: INTERNAL MEDICINE

## 2021-04-07 PROCEDURE — 3074F SYST BP LT 130 MM HG: CPT | Mod: CPTII,S$GLB,, | Performed by: INTERNAL MEDICINE

## 2021-04-07 PROCEDURE — 83036 HEMOGLOBIN GLYCOSYLATED A1C: CPT | Performed by: INTERNAL MEDICINE

## 2021-04-07 PROCEDURE — 3074F PR MOST RECENT SYSTOLIC BLOOD PRESSURE < 130 MM HG: ICD-10-PCS | Mod: CPTII,S$GLB,, | Performed by: INTERNAL MEDICINE

## 2021-04-07 PROCEDURE — 1159F MED LIST DOCD IN RCRD: CPT | Mod: S$GLB,,, | Performed by: INTERNAL MEDICINE

## 2021-04-07 RX ORDER — LORAZEPAM 0.5 MG/1
TABLET ORAL
Qty: 10 TABLET | Refills: 3 | Status: SHIPPED | OUTPATIENT
Start: 2021-04-07 | End: 2021-06-10 | Stop reason: SDUPTHER

## 2021-04-08 LAB
ANION GAP SERPL CALC-SCNC: 7 MMOL/L (ref 8–16)
BUN SERPL-MCNC: 21 MG/DL (ref 8–23)
CALCIUM SERPL-MCNC: 9.2 MG/DL (ref 8.7–10.5)
CHLORIDE SERPL-SCNC: 106 MMOL/L (ref 95–110)
CO2 SERPL-SCNC: 23 MMOL/L (ref 23–29)
COMPLEXED PSA SERPL-MCNC: 0.34 NG/ML (ref 0–4)
CREAT SERPL-MCNC: 1.1 MG/DL (ref 0.5–1.4)
EST. GFR  (AFRICAN AMERICAN): >60 ML/MIN/1.73 M^2
EST. GFR  (NON AFRICAN AMERICAN): >60 ML/MIN/1.73 M^2
GLUCOSE SERPL-MCNC: 249 MG/DL (ref 70–110)
POTASSIUM SERPL-SCNC: 4.7 MMOL/L (ref 3.5–5.1)
SODIUM SERPL-SCNC: 136 MMOL/L (ref 136–145)

## 2021-05-06 ENCOUNTER — TELEPHONE (OUTPATIENT)
Dept: UROLOGY | Facility: CLINIC | Age: 76
End: 2021-05-06

## 2021-06-04 ENCOUNTER — CLINICAL SUPPORT (OUTPATIENT)
Dept: UROLOGY | Facility: CLINIC | Age: 76
End: 2021-06-04
Payer: MEDICARE

## 2021-06-04 DIAGNOSIS — C68.9 UROTHELIAL CARCINOMA: ICD-10-CM

## 2021-06-04 DIAGNOSIS — R82.71 BACTERIURIA: ICD-10-CM

## 2021-06-04 PROCEDURE — 88112 CYTOPATH CELL ENHANCE TECH: CPT | Performed by: STUDENT IN AN ORGANIZED HEALTH CARE EDUCATION/TRAINING PROGRAM

## 2021-06-04 PROCEDURE — 88112 CYTOPATH CELL ENHANCE TECH: CPT | Mod: 26,,, | Performed by: STUDENT IN AN ORGANIZED HEALTH CARE EDUCATION/TRAINING PROGRAM

## 2021-06-04 PROCEDURE — 88112 PR  CYTOPATH, CELL ENHANCE TECH: ICD-10-PCS | Mod: 26,,, | Performed by: STUDENT IN AN ORGANIZED HEALTH CARE EDUCATION/TRAINING PROGRAM

## 2021-06-07 LAB
FINAL PATHOLOGIC DIAGNOSIS: NORMAL
Lab: NORMAL
MICROSCOPIC EXAM: NORMAL

## 2021-06-08 DIAGNOSIS — F41.9 ANXIETY: ICD-10-CM

## 2021-06-08 RX ORDER — LORAZEPAM 0.5 MG/1
TABLET ORAL
Qty: 10 TABLET | Refills: 3 | Status: CANCELLED | OUTPATIENT
Start: 2021-06-08

## 2021-06-09 ENCOUNTER — PATIENT MESSAGE (OUTPATIENT)
Dept: FAMILY MEDICINE | Facility: CLINIC | Age: 76
End: 2021-06-09

## 2021-06-09 DIAGNOSIS — F41.9 ANXIETY: ICD-10-CM

## 2021-06-10 ENCOUNTER — PATIENT MESSAGE (OUTPATIENT)
Dept: FAMILY MEDICINE | Facility: CLINIC | Age: 76
End: 2021-06-10

## 2021-06-10 ENCOUNTER — PATIENT OUTREACH (OUTPATIENT)
Dept: ADMINISTRATIVE | Facility: OTHER | Age: 76
End: 2021-06-10

## 2021-06-10 RX ORDER — LORAZEPAM 0.5 MG/1
TABLET ORAL
Qty: 10 TABLET | Refills: 5 | Status: SHIPPED | OUTPATIENT
Start: 2021-06-10 | End: 2022-01-20 | Stop reason: SDUPTHER

## 2021-06-11 ENCOUNTER — PROCEDURE VISIT (OUTPATIENT)
Dept: UROLOGY | Facility: CLINIC | Age: 76
End: 2021-06-11
Payer: MEDICARE

## 2021-06-11 VITALS
DIASTOLIC BLOOD PRESSURE: 74 MMHG | WEIGHT: 198.88 LBS | SYSTOLIC BLOOD PRESSURE: 139 MMHG | HEART RATE: 75 BPM | HEIGHT: 70 IN | BODY MASS INDEX: 28.47 KG/M2

## 2021-06-11 DIAGNOSIS — C68.9 UROTHELIAL CARCINOMA: ICD-10-CM

## 2021-06-11 DIAGNOSIS — R82.71 BACTERIURIA: ICD-10-CM

## 2021-06-11 PROCEDURE — 52000 CYSTOSCOPY: ICD-10-PCS | Mod: S$GLB,,, | Performed by: UROLOGY

## 2021-06-11 PROCEDURE — 52000 CYSTOURETHROSCOPY: CPT | Mod: S$GLB,,, | Performed by: UROLOGY

## 2021-06-11 RX ORDER — CIPROFLOXACIN 500 MG/1
500 TABLET ORAL
Status: COMPLETED | OUTPATIENT
Start: 2021-06-11 | End: 2021-06-11

## 2021-06-11 RX ORDER — LIDOCAINE HYDROCHLORIDE 20 MG/ML
JELLY TOPICAL
Status: COMPLETED | OUTPATIENT
Start: 2021-06-11 | End: 2021-06-11

## 2021-06-11 RX ADMIN — CIPROFLOXACIN 500 MG: 500 TABLET ORAL at 10:06

## 2021-06-11 RX ADMIN — LIDOCAINE HYDROCHLORIDE: 20 JELLY TOPICAL at 10:06

## 2021-06-14 ENCOUNTER — TELEPHONE (OUTPATIENT)
Dept: UROLOGY | Facility: CLINIC | Age: 76
End: 2021-06-14

## 2021-06-15 ENCOUNTER — LAB VISIT (OUTPATIENT)
Dept: LAB | Facility: HOSPITAL | Age: 76
End: 2021-06-15
Attending: INTERNAL MEDICINE
Payer: MEDICARE

## 2021-06-15 LAB
ESTIMATED AVG GLUCOSE: 209 MG/DL (ref 68–131)
HBA1C MFR BLD: 8.9 % (ref 4–5.6)

## 2021-06-15 PROCEDURE — 83036 HEMOGLOBIN GLYCOSYLATED A1C: CPT | Performed by: INTERNAL MEDICINE

## 2021-06-15 PROCEDURE — 36415 COLL VENOUS BLD VENIPUNCTURE: CPT | Mod: PO | Performed by: INTERNAL MEDICINE

## 2021-06-18 ENCOUNTER — PROCEDURE VISIT (OUTPATIENT)
Dept: UROLOGY | Facility: CLINIC | Age: 76
End: 2021-06-18
Payer: MEDICARE

## 2021-06-18 VITALS
DIASTOLIC BLOOD PRESSURE: 77 MMHG | BODY MASS INDEX: 28.35 KG/M2 | SYSTOLIC BLOOD PRESSURE: 150 MMHG | HEART RATE: 74 BPM | HEIGHT: 70 IN | WEIGHT: 198 LBS

## 2021-06-18 DIAGNOSIS — C68.9 UROTHELIAL CARCINOMA: ICD-10-CM

## 2021-06-18 LAB
BILIRUB SERPL-MCNC: NORMAL MG/DL
BLOOD URINE, POC: NORMAL
CLARITY, POC UA: CLEAR
COLOR, POC UA: NORMAL
GLUCOSE UR QL STRIP: 250
KETONES UR QL STRIP: NORMAL
LEUKOCYTE ESTERASE URINE, POC: NORMAL
NITRITE, POC UA: NORMAL
PH, POC UA: 5
PROTEIN, POC: NORMAL
SPECIFIC GRAVITY, POC UA: 1.02
UROBILINOGEN, POC UA: NORMAL

## 2021-06-18 PROCEDURE — 81002 URINALYSIS NONAUTO W/O SCOPE: CPT | Mod: S$GLB,,, | Performed by: UROLOGY

## 2021-06-18 PROCEDURE — 81002 POCT URINE DIPSTICK WITHOUT MICROSCOPE: ICD-10-PCS | Mod: S$GLB,,, | Performed by: UROLOGY

## 2021-06-18 RX ORDER — CIPROFLOXACIN 500 MG/1
500 TABLET ORAL
Status: COMPLETED | OUTPATIENT
Start: 2021-06-18 | End: 2021-06-18

## 2021-06-18 RX ORDER — LIDOCAINE HYDROCHLORIDE 10 MG/ML
40 INJECTION, SOLUTION EPIDURAL; INFILTRATION; INTRACAUDAL; PERINEURAL
Status: COMPLETED | OUTPATIENT
Start: 2021-06-18 | End: 2021-06-18

## 2021-06-18 RX ORDER — LIDOCAINE HYDROCHLORIDE 20 MG/ML
JELLY TOPICAL ONCE
Status: COMPLETED | OUTPATIENT
Start: 2021-06-18 | End: 2021-06-18

## 2021-06-18 RX ADMIN — LIDOCAINE HYDROCHLORIDE: 20 JELLY TOPICAL at 01:06

## 2021-06-18 RX ADMIN — LIDOCAINE HYDROCHLORIDE 400 MG: 10 INJECTION, SOLUTION EPIDURAL; INFILTRATION; INTRACAUDAL; PERINEURAL at 01:06

## 2021-06-18 RX ADMIN — CIPROFLOXACIN 500 MG: 500 TABLET ORAL at 01:06

## 2021-06-28 ENCOUNTER — PATIENT OUTREACH (OUTPATIENT)
Dept: ADMINISTRATIVE | Facility: HOSPITAL | Age: 76
End: 2021-06-28

## 2021-06-28 ENCOUNTER — PATIENT MESSAGE (OUTPATIENT)
Dept: ADMINISTRATIVE | Facility: HOSPITAL | Age: 76
End: 2021-06-28

## 2021-06-30 ENCOUNTER — PATIENT OUTREACH (OUTPATIENT)
Dept: ADMINISTRATIVE | Facility: HOSPITAL | Age: 76
End: 2021-06-30

## 2021-06-30 RX ORDER — POLYETHYLENE GLYCOL 3350, SODIUM SULFATE ANHYDROUS, SODIUM BICARBONATE, SODIUM CHLORIDE, POTASSIUM CHLORIDE 236; 22.74; 6.74; 5.86; 2.97 G/4L; G/4L; G/4L; G/4L; G/4L
POWDER, FOR SOLUTION ORAL
COMMUNITY
Start: 2021-06-21

## 2021-07-01 ENCOUNTER — OFFICE VISIT (OUTPATIENT)
Dept: FAMILY MEDICINE | Facility: CLINIC | Age: 76
End: 2021-07-01
Payer: MEDICARE

## 2021-07-01 VITALS
HEIGHT: 70 IN | HEART RATE: 76 BPM | TEMPERATURE: 99 F | SYSTOLIC BLOOD PRESSURE: 138 MMHG | OXYGEN SATURATION: 96 % | BODY MASS INDEX: 28.35 KG/M2 | DIASTOLIC BLOOD PRESSURE: 78 MMHG | WEIGHT: 198 LBS

## 2021-07-01 DIAGNOSIS — D69.6 THROMBOCYTOPENIA: ICD-10-CM

## 2021-07-01 DIAGNOSIS — Z86.010 HISTORY OF COLONIC POLYPS: ICD-10-CM

## 2021-07-01 DIAGNOSIS — F39 MOOD DISORDER: ICD-10-CM

## 2021-07-01 DIAGNOSIS — D64.9 ANEMIA, UNSPECIFIED TYPE: ICD-10-CM

## 2021-07-01 DIAGNOSIS — C68.9 UROTHELIAL CARCINOMA: ICD-10-CM

## 2021-07-01 DIAGNOSIS — I10 ESSENTIAL HYPERTENSION: ICD-10-CM

## 2021-07-01 DIAGNOSIS — I25.10 CORONARY ARTERY DISEASE INVOLVING NATIVE CORONARY ARTERY OF NATIVE HEART WITHOUT ANGINA PECTORIS: ICD-10-CM

## 2021-07-01 DIAGNOSIS — F13.20 BENZODIAZEPINE DEPENDENCE: ICD-10-CM

## 2021-07-01 PROCEDURE — 99999 PR PBB SHADOW E&M-EST. PATIENT-LVL III: CPT | Mod: PBBFAC,,, | Performed by: INTERNAL MEDICINE

## 2021-07-01 PROCEDURE — 3078F PR MOST RECENT DIASTOLIC BLOOD PRESSURE < 80 MM HG: ICD-10-PCS | Mod: CPTII,S$GLB,, | Performed by: INTERNAL MEDICINE

## 2021-07-01 PROCEDURE — 3075F SYST BP GE 130 - 139MM HG: CPT | Mod: CPTII,S$GLB,, | Performed by: INTERNAL MEDICINE

## 2021-07-01 PROCEDURE — 3288F FALL RISK ASSESSMENT DOCD: CPT | Mod: CPTII,S$GLB,, | Performed by: INTERNAL MEDICINE

## 2021-07-01 PROCEDURE — 3052F PR MOST RECENT HEMOGLOBIN A1C LEVEL 8.0 - < 9.0%: ICD-10-PCS | Mod: CPTII,S$GLB,, | Performed by: INTERNAL MEDICINE

## 2021-07-01 PROCEDURE — 99214 PR OFFICE/OUTPT VISIT, EST, LEVL IV, 30-39 MIN: ICD-10-PCS | Mod: S$GLB,,, | Performed by: INTERNAL MEDICINE

## 2021-07-01 PROCEDURE — 99999 PR PBB SHADOW E&M-EST. PATIENT-LVL III: ICD-10-PCS | Mod: PBBFAC,,, | Performed by: INTERNAL MEDICINE

## 2021-07-01 PROCEDURE — 3075F PR MOST RECENT SYSTOLIC BLOOD PRESS GE 130-139MM HG: ICD-10-PCS | Mod: CPTII,S$GLB,, | Performed by: INTERNAL MEDICINE

## 2021-07-01 PROCEDURE — 99499 RISK ADDL DX/OHS AUDIT: ICD-10-PCS | Mod: S$GLB,,, | Performed by: INTERNAL MEDICINE

## 2021-07-01 PROCEDURE — 3052F HG A1C>EQUAL 8.0%<EQUAL 9.0%: CPT | Mod: CPTII,S$GLB,, | Performed by: INTERNAL MEDICINE

## 2021-07-01 PROCEDURE — 1159F PR MEDICATION LIST DOCUMENTED IN MEDICAL RECORD: ICD-10-PCS | Mod: S$GLB,,, | Performed by: INTERNAL MEDICINE

## 2021-07-01 PROCEDURE — 99214 OFFICE O/P EST MOD 30 MIN: CPT | Mod: S$GLB,,, | Performed by: INTERNAL MEDICINE

## 2021-07-01 PROCEDURE — 99499 UNLISTED E&M SERVICE: CPT | Mod: S$GLB,,, | Performed by: INTERNAL MEDICINE

## 2021-07-01 PROCEDURE — 1159F MED LIST DOCD IN RCRD: CPT | Mod: S$GLB,,, | Performed by: INTERNAL MEDICINE

## 2021-07-01 PROCEDURE — 1101F PR PT FALLS ASSESS DOC 0-1 FALLS W/OUT INJ PAST YR: ICD-10-PCS | Mod: CPTII,S$GLB,, | Performed by: INTERNAL MEDICINE

## 2021-07-01 PROCEDURE — 3288F PR FALLS RISK ASSESSMENT DOCUMENTED: ICD-10-PCS | Mod: CPTII,S$GLB,, | Performed by: INTERNAL MEDICINE

## 2021-07-01 PROCEDURE — 3078F DIAST BP <80 MM HG: CPT | Mod: CPTII,S$GLB,, | Performed by: INTERNAL MEDICINE

## 2021-07-01 PROCEDURE — 1101F PT FALLS ASSESS-DOCD LE1/YR: CPT | Mod: CPTII,S$GLB,, | Performed by: INTERNAL MEDICINE

## 2021-07-01 RX ORDER — GLIMEPIRIDE 4 MG/1
TABLET ORAL
Qty: 180 TABLET | Refills: 0 | Status: SHIPPED | OUTPATIENT
Start: 2021-07-01 | End: 2021-09-21

## 2021-07-01 RX ORDER — METFORMIN HYDROCHLORIDE 750 MG/1
TABLET, EXTENDED RELEASE ORAL
Qty: 60 TABLET | Refills: 3 | Status: SHIPPED | OUTPATIENT
Start: 2021-07-01 | End: 2021-09-25

## 2021-07-30 ENCOUNTER — HOSPITAL ENCOUNTER (OUTPATIENT)
Dept: CARDIOLOGY | Facility: HOSPITAL | Age: 76
Discharge: HOME | End: 2021-07-30
Attending: INTERNAL MEDICINE
Payer: MEDICARE

## 2021-07-30 ENCOUNTER — HOSPITAL ENCOUNTER (OUTPATIENT)
Dept: RADIOLOGY | Facility: HOSPITAL | Age: 76
Discharge: HOME | End: 2021-07-30
Attending: INTERNAL MEDICINE
Payer: MEDICARE

## 2021-07-30 VITALS — BODY MASS INDEX: 28.2 KG/M2 | HEIGHT: 70 IN | WEIGHT: 197 LBS

## 2021-07-30 DIAGNOSIS — E78.5 HYPERLIPIDEMIA, UNSPECIFIED HYPERLIPIDEMIA TYPE: ICD-10-CM

## 2021-07-30 DIAGNOSIS — I25.10 CORONARY ARTERY DISEASE INVOLVING NATIVE CORONARY ARTERY OF NATIVE HEART WITHOUT ANGINA PECTORIS: ICD-10-CM

## 2021-07-30 DIAGNOSIS — I10 ESSENTIAL HYPERTENSION: ICD-10-CM

## 2021-07-30 LAB
AORTIC ROOT ANNULUS: 3.57 CM
AORTIC VALVE CUSP SEPERATION: 1.76 CM
ASCENDING AORTA: 2.68 CM
AV INDEX (PROSTH): 0.5
AV MEAN GRADIENT: 11 MMHG
AV PEAK GRADIENT: 18 MMHG
AV VALVE AREA: 1.83 CM2
AV VELOCITY RATIO: 0.53
BSA FOR ECHO PROCEDURE: 2.1 M2
CV ECHO LV RWT: 0.69 CM
CV STRESS BASE HR: 67 BPM
DIASTOLIC BLOOD PRESSURE: 84 MMHG
DOP CALC AO PEAK VEL: 2.13 M/S
DOP CALC AO VTI: 49.07 CM
DOP CALC LVOT AREA: 3.7 CM2
DOP CALC LVOT DIAMETER: 2.16 CM
DOP CALC LVOT PEAK VEL: 1.12 M/S
DOP CALC LVOT STROKE VOLUME: 89.91 CM3
DOP CALCLVOT PEAK VEL VTI: 24.55 CM
E WAVE DECELERATION TIME: 213.02 MSEC
E/A RATIO: 1.01
E/E' RATIO: 12 M/S
ECHO LV POSTERIOR WALL: 1.34 CM (ref 0.6–1.1)
EJECTION FRACTION: 55 %
FRACTIONAL SHORTENING: 27 % (ref 28–44)
INTERVENTRICULAR SEPTUM: 1.26 CM (ref 0.6–1.1)
IVRT: 144.62 MSEC
LA MAJOR: 4.65 CM
LA MINOR: 5.38 CM
LA WIDTH: 4.5 CM
LEFT ATRIUM SIZE: 3.75 CM
LEFT ATRIUM VOLUME INDEX: 34.6 ML/M2
LEFT ATRIUM VOLUME: 71.55 CM3
LEFT INTERNAL DIMENSION IN SYSTOLE: 2.85 CM (ref 2.1–4)
LEFT VENTRICLE DIASTOLIC VOLUME INDEX: 31.57 ML/M2
LEFT VENTRICLE DIASTOLIC VOLUME: 65.36 ML
LEFT VENTRICLE MASS INDEX: 87 G/M2
LEFT VENTRICLE SYSTOLIC VOLUME INDEX: 14.9 ML/M2
LEFT VENTRICLE SYSTOLIC VOLUME: 30.89 ML
LEFT VENTRICULAR INTERNAL DIMENSION IN DIASTOLE: 3.89 CM (ref 3.5–6)
LEFT VENTRICULAR MASS: 179.06 G
LV LATERAL E/E' RATIO: 9.75 M/S
LV SEPTAL E/E' RATIO: 15.6 M/S
MV PEAK A VEL: 0.77 M/S
MV PEAK E VEL: 0.78 M/S
MV STENOSIS PRESSURE HALF TIME: 61.78 MS
MV VALVE AREA P 1/2 METHOD: 3.56 CM2
NUC STRESS DIASTOLIC VOLUME INDEX: 61
NUC STRESS EJECTION FRACTION: 70 %
NUC STRESS SYSTOLIC VOLUME INDEX: 18
OHS CV CPX 85 PERCENT MAX PREDICTED HEART RATE MALE: 123
OHS CV CPX MAX PREDICTED HEART RATE: 145
OHS CV CPX PATIENT IS FEMALE: 0
OHS CV CPX PATIENT IS MALE: 1
OHS CV CPX PEAK DIASTOLIC BLOOD PRESSURE: 85 MMHG
OHS CV CPX PEAK HEAR RATE: 87 BPM
OHS CV CPX PEAK RATE PRESSURE PRODUCT: NORMAL
OHS CV CPX PEAK SYSTOLIC BLOOD PRESSURE: 140 MMHG
OHS CV CPX PERCENT MAX PREDICTED HEART RATE ACHIEVED: 60
OHS CV CPX RATE PRESSURE PRODUCT PRESENTING: 9313
PISA TR MAX VEL: 2.47 M/S
PULM VEIN S/D RATIO: 1.58
PV PEAK D VEL: 0.36 M/S
PV PEAK S VEL: 0.57 M/S
PV PEAK VELOCITY: 1.23 CM/S
RA MAJOR: 5.24 CM
RA PRESSURE: 3 MMHG
RA WIDTH: 4.06 CM
RIGHT VENTRICULAR END-DIASTOLIC DIMENSION: 3.7 CM
RV TISSUE DOPPLER FREE WALL SYSTOLIC VELOCITY 1 (APICAL 4 CHAMBER VIEW): 11.49 CM/S
SINUS: 3.2 CM
STJ: 2.46 CM
SYSTOLIC BLOOD PRESSURE: 139 MMHG
TDI LATERAL: 0.08 M/S
TDI SEPTAL: 0.05 M/S
TDI: 0.07 M/S
TR MAX PG: 24 MMHG
TRICUSPID ANNULAR PLANE SYSTOLIC EXCURSION: 1.87 CM
TV REST PULMONARY ARTERY PRESSURE: 27 MMHG

## 2021-07-30 PROCEDURE — 93306 TTE W/DOPPLER COMPLETE: CPT | Mod: 26,,, | Performed by: INTERNAL MEDICINE

## 2021-07-30 PROCEDURE — 78452 HT MUSCLE IMAGE SPECT MULT: CPT

## 2021-07-30 PROCEDURE — 93306 TTE W/DOPPLER COMPLETE: CPT

## 2021-07-30 PROCEDURE — 63600175 PHARM REV CODE 636 W HCPCS: Performed by: INTERNAL MEDICINE

## 2021-07-30 PROCEDURE — 78452 STRESS TEST WITH MYOCARDIAL PERFUSION (CUPID ONLY): ICD-10-PCS | Mod: 26,,, | Performed by: INTERNAL MEDICINE

## 2021-07-30 PROCEDURE — 78452 HT MUSCLE IMAGE SPECT MULT: CPT | Mod: 26,,, | Performed by: INTERNAL MEDICINE

## 2021-07-30 PROCEDURE — 93018 STRESS TEST WITH MYOCARDIAL PERFUSION (CUPID ONLY): ICD-10-PCS | Mod: ,,, | Performed by: INTERNAL MEDICINE

## 2021-07-30 PROCEDURE — 93018 CV STRESS TEST I&R ONLY: CPT | Mod: ,,, | Performed by: INTERNAL MEDICINE

## 2021-07-30 PROCEDURE — 93016 STRESS TEST WITH MYOCARDIAL PERFUSION (CUPID ONLY): ICD-10-PCS | Mod: ,,, | Performed by: INTERNAL MEDICINE

## 2021-07-30 PROCEDURE — A9502 TC99M TETROFOSMIN: HCPCS

## 2021-07-30 PROCEDURE — 93017 CV STRESS TEST TRACING ONLY: CPT

## 2021-07-30 PROCEDURE — 93016 CV STRESS TEST SUPVJ ONLY: CPT | Mod: ,,, | Performed by: INTERNAL MEDICINE

## 2021-07-30 PROCEDURE — 93306 ECHO (CUPID ONLY): ICD-10-PCS | Mod: 26,,, | Performed by: INTERNAL MEDICINE

## 2021-07-30 RX ORDER — REGADENOSON 0.08 MG/ML
0.4 INJECTION, SOLUTION INTRAVENOUS ONCE
Status: COMPLETED | OUTPATIENT
Start: 2021-07-30 | End: 2021-07-30

## 2021-07-30 RX ADMIN — REGADENOSON 0.4 MG: 0.08 INJECTION, SOLUTION INTRAVENOUS at 09:07

## 2021-08-03 ENCOUNTER — TELEPHONE (OUTPATIENT)
Dept: CARDIOLOGY | Facility: CLINIC | Age: 76
End: 2021-08-03

## 2021-08-03 ENCOUNTER — PATIENT MESSAGE (OUTPATIENT)
Dept: CARDIOLOGY | Facility: CLINIC | Age: 76
End: 2021-08-03

## 2021-08-19 ENCOUNTER — PATIENT MESSAGE (OUTPATIENT)
Dept: UROLOGY | Facility: CLINIC | Age: 76
End: 2021-08-19

## 2021-09-10 ENCOUNTER — LAB VISIT (OUTPATIENT)
Dept: LAB | Facility: HOSPITAL | Age: 76
End: 2021-09-10
Attending: UROLOGY
Payer: MEDICARE

## 2021-09-10 DIAGNOSIS — C68.9 UROTHELIAL CARCINOMA: ICD-10-CM

## 2021-09-10 DIAGNOSIS — R82.71 BACTERIURIA: ICD-10-CM

## 2021-09-10 PROCEDURE — 88112 PR  CYTOPATH, CELL ENHANCE TECH: ICD-10-PCS | Mod: 26,,, | Performed by: PATHOLOGY

## 2021-09-10 PROCEDURE — 88112 CYTOPATH CELL ENHANCE TECH: CPT | Mod: 26,,, | Performed by: PATHOLOGY

## 2021-09-10 PROCEDURE — 88112 CYTOPATH CELL ENHANCE TECH: CPT | Performed by: PATHOLOGY

## 2021-09-13 LAB
FINAL PATHOLOGIC DIAGNOSIS: NORMAL
Lab: NORMAL

## 2021-09-17 ENCOUNTER — PROCEDURE VISIT (OUTPATIENT)
Dept: UROLOGY | Facility: CLINIC | Age: 76
End: 2021-09-17
Payer: MEDICARE

## 2021-09-17 VITALS
HEIGHT: 70 IN | DIASTOLIC BLOOD PRESSURE: 84 MMHG | WEIGHT: 196 LBS | SYSTOLIC BLOOD PRESSURE: 121 MMHG | BODY MASS INDEX: 28.06 KG/M2 | HEART RATE: 79 BPM

## 2021-09-17 DIAGNOSIS — C68.9 UROTHELIAL CARCINOMA: ICD-10-CM

## 2021-09-17 LAB
BILIRUB SERPL-MCNC: NORMAL MG/DL
BLOOD URINE, POC: NORMAL
CLARITY, POC UA: CLEAR
COLOR, POC UA: YELLOW
GLUCOSE UR QL STRIP: 100
KETONES UR QL STRIP: NORMAL
LEUKOCYTE ESTERASE URINE, POC: NORMAL
NITRITE, POC UA: NORMAL
PH, POC UA: 5
PROTEIN, POC: NORMAL
SPECIFIC GRAVITY, POC UA: 1.02
UROBILINOGEN, POC UA: NORMAL

## 2021-09-17 PROCEDURE — 52000 CYSTOURETHROSCOPY: CPT | Mod: S$GLB,,, | Performed by: UROLOGY

## 2021-09-17 PROCEDURE — 81002 URINALYSIS NONAUTO W/O SCOPE: CPT | Mod: QW,S$GLB,, | Performed by: UROLOGY

## 2021-09-17 PROCEDURE — 81002 POCT URINE DIPSTICK WITHOUT MICROSCOPE: ICD-10-PCS | Mod: QW,S$GLB,, | Performed by: UROLOGY

## 2021-09-17 PROCEDURE — 52000 CYSTOSCOPY: ICD-10-PCS | Mod: S$GLB,,, | Performed by: UROLOGY

## 2021-09-17 RX ORDER — CIPROFLOXACIN 500 MG/1
500 TABLET ORAL
Status: COMPLETED | OUTPATIENT
Start: 2021-09-17 | End: 2021-09-17

## 2021-09-17 RX ORDER — LIDOCAINE HYDROCHLORIDE 20 MG/ML
JELLY TOPICAL
Status: COMPLETED | OUTPATIENT
Start: 2021-09-17 | End: 2021-09-17

## 2021-09-17 RX ADMIN — CIPROFLOXACIN 500 MG: 500 TABLET ORAL at 11:09

## 2021-09-17 RX ADMIN — LIDOCAINE HYDROCHLORIDE 5 ML: 20 JELLY TOPICAL at 11:09

## 2021-09-20 NOTE — TRANSFER OF CARE
"Anesthesia Transfer of Care Note    Patient: Ruben Macedo Jr.    Procedure(s) Performed: Procedure(s) (LRB):  TURBT,WITH BLUE LIGHT CYSTOSCOPY AND CYSVIEW; bilateral retrograde pyelogram (Bilateral)  DILATION, URETHRA (N/A)    Patient location: PACU    Anesthesia Type: general    Transport from OR: Transported from OR on 2-3 L/min O2 by NC with adequate spontaneous ventilation    Post pain: adequate analgesia    Post assessment: no apparent anesthetic complications    Post vital signs: stable    Level of consciousness: awake and alert    Nausea/Vomiting: no nausea/vomiting    Complications: none    Transfer of care protocol was followed      Last vitals:   Visit Vitals  BP (!) 157/75 (BP Location: Left arm, Patient Position: Sitting)   Pulse 65   Temp 36.4 °C (97.6 °F) (Tympanic)   Resp 18   Ht 5' 10" (1.778 m)   Wt 88.5 kg (195 lb)   SpO2 98%   BMI 27.98 kg/m²     " LABOR PROGRESS NOTE:    S: Patient feeling more pain and pressure, anesthesia in room adjusting epidural upon my evaluation. Pain improved afterwards    O:  Vitals:    21 0700 21 0701 21 0706 21 0719   BP: 122/75      BP Location:       Patient Position:       Pulse: 67 76 75    Resp:       Temp:    98.4 °F (36.9 °C)   TempSrc:    Oral   SpO2:  99% 98%    Weight:       Height:       LMP: 2020       FHT: 135 / Moderate variability / Present accelerations / Intermittent variable decelerations with return to baseline   TOCO: irregular, patient reports contractions q2-3min but toco not picking contractions up well    SVE: 9/100/-1     Ms. Sandra Herrera is a 26 year old  at 40w4d who presented with painful contractions, admitted for expectant management of labor.     1. FHT: cat 2 secondary to intermittent variable decelerations that return to baseline. Possibly secondary to cervical change. Patient repositioned and tracing improved. Good variability. Discussed with Dr. Franks. Will continue to monitor closely  2. Vitals: afebrile, normotensive  3. Labor      -4/50/-2 at 1400      -4-5/70/-2 at 2205      -5/70/-2 at 0144, AROM w/ clear fluid      -6/80/-2 at 0542      -Pitocin began at 0611      -9/100/-1 at 0735  4. GBS bacteriuria: PCN started 16:30  5. Rh status      - positive, no indication for rhogam at this time  6.Pain - epidural in place  7. PNI:      - Anxiety- continue Buspar 10mg daily      - Hypothyroidism- on levothyroxine 112 mcg daily      - Anemia- 3rd trimester hgb 9.8, on Fe. Hgb 10.7 on admission      Discussed with Dr. Tiny Wilson MD  PGY-1  Family Medicine   Please contact through [facility]

## 2021-10-04 ENCOUNTER — TELEPHONE (OUTPATIENT)
Dept: FAMILY MEDICINE | Facility: CLINIC | Age: 76
End: 2021-10-04

## 2021-10-06 ENCOUNTER — CLINICAL SUPPORT (OUTPATIENT)
Dept: FAMILY MEDICINE | Facility: CLINIC | Age: 76
End: 2021-10-06
Payer: MEDICARE

## 2021-10-06 DIAGNOSIS — Z23 FLU VACCINE NEED: Primary | ICD-10-CM

## 2021-10-06 PROCEDURE — 90662 FLU VACCINE - HIGH DOSE (65+) PRESERVATIVE FREE IM: ICD-10-PCS | Mod: S$GLB,,, | Performed by: INTERNAL MEDICINE

## 2021-10-06 PROCEDURE — G0008 ADMIN INFLUENZA VIRUS VAC: HCPCS | Mod: S$GLB,,, | Performed by: INTERNAL MEDICINE

## 2021-10-06 PROCEDURE — 90662 IIV NO PRSV INCREASED AG IM: CPT | Mod: S$GLB,,, | Performed by: INTERNAL MEDICINE

## 2021-10-06 PROCEDURE — G0008 FLU VACCINE - HIGH DOSE (65+) PRESERVATIVE FREE IM: ICD-10-PCS | Mod: S$GLB,,, | Performed by: INTERNAL MEDICINE

## 2021-11-19 ENCOUNTER — IMMUNIZATION (OUTPATIENT)
Dept: OBSTETRICS AND GYNECOLOGY | Facility: CLINIC | Age: 76
End: 2021-11-19
Payer: MEDICARE

## 2021-11-19 DIAGNOSIS — Z23 NEED FOR VACCINATION: Primary | ICD-10-CM

## 2021-11-19 PROCEDURE — 0004A COVID-19, MRNA, LNP-S, PF, 30 MCG/0.3 ML DOSE VACCINE: CPT | Mod: PBBFAC | Performed by: FAMILY MEDICINE

## 2021-12-02 ENCOUNTER — PES CALL (OUTPATIENT)
Dept: ADMINISTRATIVE | Facility: CLINIC | Age: 76
End: 2021-12-02
Payer: MEDICARE

## 2021-12-14 ENCOUNTER — PATIENT MESSAGE (OUTPATIENT)
Dept: ADMINISTRATIVE | Facility: HOSPITAL | Age: 76
End: 2021-12-14
Payer: MEDICARE

## 2022-01-13 ENCOUNTER — LAB VISIT (OUTPATIENT)
Dept: PRIMARY CARE CLINIC | Facility: OTHER | Age: 77
End: 2022-01-13
Attending: INTERNAL MEDICINE
Payer: MEDICARE

## 2022-01-13 ENCOUNTER — TELEPHONE (OUTPATIENT)
Dept: FAMILY MEDICINE | Facility: CLINIC | Age: 77
End: 2022-01-13
Payer: MEDICARE

## 2022-01-13 DIAGNOSIS — Z20.822 ENCOUNTER FOR LABORATORY TESTING FOR COVID-19 VIRUS: ICD-10-CM

## 2022-01-13 PROCEDURE — U0003 INFECTIOUS AGENT DETECTION BY NUCLEIC ACID (DNA OR RNA); SEVERE ACUTE RESPIRATORY SYNDROME CORONAVIRUS 2 (SARS-COV-2) (CORONAVIRUS DISEASE [COVID-19]), AMPLIFIED PROBE TECHNIQUE, MAKING USE OF HIGH THROUGHPUT TECHNOLOGIES AS DESCRIBED BY CMS-2020-01-R: HCPCS | Performed by: INTERNAL MEDICINE

## 2022-01-13 NOTE — TELEPHONE ENCOUNTER
Pt called stating that he has  Flu like symptoms and wanted to know if  be seen virtually or in the office. PT  Has a appointment scheduled on 01/14/2022 but the pt PCP stated that he would like for him to be tested. If test is negative the PCP stated that the patient can be seen today, but pt states that he can not be seen today but would like to keep his appointment for tomorrow if he test negative for Covid.

## 2022-01-13 NOTE — TELEPHONE ENCOUNTER
Thanks, be sure to call patient back Friday morning to get more information, assess symptoms and confirm that he is coming in.

## 2022-01-13 NOTE — TELEPHONE ENCOUNTER
----- Message from Cassandra Raymundo sent at 1/13/2022 11:36 AM CST -----  Contact: Wife Mrs. Macedo [phone]  Type:  Patient Returning Call    Who Called: Wife Mrs. Macedo    Who Left Message for Patient: Not sure    Does the patient know what this is regarding?: Returning call regarding COVID TESTING    Would the patient rather a call back or a response via [facility]? Call back    Best Call Back Number: [phone]

## 2022-01-14 ENCOUNTER — HOSPITAL ENCOUNTER (EMERGENCY)
Facility: HOSPITAL | Age: 77
Discharge: HOME | End: 2022-01-14
Attending: EMERGENCY MEDICINE
Payer: MEDICARE

## 2022-01-14 ENCOUNTER — APPOINTMENT (OUTPATIENT)
Dept: RADIOLOGY | Facility: HOSPITAL | Age: 77
End: 2022-01-14
Attending: EMERGENCY MEDICINE
Payer: MEDICARE

## 2022-01-14 VITALS
OXYGEN SATURATION: 95 % | DIASTOLIC BLOOD PRESSURE: 83 MMHG | RESPIRATION RATE: 18 BRPM | HEART RATE: 118 BPM | HEIGHT: 70 IN | SYSTOLIC BLOOD PRESSURE: 125 MMHG | WEIGHT: 195 LBS | BODY MASS INDEX: 27.92 KG/M2 | TEMPERATURE: 99 F

## 2022-01-14 DIAGNOSIS — B34.9 ACUTE VIRAL SYNDROME: Primary | ICD-10-CM

## 2022-01-14 DIAGNOSIS — U07.1 COVID-19: ICD-10-CM

## 2022-01-14 DIAGNOSIS — J02.9 SORE THROAT: ICD-10-CM

## 2022-01-14 LAB
CTP QC/QA: YES
POCT GLUCOSE: 234 MG/DL (ref 70–110)
SARS-COV-2 RDRP RESP QL NAA+PROBE: POSITIVE
SARS-COV-2 RNA RESP QL NAA+PROBE: DETECTED
SARS-COV-2- CYCLE NUMBER: 18

## 2022-01-14 PROCEDURE — 71045 XR CHEST 1 VIEW: ICD-10-PCS | Mod: 26,,, | Performed by: RADIOLOGY

## 2022-01-14 PROCEDURE — 25000003 PHARM REV CODE 250: Mod: ER | Performed by: EMERGENCY MEDICINE

## 2022-01-14 PROCEDURE — 71045 X-RAY EXAM CHEST 1 VIEW: CPT | Mod: 26,,, | Performed by: RADIOLOGY

## 2022-01-14 PROCEDURE — 96372 THER/PROPH/DIAG INJ SC/IM: CPT | Mod: ER

## 2022-01-14 PROCEDURE — 82962 GLUCOSE BLOOD TEST: CPT | Mod: ER

## 2022-01-14 PROCEDURE — 93010 EKG 12-LEAD: ICD-10-PCS | Mod: ,,, | Performed by: INTERNAL MEDICINE

## 2022-01-14 PROCEDURE — U0002 COVID-19 LAB TEST NON-CDC: HCPCS | Mod: ER | Performed by: EMERGENCY MEDICINE

## 2022-01-14 PROCEDURE — 93010 ELECTROCARDIOGRAM REPORT: CPT | Mod: ,,, | Performed by: INTERNAL MEDICINE

## 2022-01-14 PROCEDURE — 94640 AIRWAY INHALATION TREATMENT: CPT | Mod: ER

## 2022-01-14 PROCEDURE — 63600175 PHARM REV CODE 636 W HCPCS: Mod: ER | Performed by: EMERGENCY MEDICINE

## 2022-01-14 PROCEDURE — 99284 EMERGENCY DEPT VISIT MOD MDM: CPT | Mod: 25,ER

## 2022-01-14 PROCEDURE — 25000242 PHARM REV CODE 250 ALT 637 W/ HCPCS: Mod: ER | Performed by: EMERGENCY MEDICINE

## 2022-01-14 PROCEDURE — 27100098 HC SPACER: Mod: ER

## 2022-01-14 PROCEDURE — 71045 X-RAY EXAM CHEST 1 VIEW: CPT | Mod: TC,FY

## 2022-01-14 PROCEDURE — 93005 ELECTROCARDIOGRAM TRACING: CPT | Mod: ER

## 2022-01-14 RX ORDER — DEXAMETHASONE SODIUM PHOSPHATE 4 MG/ML
4 INJECTION, SOLUTION INTRA-ARTICULAR; INTRALESIONAL; INTRAMUSCULAR; INTRAVENOUS; SOFT TISSUE
Status: COMPLETED | OUTPATIENT
Start: 2022-01-14 | End: 2022-01-14

## 2022-01-14 RX ORDER — ALBUTEROL SULFATE 90 UG/1
2 AEROSOL, METERED RESPIRATORY (INHALATION) EVERY 6 HOURS PRN
Status: DISCONTINUED | OUTPATIENT
Start: 2022-01-14 | End: 2022-01-14 | Stop reason: HOSPADM

## 2022-01-14 RX ORDER — ACETAMINOPHEN 325 MG/1
650 TABLET ORAL
Status: COMPLETED | OUTPATIENT
Start: 2022-01-14 | End: 2022-01-14

## 2022-01-14 RX ADMIN — ALBUTEROL SULFATE 2 PUFF: 108 INHALANT RESPIRATORY (INHALATION) at 09:01

## 2022-01-14 RX ADMIN — DEXAMETHASONE SODIUM PHOSPHATE 4 MG: 4 INJECTION INTRA-ARTICULAR; INTRALESIONAL; INTRAMUSCULAR; INTRAVENOUS; SOFT TISSUE at 07:01

## 2022-01-14 RX ADMIN — ACETAMINOPHEN 650 MG: 325 TABLET ORAL at 09:01

## 2022-01-14 NOTE — ED PROVIDER NOTES
Encounter Date: 1/14/2022       History     Chief Complaint   Patient presents with    Sore Throat    Fever     Onset yesterday, pt has pending covid test, increasing pain to throat     76-year-old male with a history of anxiety diabetes and hypertension, presenting with chief complaint of sore throat for the past 2 days.  Patient describes pain on swallowing oral temperature to 995, general malaise and occasional cough.  He has a COVID test pending.  No chills no nausea or vomiting no chest pain.  At rest he does not feel short of breath.  He reports some postnasal drip that recently started.  No drooling or difficulty swallowing.  Some decreased p.o. intake secondary to pain on swallowing.  No leg pain or swelling no history of PE or DVT  He is here with his wife who is very supportive.  They are reliable historians and they do have a primary care doctor.        Review of patient's allergies indicates:   Allergen Reactions    Morphine Nausea And Vomiting     Can take Dilaudid, Demerol, Percocet & Norco    Tramadol Nausea And Vomiting    Morphine (pf) Nausea And Vomiting     Past Medical History:   Diagnosis Date    Anticoagulant long-term use     plavix    Anxiety     BPH (benign prostatic hypertrophy) 2/2/2015    Cataract     Coronary artery disease 2005    stent x 1        Diabetes mellitus, type 2     History of colonic polyps 4/11/2017 2/13 --3 yrs    History of MI (myocardial infarction) 2005    Followed by cardiology, Dr. Acevedo (at [facility])    History of stroke November 2005    Hyperlipidemia     Hypertension     Incomplete bladder emptying 2/2/2015    Myocardial infarction 2006    Overweight(278.02)     Personal history of kidney stones     Renal cyst 2/10/2015    Stroke 2005    loss of peripheral vision     Past Surgical History:   Procedure Laterality Date    APPENDECTOMY      BACK SURGERY      BLADDER FULGURATION  5/11/2020    Procedure: FULGURATION,  BLADDER;  Surgeon: Gelacio Sanchez MD;  Location: [facility];  Service: Urology;;    CATARACT EXTRACTION      od    CORONARY ANGIOPLASTY WITH STENT PLACEMENT  2000    CYSTOSCOPY  5/11/2020    Procedure: CYSTOSCOPY;  Surgeon: Gelacio Sanchez MD;  Location: [facility];  Service: Urology;;    CYSTOSCOPY W/ URETERAL STENT PLACEMENT      stents placed x2    DILATION OF URETHRA N/A 11/12/2020    Procedure: DILATION, URETHRA;  Surgeon: Gelacio Sanchez MD;  Location: [facility];  Service: Urology;  Laterality: N/A;    EYE SURGERY      cataract right eye    REMOVAL OF BLOOD CLOT  5/11/2020    Procedure: REMOVAL, BLOOD CLOT;  Surgeon: Gelacio Sanchez MD;  Location: [facility];  Service: Urology;;    SPINE SURGERY      TONSILLECTOMY      TURBT, WITH BLUE LIGHT CYSTOSCOPY AND CYSVIEW N/A 7/9/2020    Procedure: TURBT,WITH BLUE LIGHT CYSTOSCOPY AND CYSVIEW;  Surgeon: Gelacio Sanchez MD;  Location: [facility];  Service: Urology;  Laterality: N/A;    TURBT, WITH BLUE LIGHT CYSTOSCOPY AND CYSVIEW Bilateral 11/12/2020    Procedure: TURBT,WITH BLUE LIGHT CYSTOSCOPY AND CYSVIEW; bilateral retrograde pyelogram;  Surgeon: Gelacio Sanchez MD;  Location: [facility];  Service: Urology;  Laterality: Bilateral;     Family History   Problem Relation Age of Onset    Hyperlipidemia Father     Cancer Father     Cataracts Father     Cataracts Mother     Hyperlipidemia Sister     Alzheimer's disease Unknown     Diabetes Neg Hx     Amblyopia Neg Hx     Blindness Neg Hx     Glaucoma Neg Hx     Hypertension Neg Hx     Macular degeneration Neg Hx     Retinal detachment Neg Hx     Strabismus Neg Hx     Stroke Neg Hx     Thyroid disease Neg Hx      Social History     Tobacco Use    Smoking status: Never Smoker    Smokeless tobacco: Never Used   Substance Use Topics    Alcohol use: Not Currently     Comment: occasional    Drug use: No     Review of Systems   Constitutional: Negative for chills and fever.   HENT: Positive for postnasal  drip and sore throat. Negative for drooling and trouble swallowing.    Respiratory: Positive for cough. Negative for wheezing.    Cardiovascular: Negative for chest pain and leg swelling.   Gastrointestinal: Negative for abdominal pain.   Endocrine: Negative for polyuria.   Genitourinary: Negative for dysuria.   Musculoskeletal: Negative for back pain.   Skin: Negative for rash.   Neurological: Negative for headaches.   Hematological: Does not bruise/bleed easily.   Psychiatric/Behavioral: Negative for agitation and confusion.       Physical Exam     Initial Vitals [01/14/22 0724]   BP Pulse Resp Temp SpO2   (!) 171/90 93 20 98.8 °F (37.1 °C) (!) 94 %      MAP       --         Physical Exam    Nursing note and vitals reviewed.  Constitutional: He appears well-developed and well-nourished. No distress.   HENT:   Head: Normocephalic.   Mouth/Throat: Uvula is midline, oropharynx is clear and moist and mucous membranes are normal. No oropharyngeal exudate, posterior oropharyngeal edema, posterior oropharyngeal erythema or tonsillar abscesses.   Eyes: Conjunctivae are normal. Pupils are equal, round, and reactive to light.   Neck: Neck supple.   Normal range of motion.  Pulmonary/Chest: Effort normal and breath sounds normal. He has no decreased breath sounds. He has no wheezes.   Abdominal: Abdomen is soft and flat. Bowel sounds are normal.   Musculoskeletal:      Cervical back: Normal range of motion and neck supple.     Neurological: He is alert and oriented to person, place, and time. GCS eye subscore is 4. GCS verbal subscore is 5. GCS motor subscore is 6.   Skin: Skin is warm and dry. Capillary refill takes less than 2 seconds.         ED Course   Procedures  Labs Reviewed   SARS-COV-2 RDRP GENE - Abnormal; Notable for the following components:       Result Value    POC Rapid COVID Positive (*)     All other components within normal limits    Narrative:     This test utilizes isothermal nucleic acid amplification    technology to detect the SARS-CoV-2 RdRp nucleic acid segment.   The analytical sensitivity (limit of detection) is 125 genome   equivalents/mL.   A POSITIVE result implies infection with the SARS-CoV-2 virus;   the patient is presumed to be contagious.     A NEGATIVE result means that SARS-CoV-2 nucleic acids are not   present above the limit of detection. A NEGATIVE result should be   treated as presumptive. It does not rule out the possibility of   COVID-19 and should not be the sole basis for treatment decisions.   If COVID-19 is strongly suspected based on clinical and exposure   history, re-testing using an alternate molecular assay should be   considered.   This test is only for use under the Food and Drug   Administration s Emergency Use Authorization (EUA).   Commercial kits are provided by Dr. TATTOFF.   Performance characteristics of the EUA have been independently   verified by [facility] Department of   Pathology and Laboratory Medicine.   _________________________________________________________________   The authorized Fact Sheet for Healthcare Providers and the authorized Fact   Sheet for Patients of the ID NOW COVID-19 are available on the FDA   website:     https://www.fda.gov/media/011659/download  https://www.fda.gov/media/240095/download       POCT GLUCOSE - Abnormal; Notable for the following components:    POCT Glucose 234 (*)     All other components within normal limits   POCT GLUCOSE MONITORING CONTINUOUS     EKG Readings: (Independently Interpreted)   Initial Reading: No STEMI. Rhythm: Sinus Tachycardia. Heart Rate: 109. Ectopy: No Ectopy. T Waves: Normal. Axis: Normal.       Imaging Results          X-Ray Chest 1 View (Final result)  Result time 01/14/22 08:31:38    Final result by Som Parr DO (01/14/22 08:31:38)                 Impression:      No acute abnormality.      Electronically signed by: Som Parr  Date:    01/14/2022  Time:    08:31              Narrative:    EXAMINATION:  XR CHEST 1 VIEW    CLINICAL HISTORY:  COVID-19    TECHNIQUE:  Single frontal view of the chest was performed.    COMPARISON:  12/19/2016.    FINDINGS:  The lungs are well expanded and clear. No focal opacities are seen.  There is nonspecific elevation of the left hemidiaphragm.  The pleural spaces are clear.    The cardiac silhouette is unremarkable.  There are atherosclerotic calcifications of the aortic arch.    The visualized osseous structures are unremarkable.                              X-Rays:   Independently Interpreted Readings:   Chest X-Ray: Normal heart size.  No infiltrates.  No acute abnormalities.     Medications   albuterol inhaler 2 puff (2 puffs Inhalation Given 1/14/22 0912)   dexamethasone injection 4 mg (4 mg Intramuscular Given 1/14/22 0745)   acetaminophen tablet 650 mg (650 mg Oral Given 1/14/22 0901)                          Clinical Impression:   Final diagnoses:  [U07.1] COVID-19  [J02.9] Sore throat  [B34.9] Acute viral syndrome (Primary)          ED Disposition Condition    Discharge Stable        ED Prescriptions     Medication Sig Dispense Start Date End Date Auth. Provider    diphenhydrAMINE-aluminum-magnesium hydroxide-simethicone-LIDOcaine HCl 2% Swish and spit 15 mLs every 4 (four) hours as needed (sore throat). 300 mL 1/14/2022  Ledy Obrien MD        Follow-up Information     Follow up With Specialties Details Why Contact Info    Cedric Holder MD Internal Medicine Schedule an appointment as soon as possible for a visit  on Monday for recheck [address] [place] [address]  [phone]             Ledy Obrien MD  01/14/22 3517

## 2022-01-14 NOTE — ED NOTES
Pulse oximetry measurement of 95 percent at rest on room air, heart rate of 98 beats per minute.  Ambulatory pulse oximetry test performed, patient ambulated for approximately 2-4 minutes.  At test end pulse oximetry measured 92-93 percent on room air with heart rate of 114 beats per minute.

## 2022-01-17 ENCOUNTER — INFUSION (OUTPATIENT)
Dept: INFECTIOUS DISEASES | Facility: HOSPITAL | Age: 77
End: 2022-01-17
Attending: EMERGENCY MEDICINE
Payer: MEDICARE

## 2022-01-17 VITALS
HEART RATE: 76 BPM | RESPIRATION RATE: 18 BRPM | TEMPERATURE: 98 F | OXYGEN SATURATION: 98 % | SYSTOLIC BLOOD PRESSURE: 162 MMHG | DIASTOLIC BLOOD PRESSURE: 83 MMHG | WEIGHT: 195 LBS | BODY MASS INDEX: 27.92 KG/M2 | HEIGHT: 70 IN

## 2022-01-17 DIAGNOSIS — B34.9 ACUTE VIRAL SYNDROME: ICD-10-CM

## 2022-01-17 DIAGNOSIS — U07.1 COVID-19: Primary | ICD-10-CM

## 2022-01-17 DIAGNOSIS — J02.9 SORE THROAT: ICD-10-CM

## 2022-01-17 PROCEDURE — M0243 CASIRIVI AND IMDEVI INFUSION: HCPCS | Performed by: INTERNAL MEDICINE

## 2022-01-17 PROCEDURE — 25000003 PHARM REV CODE 250: Performed by: INTERNAL MEDICINE

## 2022-01-17 PROCEDURE — 63600175 PHARM REV CODE 636 W HCPCS: Performed by: INTERNAL MEDICINE

## 2022-01-17 RX ORDER — ONDANSETRON 4 MG/1
4 TABLET, ORALLY DISINTEGRATING ORAL ONCE AS NEEDED
Status: ACTIVE | OUTPATIENT
Start: 2022-01-17 | End: 2033-06-15

## 2022-01-17 RX ORDER — EPINEPHRINE 0.3 MG/.3ML
0.3 INJECTION SUBCUTANEOUS
Status: ACTIVE | OUTPATIENT
Start: 2022-01-17

## 2022-01-17 RX ORDER — DIPHENHYDRAMINE HYDROCHLORIDE 50 MG/ML
25 INJECTION INTRAMUSCULAR; INTRAVENOUS ONCE AS NEEDED
Status: ACTIVE | OUTPATIENT
Start: 2022-01-17 | End: 2033-06-15

## 2022-01-17 RX ORDER — SODIUM CHLORIDE 0.9 % (FLUSH) 0.9 %
10 SYRINGE (ML) INJECTION
Status: ACTIVE | OUTPATIENT
Start: 2022-01-17

## 2022-01-17 RX ORDER — ACETAMINOPHEN 325 MG/1
650 TABLET ORAL ONCE AS NEEDED
Status: ACTIVE | OUTPATIENT
Start: 2022-01-17 | End: 2033-06-15

## 2022-01-17 RX ORDER — ALBUTEROL SULFATE 90 UG/1
2 AEROSOL, METERED RESPIRATORY (INHALATION)
Status: ACTIVE | OUTPATIENT
Start: 2022-01-17

## 2022-01-17 RX ADMIN — CASIRIVIMAB AND IMDEVIMAB 600 MG: 600; 600 INJECTION, SOLUTION, CONCENTRATE INTRAVENOUS at 10:01

## 2022-01-17 NOTE — PROGRESS NOTES
Patient arrives for casirivimab/ imdevimab infusion. Ambulatory. Pt AAox3. No distress noted. RR even and unlabored.     Symptoms and onset date: cold, sneezes, headache, sore throat. Onset 1/7/22.      Tested COVID + on 1/14/22.                                                            1000

## 2022-01-19 RX ORDER — GLIMEPIRIDE 4 MG/1
TABLET ORAL
Qty: 45 TABLET | Refills: 2 | Status: SHIPPED | OUTPATIENT
Start: 2022-01-19 | End: 2022-03-31

## 2022-01-19 NOTE — TELEPHONE ENCOUNTER
Care Due:                  Date            Visit Type   Department     Provider  --------------------------------------------------------------------------------                                             [facility]                                           MED/ INTERNAL  Cedric Stark  Last Visit: 07-      None         MED/ PEDS      [place]  Next Visit: None Scheduled  None         None Found                                                            Last  Test          Frequency    Reason                     Performed    Due Date  --------------------------------------------------------------------------------    Cr..........  12 months..  glimepiride, metFORMIN...  04- 04-    Powered by [employer]. Reference number: [id number].   1/19/2022 12:32:32 PM CST

## 2022-01-20 DIAGNOSIS — F41.9 ANXIETY: ICD-10-CM

## 2022-01-20 NOTE — TELEPHONE ENCOUNTER
No new care gaps identified.  Powered by [employer]. Reference number: [id number].   1/20/2022 11:04:08 AM CST

## 2022-01-21 RX ORDER — LORAZEPAM 0.5 MG/1
TABLET ORAL
Qty: 10 TABLET | Refills: 5 | Status: SHIPPED | OUTPATIENT
Start: 2022-01-21 | End: 2022-07-21

## 2022-01-24 NOTE — TELEPHONE ENCOUNTER
Spoke with the Patient to inform Rx refill has been approved and sent to preferred pharmacy. Patient verbally understands.

## 2022-01-28 ENCOUNTER — PATIENT MESSAGE (OUTPATIENT)
Dept: ADMINISTRATIVE | Facility: HOSPITAL | Age: 77
End: 2022-01-28
Payer: MEDICARE

## 2022-01-28 DIAGNOSIS — E11.9 TYPE 2 DIABETES MELLITUS WITHOUT COMPLICATION, UNSPECIFIED WHETHER LONG TERM INSULIN USE: ICD-10-CM

## 2022-02-08 ENCOUNTER — PES CALL (OUTPATIENT)
Dept: ADMINISTRATIVE | Facility: CLINIC | Age: 77
End: 2022-02-08
Payer: MEDICARE

## 2022-02-08 NOTE — LETTER
April 29, 2019    Dr. Mushtaq Barbour  [facility], [facility]             [facility]  [address]  [facility] [address]  Phone: [phone]   Patient: Ruben Macedo    YOB: 1945   MRN: [id number]     Dr. Barbour     Ruben MacedoJr. is a patient of Dr. Cedric Holder (PCP) at [facility]. While reviewing his/her chart, it has come to our attention that there is an outstanding lab/procedure. Please help keep our Health Maintenance records as accurate and as up to date as possible by supplying the following:     Colonoscopy                                                                             Please fax to [facility] @ [phone].    Thank you for your assistance in our patient's healthcare.     Sincerely,     Martine Acevedo LPN  Clinical Care Coordinator   [facility]       
none

## 2022-03-14 ENCOUNTER — LAB VISIT (OUTPATIENT)
Dept: LAB | Facility: HOSPITAL | Age: 77
End: 2022-03-14
Attending: UROLOGY
Payer: MEDICARE

## 2022-03-14 DIAGNOSIS — C68.9 UROTHELIAL CARCINOMA: ICD-10-CM

## 2022-03-14 PROCEDURE — 88112 CYTOPATH CELL ENHANCE TECH: CPT | Performed by: PATHOLOGY

## 2022-03-14 PROCEDURE — 88112 CYTOPATH CELL ENHANCE TECH: CPT | Mod: 26,,, | Performed by: PATHOLOGY

## 2022-03-14 PROCEDURE — 88112 PR  CYTOPATH, CELL ENHANCE TECH: ICD-10-PCS | Mod: 26,,, | Performed by: PATHOLOGY

## 2022-03-15 LAB
FINAL PATHOLOGIC DIAGNOSIS: NORMAL
Lab: NORMAL

## 2022-03-16 ENCOUNTER — TELEPHONE (OUTPATIENT)
Dept: UROLOGY | Facility: CLINIC | Age: 77
End: 2022-03-16
Payer: MEDICARE

## 2022-03-16 NOTE — TELEPHONE ENCOUNTER
PATIENT HAS CONFIRMED APPOINTMENT WITH DR.COLLINS LOAN MENARD  ----- Message from Vivien Oseguera sent at 3/16/2022  8:02 AM CDT -----  Regarding: Return call    Type:  Patient Returning Call    Who Called:  Bell (wifeO    Who Left Message for Patient:   LOAN LOCKHART        Does the patient know what this is regarding?: Y    Can the clinic reply in [facility]: No    Best Call Back Number: [phone]    Additional Information: She states she confirms the appt change and it is fine

## 2022-03-28 ENCOUNTER — TELEPHONE (OUTPATIENT)
Dept: UROLOGY | Facility: CLINIC | Age: 77
End: 2022-03-28
Payer: MEDICARE

## 2022-03-30 NOTE — TELEPHONE ENCOUNTER
No new care gaps identified.  Powered by [employer]. Reference number: [id number].   3/30/2022 4:49:15 PM CDT

## 2022-03-31 RX ORDER — GLIMEPIRIDE 4 MG/1
TABLET ORAL
Qty: 135 TABLET | Refills: 0 | Status: SHIPPED | OUTPATIENT
Start: 2022-03-31 | End: 2022-06-22

## 2022-04-01 ENCOUNTER — PROCEDURE VISIT (OUTPATIENT)
Dept: UROLOGY | Facility: CLINIC | Age: 77
End: 2022-04-01
Payer: MEDICARE

## 2022-04-01 VITALS
DIASTOLIC BLOOD PRESSURE: 65 MMHG | SYSTOLIC BLOOD PRESSURE: 162 MMHG | WEIGHT: 188.19 LBS | BODY MASS INDEX: 27 KG/M2 | HEART RATE: 97 BPM

## 2022-04-01 DIAGNOSIS — C68.9 UROTHELIAL CARCINOMA: ICD-10-CM

## 2022-04-01 LAB
BILIRUB SERPL-MCNC: ABNORMAL MG/DL
BLOOD URINE, POC: ABNORMAL
CLARITY, POC UA: CLEAR
COLOR, POC UA: YELLOW
GLUCOSE UR QL STRIP: ABNORMAL
KETONES UR QL STRIP: ABNORMAL
LEUKOCYTE ESTERASE URINE, POC: ABNORMAL
NITRITE, POC UA: ABNORMAL
PH, POC UA: 5
PROTEIN, POC: ABNORMAL
SPECIFIC GRAVITY, POC UA: 1.02
UROBILINOGEN, POC UA: ABNORMAL

## 2022-04-01 PROCEDURE — 81002 POCT URINE DIPSTICK WITHOUT MICROSCOPE: ICD-10-PCS | Mod: S$GLB,,, | Performed by: UROLOGY

## 2022-04-01 PROCEDURE — 81002 URINALYSIS NONAUTO W/O SCOPE: CPT | Mod: S$GLB,,, | Performed by: UROLOGY

## 2022-04-01 PROCEDURE — 52000 CYSTOSCOPY: ICD-10-PCS | Mod: S$GLB,,, | Performed by: UROLOGY

## 2022-04-01 PROCEDURE — 52000 CYSTOURETHROSCOPY: CPT | Mod: S$GLB,,, | Performed by: UROLOGY

## 2022-04-01 RX ORDER — LIDOCAINE HYDROCHLORIDE 20 MG/ML
JELLY TOPICAL ONCE
Status: COMPLETED | OUTPATIENT
Start: 2022-04-01 | End: 2022-04-01

## 2022-04-01 RX ORDER — CIPROFLOXACIN 500 MG/1
500 TABLET ORAL
Status: COMPLETED | OUTPATIENT
Start: 2022-04-01 | End: 2022-04-01

## 2022-04-01 RX ORDER — EZETIMIBE 10 MG/1
TABLET ORAL
Qty: 90 TABLET | Refills: 3 | Status: SHIPPED | OUTPATIENT
Start: 2022-04-01 | End: 2023-05-04

## 2022-04-01 RX ADMIN — LIDOCAINE HYDROCHLORIDE 5 ML: 20 JELLY TOPICAL at 10:04

## 2022-04-01 RX ADMIN — CIPROFLOXACIN 500 MG: 500 TABLET ORAL at 10:04

## 2022-04-01 NOTE — PROCEDURES
"Cystoscopy    Date/Time: 4/1/2022 10:06 AM  Performed by: Gelacio Sanchez MD  Authorized by: Gelacio Sanchez MD     Consent Done?:  Yes (Written)  Time out: Immediately prior to procedure a "time out" was called to verify the correct patient, procedure, equipment, support staff and site/side marked as required.    Indications: history bladder cancer    Preparation: Patient was prepped and draped in usual sterile fashion      Scope type:  Flexible cystoscope  External exam normal: Yes    Urethra normal: Yes    Prostate normal: Yes  Bladder neck normal: Bladder neck normal   Bladder normal: Yes      Patient tolerance:  Patient tolerated the procedure well with no immediate complications     Cytology neg      NMIBC CORBIN    rtc 6 months with cytology for cysto      "

## 2022-04-05 ENCOUNTER — HOSPITAL ENCOUNTER (OUTPATIENT)
Dept: RADIOLOGY | Facility: HOSPITAL | Age: 77
Discharge: HOME | End: 2022-04-05
Attending: PODIATRIST
Payer: MEDICARE

## 2022-04-05 ENCOUNTER — OFFICE VISIT (OUTPATIENT)
Dept: PODIATRY | Facility: CLINIC | Age: 77
End: 2022-04-05
Payer: MEDICARE

## 2022-04-05 VITALS — WEIGHT: 188 LBS | BODY MASS INDEX: 26.92 KG/M2 | HEIGHT: 70 IN

## 2022-04-05 DIAGNOSIS — M20.41 HAMMER TOES OF BOTH FEET: ICD-10-CM

## 2022-04-05 DIAGNOSIS — L84 CORN OR CALLUS: ICD-10-CM

## 2022-04-05 DIAGNOSIS — E11.9 COMPREHENSIVE DIABETIC FOOT EXAMINATION, TYPE 2 DM, ENCOUNTER FOR: Primary | ICD-10-CM

## 2022-04-05 DIAGNOSIS — M79.671 RIGHT FOOT PAIN: ICD-10-CM

## 2022-04-05 DIAGNOSIS — M20.11 HALLUX ABDUCTO VALGUS, RIGHT: ICD-10-CM

## 2022-04-05 DIAGNOSIS — M20.12 HALLUX ABDUCTO VALGUS, LEFT: ICD-10-CM

## 2022-04-05 DIAGNOSIS — M21.41 PES PLANUS OF BOTH FEET: ICD-10-CM

## 2022-04-05 DIAGNOSIS — M21.42 PES PLANUS OF BOTH FEET: ICD-10-CM

## 2022-04-05 DIAGNOSIS — M20.42 HAMMER TOES OF BOTH FEET: ICD-10-CM

## 2022-04-05 PROCEDURE — 1125F AMNT PAIN NOTED PAIN PRSNT: CPT | Mod: CPTII,S$GLB,, | Performed by: PODIATRIST

## 2022-04-05 PROCEDURE — 3052F PR MOST RECENT HEMOGLOBIN A1C LEVEL 8.0 - < 9.0%: ICD-10-PCS | Mod: CPTII,S$GLB,, | Performed by: PODIATRIST

## 2022-04-05 PROCEDURE — 3052F HG A1C>EQUAL 8.0%<EQUAL 9.0%: CPT | Mod: CPTII,S$GLB,, | Performed by: PODIATRIST

## 2022-04-05 PROCEDURE — 73630 XR FOOT COMPLETE 3 VIEW RIGHT: ICD-10-PCS | Mod: 26,RT,, | Performed by: RADIOLOGY

## 2022-04-05 PROCEDURE — 3072F LOW RISK FOR RETINOPATHY: CPT | Mod: CPTII,S$GLB,, | Performed by: PODIATRIST

## 2022-04-05 PROCEDURE — 1160F PR REVIEW ALL MEDS BY PRESCRIBER/CLIN PHARMACIST DOCUMENTED: ICD-10-PCS | Mod: CPTII,S$GLB,, | Performed by: PODIATRIST

## 2022-04-05 PROCEDURE — 99499 RISK ADDL DX/OHS AUDIT: ICD-10-PCS | Mod: S$GLB,,, | Performed by: PODIATRIST

## 2022-04-05 PROCEDURE — 99999 PR PBB SHADOW E&M-EST. PATIENT-LVL V: CPT | Mod: PBBFAC,,, | Performed by: PODIATRIST

## 2022-04-05 PROCEDURE — 3288F PR FALLS RISK ASSESSMENT DOCUMENTED: ICD-10-PCS | Mod: CPTII,S$GLB,, | Performed by: PODIATRIST

## 2022-04-05 PROCEDURE — 1125F PR PAIN SEVERITY QUANTIFIED, PAIN PRESENT: ICD-10-PCS | Mod: CPTII,S$GLB,, | Performed by: PODIATRIST

## 2022-04-05 PROCEDURE — 99499 UNLISTED E&M SERVICE: CPT | Mod: S$GLB,,, | Performed by: PODIATRIST

## 2022-04-05 PROCEDURE — 1160F RVW MEDS BY RX/DR IN RCRD: CPT | Mod: CPTII,S$GLB,, | Performed by: PODIATRIST

## 2022-04-05 PROCEDURE — 3072F PR LOW RISK FOR RETINOPATHY: ICD-10-PCS | Mod: CPTII,S$GLB,, | Performed by: PODIATRIST

## 2022-04-05 PROCEDURE — 99214 OFFICE O/P EST MOD 30 MIN: CPT | Mod: S$GLB,,, | Performed by: PODIATRIST

## 2022-04-05 PROCEDURE — 99999 PR PBB SHADOW E&M-EST. PATIENT-LVL V: ICD-10-PCS | Mod: PBBFAC,,, | Performed by: PODIATRIST

## 2022-04-05 PROCEDURE — 3288F FALL RISK ASSESSMENT DOCD: CPT | Mod: CPTII,S$GLB,, | Performed by: PODIATRIST

## 2022-04-05 PROCEDURE — 73630 X-RAY EXAM OF FOOT: CPT | Mod: TC,FY,PO,RT

## 2022-04-05 PROCEDURE — 1101F PR PT FALLS ASSESS DOC 0-1 FALLS W/OUT INJ PAST YR: ICD-10-PCS | Mod: CPTII,S$GLB,, | Performed by: PODIATRIST

## 2022-04-05 PROCEDURE — 1101F PT FALLS ASSESS-DOCD LE1/YR: CPT | Mod: CPTII,S$GLB,, | Performed by: PODIATRIST

## 2022-04-05 PROCEDURE — 1159F PR MEDICATION LIST DOCUMENTED IN MEDICAL RECORD: ICD-10-PCS | Mod: CPTII,S$GLB,, | Performed by: PODIATRIST

## 2022-04-05 PROCEDURE — 1159F MED LIST DOCD IN RCRD: CPT | Mod: CPTII,S$GLB,, | Performed by: PODIATRIST

## 2022-04-05 PROCEDURE — 73630 X-RAY EXAM OF FOOT: CPT | Mod: 26,RT,, | Performed by: RADIOLOGY

## 2022-04-05 PROCEDURE — 99214 PR OFFICE/OUTPT VISIT, EST, LEVL IV, 30-39 MIN: ICD-10-PCS | Mod: S$GLB,,, | Performed by: PODIATRIST

## 2022-04-05 NOTE — PROGRESS NOTES
Subjective:      Patient ID: Ruben Macedo Jr. is a 76 y.o. male.    Chief Complaint: Diabetic Foot Exam (PCP  07/01/2021), Diabetes Mellitus, and Callouses    Ruben is a 76 y.o. male who presents to the clinic upon referral from Dr. Qi clark. provider found  for evaluation and treatment of diabetic feet. Ruben has a past medical history of Anticoagulant long-term use, Anxiety, BPH (benign prostatic hypertrophy) (2/2/2015), Cataract, Coronary artery disease (2005), Diabetes mellitus, type 2, History of colonic polyps (4/11/2017), History of MI (myocardial infarction) (2005), History of stroke (November 2005), Hyperlipidemia, Hypertension, Incomplete bladder emptying (2/2/2015), Myocardial infarction (2006), Overweight(278.02), Personal history of kidney stones, Renal cyst (2/10/2015), and Stroke (2005). The patient she has complaint is right plantar forefoot pain for the last several weeks.  He cannot recall an inciting event but states that he does walk around the patio around his home barefoot and could have stepped on something.  He denies seeing drainage.  He denies seeing redness.  He denies nausea, vomiting, fever, chills.  He relates that the foot is only painful with ambulation and relieved at rest.  He has not tried anything for the pain.    PCP: Cedric Holder MD    Date Last Seen by PCP:   Chief Complaint   Patient presents with    Diabetic Foot Exam     PCP  07/01/2021    Diabetes Mellitus    [facility]     Current shoe gear: boat shoes    Hemoglobin A1C   Date Value Ref Range Status   06/15/2021 8.9 (H) 4.0 - 5.6 % Final     Comment:     ADA Screening Guidelines:  5.7-6.4%  Consistent with prediabetes  >or=6.5%  Consistent with diabetes    High levels of fetal hemoglobin interfere with the HbA1C  assay. Heterozygous hemoglobin variants (HbS, HgC, etc)do  not significantly interfere with this assay.   However, presence of multiple variants may affect accuracy.      04/07/2021 8.6 (H) 4.0 - 5.6 % Final     Comment:     ADA Screening Guidelines:  5.7-6.4%  Consistent with prediabetes  >or=6.5%  Consistent with diabetes    High levels of fetal hemoglobin interfere with the HbA1C  assay. Heterozygous hemoglobin variants (HbS, HgC, etc)do  not significantly interfere with this assay.   However, presence of multiple variants may affect accuracy.     01/07/2021 7.7 (H) 4.0 - 5.6 % Final     Comment:     ADA Screening Guidelines:  5.7-6.4%  Consistent with prediabetes  >or=6.5%  Consistent with diabetes  High levels of fetal hemoglobin interfere with the HbA1C  assay. Heterozygous hemoglobin variants (HbS, HgC, etc)do  not significantly interfere with this assay.   However, presence of multiple variants may affect accuracy.                 Patient Active Problem List   Diagnosis    CAD (coronary artery disease)    HTN (hypertension)    Anxiety    Kidney stone    BPH (benign prostatic hypertrophy)    Incomplete bladder emptying    Renal cyst    History of urinary calculi    Prediabetes    Hyperlipemia    Hx of constipation    Anemia    Coronary artery disease involving native coronary artery of native heart without angina pectoris    Mood disorder    Anxiety disorder    Essential hypertension    Uncontrolled type 2 diabetes mellitus with microalbuminuria, without long-term current use of insulin    Unspecified inflammatory spondylopathy, cervical region    Benzodiazepine dependence    Bladder tumor    Bladder cancer    Urothelial carcinoma    Type 2 diabetes mellitus without complication, without long-term current use of insulin    Thrombocytopenia       Current Outpatient Medications on File Prior to Visit   Medication Sig Dispense Refill    allopurinoL (ZYLOPRIM) 300 MG tablet TAKE 1 TABLET BY MOUTH EVERY DAY 90 tablet 3    aspirin (ECOTRIN) 81 MG EC tablet Take 81 mg by mouth once daily.      betamethasone valerate 0.1% (VALISONE) 0.1 % Crea        clopidogreL (PLAVIX) 75 mg tablet TAKE 1 TABLET BY MOUTH EVERY DAY 90 tablet 3    diphenhydrAMINE-aluminum-magnesium hydroxide-simethicone-LIDOcaine HCl 2% Swish and spit 15 mLs every 4 (four) hours as needed (sore throat). 300 mL 1    ezetimibe (ZETIA) 10 mg tablet TAKE 1 TABLET BY MOUTH EVERY DAY 90 tablet 3    finasteride (PROSCAR) 5 mg tablet TAKE 1 TABLET BY MOUTH EVERY DAY 90 tablet 3    FLUAD QUAD 2020-21,65Y UP,,PF, 60 mcg (15 mcg x 4)/0.5 mL Syrg ADMINISTER 0.5ML IN THE MUSCLE AS DIRECTED      glimepiride (AMARYL) 4 MG tablet TAKE 1 & 1/2 TABLET BY MOUTH EVERY DAY BEFORE BREAKFAST 135 tablet 0    LORazepam (ATIVAN) 0.5 MG tablet TAKE 1-2 TABLETS BY MOUTH NIGHTLY AS NEEDED 10 tablet 5    metFORMIN (GLUCOPHAGE-XR) 750 MG ER 24hr tablet TAKE 2 TABLETS BY MOUTH EVERY MORNING 60 tablet 0    metoprolol succinate (TOPROL-XL) 25 MG 24 hr tablet TAKE 1 TABLET BY MOUTH EVERY DAY 90 tablet 3    polyethylene glycol (GOLYTELY,NULYTELY) 236-22.74-6.74 -5.86 gram suspension AS DIRECTED      rosuvastatin (CRESTOR) 40 MG Tab TAKE 1 TABLET (40 MG TOTAL) BY MOUTH ONCE DAILY. TAKE 1 TABLET BY MOUTH DAILY 90 tablet 3    valsartan (DIOVAN) 160 MG tablet TAKE 2 TABLETS (320 MG TOTAL) BY MOUTH ONCE DAILY. 180 tablet 3    mupirocin (BACTROBAN) 2 % ointment Apply topically 3 (three) times daily. 30 g 12    oxybutynin (DITROPAN) 5 MG Tab Take 1 tablet (5 mg total) by mouth 3 (three) times daily. for 10 days 30 tablet 0     Current Facility-Administered Medications on File Prior to Visit   Medication Dose Route Frequency Provider Last Rate Last Admin    acetaminophen tablet 650 mg  650 mg Oral Once PRN Renato Holder MD        albuterol inhaler 2 puff  2 puff Inhalation Q20 Min PRN Renato Holder MD        diphenhydrAMINE injection 25 mg  25 mg Intravenous Once PRN Renato Holder MD        EPINEPHrine (EPIPEN) 0.3 mg/0.3 mL pen injection 0.3 mg  0.3 mg Intramuscular PRN Renato R. Ehrensing, MD         methylPREDNISolone sodium succinate injection 40 mg  40 mg Intravenous Once PRN Renato Holder MD        ondansetron disintegrating tablet 4 mg  4 mg Oral Once PRN Renato Holder MD        sodium chloride 0.9% 500 mL flush bag   Intravenous PRN Renato Holder MD        sodium chloride 0.9% flush 10 mL  10 mL Intravenous PRN Renato Holder MD           Review of patient's allergies indicates:   Allergen Reactions    Morphine Nausea And Vomiting     Can take Dilaudid, Demerol, Percocet & Norco    Tramadol Nausea And Vomiting    Morphine (pf) Nausea And Vomiting       Past Surgical History:   Procedure Laterality Date    APPENDECTOMY      BACK SURGERY      BLADDER FULGURATION  5/11/2020    Procedure: FULGURATION, BLADDER;  Surgeon: Gelacio Sanchez MD;  Location: [facility];  Service: Urology;;    CATARACT EXTRACTION      od    CORONARY ANGIOPLASTY WITH STENT PLACEMENT  2000    CYSTOSCOPY  5/11/2020    Procedure: CYSTOSCOPY;  Surgeon: Gelacio Sanchez MD;  Location: [facility];  Service: Urology;;    CYSTOSCOPY W/ URETERAL STENT PLACEMENT      stents placed x2    DILATION OF URETHRA N/A 11/12/2020    Procedure: DILATION, URETHRA;  Surgeon: Gelacio Sanchez MD;  Location: [facility];  Service: Urology;  Laterality: N/A;    EYE SURGERY      cataract right eye    REMOVAL OF BLOOD CLOT  5/11/2020    Procedure: REMOVAL, BLOOD CLOT;  Surgeon: Gelacio Sanchez MD;  Location: [facility];  Service: Urology;;    SPINE SURGERY      TONSILLECTOMY      TURBT, WITH BLUE LIGHT CYSTOSCOPY AND CYSVIEW N/A 7/9/2020    Procedure: TURBT,WITH BLUE LIGHT CYSTOSCOPY AND CYSVIEW;  Surgeon: Gelacio Sanchez MD;  Location: [facility];  Service: Urology;  Laterality: N/A;    TURBT, WITH BLUE LIGHT CYSTOSCOPY AND CYSVIEW Bilateral 11/12/2020    Procedure: TURBT,WITH BLUE LIGHT CYSTOSCOPY AND CYSVIEW; bilateral retrograde pyelogram;  Surgeon: Gelacio Sanchez MD;  Location: [facility];  Service: Urology;  Laterality: Bilateral;        Family History   Problem Relation Age of Onset    Hyperlipidemia Father     Cancer Father     Cataracts Father     Cataracts Mother     Hyperlipidemia Sister     Alzheimer's disease Unknown     Diabetes Neg Hx     Amblyopia Neg Hx     Blindness Neg Hx     Glaucoma Neg Hx     Hypertension Neg Hx     Macular degeneration Neg Hx     Retinal detachment Neg Hx     Strabismus Neg Hx     Stroke Neg Hx     Thyroid disease Neg Hx        Social History     Socioeconomic History    Marital status:     Number of children: 2   Occupational History    Occupation: Retired - chemical industry   Tobacco Use    Smoking status: Never Smoker    Smokeless tobacco: Never Used   Substance and Sexual Activity    Alcohol use: Not Currently     Comment: occasional    Drug use: No    Sexual activity: Yes     Partners: Female     Social Determinants of Health     Financial Resource Strain: Low Risk     Difficulty of Paying Living Expenses: Not hard at all   Food Insecurity: No Food Insecurity    Worried About Running Out of Food in the Last Year: Never true    Ran Out of Food in the Last Year: Never true   Transportation Needs: No Transportation Needs    Lack of Transportation (Medical): No    Lack of Transportation (Non-Medical): No   Physical Activity: Insufficiently Active    Days of Exercise per Week: 2 days    Minutes of Exercise per Session: 30 min   Stress: No Stress Concern Present    Feeling of Stress : Not at all   Social Connections: Unknown    Frequency of Communication with Friends and Family: Once a week    Frequency of Social Gatherings with Friends and Family: Once a week    Active Member of Clubs or Organizations: Yes    Attends Club or Organization Meetings: More than 4 times per year    Marital Status:    Housing Stability: Low Risk     Unable to Pay for Housing in the Last Year: No    Number of Places Lived in the Last Year: 1    Unstable Housing in the Last Year:  "No       Review of Systems   Constitutional: Negative for chills and fever.   Cardiovascular: Negative for claudication and leg swelling.   Respiratory: Negative for cough and shortness of breath.    Skin: Positive for dry skin and nail changes. Negative for itching and rash.   Musculoskeletal: Positive for arthritis, joint pain, myalgias and neck pain. Negative for falls, joint swelling and muscle weakness.   Gastrointestinal: Negative for diarrhea, nausea and vomiting.   Neurological: Positive for paresthesias (hands, arms, from neck). Negative for numbness, tremors and weakness.   Psychiatric/Behavioral: Negative for altered mental status and hallucinations.           Objective:      Vitals:    04/05/22 1436   Weight: 85.3 kg (188 lb)   Height: 5' 10" (1.778 m)   PainSc:   8   PainLoc: Foot       Physical Exam  Vitals and nursing note reviewed.   Constitutional:       General: He is not in acute distress.     Appearance: He is well-developed. He is not toxic-appearing or diaphoretic.      Comments: alert and oriented x 3.    Cardiovascular:      Pulses:           Dorsalis pedis pulses are 2+ on the right side and 2+ on the left side.        Posterior tibial pulses are 2+ on the right side and 2+ on the left side.      Comments:  Capillary refill time is within normal limits. Digital hair present.   Pulmonary:      Effort: No respiratory distress.   Musculoskeletal:         General: No deformity.      Right ankle: No tenderness. No lateral malleolus, medial malleolus, AITF ligament, CF ligament or posterior TF ligament tenderness.      Right Achilles Tendon: No defects. Curry's test negative.      Left ankle: No tenderness. No lateral malleolus, medial malleolus, AITF ligament, CF ligament or posterior TF ligament tenderness.      Left Achilles Tendon: No defects. Curry's test negative.      Right foot: No tenderness or bony tenderness.      Left foot: No tenderness or bony tenderness.      Comments: There " is equinus deformity bilateral with decreased dorsiflexion at the ankle joint bilateral. No tenderness with compression of heel. Negative tinels sign. Gait analysis reveals excessive pronation through midstance and propulsion with early heel off. Shoes reveals lateral heel counter wear bilateral     Decreased first MPJ range of motion both weightbearing and nonweightbearing, no crepitus observed the first MP joint, + dorsal flag sign. Mild  bunion deformity is observed .    Patient has hammertoes of digits 2-5 bilateral partially reducible     Mallet toe right 2nd digit    Muscle strength is 5/5 in all groups bilaterally.           Feet:      Right foot:      Protective Sensation: 5 sites tested. 5 sites sensed.      Left foot:      Protective Sensation: 5 sites tested. 5 sites sensed.   Lymphadenopathy:      Comments: No lymphatic streaking     Skin:     General: Skin is warm and dry.      Coloration: Skin is not pale.      Findings: No rash.      Nails: There is no clubbing.      Comments: Normal temperature gradient.    Focal hyperkeratotic lesion with painful central core consisting entirely of hyperkeratotic tissue without open skin, drainage, pus, fluctuance, malodor, or signs of infection:  Sub 2nd MTPJ of the right foot   Neurological:      Sensory: No sensory deficit.      Motor: No atrophy.      Comments: Light touch present     Psychiatric:         Attention and Perception: He is attentive.         Mood and Affect: Mood is not anxious. Affect is not inappropriate.         Speech: He is communicative. Speech is not slurred.         Behavior: Behavior is not combative.               Assessment:       Encounter Diagnoses   Name Primary?    Comprehensive diabetic foot examination, type 2 DM, encounter for Yes    Corn or callus     Hammer toes of both feet     Hallux abducto valgus, right     Hallux abducto valgus, left     Pes planus of both feet     Right foot pain          Plan:     Problem List  Items Addressed This Visit    None     Visit Diagnoses     Comprehensive diabetic foot examination, type 2 DM, encounter for    -  Primary    Relevant Orders    DIABETIC SHOES FOR HOME USE    Corn or callus        Relevant Orders    DIABETIC SHOES FOR HOME USE    X-Ray Foot Complete Right    Hammer toes of both feet        Relevant Orders    DIABETIC SHOES FOR HOME USE    Hallux abducto valgus, right        Relevant Orders    DIABETIC SHOES FOR HOME USE    Hallux abducto valgus, left        Relevant Orders    DIABETIC SHOES FOR HOME USE    Pes planus of both feet        Relevant Orders    DIABETIC SHOES FOR HOME USE    Right foot pain        Relevant Orders    X-Ray Foot Complete Right         I counseled the patient on his conditions, their implications and medical management.    Education about the diabetic foot, neuropathy, and prevention of limb loss.    Shoe inspection. Diabetic Foot Education. Patient reminded of the importance of good nutrition/healthy diet/weight management and blood sugar control to help prevent podiatric complications of diabetes. Patient instructed on proper foot hygeine. Wear comfortable, proper fitting shoes. Wash feet daily. Dry well. After drying, apply moisturizer to feet (no lotion to webspaces). Inspect feet daily for skin breaks, blisters, swelling, or redness. Wear cotton socks (preferably white)  Change socks every day. Do NOT walk barefoot. Do NOT use heating pads or hot water soaks. We discussed wearing proper shoe gear, daily foot inspections, never walking without protective shoe gear.     Rx diabetic shoes for protection and support    -- Discussed biomechanical deformity correction surgically vs conservative management     -- Conservative treatments for hammer digit discussed include padding, hammertoe crest  Pads, extra-depth shoes; Surgical treatments discussed, including hammertoe correction and generic post-op course. All questions answered. Patient opting to begin  with conservative options first.      --  Patient was advised use of a pumice stone, and skin emollients to slow the progression of callus formation.  Because patient has some suspicion that there may be a foreign body in his foot although I do not suspect, x-ray ordered.    --Dispensed information on proper shoe fit and modification including inserts, non medicated met pads.    Discussed edema control and the importance of daily moisturizer to the feet such as Gold bonds diabetic foot cream    Recommend applying vicks vaporub to thick abnormal toenails daily x 6 months to treat fungal nail infection.    He will continue to monitor the areas daily, inspect his feet, wear protective shoe gear when ambulatory, moisturizer to maintain skin integrity and follow in this office in approximately 12 months, sooner p.r.n. proc B

## 2022-04-05 NOTE — PATIENT INSTRUCTIONS
Recommend lotions: eucerin, eucerin for diabetics, aquaphor, A&D ointment, gold bond for diabetics, sween, Parsons's Bees all purpose baby ointment,  urea 40 with aloe (found on amazon.com)    Shoe recommendations: (try 6pm.com, zappos.com , [email or web address], or shoes.[employer] for discounted prices) you can visit [employer] in [place]  or [facility] in the [facility] (there are also several shoe brand outlets in the [facility])    Asics (GT 2000 or gel foundations), new balance stability type shoes (such as the 940 series), saucony (stabil c3),  Coreas (GTS or Beast or transcend), propet (tennis shoe)    Sofft Brand (women) Diana&Gabriel (men), clarks, crocs, aerosoles, naturalizers, SAS, ecco, born, anuel clement, rockports (dress shoes)    Vionic, burkenstocks, fitflops, propet (sandals)  Nike comfort thong sandals, crocs, propet (house shoes)    Nail Home remedy:  Vicks Vapor rub to nails for easier manageability    Occasional soaks for 15-20 mins in luke warm water with 1 cup of listerine and 1 cup of apple cider vinegar are ok You may add several drops of oil of oregano or tea tree oil as well      Wearing Proper Shoes                    You walk on your feet every day, forcing them to support the weight of your body. Repeated stress on your feet can cause damage over time. The right shoes can help protect your feet. The wrong shoes can cause more foot problems. Read the information below to help you find a shoe that fits your foot needs.     A good shoe fit will cover your foot outline.       A shoe that doesnt cover the outline is a bad fit.      Whats your foot shape?  To get a good fit, you need to know the shape of your foot. Do this simple test: While standing, place your foot on a piece of paper and trace around it. Is your foot straight or curved? Do you have a foot problem, such as a bunion, that causes your foot outline to show a bulge on the side of your big toe?  Finding your fit  Bring your foot outline  to the shoe store to help you find the right shoe. Place a shoe you like on top of the outline to see if it matches the shape. The shoe should cover the outline. (If you have a bunion, the shoe may not cover the bulge on the outline. Look for soft leather shoes to stretch over the bunion.) Once youve found a pair of proper shoes, put them on. Walk around. Be sure the shoes dont rub or pinch. If the shoes feel good, youve found your fit!  The right shoe for you  A good shoe has features that provide comfort and support. It must also be the right size and shape for your feet. Look for a shoe made of breathable fabric and lining, such as leather or canvas. Be sure that shoes have enough tread to prevent slipping. Go to a good shoe store for help finding the right shoe.  Good shoe features  An ideal shoe has the following:  Laces for support. If tying laces is a problem for you, try shoes with Velcro fasteners or elmer.  A front of the shoe (toe box) with ½ inch space in front of your longest toes.  An arch shape that supports your foot.  No more than 1½ inches of heel.  A stiff, snug back of the shoe to keep your foot from sliding around.  A smooth lining with no rough seams.  Shoe shopping tips  Below are some dos and donts for when you go to the shoe store.  Do:  Select the shoes that feel right. Wear them around the house. Then bring them to your foot healthcare provider to check for fit. If they dont fit well, return them.  Shop late in the day, when your feet will be slightly bigger.  Each time you buy shoes, have both your feet measured while you are standing. Foot size changes with time.  Pick shoes to suit their purpose. High heels are OK for an occasional night on the town. But for everyday wear, choose a more sensible shoe.  Try on shoes while wearing any inserts specially made for your feet (orthoses).  Try on both the right and left shoes. If your feet are different sizes, pick a pair that fits the  larger foot.  Dont:  Dont buy shoes based on shoe size alone. Always try on shoes, as sizes differ from brand to brand and within brands.  Dont expect shoes to break in. If they dont fit at the store, dont buy them.  Dont buy a shoe that doesnt match your foot shape.  What about socks?  Always wear socks with shoes. Socks help absorb sweat and reduce friction and blistering. When shopping for shoes, choose soft, padded socks with seams that dont irritate your feet.  If you have foot problems  Some foot problems cause deformities. This can make it hard to find a good fit. Look for shoes made of soft leather to stretch over the deformity. If you have bunions, buy shoes with a wider toe box. To fit hammertoes, look for shoes with a tall toe box. If you have arch problems, you may need inserts. In some cases, youll need to have custom footwear or orthoses made for your feet.  Suggested footwear  Ask your healthcare provider what kind of footwear you need. He or she may recommend a certain brand or shoe store.  Date Last Reviewed: 8/1/2016  © 6826-7304 [employer]. [address] [place] [address], [place] [address]. All rights reserved. This information is not intended as a substitute for professional medical care. Always follow your healthcare professional's instructions.        What Are Corns and Calluses?    Corns and calluses are your bodys response to friction or pressure against the skin. If your foot rubs the inside of your shoe, the affected area of skin thickens. Or, if a bone is not in the normal position, skin caught between bone and shoe or bone and ground builds up. In either case, the outer layer of skin thickens to protect the foot from unusual pressure. In many cases, corns and calluses look bad but are not harmful. However, more severe corns and calluses may become infected, destroy healthy tissue, or affect foot movement.    Corns  Corns usually grow on top of the foot, often at the toe  joint. Corns can range from a slight thickening of skin to a painful soft or hard bump. They often form on top of buckled toe joints (hammer toes). If your toes curl under, corns may grow on the tips of the toes. You may also get a corn on the end of a toe if it rubs against your shoe. Corns can also grow between toes, often between the first and second toes.    Calluses  Calluses grow on the bottom of the foot or on the outer edge of a toe or heel. A callus may spread across the ball of your foot. This type of callus is usually due to a problem with a metatarsal (the long bone at the base of a toe, near the ball of the foot). A pinch callus may grow along the outer edge of the heel or the big toe. Some calluses press up into the foot instead of spreading on the outside. A callus may form a central core or plug of tissue where pressure is greatest.  Date Last Reviewed: 9/21/2015 © 2000-2016 [employer]. [address] [place] [address]. All rights reserved. This information is not intended as a substitute for professional medical care. Always follow your healthcare professional's instructions.        Treating Corns and Calluses  If your corns or calluses are mild, reducing friction may help. Different shoes, moleskin patches, or soft pads may be all the treatment you need. In more severe cases, treating tissue buildup may require your doctors care. Sometimes custom-made shoe inserts (orthotics) or special pads are prescribed to reduce friction and pressure.    Change shoes  If you have corns, your doctor may suggest wearing shoes that have more toe room. This way, buckled joints are less likely to be pinched against the top of the shoe. If you have calluses, wearing a cushioned insole, arch support, or heel counter can help reduce friction.  Visit your doctor  In some cases, your doctor may trim away the outer layers of skin that make up the corn or callus. For a painful corn, medicine may  be injected beneath the built-up tissue.  Wear orthotics  Orthotics are specially made to meet the needs of your feet. They cushion calluses or divert pressure away from these problem areas. Worn as directed, orthotics help limit existing problems and prevent new ones from forming.  If you need surgery  If a bone or joint is out of place, certain parts of your foot may be under too much pressure. This can cause severe corns and calluses. In such cases, surgery may be the best way to correct the problem.  Outpatient procedures  In most cases, surgery to improve bone position is an outpatient procedure. Your doctor may cut away excess bone, reposition prominent bones, or even fuse joints. Sometimes tendons or ligaments are cut to reduce tension on a bone or joint. Your doctor will talk with you about the procedure that is best suited to your needs.  Date Last Reviewed: 7/1/2016 © 2000-2016 [employer]. [address] [place] [address], [place] [address]. All rights reserved. This information is not intended as a substitute for professional medical care. Always follow your healthcare professional's instructions.

## 2022-04-13 ENCOUNTER — OFFICE VISIT (OUTPATIENT)
Dept: FAMILY MEDICINE | Facility: CLINIC | Age: 77
End: 2022-04-13
Payer: MEDICARE

## 2022-04-13 ENCOUNTER — LAB VISIT (OUTPATIENT)
Dept: LAB | Facility: HOSPITAL | Age: 77
End: 2022-04-13
Attending: INTERNAL MEDICINE
Payer: MEDICARE

## 2022-04-13 VITALS
HEIGHT: 70 IN | TEMPERATURE: 98 F | OXYGEN SATURATION: 95 % | BODY MASS INDEX: 27.61 KG/M2 | WEIGHT: 192.88 LBS | DIASTOLIC BLOOD PRESSURE: 80 MMHG | HEART RATE: 72 BPM | SYSTOLIC BLOOD PRESSURE: 130 MMHG

## 2022-04-13 DIAGNOSIS — M46.92 UNSPECIFIED INFLAMMATORY SPONDYLOPATHY, CERVICAL REGION: ICD-10-CM

## 2022-04-13 DIAGNOSIS — D64.9 ANEMIA, UNSPECIFIED TYPE: ICD-10-CM

## 2022-04-13 DIAGNOSIS — C67.9 MALIGNANT NEOPLASM OF URINARY BLADDER, UNSPECIFIED SITE: ICD-10-CM

## 2022-04-13 DIAGNOSIS — D69.6 THROMBOCYTOPENIA: ICD-10-CM

## 2022-04-13 DIAGNOSIS — I25.10 CORONARY ARTERY DISEASE INVOLVING NATIVE CORONARY ARTERY OF NATIVE HEART WITHOUT ANGINA PECTORIS: ICD-10-CM

## 2022-04-13 DIAGNOSIS — R80.9 MICROALBUMINURIA: ICD-10-CM

## 2022-04-13 DIAGNOSIS — S13.4XXA WHIPLASH INJURY TO NECK, INITIAL ENCOUNTER: ICD-10-CM

## 2022-04-13 DIAGNOSIS — Z00.00 ROUTINE MEDICAL EXAM: Primary | ICD-10-CM

## 2022-04-13 DIAGNOSIS — F39 MOOD DISORDER: ICD-10-CM

## 2022-04-13 DIAGNOSIS — E11.9 TYPE 2 DIABETES MELLITUS WITHOUT COMPLICATION, WITHOUT LONG-TERM CURRENT USE OF INSULIN: ICD-10-CM

## 2022-04-13 DIAGNOSIS — N40.0 BENIGN PROSTATIC HYPERPLASIA, UNSPECIFIED WHETHER LOWER URINARY TRACT SYMPTOMS PRESENT: ICD-10-CM

## 2022-04-13 DIAGNOSIS — I10 ESSENTIAL HYPERTENSION: ICD-10-CM

## 2022-04-13 DIAGNOSIS — I70.0 ATHEROSCLEROSIS OF AORTA: ICD-10-CM

## 2022-04-13 DIAGNOSIS — F13.20 BENZODIAZEPINE DEPENDENCE: ICD-10-CM

## 2022-04-13 DIAGNOSIS — Z12.5 SCREENING FOR PROSTATE CANCER: ICD-10-CM

## 2022-04-13 DIAGNOSIS — Z86.010 HISTORY OF COLONIC POLYPS: ICD-10-CM

## 2022-04-13 LAB
ALBUMIN/CREAT UR: 964.9 UG/MG (ref 0–30)
CREAT UR-MCNC: 94 MG/DL (ref 23–375)
MICROALBUMIN UR DL<=1MG/L-MCNC: 907 UG/ML

## 2022-04-13 PROCEDURE — 3075F PR MOST RECENT SYSTOLIC BLOOD PRESS GE 130-139MM HG: ICD-10-PCS | Mod: CPTII,S$GLB,, | Performed by: INTERNAL MEDICINE

## 2022-04-13 PROCEDURE — 99214 OFFICE O/P EST MOD 30 MIN: CPT | Mod: S$GLB,,, | Performed by: INTERNAL MEDICINE

## 2022-04-13 PROCEDURE — 99214 PR OFFICE/OUTPT VISIT, EST, LEVL IV, 30-39 MIN: ICD-10-PCS | Mod: S$GLB,,, | Performed by: INTERNAL MEDICINE

## 2022-04-13 PROCEDURE — 1101F PR PT FALLS ASSESS DOC 0-1 FALLS W/OUT INJ PAST YR: ICD-10-PCS | Mod: CPTII,S$GLB,, | Performed by: INTERNAL MEDICINE

## 2022-04-13 PROCEDURE — 3075F SYST BP GE 130 - 139MM HG: CPT | Mod: CPTII,S$GLB,, | Performed by: INTERNAL MEDICINE

## 2022-04-13 PROCEDURE — 1159F PR MEDICATION LIST DOCUMENTED IN MEDICAL RECORD: ICD-10-PCS | Mod: CPTII,S$GLB,, | Performed by: INTERNAL MEDICINE

## 2022-04-13 PROCEDURE — 3288F PR FALLS RISK ASSESSMENT DOCUMENTED: ICD-10-PCS | Mod: CPTII,S$GLB,, | Performed by: INTERNAL MEDICINE

## 2022-04-13 PROCEDURE — 3072F PR LOW RISK FOR RETINOPATHY: ICD-10-PCS | Mod: CPTII,S$GLB,, | Performed by: INTERNAL MEDICINE

## 2022-04-13 PROCEDURE — 3079F PR MOST RECENT DIASTOLIC BLOOD PRESSURE 80-89 MM HG: ICD-10-PCS | Mod: CPTII,S$GLB,, | Performed by: INTERNAL MEDICINE

## 2022-04-13 PROCEDURE — 3288F FALL RISK ASSESSMENT DOCD: CPT | Mod: CPTII,S$GLB,, | Performed by: INTERNAL MEDICINE

## 2022-04-13 PROCEDURE — 3052F HG A1C>EQUAL 8.0%<EQUAL 9.0%: CPT | Mod: CPTII,S$GLB,, | Performed by: INTERNAL MEDICINE

## 2022-04-13 PROCEDURE — 3052F PR MOST RECENT HEMOGLOBIN A1C LEVEL 8.0 - < 9.0%: ICD-10-PCS | Mod: CPTII,S$GLB,, | Performed by: INTERNAL MEDICINE

## 2022-04-13 PROCEDURE — 82570 ASSAY OF URINE CREATININE: CPT | Performed by: INTERNAL MEDICINE

## 2022-04-13 PROCEDURE — 3079F DIAST BP 80-89 MM HG: CPT | Mod: CPTII,S$GLB,, | Performed by: INTERNAL MEDICINE

## 2022-04-13 PROCEDURE — 82043 UR ALBUMIN QUANTITATIVE: CPT | Performed by: INTERNAL MEDICINE

## 2022-04-13 PROCEDURE — 99499 RISK ADDL DX/OHS AUDIT: ICD-10-PCS | Mod: S$GLB,,, | Performed by: INTERNAL MEDICINE

## 2022-04-13 PROCEDURE — 3072F LOW RISK FOR RETINOPATHY: CPT | Mod: CPTII,S$GLB,, | Performed by: INTERNAL MEDICINE

## 2022-04-13 PROCEDURE — 1159F MED LIST DOCD IN RCRD: CPT | Mod: CPTII,S$GLB,, | Performed by: INTERNAL MEDICINE

## 2022-04-13 PROCEDURE — 99999 PR PBB SHADOW E&M-EST. PATIENT-LVL IV: CPT | Mod: PBBFAC,,, | Performed by: INTERNAL MEDICINE

## 2022-04-13 PROCEDURE — 99499 UNLISTED E&M SERVICE: CPT | Mod: S$GLB,,, | Performed by: INTERNAL MEDICINE

## 2022-04-13 PROCEDURE — 99999 PR PBB SHADOW E&M-EST. PATIENT-LVL IV: ICD-10-PCS | Mod: PBBFAC,,, | Performed by: INTERNAL MEDICINE

## 2022-04-13 PROCEDURE — 1101F PT FALLS ASSESS-DOCD LE1/YR: CPT | Mod: CPTII,S$GLB,, | Performed by: INTERNAL MEDICINE
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
## 2022-04-14 ENCOUNTER — LAB VISIT (OUTPATIENT)
Dept: LAB | Facility: HOSPITAL | Age: 77
End: 2022-04-14
Attending: INTERNAL MEDICINE
Payer: MEDICARE

## 2022-04-14 DIAGNOSIS — N40.0 BENIGN PROSTATIC HYPERPLASIA, UNSPECIFIED WHETHER LOWER URINARY TRACT SYMPTOMS PRESENT: ICD-10-CM

## 2022-04-14 DIAGNOSIS — Z00.00 ROUTINE MEDICAL EXAM: ICD-10-CM

## 2022-04-14 DIAGNOSIS — Z12.5 SCREENING FOR PROSTATE CANCER: ICD-10-CM

## 2022-04-14 LAB — COMPLEXED PSA SERPL-MCNC: 0.43 NG/ML (ref 0–4)

## 2022-04-14 PROCEDURE — 84153 ASSAY OF PSA TOTAL: CPT | Performed by: INTERNAL MEDICINE

## 2022-04-14 PROCEDURE — 36415 COLL VENOUS BLD VENIPUNCTURE: CPT | Mod: PO | Performed by: INTERNAL MEDICINE

## 2022-04-21 NOTE — TELEPHONE ENCOUNTER
----- Message from Eric Horn sent at 4/21/2022  7:42 AM CDT -----  Regarding: patient would like lab results from 4/13 uploaded to portal  Type:  Test Results    Who Called: Bell (spouse)    Name of Test (Lab/Mammo/Etc): cmp , Psa , etc    Date of Test: 4/13    Ordering Provider: Dr. Holder     Where the test was performed: [facility]     Would the patient rather a call back or a response via [facility]? Call back     Best Call Back Number: [phone]    Additional Information:  Patient would like lab results uploaded to portal once they are processed

## 2022-04-22 ENCOUNTER — PATIENT MESSAGE (OUTPATIENT)
Dept: FAMILY MEDICINE | Facility: CLINIC | Age: 77
End: 2022-04-22
Payer: MEDICARE

## 2022-06-22 RX ORDER — GLIMEPIRIDE 4 MG/1
TABLET ORAL
Qty: 135 TABLET | Refills: 1 | Status: SHIPPED | OUTPATIENT
Start: 2022-06-22 | End: 2023-02-10

## 2022-06-22 NOTE — TELEPHONE ENCOUNTER
Refill Decision Note   Ruben Macedo  is requesting a refill authorization.  Brief Assessment and Rationale for Refill:  Approve     Medication Therapy Plan:       Medication Reconciliation Completed: No   Comments:     No Care Gaps recommended.     Note composed:4:26 PM 06/22/2022

## 2022-06-22 NOTE — TELEPHONE ENCOUNTER
No new care gaps identified.  [facility] Embedded Care Gaps. Reference number: [id number]. 6/22/2022   1:32:49 PM CDT

## 2022-07-20 RX ORDER — METFORMIN HYDROCHLORIDE 750 MG/1
1500 TABLET, EXTENDED RELEASE ORAL EVERY MORNING
Qty: 180 TABLET | Refills: 1 | Status: SHIPPED | OUTPATIENT
Start: 2022-07-20 | End: 2023-04-28 | Stop reason: SDUPTHER

## 2022-07-20 NOTE — TELEPHONE ENCOUNTER
No new care gaps identified.  [facility] Embedded Care Gaps. Reference number: [id number]. 7/20/2022   12:33:22 PM CDT

## 2022-07-20 NOTE — TELEPHONE ENCOUNTER
Refill Decision Note   Ruben Macedo  is requesting a refill authorization.  Brief Assessment and Rationale for Refill:  Approve     Medication Therapy Plan:       Medication Reconciliation Completed: No   Comments:     No Care Gaps recommended.     Note composed:12:55 PM 07/20/2022

## 2022-08-01 ENCOUNTER — OFFICE VISIT (OUTPATIENT)
Dept: CARDIOLOGY | Facility: CLINIC | Age: 77
End: 2022-08-01
Payer: MEDICARE

## 2022-08-01 VITALS
SYSTOLIC BLOOD PRESSURE: 132 MMHG | BODY MASS INDEX: 27.46 KG/M2 | DIASTOLIC BLOOD PRESSURE: 68 MMHG | HEART RATE: 86 BPM | HEIGHT: 70 IN | WEIGHT: 191.81 LBS | OXYGEN SATURATION: 98 % | RESPIRATION RATE: 18 BRPM

## 2022-08-01 DIAGNOSIS — I25.10 CORONARY ARTERY DISEASE INVOLVING NATIVE CORONARY ARTERY OF NATIVE HEART WITHOUT ANGINA PECTORIS: ICD-10-CM

## 2022-08-01 DIAGNOSIS — I10 ESSENTIAL HYPERTENSION: ICD-10-CM

## 2022-08-01 DIAGNOSIS — E78.5 HYPERLIPIDEMIA, UNSPECIFIED HYPERLIPIDEMIA TYPE: Primary | ICD-10-CM

## 2022-08-01 DIAGNOSIS — I70.0 ATHEROSCLEROSIS OF AORTA: ICD-10-CM

## 2022-08-01 PROCEDURE — 3072F LOW RISK FOR RETINOPATHY: CPT | Mod: CPTII,S$GLB,, | Performed by: INTERNAL MEDICINE

## 2022-08-01 PROCEDURE — 99499 UNLISTED E&M SERVICE: CPT | Mod: S$GLB,,, | Performed by: INTERNAL MEDICINE

## 2022-08-01 PROCEDURE — 3078F DIAST BP <80 MM HG: CPT | Mod: CPTII,S$GLB,, | Performed by: INTERNAL MEDICINE

## 2022-08-01 PROCEDURE — 99214 OFFICE O/P EST MOD 30 MIN: CPT | Mod: S$GLB,,, | Performed by: INTERNAL MEDICINE

## 2022-08-01 PROCEDURE — 1126F AMNT PAIN NOTED NONE PRSNT: CPT | Mod: CPTII,S$GLB,, | Performed by: INTERNAL MEDICINE

## 2022-08-01 PROCEDURE — 3072F PR LOW RISK FOR RETINOPATHY: ICD-10-PCS | Mod: CPTII,S$GLB,, | Performed by: INTERNAL MEDICINE

## 2022-08-01 PROCEDURE — 1159F MED LIST DOCD IN RCRD: CPT | Mod: CPTII,S$GLB,, | Performed by: INTERNAL MEDICINE

## 2022-08-01 PROCEDURE — 3288F PR FALLS RISK ASSESSMENT DOCUMENTED: ICD-10-PCS | Mod: CPTII,S$GLB,, | Performed by: INTERNAL MEDICINE

## 2022-08-01 PROCEDURE — 1101F PT FALLS ASSESS-DOCD LE1/YR: CPT | Mod: CPTII,S$GLB,, | Performed by: INTERNAL MEDICINE

## 2022-08-01 PROCEDURE — 99999 PR PBB SHADOW E&M-EST. PATIENT-LVL V: CPT | Mod: PBBFAC,,, | Performed by: INTERNAL MEDICINE

## 2022-08-01 PROCEDURE — 1159F PR MEDICATION LIST DOCUMENTED IN MEDICAL RECORD: ICD-10-PCS | Mod: CPTII,S$GLB,, | Performed by: INTERNAL MEDICINE

## 2022-08-01 PROCEDURE — 99999 PR PBB SHADOW E&M-EST. PATIENT-LVL V: ICD-10-PCS | Mod: PBBFAC,,, | Performed by: INTERNAL MEDICINE

## 2022-08-01 PROCEDURE — 3288F FALL RISK ASSESSMENT DOCD: CPT | Mod: CPTII,S$GLB,, | Performed by: INTERNAL MEDICINE

## 2022-08-01 PROCEDURE — 99499 RISK ADDL DX/OHS AUDIT: ICD-10-PCS | Mod: S$GLB,,, | Performed by: INTERNAL MEDICINE

## 2022-08-01 PROCEDURE — 93000 ELECTROCARDIOGRAM COMPLETE: CPT | Mod: S$GLB,,, | Performed by: INTERNAL MEDICINE

## 2022-08-01 PROCEDURE — 99214 PR OFFICE/OUTPT VISIT, EST, LEVL IV, 30-39 MIN: ICD-10-PCS | Mod: S$GLB,,, | Performed by: INTERNAL MEDICINE

## 2022-08-01 PROCEDURE — 3075F PR MOST RECENT SYSTOLIC BLOOD PRESS GE 130-139MM HG: ICD-10-PCS | Mod: CPTII,S$GLB,, | Performed by: INTERNAL MEDICINE

## 2022-08-01 PROCEDURE — 3078F PR MOST RECENT DIASTOLIC BLOOD PRESSURE < 80 MM HG: ICD-10-PCS | Mod: CPTII,S$GLB,, | Performed by: INTERNAL MEDICINE

## 2022-08-01 PROCEDURE — 1101F PR PT FALLS ASSESS DOC 0-1 FALLS W/OUT INJ PAST YR: ICD-10-PCS | Mod: CPTII,S$GLB,, | Performed by: INTERNAL MEDICINE

## 2022-08-01 PROCEDURE — 3075F SYST BP GE 130 - 139MM HG: CPT | Mod: CPTII,S$GLB,, | Performed by: INTERNAL MEDICINE

## 2022-08-01 PROCEDURE — 1126F PR PAIN SEVERITY QUANTIFIED, NO PAIN PRESENT: ICD-10-PCS | Mod: CPTII,S$GLB,, | Performed by: INTERNAL MEDICINE

## 2022-08-01 PROCEDURE — 93000 EKG 12-LEAD: ICD-10-PCS | Mod: S$GLB,,, | Performed by: INTERNAL MEDICINE

## 2022-08-01 NOTE — PROGRESS NOTES
Subjective:    Patient ID:  Ruben Macedo Jr. is a 76 y.o. male who presents for follow-up of No chief complaint on file.      HPI     CAD - stent 2000, HTN, HLD, DM,  bladder CA     [facility]     Followed by Dr Holder  74-year-old white male .  Patient here with his wife who is in the medical field in some fashion.  In the interval he was diagnosed with bladder cancer.  He is receiving BCG treatment.  Lately his left ankle has swollen on occasion but then it goes down with elevation.  No pain.  Obviously we discussed the possibility of increased risk of DVT given the underlying cancer with his symptoms do not represent that.  Should he get any persistent swelling or pain they know to go to the emergency room for a more immediate ultrasound.     We also reviewed his labs.  His A1c went from 7.9 down to 7.3 but is due.  He has also developed some thrombocytopenia and some anemia.  He did have significant bleeding due to the bladder cancer.  He will discuss with urology whether not the BCG treatment would be expected to cause some thrombocytopenia.  He has had fatty liver on prior imaging but spleen on CT scans stated to be normal.  He does take Plavix but reassured that would not affect the platelet count but might affect platelet function.  Many years ago he did have one episode of thrombocytopenia but then normalized in the interval.  Wife was concerned and she did have an unofficial CBC done and by her verbal report sounds similar to what we have recorded in our system.  She also did an unofficial stool for blood which was negative.     Regarding diabetes A1c hopefully improved.  He had itching with metformin.  We discussed potential increased risk association of bladder cancer and Actos so it is time to discontinue.  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  He was on Januvia but expensive so has been off.  We  discussed many new medications are very expensive and will probably not be sustainable with his current insurance.  We discussed Amaryl and the potential for hypoglycemia.  We will start with 4 mg daily, reduce accordingly but obviously if he follow the diet and lost diabetes with improved as well.  Patient counseled at great length regarding the multitude these issues.Total time over 45 minutes with over 50% counseling.     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  It looks like he does have some paronychia.  He applied Neosporin without success and has been there for weeks.  We discussed using some clindamycin for a couple of days but also the more important thing is going to be local care since the cuticle appears to be chronically damaged and he would be at risk for further infection.  The abrasion on the top may well have also initiated the infection.  Rather than use Neosporin all call him in some mupirocin     Echo 7/30/21  · The left ventricle is normal in size with concentric remodeling and normal systolic function.  · The estimated ejection fraction is 55%.  · Indeterminate left ventricular diastolic function.  · Mild left atrial enlargement.  · Normal right ventricular size with normal right ventricular systolic function.  · There is mild aortic valve stenosis.  · Aortic valve area is 1.83 cm2; peak velocity is 2.13 m/s; mean gradient is 11 mmHg.  · Normal central venous pressure (3 mmHg).  · The estimated PA systolic pressure is 27 mmHg.  · There is no pulmonary hypertension.    Stress test 7/30/21    Normal myocardial perfusion scan. There is no evidence of myocardial ischemia or infarction.    The gated perfusion images showed an ejection fraction of 70% post stress.    There is normal wall motion post stress.    The EKG portion of this study is negative for ischemia.    The patient  reported no chest pain during the stress test.    During stress, rare PACs are noted.      12/2/20 Denies recent CP or SOB. Has not seen a cardiologist in years  EKG NSR old IMI   Echo and lexiscan myoview for CAD  Continue Rx for CAD, HTN, HLD, DM    8/1/22 Denies CP or SOB. Able to cut grass without issues  BP controlled  EKG NSR PACs in bigeminy - otherwise ok       Review of Systems   Constitutional: Negative for decreased appetite.   HENT: Negative for ear discharge.    Eyes: Negative for blurred vision.   Endocrine: Negative for polyphagia.   Skin: Negative for nail changes.   Genitourinary: Negative for bladder incontinence.   Neurological: Negative for aphonia.   Psychiatric/Behavioral: Negative for hallucinations.   Allergic/Immunologic: Negative for hives.        Objective:    Physical Exam  Constitutional:       Appearance: He is well-developed.   HENT:      Head: Normocephalic and atraumatic.   Eyes:      Conjunctiva/sclera: Conjunctivae normal.      Pupils: Pupils are equal, round, and reactive to light.   Cardiovascular:      Rate and Rhythm: Normal rate.      Pulses: Intact distal pulses.      Heart sounds: Normal heart sounds.   Pulmonary:      Effort: Pulmonary effort is normal.      Breath sounds: Normal breath sounds.   Abdominal:      General: Bowel sounds are normal.      Palpations: Abdomen is soft.   Musculoskeletal:         General: Normal range of motion.      Cervical back: Normal range of motion and neck supple.   Skin:     General: Skin is warm and dry.   Neurological:      Mental Status: He is alert and oriented to person, place, and time.           Assessment:       1. Hyperlipidemia, unspecified hyperlipidemia type    2. Essential hypertension    3. Coronary artery disease involving native coronary artery of native heart without angina pectoris    4. Atherosclerosis of aorta         Plan:       Continue Rx for CAD, HTN, HLD, DM  OV 6 months with echo to look at AS

## 2022-08-03 ENCOUNTER — PES CALL (OUTPATIENT)
Dept: ADMINISTRATIVE | Facility: CLINIC | Age: 77
End: 2022-08-03
Payer: MEDICARE

## 2022-08-12 ENCOUNTER — TELEPHONE (OUTPATIENT)
Dept: ADMINISTRATIVE | Facility: CLINIC | Age: 77
End: 2022-08-12
Payer: MEDICARE

## 2022-08-12 NOTE — TELEPHONE ENCOUNTER
Called pt, informed pt's wife I was calling to remind pt of his in office EAWV on 8/15/22; clinic location provided; pt's wife confirmed appointment

## 2022-08-15 ENCOUNTER — OFFICE VISIT (OUTPATIENT)
Dept: FAMILY MEDICINE | Facility: CLINIC | Age: 77
End: 2022-08-15
Payer: MEDICARE

## 2022-08-15 VITALS
OXYGEN SATURATION: 96 % | WEIGHT: 188.38 LBS | SYSTOLIC BLOOD PRESSURE: 130 MMHG | HEIGHT: 70 IN | DIASTOLIC BLOOD PRESSURE: 72 MMHG | HEART RATE: 54 BPM | BODY MASS INDEX: 26.97 KG/M2

## 2022-08-15 DIAGNOSIS — C68.9 UROTHELIAL CARCINOMA: ICD-10-CM

## 2022-08-15 DIAGNOSIS — D69.6 THROMBOCYTOPENIA: ICD-10-CM

## 2022-08-15 DIAGNOSIS — F39 MOOD DISORDER: ICD-10-CM

## 2022-08-15 DIAGNOSIS — I25.10 CORONARY ARTERY DISEASE INVOLVING NATIVE CORONARY ARTERY OF NATIVE HEART WITHOUT ANGINA PECTORIS: ICD-10-CM

## 2022-08-15 DIAGNOSIS — M46.92 UNSPECIFIED INFLAMMATORY SPONDYLOPATHY, CERVICAL REGION: ICD-10-CM

## 2022-08-15 DIAGNOSIS — E11.9 TYPE 2 DIABETES MELLITUS WITHOUT COMPLICATION, WITHOUT LONG-TERM CURRENT USE OF INSULIN: ICD-10-CM

## 2022-08-15 DIAGNOSIS — E78.5 HYPERLIPIDEMIA, UNSPECIFIED HYPERLIPIDEMIA TYPE: ICD-10-CM

## 2022-08-15 DIAGNOSIS — F13.20 BENZODIAZEPINE DEPENDENCE: ICD-10-CM

## 2022-08-15 DIAGNOSIS — N40.0 BENIGN PROSTATIC HYPERPLASIA, UNSPECIFIED WHETHER LOWER URINARY TRACT SYMPTOMS PRESENT: ICD-10-CM

## 2022-08-15 DIAGNOSIS — Z00.00 ENCOUNTER FOR PREVENTIVE HEALTH EXAMINATION: Primary | ICD-10-CM

## 2022-08-15 DIAGNOSIS — E11.65 TYPE 2 DIABETES MELLITUS WITH HYPERGLYCEMIA, WITHOUT LONG-TERM CURRENT USE OF INSULIN: ICD-10-CM

## 2022-08-15 DIAGNOSIS — C67.9 MALIGNANT NEOPLASM OF URINARY BLADDER, UNSPECIFIED SITE: ICD-10-CM

## 2022-08-15 DIAGNOSIS — I10 ESSENTIAL HYPERTENSION: ICD-10-CM

## 2022-08-15 DIAGNOSIS — R80.9 TYPE 2 DIABETES MELLITUS WITH MICROALBUMINURIA, WITHOUT LONG-TERM CURRENT USE OF INSULIN: ICD-10-CM

## 2022-08-15 DIAGNOSIS — F41.9 ANXIETY: ICD-10-CM

## 2022-08-15 DIAGNOSIS — I70.0 ATHEROSCLEROSIS OF AORTA: ICD-10-CM

## 2022-08-15 DIAGNOSIS — E11.29 TYPE 2 DIABETES MELLITUS WITH MICROALBUMINURIA, WITHOUT LONG-TERM CURRENT USE OF INSULIN: ICD-10-CM

## 2022-08-15 DIAGNOSIS — R00.1 BRADYCARDIA: ICD-10-CM

## 2022-08-15 PROCEDURE — 1126F AMNT PAIN NOTED NONE PRSNT: CPT | Mod: CPTII,S$GLB,, | Performed by: NURSE PRACTITIONER

## 2022-08-15 PROCEDURE — 99499 UNLISTED E&M SERVICE: CPT | Mod: S$GLB,,, | Performed by: NURSE PRACTITIONER

## 2022-08-15 PROCEDURE — 3078F DIAST BP <80 MM HG: CPT | Mod: CPTII,S$GLB,, | Performed by: NURSE PRACTITIONER

## 2022-08-15 PROCEDURE — 1159F PR MEDICATION LIST DOCUMENTED IN MEDICAL RECORD: ICD-10-PCS | Mod: CPTII,S$GLB,, | Performed by: NURSE PRACTITIONER

## 2022-08-15 PROCEDURE — G0439 PPPS, SUBSEQ VISIT: HCPCS | Mod: S$GLB,,, | Performed by: NURSE PRACTITIONER

## 2022-08-15 PROCEDURE — 3288F PR FALLS RISK ASSESSMENT DOCUMENTED: ICD-10-PCS | Mod: CPTII,S$GLB,, | Performed by: NURSE PRACTITIONER

## 2022-08-15 PROCEDURE — 1126F PR PAIN SEVERITY QUANTIFIED, NO PAIN PRESENT: ICD-10-PCS | Mod: CPTII,S$GLB,, | Performed by: NURSE PRACTITIONER

## 2022-08-15 PROCEDURE — G0439 PR MEDICARE ANNUAL WELLNESS SUBSEQUENT VISIT: ICD-10-PCS | Mod: S$GLB,,, | Performed by: NURSE PRACTITIONER

## 2022-08-15 PROCEDURE — 1170F PR FUNCTIONAL STATUS ASSESSED: ICD-10-PCS | Mod: CPTII,S$GLB,, | Performed by: NURSE PRACTITIONER

## 2022-08-15 PROCEDURE — 3072F LOW RISK FOR RETINOPATHY: CPT | Mod: CPTII,S$GLB,, | Performed by: NURSE PRACTITIONER

## 2022-08-15 PROCEDURE — 3075F SYST BP GE 130 - 139MM HG: CPT | Mod: CPTII,S$GLB,, | Performed by: NURSE PRACTITIONER

## 2022-08-15 PROCEDURE — 3078F PR MOST RECENT DIASTOLIC BLOOD PRESSURE < 80 MM HG: ICD-10-PCS | Mod: CPTII,S$GLB,, | Performed by: NURSE PRACTITIONER

## 2022-08-15 PROCEDURE — 1101F PT FALLS ASSESS-DOCD LE1/YR: CPT | Mod: CPTII,S$GLB,, | Performed by: NURSE PRACTITIONER

## 2022-08-15 PROCEDURE — 3075F PR MOST RECENT SYSTOLIC BLOOD PRESS GE 130-139MM HG: ICD-10-PCS | Mod: CPTII,S$GLB,, | Performed by: NURSE PRACTITIONER

## 2022-08-15 PROCEDURE — 1101F PR PT FALLS ASSESS DOC 0-1 FALLS W/OUT INJ PAST YR: ICD-10-PCS | Mod: CPTII,S$GLB,, | Performed by: NURSE PRACTITIONER

## 2022-08-15 PROCEDURE — 1159F MED LIST DOCD IN RCRD: CPT | Mod: CPTII,S$GLB,, | Performed by: NURSE PRACTITIONER

## 2022-08-15 PROCEDURE — 99999 PR PBB SHADOW E&M-EST. PATIENT-LVL V: ICD-10-PCS | Mod: PBBFAC,,, | Performed by: NURSE PRACTITIONER

## 2022-08-15 PROCEDURE — 1160F PR REVIEW ALL MEDS BY PRESCRIBER/CLIN PHARMACIST DOCUMENTED: ICD-10-PCS | Mod: CPTII,S$GLB,, | Performed by: NURSE PRACTITIONER

## 2022-08-15 PROCEDURE — 99499 RISK ADDL DX/OHS AUDIT: ICD-10-PCS | Mod: S$GLB,,, | Performed by: NURSE PRACTITIONER

## 2022-08-15 PROCEDURE — 1170F FXNL STATUS ASSESSED: CPT | Mod: CPTII,S$GLB,, | Performed by: NURSE PRACTITIONER

## 2022-08-15 PROCEDURE — 1160F RVW MEDS BY RX/DR IN RCRD: CPT | Mod: CPTII,S$GLB,, | Performed by: NURSE PRACTITIONER

## 2022-08-15 PROCEDURE — 99999 PR PBB SHADOW E&M-EST. PATIENT-LVL V: CPT | Mod: PBBFAC,,, | Performed by: NURSE PRACTITIONER

## 2022-08-15 PROCEDURE — 3288F FALL RISK ASSESSMENT DOCD: CPT | Mod: CPTII,S$GLB,, | Performed by: NURSE PRACTITIONER

## 2022-08-15 PROCEDURE — 3072F PR LOW RISK FOR RETINOPATHY: ICD-10-PCS | Mod: CPTII,S$GLB,, | Performed by: NURSE PRACTITIONER

## 2022-08-15 NOTE — PATIENT INSTRUCTIONS
Counseling and Referral of Other Preventative  (Italic type indicates deductible and co-insurance are waived)    Patient Name: Ruben Macedo  Today's Date: 8/15/2022    Health Maintenance       Date Due Completion Date    Hepatitis C Screening Never done ---    TETANUS VACCINE Never done ---    Shingles Vaccine (1 of 2) Never done ---    Eye Exam 03/09/2022 3/9/2021    Override on 4/11/2017: Not Clinically Appropriate (dr butts in [place])    Override on 1/10/2014: Done (Dr.Riley TAMAR [place])    COVID-19 Vaccine (4 - Booster for Pfizer series) 03/19/2022 11/19/2021    Hemoglobin A1c 07/22/2022 4/22/2022    Influenza Vaccine (1) 09/01/2022 10/6/2021    Override on 10/11/2014: Done    Override on 9/1/2013: Done    Foot Exam 04/05/2023 4/5/2022    Override on 1/19/2021: Done    Diabetes Urine Screening 04/13/2023 4/13/2022    PROSTATE-SPECIFIC ANTIGEN 04/14/2023 4/14/2022    Aspirin/Antiplatelet Therapy 08/15/2023 8/15/2022    Colonoscopy 06/23/2024 6/23/2021        No orders of the defined types were placed in this encounter.    The following information is provided to all patients.  This information is to help you find resources for any of the problems found today that may be affecting your health:                Living healthy guide: www.[facility].[place].gov      Understanding Diabetes: www.diabetes.org      Eating healthy: www.cdc.gov/healthyweight      CDC home safety checklist: www.cdc.gov/steadi/patient.html      Agency on Aging: www.goea.[place].[facility]      Alcoholics anonymous (AA): www.aa.org      Physical Activity: www.yaneli.nih.gov/sx8ujha      Tobacco use: www.quitwithusla.org

## 2022-08-15 NOTE — PROGRESS NOTES
"  Ruben Macedo presented for a  Medicare AWV and comprehensive Health Risk Assessment today. The following components were reviewed and updated:    · Medical history  · Family History  · Social history  · Allergies and Current Medications  · Health Risk Assessment  · Health Maintenance  · Care Team       ** See Completed Assessments for Annual Wellness Visit within the encounter summary.**       The following assessments were completed:  · Living Situation  · CAGE  · Depression Screening  · Timed Get Up and Go  · Whisper Test  · Cognitive Function Screening  · Nutrition Screening  · ADL Screening  · PAQ Screening            Vitals:    08/15/22 1008   BP: 130/72   BP Location: Right arm   Patient Position: Sitting   BP Method: Large (Manual)   Pulse: (!) 54   SpO2: 96%   Weight: 85.5 kg (188 lb 6.1 oz)   Height: 5' 10" (1.778 m)     Body mass index is 27.03 kg/m².  Physical Exam  Vitals and nursing note reviewed.   Constitutional:       Appearance: Normal appearance.   Cardiovascular:      Rate and Rhythm: Normal rate.      Pulses: Normal pulses.      Heart sounds: Normal heart sounds.   Pulmonary:      Effort: Pulmonary effort is normal.   Abdominal:      General: Bowel sounds are normal.   Skin:     General: Skin is warm and dry.   Neurological:      Mental Status: He is alert and oriented to person, place, and time.   Psychiatric:         Mood and Affect: Mood normal.         Behavior: Behavior normal.             Diagnoses and health risks identified today and associated recommendations/orders:    1. Encounter for preventive health examination  Pt, accompanied by his wife, was seen today for an Annual Wellness visit. Healthcare maintenance and screening recommendations were discussed and updated as indicated. Return in one year for AWV.    Review current opioid prescriptions: yes  Screen for potential Substance Use Disorders: yes    2. Atherosclerosis of aorta  CXR 1/14/22. The current medical regimen is " effective;  continue present plan and medications.    3. Type 2 diabetes mellitus with hyperglycemia, without long-term current use of insulin  4. Type 2 diabetes mellitus without complication, without long-term current use of insulin  5. Type 2 diabetes mellitus with microalbuminuria, without long-term current use of insulin  Not at goal. Hgb A1C 8.2 on 4/22/22 (trending down). Discussed Hgb A1C goal, diet, activity. The current medical regimen is effective;  continue present plan and medications.    Hemoglobin A1C   Date Value Ref Range Status   04/22/2022 8.2 (H) 4.0 - 5.6 % Final     Comment:     ADA Screening Guidelines:  5.7-6.4%  Consistent with prediabetes  >or=6.5%  Consistent with diabetes    High levels of fetal hemoglobin interfere with the HbA1C  assay. Heterozygous hemoglobin variants (HbS, HgC, etc)do  not significantly interfere with this assay.   However, presence of multiple variants may affect accuracy.     06/15/2021 8.9 (H) 4.0 - 5.6 % Final     Comment:     ADA Screening Guidelines:  5.7-6.4%  Consistent with prediabetes  >or=6.5%  Consistent with diabetes    High levels of fetal hemoglobin interfere with the HbA1C  assay. Heterozygous hemoglobin variants (HbS, HgC, etc)do  not significantly interfere with this assay.   However, presence of multiple variants may affect accuracy.     04/07/2021 8.6 (H) 4.0 - 5.6 % Final     Comment:     ADA Screening Guidelines:  5.7-6.4%  Consistent with prediabetes  >or=6.5%  Consistent with diabetes    High levels of fetal hemoglobin interfere with the HbA1C  assay. Heterozygous hemoglobin variants (HbS, HgC, etc)do  not significantly interfere with this assay.   However, presence of multiple variants may affect accuracy.       6. Thrombocytopenia  The current medical regimen is effective;  continue present plan and medications.    7. Mood disorder  The current medical regimen is effective;  continue present plan and medications.    8. Unspecified  inflammatory spondylopathy, cervical region  The current medical regimen is effective;  continue present plan and medications.    9. Malignant neoplasm of urinary bladder, unspecified site  The current medical regimen is effective;  continue present plan and medications.    10. Urothelial carcinoma  The current medical regimen is effective;  continue present plan and medications.    11. Benzodiazepine dependence  The current medical regimen is effective;  continue present plan and medications.    12. Essential hypertension  The current medical regimen is effective;  continue present plan and medications.    13. Coronary artery disease involving native coronary artery of native heart without angina pectoris  The current medical regimen is effective;  continue present plan and medications.    14. Hyperlipidemia, unspecified hyperlipidemia type  The current medical regimen is effective;  continue present plan and medications.    15. Anxiety  The current medical regimen is effective;  continue present plan and medications.    16. Benign prostatic hyperplasia, unspecified whether lower urinary tract symptoms present  The current medical regimen is effective;  continue present plan and medications.    17. Bradycardia  Stable. Asymptomatic. The current medical regimen is effective;  continue present plan and medications.        Provided Ruben with a 5-10 year written screening schedule and personal prevention plan. Recommendations were developed using the USPSTF age appropriate recommendations. Education, counseling, and referrals were provided as needed. After Visit Summary printed and given to patient which includes a list of additional screenings\tests needed.    Follow up in about 6 months (around 2/15/2023) with provider.    NIKI Conteh  I offered to discuss advanced care planning, including how to pick a person who would make decisions for you if you were unable to make them for yourself, called a [facility]  care power of , and what kind of decisions you might make such as use of life sustaining treatments such as ventilators and tube feeding when faced with a life limiting illness recorded on a living will that they will need to know. (How you want to be cared for as you near the end of your natural life)     X Patient is interested in learning more about how to make advanced directives.  I provided them paperwork and offered to discuss this with them.

## 2022-09-30 ENCOUNTER — LAB VISIT (OUTPATIENT)
Dept: LAB | Facility: HOSPITAL | Age: 77
End: 2022-09-30
Attending: UROLOGY
Payer: MEDICARE

## 2022-09-30 DIAGNOSIS — C68.9 UROTHELIAL CARCINOMA: ICD-10-CM

## 2022-09-30 PROCEDURE — 88112 CYTOPATH CELL ENHANCE TECH: CPT | Performed by: PATHOLOGY

## 2022-09-30 PROCEDURE — 88112 PR  CYTOPATH, CELL ENHANCE TECH: ICD-10-PCS | Mod: 26,,, | Performed by: PATHOLOGY

## 2022-09-30 PROCEDURE — 88112 CYTOPATH CELL ENHANCE TECH: CPT | Mod: 26,,, | Performed by: PATHOLOGY

## 2022-10-06 LAB
FINAL PATHOLOGIC DIAGNOSIS: NORMAL
Lab: NORMAL

## 2022-10-07 ENCOUNTER — PROCEDURE VISIT (OUTPATIENT)
Dept: UROLOGY | Facility: CLINIC | Age: 77
End: 2022-10-07
Payer: MEDICARE

## 2022-10-07 VITALS — DIASTOLIC BLOOD PRESSURE: 72 MMHG | SYSTOLIC BLOOD PRESSURE: 149 MMHG | HEART RATE: 88 BPM

## 2022-10-07 DIAGNOSIS — C68.9 UROTHELIAL CARCINOMA: ICD-10-CM

## 2022-10-07 PROCEDURE — 81002 POCT URINE DIPSTICK WITHOUT MICROSCOPE: ICD-10-PCS | Mod: S$GLB,,, | Performed by: UROLOGY

## 2022-10-07 PROCEDURE — 81002 URINALYSIS NONAUTO W/O SCOPE: CPT | Mod: S$GLB,,, | Performed by: UROLOGY

## 2022-10-07 PROCEDURE — 52000 CYSTOURETHROSCOPY: CPT | Mod: S$GLB,,, | Performed by: UROLOGY

## 2022-10-07 PROCEDURE — 52000 CYSTOSCOPY: ICD-10-PCS | Mod: S$GLB,,, | Performed by: UROLOGY

## 2022-10-07 RX ORDER — CIPROFLOXACIN 500 MG/1
500 TABLET ORAL
Status: COMPLETED | OUTPATIENT
Start: 2022-10-07 | End: 2022-10-07

## 2022-10-07 RX ORDER — LIDOCAINE HYDROCHLORIDE 20 MG/ML
JELLY TOPICAL
Status: COMPLETED | OUTPATIENT
Start: 2022-10-07 | End: 2022-10-07

## 2022-10-07 RX ADMIN — LIDOCAINE HYDROCHLORIDE 1 ML: 20 JELLY TOPICAL at 04:10

## 2022-10-07 RX ADMIN — CIPROFLOXACIN 500 MG: 500 TABLET ORAL at 04:10

## 2022-10-07 NOTE — PROCEDURES
Cystoscopy    Date/Time: 10/7/2022 11:45 AM  Performed by: Gelacio Sanchez MD  Authorized by: Gelacio Sanchez MD     Consent Done?:  Yes (Written)  Timeout: prior to procedure the correct patient, procedure, and site was verified    Prep: patient was prepped and draped in usual sterile fashion    Local anesthesia used?: Yes    Anesthesia:  Lidocaine jelly  Indications: history bladder cancer    Position:  Supine  Anesthesia:  Lidocaine jelly  Preparation: Patient was prepped and draped in usual sterile fashion    Scope type:  Flexible cystoscope  Urethra normal: Yes    Prostate normal: Yes    Bladder neck normal: Yes    Bladder normal: Yes     patient tolerated the procedure well with no immediate complications  Comments:      Cytology neg    NMIBC CORBIN*2 years    Cysto and cytology in 1 yr

## 2022-10-10 LAB
BILIRUB SERPL-MCNC: NEGATIVE MG/DL
BLOOD URINE, POC: NEGATIVE
CLARITY, POC UA: CLEAR
COLOR, POC UA: YELLOW
GLUCOSE UR QL STRIP: 1000
KETONES UR QL STRIP: NEGATIVE
LEUKOCYTE ESTERASE URINE, POC: NEGATIVE
NITRITE, POC UA: NEGATIVE
PH, POC UA: 5
PROTEIN, POC: ABNORMAL
SPECIFIC GRAVITY, POC UA: 1.02
UROBILINOGEN, POC UA: NORMAL

## 2022-11-24 DIAGNOSIS — E11.9 TYPE 2 DIABETES MELLITUS WITHOUT COMPLICATION, WITHOUT LONG-TERM CURRENT USE OF INSULIN: ICD-10-CM

## 2022-11-24 NOTE — TELEPHONE ENCOUNTER
Care Due:                  Date            Visit Type   Department     Provider  --------------------------------------------------------------------------------                                MYCHART                              ANNUAL       [facility]                              CHECKUP/PHY  MED/ INTERNAL  Cedric Stark  Last Visit: 04-      S            MED/ PEDS      [place]  Next Visit: None Scheduled  None         None Found                                                            Last  Test          Frequency    Reason                     Performed    Due Date  --------------------------------------------------------------------------------    HBA1C.......  6 months...  leela PASTOR,      04-   10-                             metFORMIN................    [employer] Embedded Care Gaps. Reference number: [id number]. 11/24/2022   12:32:40 AM CST

## 2022-11-25 NOTE — TELEPHONE ENCOUNTER
Refill Routing Note   Medication(s) are not appropriate for processing by [facility] for the following reason(s):      - Required laboratory values are outdated    [facility] action(s):  Defer Medication-related problems identified: Requires labs     Medication Therapy Plan: Labs (a1c) outdated  Medication reconciliation completed: No     Appointments  past 12m or future 3m with PCP    Date Provider   Last Visit   4/13/2022 Cedric Holder MD   Next Visit   Visit date not found Cedric Holder MD   ED visits in past 90 days: 0        Note composed:8:45 PM 11/24/2022

## 2022-11-26 NOTE — TELEPHONE ENCOUNTER
Provider Staff:     Action is required for this patient.   Please see care gap opportunities below in Care Due Message.     Thanks!  [facility]     Appointments      Date Provider   Last Visit   4/13/2022 Cedric Holder MD   Next Visit   Visit date not found Cedric Holder MD     Note composed:9:58 PM 11/25/2022

## 2022-12-20 ENCOUNTER — OFFICE VISIT (OUTPATIENT)
Dept: OPTOMETRY | Facility: CLINIC | Age: 77
End: 2022-12-20
Payer: MEDICARE

## 2022-12-20 DIAGNOSIS — H52.13 MYOPIA OF BOTH EYES: ICD-10-CM

## 2022-12-20 DIAGNOSIS — Z13.5 GLAUCOMA SCREENING: ICD-10-CM

## 2022-12-20 DIAGNOSIS — E11.9 DIABETES MELLITUS TYPE 2 WITHOUT RETINOPATHY: Primary | ICD-10-CM

## 2022-12-20 DIAGNOSIS — H53.461 RIGHT HOMONYMOUS HEMIANOPSIA: ICD-10-CM

## 2022-12-20 DIAGNOSIS — Z96.1 PSEUDOPHAKIA OF BOTH EYES: ICD-10-CM

## 2022-12-20 PROCEDURE — 3288F PR FALLS RISK ASSESSMENT DOCUMENTED: ICD-10-PCS | Mod: CPTII,S$GLB,, | Performed by: OPTOMETRIST

## 2022-12-20 PROCEDURE — 2023F PR DILATED RETINAL EXAM W/O EVID OF RETINOPATHY: ICD-10-PCS | Mod: CPTII,S$GLB,, | Performed by: OPTOMETRIST

## 2022-12-20 PROCEDURE — 1159F PR MEDICATION LIST DOCUMENTED IN MEDICAL RECORD: ICD-10-PCS | Mod: CPTII,S$GLB,, | Performed by: OPTOMETRIST

## 2022-12-20 PROCEDURE — 2023F DILAT RTA XM W/O RTNOPTHY: CPT | Mod: CPTII,S$GLB,, | Performed by: OPTOMETRIST

## 2022-12-20 PROCEDURE — 99999 PR PBB SHADOW E&M-EST. PATIENT-LVL III: CPT | Mod: PBBFAC,,, | Performed by: OPTOMETRIST

## 2022-12-20 PROCEDURE — 3288F FALL RISK ASSESSMENT DOCD: CPT | Mod: CPTII,S$GLB,, | Performed by: OPTOMETRIST

## 2022-12-20 PROCEDURE — 1159F MED LIST DOCD IN RCRD: CPT | Mod: CPTII,S$GLB,, | Performed by: OPTOMETRIST

## 2022-12-20 PROCEDURE — 92014 COMPRE OPH EXAM EST PT 1/>: CPT | Mod: S$GLB,,, | Performed by: OPTOMETRIST

## 2022-12-20 PROCEDURE — 92014 PR EYE EXAM, EST PATIENT,COMPREHESV: ICD-10-PCS | Mod: S$GLB,,, | Performed by: OPTOMETRIST

## 2022-12-20 PROCEDURE — 1101F PR PT FALLS ASSESS DOC 0-1 FALLS W/OUT INJ PAST YR: ICD-10-PCS | Mod: CPTII,S$GLB,, | Performed by: OPTOMETRIST

## 2022-12-20 PROCEDURE — 99999 PR PBB SHADOW E&M-EST. PATIENT-LVL III: ICD-10-PCS | Mod: PBBFAC,,, | Performed by: OPTOMETRIST

## 2022-12-20 PROCEDURE — 92015 DETERMINE REFRACTIVE STATE: CPT | Mod: S$GLB,,, | Performed by: OPTOMETRIST

## 2022-12-20 PROCEDURE — 1101F PT FALLS ASSESS-DOCD LE1/YR: CPT | Mod: CPTII,S$GLB,, | Performed by: OPTOMETRIST

## 2022-12-20 PROCEDURE — 92015 PR REFRACTION: ICD-10-PCS | Mod: S$GLB,,, | Performed by: OPTOMETRIST

## 2022-12-20 NOTE — PROGRESS NOTES
HPI    Dls: 3/9/21 Dr. Mullins    76 y/o male presents today for diabetic and hypertensive eye exam.  Pt states no changes in vision. Pt wears bifocal glasses.   Hx of TIA in 2005 with decreased peripheral vision      x 2 days     No tearing  No itching  No burning  No pain  No ha's  No floaters  No flashes    Eye meds   Otc gtts ou prn       Last edited by Bethel Mullins, OD on 12/20/2022  8:35 AM.            Assessment /Plan     For exam results, see Encounter Report.    Diabetes mellitus type 2 without retinopathy  -No retinopathy noted today.  Continued control with primary care physician and annual comprehensive eye exam.    Right homonymous hemianopsia  -Previously noted from 2005 TIA    Pseudophakia of both eyes  -clear, centered    Glaucoma screening  -Monitor with annual eye exam and IOP check    Myopia of both eyes  Eyeglass Final Rx       Eyeglass Final Rx         Sphere Cylinder Dist VA Add    Right -0.50 Sphere 20/20 +2.75    Left -0.50 Sphere 20/20 +2.75      Type: PAL    Expiration Date: 12/20/2023                      RTC 1 yr

## 2022-12-27 ENCOUNTER — TELEPHONE (OUTPATIENT)
Dept: URGENT CARE | Facility: CLINIC | Age: 77
End: 2022-12-27
Payer: MEDICARE

## 2022-12-27 ENCOUNTER — OFFICE VISIT (OUTPATIENT)
Dept: URGENT CARE | Facility: CLINIC | Age: 77
End: 2022-12-27
Payer: MEDICARE

## 2022-12-27 VITALS
TEMPERATURE: 98 F | RESPIRATION RATE: 16 BRPM | HEIGHT: 70 IN | OXYGEN SATURATION: 95 % | SYSTOLIC BLOOD PRESSURE: 156 MMHG | HEART RATE: 78 BPM | BODY MASS INDEX: 26.92 KG/M2 | DIASTOLIC BLOOD PRESSURE: 86 MMHG | WEIGHT: 188 LBS

## 2022-12-27 DIAGNOSIS — R10.32 GROIN PAIN, LEFT: ICD-10-CM

## 2022-12-27 DIAGNOSIS — M25.552 HIP PAIN, LEFT: Primary | ICD-10-CM

## 2022-12-27 LAB
BILIRUB UR QL STRIP: NEGATIVE
GLUCOSE UR QL STRIP: POSITIVE
KETONES UR QL STRIP: NEGATIVE
LEUKOCYTE ESTERASE UR QL STRIP: NEGATIVE
PH, POC UA: 5
POC BLOOD, URINE: POSITIVE
POC NITRATES, URINE: NEGATIVE
PROT UR QL STRIP: POSITIVE
SP GR UR STRIP: 1.02 (ref 1–1.03)
UROBILINOGEN UR STRIP-ACNC: ABNORMAL (ref 0.3–2.2)

## 2022-12-27 PROCEDURE — 1125F AMNT PAIN NOTED PAIN PRSNT: CPT | Mod: CPTII,S$GLB,, | Performed by: NURSE PRACTITIONER

## 2022-12-27 PROCEDURE — 1160F PR REVIEW ALL MEDS BY PRESCRIBER/CLIN PHARMACIST DOCUMENTED: ICD-10-PCS | Mod: CPTII,S$GLB,, | Performed by: NURSE PRACTITIONER

## 2022-12-27 PROCEDURE — 73502 X-RAY EXAM HIP UNI 2-3 VIEWS: CPT | Mod: LT,S$GLB,, | Performed by: RADIOLOGY

## 2022-12-27 PROCEDURE — 1160F RVW MEDS BY RX/DR IN RCRD: CPT | Mod: CPTII,S$GLB,, | Performed by: NURSE PRACTITIONER

## 2022-12-27 PROCEDURE — 3077F SYST BP >= 140 MM HG: CPT | Mod: CPTII,S$GLB,, | Performed by: NURSE PRACTITIONER

## 2022-12-27 PROCEDURE — 99213 OFFICE O/P EST LOW 20 MIN: CPT | Mod: S$GLB,,, | Performed by: NURSE PRACTITIONER

## 2022-12-27 PROCEDURE — 3079F PR MOST RECENT DIASTOLIC BLOOD PRESSURE 80-89 MM HG: ICD-10-PCS | Mod: CPTII,S$GLB,, | Performed by: NURSE PRACTITIONER

## 2022-12-27 PROCEDURE — 73502 XR HIP WITH PELVIS WHEN PERFORMED, 2 OR 3 VIEWS LEFT: ICD-10-PCS | Mod: LT,S$GLB,, | Performed by: RADIOLOGY

## 2022-12-27 PROCEDURE — 99213 PR OFFICE/OUTPT VISIT, EST, LEVL III, 20-29 MIN: ICD-10-PCS | Mod: S$GLB,,, | Performed by: NURSE PRACTITIONER

## 2022-12-27 PROCEDURE — 3072F LOW RISK FOR RETINOPATHY: CPT | Mod: CPTII,S$GLB,, | Performed by: NURSE PRACTITIONER

## 2022-12-27 PROCEDURE — 81003 URINALYSIS AUTO W/O SCOPE: CPT | Mod: QW,S$GLB,, | Performed by: NURSE PRACTITIONER

## 2022-12-27 PROCEDURE — 3072F PR LOW RISK FOR RETINOPATHY: ICD-10-PCS | Mod: CPTII,S$GLB,, | Performed by: NURSE PRACTITIONER

## 2022-12-27 PROCEDURE — 1159F MED LIST DOCD IN RCRD: CPT | Mod: CPTII,S$GLB,, | Performed by: NURSE PRACTITIONER

## 2022-12-27 PROCEDURE — 81003 POCT URINALYSIS, DIPSTICK, AUTOMATED, W/O SCOPE: ICD-10-PCS | Mod: QW,S$GLB,, | Performed by: NURSE PRACTITIONER

## 2022-12-27 PROCEDURE — 3079F DIAST BP 80-89 MM HG: CPT | Mod: CPTII,S$GLB,, | Performed by: NURSE PRACTITIONER

## 2022-12-27 PROCEDURE — 87086 URINE CULTURE/COLONY COUNT: CPT | Performed by: NURSE PRACTITIONER

## 2022-12-27 PROCEDURE — 1125F PR PAIN SEVERITY QUANTIFIED, PAIN PRESENT: ICD-10-PCS | Mod: CPTII,S$GLB,, | Performed by: NURSE PRACTITIONER

## 2022-12-27 PROCEDURE — 1159F PR MEDICATION LIST DOCUMENTED IN MEDICAL RECORD: ICD-10-PCS | Mod: CPTII,S$GLB,, | Performed by: NURSE PRACTITIONER

## 2022-12-27 PROCEDURE — 3077F PR MOST RECENT SYSTOLIC BLOOD PRESSURE >= 140 MM HG: ICD-10-PCS | Mod: CPTII,S$GLB,, | Performed by: NURSE PRACTITIONER

## 2022-12-27 RX ORDER — METHOCARBAMOL 500 MG/1
500 TABLET, FILM COATED ORAL 4 TIMES DAILY
Qty: 40 TABLET | Refills: 0 | Status: SHIPPED | OUTPATIENT
Start: 2022-12-27 | End: 2023-01-06

## 2022-12-27 NOTE — PROGRESS NOTES
"Subjective:       Patient ID: Ruben Macedo Jr. is a 77 y.o. male.    Vitals:  height is 5' 10" (1.778 m) and weight is 85.3 kg (188 lb). His oral temperature is 98.2 °F (36.8 °C). His blood pressure is 156/86 (abnormal) and his pulse is 78. His respiration is 16 and oxygen saturation is 95%.     Chief Complaint: Groin Pain    78yo male pt reports pain to L side of groin that radiates into L hip.  Denies trauma or injury prior to symptom onset, denies HX of similar symptoms, but states that he had sciatica to the R side 20 years ago.  Denies burning with urination, abd/back/flank pain, penile discharge, or scrotal swelling/pain.  Denies numbness or tingling to leg/foot, saddle anesthesia, or issues with incontinence.  Reports that symptoms have been recurring for 2-4 weeks, occurs in "waves," reports pain is 8/10 at worst, daniel with walking.    Groin Pain  The patient's primary symptoms include pelvic pain. The patient's pertinent negatives include no penile discharge, penile pain, scrotal swelling or testicular pain. This is a new problem. Episode onset: 2 weeks ago. The problem has been gradually worsening. The pain is mild. Associated symptoms include joint pain. Pertinent negatives include no abdominal pain, anorexia, chest pain, chills, constipation, coughing, diarrhea, discolored urine, dysuria, fever, flank pain, frequency, headaches, hematuria, hesitancy, joint swelling, nausea, painful intercourse, rash, shortness of breath, sore throat, urgency, urinary retention or vomiting. The symptoms are aggravated by activity. Treatments tried: tylenol. The treatment provided mild relief. He is not sexually active. He never uses condoms. No, his partner does not have an STD. There is no history of BPH, chlamydia, cryptorchidism, erectile aid use, erectile dysfunction, a femoral hernia, gonorrhea, herpes simplex, HIV, an inguinal hernia, kidney stones, prostatitis, sickle cell disease, syphilis or varicocele. "     Constitution: Negative for chills and fever.   HENT:  Negative for sore throat.    Cardiovascular:  Negative for chest pain.   Respiratory:  Negative for cough and shortness of breath.    Gastrointestinal:  Negative for abdominal pain, nausea, vomiting, constipation and diarrhea.   Genitourinary:  Positive for pelvic pain. Negative for dysuria, frequency, urgency, flank pain, painful ejaculation, genital sore, penile discharge, painful ejaculation, penile pain, penile swelling, scrotal swelling and testicular pain.   Musculoskeletal:  Positive for pain, joint pain, pain with walking and muscle ache. Negative for trauma, joint swelling and abnormal ROM of joint.   Skin:  Negative for rash, wound, abrasion, laceration, lesion, erythema and bruising.   Neurological:  Negative for headaches.     Objective:      Physical Exam   Constitutional: He is oriented to person, place, and time. He appears well-developed.   HENT:   Head: Normocephalic and atraumatic. Head is without abrasion, without contusion and without laceration.   Ears:   Right Ear: External ear normal.   Left Ear: External ear normal.   Nose: Nose normal.   Mouth/Throat: Oropharynx is clear and moist and mucous membranes are normal.   Eyes: Conjunctivae, EOM and lids are normal. Pupils are equal, round, and reactive to light.   Neck: Trachea normal and phonation normal. Neck supple.   Cardiovascular: Normal rate, regular rhythm and normal heart sounds.   Pulmonary/Chest: Effort normal and breath sounds normal. No stridor. No respiratory distress.   Musculoskeletal: Normal range of motion.         General: Normal range of motion.      Right hip: Normal.      Left hip: He exhibits normal range of motion (pain reported with hip flexion, abduction, and adduction), normal strength, no tenderness, no bony tenderness, no crepitus, no deformity and no laceration.      Right upper leg: Normal.      Left upper leg: Normal.   Neurological: He is alert and oriented  to person, place, and time.   Skin: Skin is warm, dry, intact and no rash. Capillary refill takes less than 2 seconds. No abrasion, No burn, No bruising, No erythema and No ecchymosis   Psychiatric: His speech is normal and behavior is normal. Judgment and thought content normal.   Nursing note and vitals reviewed.    Results for orders placed or performed in visit on 12/27/22   POCT Urinalysis, Dipstick, Automated, W/O Scope   Result Value Ref Range    POC Blood, Urine Positive (A) Negative    POC Bilirubin, Urine Negative Negative    POC Urobilinogen, Urine norm 0.3 - 2.2    POC Ketones, Urine Negative Negative    POC Protein, Urine Positive (A) Negative    POC Nitrates, Urine Negative Negative    POC Glucose, Urine Positive (A) Negative    pH, UA 5.0     POC Specific Gravity, Urine 1.025 1.003 - 1.029    POC Leukocytes, Urine Negative Negative     X-Ray Hip 2 or 3 views Left (with Pelvis when performed)    Result Date: 12/27/2022  EXAMINATION: XR HIP WITH PELVIS WHEN PERFORMED, 2 OR 3 VIEWS LEFT CLINICAL HISTORY: Pain in left hip TECHNIQUE: AP view of the pelvis and frog leg lateral view of the left hip were performed. COMPARISON: None FINDINGS: No definite evidence of acute displaced fracture or dislocation.  Degenerative findings are noted at the pubic symphysis and both hip joints.  Convexity of the left femoral head neck junction favored degenerative, though femoroacetabular impingement could appear similar. No definite radiopaque foreign body.  Vascular calcifications are noted.     No convincing evidence acute displaced fracture or dislocation. Electronically signed by: Ministerio Flynn Date:    12/27/2022 Time:    10:07           Assessment:       1. Hip pain, left    2. Groin pain, left          Plan:       Discussed preliminary x-ray results, will call with final radiology read.  Provided education on prescribed medications, recommended continuing Tylenol, adding rest, alternating cool/warm compresses, and  gentle stretching for additional symptom relief.  Will call with urine culture results.  Provided education on return/ER precautions, follow-up with PCP or orthopedics if symptoms do not improve with treatment.  Pt verbalized understanding and agreed to plan.      Hip pain, left  -     X-Ray Hip 2 or 3 views Left (with Pelvis when performed); Future; Expected date: 12/27/2022  -     methocarbamoL (ROBAXIN) 500 MG Tab; Take 1 tablet (500 mg total) by mouth 4 (four) times daily. for 10 days  Dispense: 40 tablet; Refill: 0    Groin pain, left  -     POCT Urinalysis, Dipstick, Automated, W/O Scope  -     Urine culture      Patient Instructions   If your condition worsens or fails to improve, we recommend that you receive another evaluation at the ER immediately, contact your PCP to discuss your concerns, or return here.  You must understand that you've received an urgent care treatment only, and that you may be released before all your medical problems are known or treated.  You, the patient, will arrange for follow-up care as instructed.     If you were prescribed a narcotic or muscle relaxant, do not drive or operate heavy machinery while taking these medication.    Tylenol or Ibuprofen can also be used as directed for pain unless you have an allergy to them or medical condition (such as stomach ulcers, kidney or liver disease, or use blood thinners, etc.) for which you should not be taking these type of medications.     If you were given a prescription NSAID here, do not also take any over the counter NSAIDs like Ibuprofen, Aleve, Advil, Motrin, etc.  RICE (rest, ice, compression, and elevation) are helpful, as well as gentle stretching, unless otherwise directed.     If you have low back pain and develop bowel or bladder symptoms or increased pain going down your legs, go to the ER immediately.     If you were given a splint, wear it at all times.  If you were given crutches, use them as instructed.  Do not rest  your armpits on the foam pad, or you risk compressing the nerves and the vessels there.

## 2022-12-28 LAB — BACTERIA UR CULT: NO GROWTH

## 2022-12-30 ENCOUNTER — TELEPHONE (OUTPATIENT)
Dept: URGENT CARE | Facility: CLINIC | Age: 77
End: 2022-12-30
Payer: MEDICARE

## 2022-12-31 NOTE — TELEPHONE ENCOUNTER
Patient contacted.  Patient answered.  Patient informed of negative urine culture.  Patient had no further questions.  Follow-up with primary care provider as needed.

## 2023-01-05 ENCOUNTER — PATIENT MESSAGE (OUTPATIENT)
Dept: FAMILY MEDICINE | Facility: CLINIC | Age: 78
End: 2023-01-05
Payer: MEDICARE

## 2023-01-05 DIAGNOSIS — E11.9 TYPE 2 DIABETES MELLITUS WITHOUT COMPLICATION, WITHOUT LONG-TERM CURRENT USE OF INSULIN: ICD-10-CM

## 2023-01-05 NOTE — TELEPHONE ENCOUNTER
No new care gaps identified.  [facility] Embedded Care Gaps. Reference number: [id number]. 1/05/2023   3:07:00 PM CST

## 2023-01-10 ENCOUNTER — HOSPITAL ENCOUNTER (EMERGENCY)
Facility: OTHER | Age: 78
Discharge: HOME | End: 2023-01-10
Attending: EMERGENCY MEDICINE
Payer: MEDICARE

## 2023-01-10 VITALS
WEIGHT: 190 LBS | BODY MASS INDEX: 27.2 KG/M2 | DIASTOLIC BLOOD PRESSURE: 90 MMHG | HEIGHT: 70 IN | SYSTOLIC BLOOD PRESSURE: 149 MMHG | TEMPERATURE: 98 F | HEART RATE: 74 BPM | OXYGEN SATURATION: 95 % | RESPIRATION RATE: 16 BRPM

## 2023-01-10 DIAGNOSIS — L03.116 CELLULITIS OF LEFT LOWER EXTREMITY: Primary | ICD-10-CM

## 2023-01-10 DIAGNOSIS — M79.89 LEG SWELLING: ICD-10-CM

## 2023-01-10 LAB
ANION GAP SERPL CALC-SCNC: 8 MMOL/L (ref 8–16)
BASOPHILS # BLD AUTO: 0.04 K/UL (ref 0–0.2)
BASOPHILS NFR BLD: 0.6 % (ref 0–1.9)
BUN SERPL-MCNC: 22 MG/DL (ref 8–23)
CALCIUM SERPL-MCNC: 9.8 MG/DL (ref 8.7–10.5)
CHLORIDE SERPL-SCNC: 107 MMOL/L (ref 95–110)
CO2 SERPL-SCNC: 22 MMOL/L (ref 23–29)
CREAT SERPL-MCNC: 1.1 MG/DL (ref 0.5–1.4)
DIFFERENTIAL METHOD: ABNORMAL
EOSINOPHIL # BLD AUTO: 0.2 K/UL (ref 0–0.5)
EOSINOPHIL NFR BLD: 2.2 % (ref 0–8)
ERYTHROCYTE [DISTWIDTH] IN BLOOD BY AUTOMATED COUNT: 13.8 % (ref 11.5–14.5)
EST. GFR  (NO RACE VARIABLE): >60 ML/MIN/1.73 M^2
GLUCOSE SERPL-MCNC: 216 MG/DL (ref 70–110)
HCT VFR BLD AUTO: 38.2 % (ref 40–54)
HGB BLD-MCNC: 12.8 G/DL (ref 14–18)
IMM GRANULOCYTES # BLD AUTO: 0.01 K/UL (ref 0–0.04)
IMM GRANULOCYTES NFR BLD AUTO: 0.1 % (ref 0–0.5)
INR PPP: 1 (ref 0.8–1.2)
LYMPHOCYTES # BLD AUTO: 1.3 K/UL (ref 1–4.8)
LYMPHOCYTES NFR BLD: 18.2 % (ref 18–48)
MCH RBC QN AUTO: 30.3 PG (ref 27–31)
MCHC RBC AUTO-ENTMCNC: 33.5 G/DL (ref 32–36)
MCV RBC AUTO: 91 FL (ref 82–98)
MONOCYTES # BLD AUTO: 0.7 K/UL (ref 0.3–1)
MONOCYTES NFR BLD: 10.1 % (ref 4–15)
NEUTROPHILS # BLD AUTO: 4.8 K/UL (ref 1.8–7.7)
NEUTROPHILS NFR BLD: 68.8 % (ref 38–73)
NRBC BLD-RTO: 0 /100 WBC
PLATELET # BLD AUTO: 166 K/UL (ref 150–450)
PMV BLD AUTO: 10.8 FL (ref 9.2–12.9)
POTASSIUM SERPL-SCNC: 4.5 MMOL/L (ref 3.5–5.1)
PROTHROMBIN TIME: 10.9 SEC (ref 9–12.5)
RBC # BLD AUTO: 4.22 M/UL (ref 4.6–6.2)
SODIUM SERPL-SCNC: 137 MMOL/L (ref 136–145)
WBC # BLD AUTO: 6.94 K/UL (ref 3.9–12.7)

## 2023-01-10 PROCEDURE — 99284 EMERGENCY DEPT VISIT MOD MDM: CPT | Mod: 25

## 2023-01-10 PROCEDURE — 85025 COMPLETE CBC W/AUTO DIFF WBC: CPT | Performed by: EMERGENCY MEDICINE

## 2023-01-10 PROCEDURE — 25000003 PHARM REV CODE 250: Performed by: EMERGENCY MEDICINE

## 2023-01-10 PROCEDURE — 80048 BASIC METABOLIC PNL TOTAL CA: CPT | Performed by: EMERGENCY MEDICINE

## 2023-01-10 PROCEDURE — 85610 PROTHROMBIN TIME: CPT | Performed by: EMERGENCY MEDICINE

## 2023-01-10 RX ORDER — IBUPROFEN 400 MG/1
800 TABLET ORAL
Status: COMPLETED | OUTPATIENT
Start: 2023-01-10 | End: 2023-01-10

## 2023-01-10 RX ORDER — CEPHALEXIN 500 MG/1
500 CAPSULE ORAL 4 TIMES DAILY
Qty: 28 CAPSULE | Refills: 0 | Status: SHIPPED | OUTPATIENT
Start: 2023-01-10 | End: 2023-01-17

## 2023-01-10 RX ADMIN — IBUPROFEN 800 MG: 400 TABLET, FILM COATED ORAL at 08:01

## 2023-01-10 NOTE — ED PROVIDER NOTES
Encounter Date: 1/10/2023    SCRIBE #1 NOTE: I, Kentrell An, am scribing for, and in the presence of,  Ciara Thomas MD. I have scribed the entire note.     History     Chief Complaint   Patient presents with    Rash     Pt to ER with complaint of an area of redness to the L leg. Pt reports that the area is itchy. Area is red but not hot to touch. Pt in no acute distress with chest noted to equal rise and fall. Pt AAOx4.     Time seen by physician: 6:31 AM    Ruben Macedo Jr. is a 77 y.o. male, with a PMHx of hypertension and diabetes mellitus, presents to the ED with a rash that has been present for a year but worsened 2 weeks ago.  Patient initially had a small area of redness that he attributed to a horse fly bite approximately 1 year ago.  He was seen by physician and prescribe mupirocin at the time.  The patient reports that he was applying mupirocin for a year which initially seemed to improve his symptoms but then seemed to stop working. He also, reports that over the past two weeks it's grown in size, became more erythematous and occasionally bleeds.  He states that it has been really itchy,and throbbing.  He has associated left lower extremity swelling.  The patient denies any fever, chest pain,or shortness of breath.  Patient is on Plavix.  Patient does have history of diabetes and states that his sugars have been well controlled.  This is the extent of the patient's complaints today in the Emergency Department.      The history is provided by the patient.   Review of patient's allergies indicates:   Allergen Reactions    Morphine Nausea And Vomiting     Can take Dilaudid, Demerol, Percocet & Norco    Tramadol Nausea And Vomiting    Morphine (pf) Nausea And Vomiting     Past Medical History:   Diagnosis Date    Anticoagulant long-term use     plavix    Anxiety     Bladder cancer     BPH (benign prostatic hypertrophy) 02/02/2015    Cancer     Cataract     Coronary artery disease 2005    stent x 1         Diabetes mellitus, type 2     History of colonic polyps 04/11/2017 2/13 --3 yrs    History of MI (myocardial infarction) 2005    Followed by cardiology, Dr. Acevedo (at [facility])    History of stroke 11/2005    Hyperlipidemia     Hypertension     Incomplete bladder emptying 02/02/2015    Myocardial infarction 2006    Overweight(278.02)     Personal history of kidney stones     Renal cyst 02/10/2015    Stroke 2005    loss of peripheral vision     Past Surgical History:   Procedure Laterality Date    APPENDECTOMY      BACK SURGERY      BLADDER FULGURATION  5/11/2020    Procedure: FULGURATION, BLADDER;  Surgeon: Gelacio Sanchez MD;  Location: [facility];  Service: Urology;;    CATARACT EXTRACTION      od    CORONARY ANGIOPLASTY WITH STENT PLACEMENT  2000    CYSTOSCOPY  5/11/2020    Procedure: CYSTOSCOPY;  Surgeon: Gelacio Sanchez MD;  Location: [facility];  Service: Urology;;    CYSTOSCOPY W/ URETERAL STENT PLACEMENT      stents placed x2    DILATION OF URETHRA N/A 11/12/2020    Procedure: DILATION, URETHRA;  Surgeon: Gelacio Sanchez MD;  Location: [facility];  Service: Urology;  Laterality: N/A;    EYE SURGERY      cataract right eye    REMOVAL OF BLOOD CLOT  5/11/2020    Procedure: REMOVAL, BLOOD CLOT;  Surgeon: Gelacio Sanchez MD;  Location: [facility];  Service: Urology;;    SPINE SURGERY      TONSILLECTOMY      TURBT, WITH BLUE LIGHT CYSTOSCOPY AND CYSVIEW N/A 7/9/2020    Procedure: TURBT,WITH BLUE LIGHT CYSTOSCOPY AND CYSVIEW;  Surgeon: Gelacio Sanchez MD;  Location: [facility];  Service: Urology;  Laterality: N/A;    TURBT, WITH BLUE LIGHT CYSTOSCOPY AND CYSVIEW Bilateral 11/12/2020    Procedure: TURBT,WITH BLUE LIGHT CYSTOSCOPY AND CYSVIEW; bilateral retrograde pyelogram;  Surgeon: Gelacio Sanchez MD;  Location: [facility];  Service: Urology;  Laterality: Bilateral;     Family History   Problem Relation Age of Onset    Cataracts Mother     Hyperlipidemia Father     Cancer Father     Cataracts Father      Hyperlipidemia Sister     No Known Problems Sister     No Known Problems Brother     No Known Problems Maternal Aunt     No Known Problems Maternal Uncle     No Known Problems Paternal Aunt     No Known Problems Paternal Uncle     No Known Problems Maternal Grandmother     No Known Problems Maternal Grandfather     No Known Problems Paternal Grandmother     No Known Problems Paternal Grandfather     No Known Problems Son     No Known Problems Son     Alzheimer's disease Other     Diabetes Neg Hx     Amblyopia Neg Hx     Blindness Neg Hx     Glaucoma Neg Hx     Hypertension Neg Hx     Macular degeneration Neg Hx     Retinal detachment Neg Hx     Strabismus Neg Hx     Stroke Neg Hx     Thyroid disease Neg Hx      Social History     Tobacco Use    Smoking status: Never    Smokeless tobacco: Never   Substance Use Topics    Alcohol use: Yes     Comment: occasional    Drug use: No     Review of Systems   Constitutional:  Negative for chills and fever.   HENT:  Negative for congestion, rhinorrhea and sore throat.    Eyes:  Negative for pain.   Respiratory:  Negative for cough and shortness of breath.    Cardiovascular:  Positive for leg swelling. Negative for chest pain.   Gastrointestinal:  Negative for abdominal pain, diarrhea, nausea and vomiting.   Genitourinary:  Negative for dysuria and flank pain.   Musculoskeletal:  Negative for arthralgias and myalgias.   Skin:  Positive for color change and rash.   Neurological:  Negative for weakness, numbness and headaches.   Hematological: Negative.    Psychiatric/Behavioral:  Negative for agitation and confusion.      Physical Exam     Initial Vitals [01/10/23 0607]   BP Pulse Resp Temp SpO2   (!) 184/91 87 18 97.7 °F (36.5 °C) 97 %      MAP       --         Physical Exam    Nursing note and vitals reviewed.  Constitutional: He appears well-developed and well-nourished.   HENT:   Head: Normocephalic and atraumatic.   Eyes: Conjunctivae are normal.   Neck: Neck supple.    Normal range of motion.  Cardiovascular:  Normal rate, regular rhythm and normal heart sounds.           2+ DP/PT pulses bilaterally.   Pulmonary/Chest: Breath sounds normal. No respiratory distress. He has no wheezes. He has no rales.   Abdominal: Abdomen is soft. Bowel sounds are normal. He exhibits no distension. There is no abdominal tenderness. There is no rebound.   Musculoskeletal:         General: Edema present. No tenderness. Normal range of motion.      Cervical back: Normal range of motion and neck supple.      Comments: Left lower extremity with nonpitting edema.     Neurological: He is alert and oriented to person, place, and time.   Ambulatory with steady gait.   Skin: Skin is warm and dry. Capillary refill takes less than 2 seconds. Rash noted. There is erythema.   6x8 cm area of erythema to the anterior aspect of the left lower extremity on the shin.  It is mildly warm to touch.  It is not raised.  The area blanches.  There is evidence of excoriations and small breaks in the skin.  No active bleeding or drainage noted.  No purpura or petechia.       ED Course   Procedures  Labs Reviewed   CBC W/ AUTO DIFFERENTIAL - Abnormal; Notable for the following components:       Result Value    RBC 4.22 (*)     Hemoglobin 12.8 (*)     Hematocrit 38.2 (*)     All other components within normal limits   BASIC METABOLIC PANEL - Abnormal; Notable for the following components:    CO2 22 (*)     Glucose 216 (*)     All other components within normal limits   PROTIME-INR          Imaging Results              US Lower Extremity Veins Left (Final result)  Result time 01/10/23 09:15:27      Final result by Ministerio Flynn MD (01/10/23 09:15:27)                   Impression:      No evidence of deep venous thrombosis in the left lower extremity.      Electronically signed by: Ministerio Flynn  Date:    01/10/2023  Time:    09:15               Narrative:    EXAMINATION:  US LOWER EXTREMITY VEINS LEFT    CLINICAL  HISTORY:  Other specified soft tissue disorders    TECHNIQUE:  Duplex and color flow Doppler evaluation and graded compression of the left lower extremity veins was performed.    COMPARISON:  None    FINDINGS:  Left thigh veins: The common femoral, femoral, popliteal, upper greater saphenous, and deep femoral veins are patent and free of thrombus. The veins are normally compressible and have normal phasic flow and augmentation response.    Left calf veins: The visualized calf veins are patent.    Contralateral CFV: The contralateral (right) common femoral vein is patent and free of thrombus.    Miscellaneous: None                                           Medications   ibuprofen tablet 800 mg (800 mg Oral Given 1/10/23 0816)     Medical Decision Making:   History:   Old Medical Records: I decided to obtain old medical records.  Old Records Summarized: other records and records from clinic visits.  Initial Assessment:   6:31AM:  Patient is a 77-year-old male who presents to the emergency department with an area of erythema to the left lower extremity.  Patient appears well, nontoxic.  The area seems have been stable over the past year but has acutely worsened over the past couple weeks, expanding in size and becoming more erythematous.  His left lower extremity is also swollen.  Will plan to check labs, ultrasound, will continue to follow and reassess.  Clinical Tests:   Lab Tests: Ordered and Reviewed  Radiological Study: Ordered and Reviewed    9:39 AM:  Pt doing well, remains stable.  His labs are unremarkable and his ultrasound is negative for any evidence of a clot.  Given the progressive redness over the past couple weeks, will plan to treat with a course of antibiotics and have him continue to monitor his blood sugars as they are mildly elevated today.  I do not feel that further work up in the ED is indicated at this time.  I updated pt regarding results and I counseled pt regarding supportive care measures.   I have discussed with the pt ED return warnings and need for close PCP f/u.  Pt agreeable to plan and all questions answered.  I feel that pt is stable for discharge and management as an outpatient and no further intervention is needed at this time.  Pt is comfortable returning to the ED if needed.  Will DC home in stable condition.          Scribe Attestation:   Scribe #1: I performed the above scribed service and the documentation accurately describes the services I performed. I attest to the accuracy of the note.          Physician Attestation for Scribe: I, Ciara Thomas, reviewed documentation as scribed in my presence, which is both accurate and complete.          Clinical Impression:   Final diagnoses:  [M79.89] Leg swelling  [L03.116] Cellulitis of left lower extremity (Primary)        ED Disposition Condition    Discharge Stable          ED Prescriptions       Medication Sig Dispense Start Date End Date Auth. Provider    cephALEXin (KEFLEX) 500 MG capsule Take 1 capsule (500 mg total) by mouth 4 (four) times daily. for 7 days 28 capsule 1/10/2023 1/17/2023 Ciara Thomas MD          Follow-up Information       Follow up With Specialties Details Why Contact Info    Cedric Holder MD Internal Medicine   [address]  [place] [id number]  [phone]               Ciara Thomas MD  01/10/23 0681

## 2023-01-10 NOTE — ED NOTES
"Pt reports he thinks he has an infection. States he was stung by a horse fly 1 year ago and has been having an an area of localized redness to left lower leg x 1 year that "itches like crazy" and throbbing x couple of weeks. Over the past month he states the area of redness is getting bigger. Leg lower leg edematous compared to right x 1 or 2 months per pt. Denies fevers, chills, chest pain, SOB, calf pain.  "

## 2023-01-15 ENCOUNTER — PATIENT MESSAGE (OUTPATIENT)
Dept: FAMILY MEDICINE | Facility: CLINIC | Age: 78
End: 2023-01-15
Payer: MEDICARE

## 2023-01-17 RX ORDER — FINASTERIDE 5 MG/1
5 TABLET, FILM COATED ORAL DAILY
Qty: 90 TABLET | Refills: 3 | Status: SHIPPED | OUTPATIENT
Start: 2023-01-17 | End: 2023-01-25

## 2023-01-17 NOTE — TELEPHONE ENCOUNTER
No new care gaps identified.  [facility] Embedded Care Gaps. Reference number: [id number]. 1/17/2023   8:42:29 AM CST

## 2023-01-24 ENCOUNTER — TELEPHONE (OUTPATIENT)
Dept: FAMILY MEDICINE | Facility: CLINIC | Age: 78
End: 2023-01-24
Payer: MEDICARE

## 2023-01-24 DIAGNOSIS — R21 RASH: Primary | ICD-10-CM

## 2023-01-24 NOTE — TELEPHONE ENCOUNTER
----- Message from Alanna Hoffman sent at 1/24/2023 10:24 AM CST -----  .Type: Patient Call Back    Who called:    What is the request in detail: Requesting a referral for a dermatologist    Can the clinic reply by [facility]? No    Would the patient rather a call back or a response via [facility]? Call    Best call back number: .[phone] (home) [phone]        Additional Information:

## 2023-01-25 ENCOUNTER — OFFICE VISIT (OUTPATIENT)
Dept: ALLERGY | Facility: CLINIC | Age: 78
End: 2023-01-25
Payer: MEDICARE

## 2023-01-25 DIAGNOSIS — R21 RASH: ICD-10-CM

## 2023-01-25 PROCEDURE — 1160F PR REVIEW ALL MEDS BY PRESCRIBER/CLIN PHARMACIST DOCUMENTED: ICD-10-PCS | Mod: CPTII,S$GLB,, | Performed by: STUDENT IN AN ORGANIZED HEALTH CARE EDUCATION/TRAINING PROGRAM

## 2023-01-25 PROCEDURE — 3072F PR LOW RISK FOR RETINOPATHY: ICD-10-PCS | Mod: CPTII,S$GLB,, | Performed by: STUDENT IN AN ORGANIZED HEALTH CARE EDUCATION/TRAINING PROGRAM

## 2023-01-25 PROCEDURE — 1160F RVW MEDS BY RX/DR IN RCRD: CPT | Mod: CPTII,S$GLB,, | Performed by: STUDENT IN AN ORGANIZED HEALTH CARE EDUCATION/TRAINING PROGRAM

## 2023-01-25 PROCEDURE — 99999 PR PBB SHADOW E&M-EST. PATIENT-LVL II: CPT | Mod: PBBFAC,,, | Performed by: STUDENT IN AN ORGANIZED HEALTH CARE EDUCATION/TRAINING PROGRAM

## 2023-01-25 PROCEDURE — 99499 NO LOS: ICD-10-PCS | Mod: S$GLB,,, | Performed by: STUDENT IN AN ORGANIZED HEALTH CARE EDUCATION/TRAINING PROGRAM

## 2023-01-25 PROCEDURE — 3072F LOW RISK FOR RETINOPATHY: CPT | Mod: CPTII,S$GLB,, | Performed by: STUDENT IN AN ORGANIZED HEALTH CARE EDUCATION/TRAINING PROGRAM

## 2023-01-25 PROCEDURE — 1159F MED LIST DOCD IN RCRD: CPT | Mod: CPTII,S$GLB,, | Performed by: STUDENT IN AN ORGANIZED HEALTH CARE EDUCATION/TRAINING PROGRAM

## 2023-01-25 PROCEDURE — 99999 PR PBB SHADOW E&M-EST. PATIENT-LVL II: ICD-10-PCS | Mod: PBBFAC,,, | Performed by: STUDENT IN AN ORGANIZED HEALTH CARE EDUCATION/TRAINING PROGRAM

## 2023-01-25 PROCEDURE — 99499 UNLISTED E&M SERVICE: CPT | Mod: S$GLB,,, | Performed by: STUDENT IN AN ORGANIZED HEALTH CARE EDUCATION/TRAINING PROGRAM

## 2023-01-25 PROCEDURE — 1159F PR MEDICATION LIST DOCUMENTED IN MEDICAL RECORD: ICD-10-PCS | Mod: CPTII,S$GLB,, | Performed by: STUDENT IN AN ORGANIZED HEALTH CARE EDUCATION/TRAINING PROGRAM

## 2023-01-26 ENCOUNTER — TELEPHONE (OUTPATIENT)
Dept: ALLERGY | Facility: CLINIC | Age: 78
End: 2023-01-26
Payer: MEDICARE

## 2023-01-26 NOTE — TELEPHONE ENCOUNTER
----- Message from Kathi Gerber MD sent at 1/25/2023  1:56 PM CST -----  Regarding: how to look at derm schedule  Lazaro Lopez,    How do you open the derm schedule with the yellow background?  Pt above referred to dermatology but sent to allergy.  Trying to find an open spot for UGLY rash.

## 2023-01-26 NOTE — TELEPHONE ENCOUNTER
Obdulia, Dr. Gerber,    I can better show you in person on Friday how to book a derm appointment. However, I was able to schedule the patient with [facility] derm but not until April 27 th     Thank you,  Jessica

## 2023-01-31 ENCOUNTER — TELEPHONE (OUTPATIENT)
Dept: DERMATOLOGY | Facility: CLINIC | Age: 78
End: 2023-01-31
Payer: MEDICARE

## 2023-02-09 ENCOUNTER — TELEPHONE (OUTPATIENT)
Dept: FAMILY MEDICINE | Facility: CLINIC | Age: 78
End: 2023-02-09
Payer: MEDICARE

## 2023-02-10 ENCOUNTER — OFFICE VISIT (OUTPATIENT)
Dept: FAMILY MEDICINE | Facility: CLINIC | Age: 78
End: 2023-02-10
Payer: MEDICARE

## 2023-02-10 VITALS
TEMPERATURE: 99 F | SYSTOLIC BLOOD PRESSURE: 134 MMHG | DIASTOLIC BLOOD PRESSURE: 78 MMHG | OXYGEN SATURATION: 95 % | HEIGHT: 70 IN | WEIGHT: 190.06 LBS | HEART RATE: 75 BPM | BODY MASS INDEX: 27.21 KG/M2

## 2023-02-10 DIAGNOSIS — R80.9 TYPE 2 DIABETES MELLITUS WITH MICROALBUMINURIA, WITHOUT LONG-TERM CURRENT USE OF INSULIN: ICD-10-CM

## 2023-02-10 DIAGNOSIS — E11.9 TYPE 2 DIABETES MELLITUS WITHOUT COMPLICATION, WITHOUT LONG-TERM CURRENT USE OF INSULIN: ICD-10-CM

## 2023-02-10 DIAGNOSIS — F13.20 BENZODIAZEPINE DEPENDENCE: ICD-10-CM

## 2023-02-10 DIAGNOSIS — N40.0 BENIGN PROSTATIC HYPERPLASIA, UNSPECIFIED WHETHER LOWER URINARY TRACT SYMPTOMS PRESENT: ICD-10-CM

## 2023-02-10 DIAGNOSIS — E11.29 TYPE 2 DIABETES MELLITUS WITH MICROALBUMINURIA, WITHOUT LONG-TERM CURRENT USE OF INSULIN: ICD-10-CM

## 2023-02-10 DIAGNOSIS — Z00.00 ROUTINE MEDICAL EXAM: Primary | ICD-10-CM

## 2023-02-10 DIAGNOSIS — Z91.199 POOR COMPLIANCE: ICD-10-CM

## 2023-02-10 DIAGNOSIS — E11.65 TYPE 2 DIABETES MELLITUS WITH HYPERGLYCEMIA, WITHOUT LONG-TERM CURRENT USE OF INSULIN: ICD-10-CM

## 2023-02-10 DIAGNOSIS — I10 ESSENTIAL HYPERTENSION: ICD-10-CM

## 2023-02-10 DIAGNOSIS — R80.9 DIABETES MELLITUS WITH PROTEINURIA: ICD-10-CM

## 2023-02-10 DIAGNOSIS — I25.10 CORONARY ARTERY DISEASE INVOLVING NATIVE CORONARY ARTERY OF NATIVE HEART WITHOUT ANGINA PECTORIS: ICD-10-CM

## 2023-02-10 DIAGNOSIS — C67.9 MALIGNANT NEOPLASM OF URINARY BLADDER, UNSPECIFIED SITE: ICD-10-CM

## 2023-02-10 DIAGNOSIS — D64.9 ANEMIA, UNSPECIFIED TYPE: ICD-10-CM

## 2023-02-10 DIAGNOSIS — M46.92 UNSPECIFIED INFLAMMATORY SPONDYLOPATHY, CERVICAL REGION: ICD-10-CM

## 2023-02-10 DIAGNOSIS — Z86.010 HISTORY OF COLONIC POLYPS: ICD-10-CM

## 2023-02-10 DIAGNOSIS — F39 MOOD DISORDER: ICD-10-CM

## 2023-02-10 DIAGNOSIS — I70.0 ATHEROSCLEROSIS OF AORTA: ICD-10-CM

## 2023-02-10 DIAGNOSIS — Z12.5 SCREENING FOR PROSTATE CANCER: ICD-10-CM

## 2023-02-10 DIAGNOSIS — R80.9 MICROALBUMINURIA: ICD-10-CM

## 2023-02-10 DIAGNOSIS — E11.29 DIABETES MELLITUS WITH PROTEINURIA: ICD-10-CM

## 2023-02-10 DIAGNOSIS — D69.6 THROMBOCYTOPENIA: ICD-10-CM

## 2023-02-10 DIAGNOSIS — E78.5 HYPERLIPIDEMIA, UNSPECIFIED HYPERLIPIDEMIA TYPE: ICD-10-CM

## 2023-02-10 PROCEDURE — 99397 PER PM REEVAL EST PAT 65+ YR: CPT | Mod: GZ,S$GLB,, | Performed by: INTERNAL MEDICINE

## 2023-02-10 PROCEDURE — 3288F PR FALLS RISK ASSESSMENT DOCUMENTED: ICD-10-PCS | Mod: CPTII,S$GLB,, | Performed by: INTERNAL MEDICINE

## 2023-02-10 PROCEDURE — 1159F MED LIST DOCD IN RCRD: CPT | Mod: CPTII,S$GLB,, | Performed by: INTERNAL MEDICINE

## 2023-02-10 PROCEDURE — 1101F PT FALLS ASSESS-DOCD LE1/YR: CPT | Mod: CPTII,S$GLB,, | Performed by: INTERNAL MEDICINE

## 2023-02-10 PROCEDURE — 3075F SYST BP GE 130 - 139MM HG: CPT | Mod: CPTII,S$GLB,, | Performed by: INTERNAL MEDICINE

## 2023-02-10 PROCEDURE — 3078F PR MOST RECENT DIASTOLIC BLOOD PRESSURE < 80 MM HG: ICD-10-PCS | Mod: CPTII,S$GLB,, | Performed by: INTERNAL MEDICINE

## 2023-02-10 PROCEDURE — 3288F FALL RISK ASSESSMENT DOCD: CPT | Mod: CPTII,S$GLB,, | Performed by: INTERNAL MEDICINE

## 2023-02-10 PROCEDURE — 3072F LOW RISK FOR RETINOPATHY: CPT | Mod: CPTII,S$GLB,, | Performed by: INTERNAL MEDICINE

## 2023-02-10 PROCEDURE — 1101F PR PT FALLS ASSESS DOC 0-1 FALLS W/OUT INJ PAST YR: ICD-10-PCS | Mod: CPTII,S$GLB,, | Performed by: INTERNAL MEDICINE

## 2023-02-10 PROCEDURE — 1159F PR MEDICATION LIST DOCUMENTED IN MEDICAL RECORD: ICD-10-PCS | Mod: CPTII,S$GLB,, | Performed by: INTERNAL MEDICINE

## 2023-02-10 PROCEDURE — 1126F PR PAIN SEVERITY QUANTIFIED, NO PAIN PRESENT: ICD-10-PCS | Mod: CPTII,S$GLB,, | Performed by: INTERNAL MEDICINE

## 2023-02-10 PROCEDURE — 99999 PR PBB SHADOW E&M-EST. PATIENT-LVL IV: CPT | Mod: PBBFAC,,, | Performed by: INTERNAL MEDICINE

## 2023-02-10 PROCEDURE — 99397 PR PREVENTIVE VISIT,EST,65 & OVER: ICD-10-PCS | Mod: GZ,S$GLB,, | Performed by: INTERNAL MEDICINE

## 2023-02-10 PROCEDURE — 3072F PR LOW RISK FOR RETINOPATHY: ICD-10-PCS | Mod: CPTII,S$GLB,, | Performed by: INTERNAL MEDICINE

## 2023-02-10 PROCEDURE — 3078F DIAST BP <80 MM HG: CPT | Mod: CPTII,S$GLB,, | Performed by: INTERNAL MEDICINE

## 2023-02-10 PROCEDURE — 3075F PR MOST RECENT SYSTOLIC BLOOD PRESS GE 130-139MM HG: ICD-10-PCS | Mod: CPTII,S$GLB,, | Performed by: INTERNAL MEDICINE

## 2023-02-10 PROCEDURE — 99999 PR PBB SHADOW E&M-EST. PATIENT-LVL IV: ICD-10-PCS | Mod: PBBFAC,,, | Performed by: INTERNAL MEDICINE

## 2023-02-10 PROCEDURE — 1126F AMNT PAIN NOTED NONE PRSNT: CPT | Mod: CPTII,S$GLB,, | Performed by: INTERNAL MEDICINE

## 2023-02-10 NOTE — PROGRESS NOTES
Chief complaint:  Physical exam    77-year-old white male .   his wife is in the medical field in some fashion.  Due for his PSA.  Up-to-date on colonoscopy- 2026.  Trying to lose some weight same              ROS:   CONST: weight stable. EYES: no vision change. ENT: no sore throat. CV: no chest pain w/ exertion. RESP: no shortness of breath. GI: no nausea, vomiting, diarrhea. No dysphagia. : no urinary issues. MUSCULOSKELETAL: no new myalgias or arthralgias other than some chronic left neck pain with movement. SKIN: no new changes. NEURO: no focal deficits. PSYCH: no new issues. ENDOCRINE: no polyuria. HEME: no lymph nodes. ALLERGY: no general pruritis.    Past Medical History:   Diagnosis Date    Anxiety     BPH (benign prostatic hypertrophy) 2/2/2015    Cataract     Coronary artery disease     Diabetes mellitus, type 2 - with PROTIENURIA     History of colonic polyps 4/11/2017 2/13 -- polyps 6/16, normal 6/2021 -5 years     History of MI (myocardial infarction) 2005    Followed by cardiology, Dr. Acevedo (at [facility])     History of stroke November 2005    Hyperlipidemia     Hypertension     Incomplete bladder emptying 2/2/2015    Overweight     Personal history of kidney stones     Renal cyst 2/10/2015   History of cervical radiculopathy on the right  Cervical arthropathy on x-ray  Anemia- chronic  bladder cancer.  He is receiving BCG treatment.            Past Surgical History:   Procedure Laterality Date    APPENDECTOMY      BACK SURGERY      BLADDER FULGURATION  5/11/2020    Procedure: FULGURATION, BLADDER;  Surgeon: Gelacio Sanchez MD;  Location: [facility] OR;  Service: Urology;;    CATARACT EXTRACTION      od    CORONARY ANGIOPLASTY WITH STENT PLACEMENT  2000    CYSTOSCOPY  5/11/2020    Procedure: CYSTOSCOPY;  Surgeon: Gelacio Sanchez MD;  Location: [facility] OR;  Service: Urology;;    CYSTOSCOPY W/ URETERAL STENT PLACEMENT      stents placed x2    DILATION OF URETHRA N/A 11/12/2020     Procedure: DILATION, URETHRA;  Surgeon: Gelacio Sanchez MD;  Location: [facility];  Service: Urology;  Laterality: N/A;    EYE SURGERY      cataract right eye    REMOVAL OF BLOOD CLOT  5/11/2020    Procedure: REMOVAL, BLOOD CLOT;  Surgeon: Gelacio Sanchez MD;  Location: [facility];  Service: Urology;;    SPINE SURGERY      TONSILLECTOMY      TURBT, WITH BLUE LIGHT CYSTOSCOPY AND CYSVIEW N/A 7/9/2020    Procedure: TURBT,WITH BLUE LIGHT CYSTOSCOPY AND CYSVIEW;  Surgeon: Gelacio Sanchez MD;  Location: [facility];  Service: Urology;  Laterality: N/A;    TURBT, WITH BLUE LIGHT CYSTOSCOPY AND CYSVIEW Bilateral 11/12/2020    Procedure: TURBT,WITH BLUE LIGHT CYSTOSCOPY AND CYSVIEW; bilateral retrograde pyelogram;  Surgeon: Gelacio Sanchez MD;  Location: [facility];  Service: Urology;  Laterality: Bilateral;     Social History     Socioeconomic History    Marital status:     Number of children: 2   Occupational History    Occupation: Retired - chemical industry   Tobacco Use    Smoking status: Never    Smokeless tobacco: Never   Substance and Sexual Activity    Alcohol use: Yes     Comment: occasional    Drug use: No    Sexual activity: Yes     Partners: Female     Social Determinants of Health     Financial Resource Strain: Low Risk     Difficulty of Paying Living Expenses: Not hard at all   Food Insecurity: No Food Insecurity    Worried About Running Out of Food in the Last Year: Never true    Ran Out of Food in the Last Year: Never true   Transportation Needs: No Transportation Needs    Lack of Transportation (Medical): No    Lack of Transportation (Non-Medical): No   Physical Activity: Sufficiently Active    Days of Exercise per Week: 3 days    Minutes of Exercise per Session: 60 min   Stress: No Stress Concern Present    Feeling of Stress : Only a little   Social Connections: Unknown    Frequency of Communication with Friends and Family: Twice a week    Frequency of Social Gatherings with Friends and Family: Once a week     Active Member of Clubs or Organizations: Yes    Attends Club or Organization Meetings: More than 4 times per year    Marital Status:    Housing Stability: Low Risk     Unable to Pay for Housing in the Last Year: No    Number of Places Lived in the Last Year: 1    Unstable Housing in the Last Year: No       family history includes Alzheimer's disease in an other family member; Cancer in his father; Cataracts in his father and mother; Hyperlipidemia in his father and sister; No Known Problems in his brother, maternal aunt, maternal grandfather, maternal grandmother, maternal uncle, paternal aunt, paternal grandfather, paternal grandmother, paternal uncle, sister, son, and son.     Gen: no distress  EYES: conjunctiva clear, non-icteric, PERRL  ENT: nose clear, nasal mucosa normal, oropharynx clear and moist, teeth good  NECK:supple, thyroid non-palpable  RESP: effort is good, lungs clear  CV: heart RRR w/o murmur, gallops or rubs; no carotid bruits, no edema  GI: abdomen soft, non-distended, non-tender, no hepatosplenomegaly  MS: gait normal, no clubbing or cyanosis of the digits, .  SKIN: no rashes, warm to touch    Ruben was seen today for annual exam.    Diagnoses and all orders for this visit:    Routine medical exam  -     Prostate Specific Antigen, Diagnostic; Future  -     CBC Auto Differential; Future  -     Comprehensive Metabolic Panel; Future  -     Hemoglobin A1C; Future  -     TSH; Future  -     Lipid Panel; Future    Screening for prostate cancer  -     Prostate Specific Antigen, Diagnostic; Future    History of colonic polyps                                                            Additional evaluation and management issues:    Additionally patient has numerous other medical issues to address separate from his physical.     In the interval he was diagnosed with bladder cancer.  He is receiving BCG treatment.  .seen Dr Yony teran 6/2021 -5 years .     We also reviewed his labs.  His A1c  went from 7.9 down to 7.3 , 7.7 , 8.6, 8.9, 8.2 -4/22  -none since? .  Sugars are running better.  Wife says they are monitoring and sugars are running below 200, more so normal.  Hopefully A1c will be better we again discussed getting one every three months.  Lost wt.  We discussed diet at great length.  He did do diabetes education and dietitian in the past.  We discussed in great detail how starches are essentially sugars.  He has been trying some sugar free candies and we reviewed the contents.  He was taken of Actos obviously due to the bladder cancer history.  He was on Januvia but the co-pay went up around 100 dollars for one month so he may have gone in the coverage gap but it is not clear.  We discussed weekly injections but they also would be expensive and eventually not covered.  He was on Amaryl 6 mg in the morning but off since januvia.    When he tried metformin in the past he had some itching around his face which we discussed would be an unusual side effect but could well be a reaction but unclear enough at this point and he is willing to give it a try again.  I  sent 6/21 the extended release 750 mg and have him take one a day for a week and assess and then possibly go to two per day.  Will obviously follow-up in three months but last appt 4/22 ????    Again explained to his wife about A1c every three months and typically people get an A1c and follow-up every three months.  They seem to again not be aware of that schedule but will commit to that.    Evaluation and management of all the separate issues will be based on medical decision making as below         Assessment and plan:      Type 2 diabetes mellitus with hyperglycemia, without long-term current use of insulin, uncontrolled but hopefully improved, on metformin and Januvia, needs better follow-up every three months  -     TSH; Future  -     Lipid Panel; Future  -     Microalbumin/Creatinine Ratio, Urine; Future  -     Urinalysis; Future  -      Hemoglobin A1C; Future  -     Comprehensive Metabolic Panel; Future    Type 2 diabetes mellitus with microalbuminuria, without long-term current use of insulin    Poor compliance, encouraged to be better and I think he will be    Anemia, unspecified type, chronic and stable    Microalbuminuria, reassess    Thrombocytopenia    Atherosclerosis of aorta    Unspecified inflammatory spondylopathy, cervical region, chronic and stable       Mood disorder, chronic and stable    Malignant neoplasm of urinary bladder, unspecified site, treated, check urinalysis    Hyperlipidemia, unspecified hyperlipidemia type    Coronary artery disease involving native coronary artery of native heart without angina pectoris    Essential hypertension, chronic and stable    Benzodiazepine dependence    Benign prostatic hyperplasia, unspecified whether lower urinary tract symptoms present  -     Prostate Specific Antigen, Diagnostic; Future    Diabetes mellitus with proteinuria    Type 2 diabetes mellitus without complication, without long-term current use of insulin  -     SITagliptin phosphate (JANUVIA) 100 MG Tab; Take 1 tablet (100 mg total) by mouth once daily.

## 2023-02-13 ENCOUNTER — LAB VISIT (OUTPATIENT)
Dept: LAB | Facility: HOSPITAL | Age: 78
End: 2023-02-13
Attending: INTERNAL MEDICINE
Payer: MEDICARE

## 2023-02-13 DIAGNOSIS — E11.65 TYPE 2 DIABETES MELLITUS WITH HYPERGLYCEMIA, WITHOUT LONG-TERM CURRENT USE OF INSULIN: ICD-10-CM

## 2023-02-13 DIAGNOSIS — Z00.00 ROUTINE MEDICAL EXAM: ICD-10-CM

## 2023-02-13 LAB
ALBUMIN/CREAT UR: 112 UG/MG (ref 0–30)
BACTERIA #/AREA URNS AUTO: NORMAL /HPF
BILIRUB UR QL STRIP: NEGATIVE
CLARITY UR REFRACT.AUTO: CLEAR
COLOR UR AUTO: YELLOW
CREAT UR-MCNC: 117 MG/DL (ref 23–375)
GLUCOSE UR QL STRIP: ABNORMAL
HGB UR QL STRIP: NEGATIVE
HYALINE CASTS UR QL AUTO: 0 /LPF
KETONES UR QL STRIP: NEGATIVE
LEUKOCYTE ESTERASE UR QL STRIP: NEGATIVE
MICROALBUMIN UR DL<=1MG/L-MCNC: 131 UG/ML
MICROSCOPIC COMMENT: NORMAL
NITRITE UR QL STRIP: NEGATIVE
PH UR STRIP: 5 [PH] (ref 5–8)
PROT UR QL STRIP: ABNORMAL
RBC #/AREA URNS AUTO: 0 /HPF (ref 0–4)
SP GR UR STRIP: 1.02 (ref 1–1.03)
SQUAMOUS #/AREA URNS AUTO: 0 /HPF
URN SPEC COLLECT METH UR: ABNORMAL
WBC #/AREA URNS AUTO: 1 /HPF (ref 0–5)

## 2023-02-13 PROCEDURE — 82570 ASSAY OF URINE CREATININE: CPT | Performed by: INTERNAL MEDICINE

## 2023-02-13 PROCEDURE — 81001 URINALYSIS AUTO W/SCOPE: CPT | Performed by: INTERNAL MEDICINE

## 2023-02-23 ENCOUNTER — TELEPHONE (OUTPATIENT)
Dept: FAMILY MEDICINE | Facility: CLINIC | Age: 78
End: 2023-02-23
Payer: MEDICARE

## 2023-02-23 NOTE — TELEPHONE ENCOUNTER
----- Message from Liza Kovacs sent at 2/22/2023  1:46 PM CST -----  Type: Patient Call Back    Who called: [facility]/ [employer]     What is the request in detail: Calling for diabetic supplies     Can the clinic reply by [facility]? No     Would the patient rather a call back or a response via [facility]? CALL     Best call back number:[phone]

## 2023-02-28 ENCOUNTER — PATIENT MESSAGE (OUTPATIENT)
Dept: FAMILY MEDICINE | Facility: CLINIC | Age: 78
End: 2023-02-28
Payer: MEDICARE

## 2023-02-28 ENCOUNTER — TELEPHONE (OUTPATIENT)
Dept: FAMILY MEDICINE | Facility: CLINIC | Age: 78
End: 2023-02-28
Payer: MEDICARE

## 2023-02-28 NOTE — TELEPHONE ENCOUNTER
----- Message from Yvon Lei sent at 2023 11:52 AM CST -----  Regardin fax  Type: Patient Call Back    Who called:[facility]     What is the request in detail:order for diabetic supplies     Can the clinic reply by [facility]?no     Would the patient rather a call back or a response via [facility]?call back     Best call back number: [phone]    Additional Information:[phone] fax

## 2023-03-03 ENCOUNTER — PATIENT MESSAGE (OUTPATIENT)
Dept: FAMILY MEDICINE | Facility: CLINIC | Age: 78
End: 2023-03-03

## 2023-03-03 ENCOUNTER — OFFICE VISIT (OUTPATIENT)
Dept: FAMILY MEDICINE | Facility: CLINIC | Age: 78
End: 2023-03-03
Payer: MEDICARE

## 2023-03-03 VITALS
OXYGEN SATURATION: 95 % | DIASTOLIC BLOOD PRESSURE: 70 MMHG | BODY MASS INDEX: 27.58 KG/M2 | TEMPERATURE: 98 F | SYSTOLIC BLOOD PRESSURE: 130 MMHG | WEIGHT: 192.69 LBS | HEART RATE: 78 BPM | HEIGHT: 70 IN

## 2023-03-03 DIAGNOSIS — H10.502 BLEPHAROCONJUNCTIVITIS OF LEFT EYE, UNSPECIFIED BLEPHAROCONJUNCTIVITIS TYPE: Primary | ICD-10-CM

## 2023-03-03 DIAGNOSIS — E11.65 TYPE 2 DIABETES MELLITUS WITH HYPERGLYCEMIA, WITHOUT LONG-TERM CURRENT USE OF INSULIN: ICD-10-CM

## 2023-03-03 DIAGNOSIS — I70.0 ATHEROSCLEROSIS OF AORTA: ICD-10-CM

## 2023-03-03 DIAGNOSIS — E78.5 HYPERLIPIDEMIA, UNSPECIFIED HYPERLIPIDEMIA TYPE: ICD-10-CM

## 2023-03-03 PROCEDURE — 1159F MED LIST DOCD IN RCRD: CPT | Mod: CPTII,S$GLB,, | Performed by: FAMILY MEDICINE

## 2023-03-03 PROCEDURE — 1126F PR PAIN SEVERITY QUANTIFIED, NO PAIN PRESENT: ICD-10-PCS | Mod: CPTII,S$GLB,, | Performed by: FAMILY MEDICINE

## 2023-03-03 PROCEDURE — 3075F PR MOST RECENT SYSTOLIC BLOOD PRESS GE 130-139MM HG: ICD-10-PCS | Mod: CPTII,S$GLB,, | Performed by: FAMILY MEDICINE

## 2023-03-03 PROCEDURE — 1160F RVW MEDS BY RX/DR IN RCRD: CPT | Mod: CPTII,S$GLB,, | Performed by: FAMILY MEDICINE

## 2023-03-03 PROCEDURE — 3288F FALL RISK ASSESSMENT DOCD: CPT | Mod: CPTII,S$GLB,, | Performed by: FAMILY MEDICINE

## 2023-03-03 PROCEDURE — 99999 PR PBB SHADOW E&M-EST. PATIENT-LVL V: ICD-10-PCS | Mod: PBBFAC,,, | Performed by: FAMILY MEDICINE

## 2023-03-03 PROCEDURE — 1160F PR REVIEW ALL MEDS BY PRESCRIBER/CLIN PHARMACIST DOCUMENTED: ICD-10-PCS | Mod: CPTII,S$GLB,, | Performed by: FAMILY MEDICINE

## 2023-03-03 PROCEDURE — 3078F PR MOST RECENT DIASTOLIC BLOOD PRESSURE < 80 MM HG: ICD-10-PCS | Mod: CPTII,S$GLB,, | Performed by: FAMILY MEDICINE

## 2023-03-03 PROCEDURE — 1159F PR MEDICATION LIST DOCUMENTED IN MEDICAL RECORD: ICD-10-PCS | Mod: CPTII,S$GLB,, | Performed by: FAMILY MEDICINE

## 2023-03-03 PROCEDURE — 99214 PR OFFICE/OUTPT VISIT, EST, LEVL IV, 30-39 MIN: ICD-10-PCS | Mod: S$GLB,,, | Performed by: FAMILY MEDICINE

## 2023-03-03 PROCEDURE — 1126F AMNT PAIN NOTED NONE PRSNT: CPT | Mod: CPTII,S$GLB,, | Performed by: FAMILY MEDICINE

## 2023-03-03 PROCEDURE — 3075F SYST BP GE 130 - 139MM HG: CPT | Mod: CPTII,S$GLB,, | Performed by: FAMILY MEDICINE

## 2023-03-03 PROCEDURE — 99214 OFFICE O/P EST MOD 30 MIN: CPT | Mod: S$GLB,,, | Performed by: FAMILY MEDICINE

## 2023-03-03 PROCEDURE — 3072F PR LOW RISK FOR RETINOPATHY: ICD-10-PCS | Mod: CPTII,S$GLB,, | Performed by: FAMILY MEDICINE

## 2023-03-03 PROCEDURE — 99999 PR PBB SHADOW E&M-EST. PATIENT-LVL V: CPT | Mod: PBBFAC,,, | Performed by: FAMILY MEDICINE

## 2023-03-03 PROCEDURE — 1101F PT FALLS ASSESS-DOCD LE1/YR: CPT | Mod: CPTII,S$GLB,, | Performed by: FAMILY MEDICINE

## 2023-03-03 PROCEDURE — 3288F PR FALLS RISK ASSESSMENT DOCUMENTED: ICD-10-PCS | Mod: CPTII,S$GLB,, | Performed by: FAMILY MEDICINE

## 2023-03-03 PROCEDURE — 1101F PR PT FALLS ASSESS DOC 0-1 FALLS W/OUT INJ PAST YR: ICD-10-PCS | Mod: CPTII,S$GLB,, | Performed by: FAMILY MEDICINE

## 2023-03-03 PROCEDURE — 3072F LOW RISK FOR RETINOPATHY: CPT | Mod: CPTII,S$GLB,, | Performed by: FAMILY MEDICINE

## 2023-03-03 PROCEDURE — 3078F DIAST BP <80 MM HG: CPT | Mod: CPTII,S$GLB,, | Performed by: FAMILY MEDICINE

## 2023-03-03 RX ORDER — NEOMYCIN SULFATE, POLYMYXIN B SULFATE AND DEXAMETHASONE 3.5; 10000; 1 MG/ML; [USP'U]/ML; MG/ML
1 SUSPENSION/ DROPS OPHTHALMIC EVERY 6 HOURS
Qty: 5 ML | Refills: 0 | Status: SHIPPED | OUTPATIENT
Start: 2023-03-03

## 2023-03-03 NOTE — PROGRESS NOTES
"Routine Office Visit    Ruben Macedo Jr.  1945  [id number]      Subjective     Ruben is a 77 y.o. male who presents today for:    Bottom left eye lid swelling and stye - started 2 days ago - Patient noticed a swelling and felt something was in his eye. He started using otc eye drops without relief of symptoms. He feels something is in his eye. No vision changes. There was a little clear drainage from style.     Objective     Review of Systems   Constitutional:  Negative for chills and fever.   HENT:  Negative for congestion.    Eyes:  Negative for blurred vision.   Respiratory:  Negative for cough.    Cardiovascular:  Negative for chest pain.   Gastrointestinal:  Negative for abdominal pain, constipation, diarrhea, heartburn, nausea and vomiting.   Genitourinary:  Negative for dysuria.   Musculoskeletal:  Negative for myalgias.   Skin:  Negative for itching and rash.   Neurological:  Negative for dizziness and headaches.   Psychiatric/Behavioral:  Negative for depression.      /70   Pulse 78   Temp 98.1 °F (36.7 °C) (Oral)   Ht 5' 10" (1.778 m)   Wt 87.4 kg (192 lb 10.9 oz)   SpO2 95%   BMI 27.65 kg/m²   Physical Exam  Constitutional:       Appearance: He is well-developed.   HENT:      Head: Normocephalic and atraumatic.   Eyes:      Conjunctiva/sclera: Conjunctivae normal.      Pupils: Pupils are equal, round, and reactive to light.     Neck:      Thyroid: No thyromegaly.      Vascular: No JVD.   Cardiovascular:      Rate and Rhythm: Normal rate and regular rhythm.      Heart sounds: Normal heart sounds.   Pulmonary:      Effort: Pulmonary effort is normal.      Breath sounds: Normal breath sounds. No wheezing.   Abdominal:      General: Bowel sounds are normal. There is no distension.      Palpations: Abdomen is soft.      Tenderness: There is no abdominal tenderness. There is no guarding.   Musculoskeletal:         General: Normal range of motion.      Cervical back: Normal range of motion and " neck supple.   Lymphadenopathy:      Cervical: No cervical adenopathy.   Skin:     General: Skin is warm and dry.   Neurological:      Mental Status: He is alert and oriented to person, place, and time.   Psychiatric:         Behavior: Behavior normal.           Assessment     Health Maintenance         Date Due Completion Date    Hepatitis C Screening Never done ---    TETANUS VACCINE Never done ---    Shingles Vaccine (1 of 2) Never done ---    COVID-19 Vaccine (4 - Booster for Pfizer series) 01/14/2022 11/19/2021    Hemoglobin A1c 05/13/2023 2/13/2023    Eye Exam 12/20/2023 12/20/2022    Override on 4/11/2017: Not Clinically Appropriate (dr butts in [place])    Override on 1/10/2014: Done (Dr.Riley TAMAR Apodaca)    PROSTATE-SPECIFIC ANTIGEN 02/13/2024 2/13/2023    Diabetes Urine Screening 02/13/2024 2/13/2023    Aspirin/Antiplatelet Therapy 03/03/2024 3/3/2023    Colonoscopy 06/23/2024 6/23/2021              Problem List Items Addressed This Visit          Cardiac/Vascular    Atherosclerosis of aorta  Patient with Atherosclerosis of the Aorta.  Stable/asymptomatic. Currently stable on lipid lowering treatment and b/p monitoring.      Hyperlipemia  The current medical regimen is effective;  continue present plan and medications.         Endocrine    Type 2 diabetes mellitus with hyperglycemia, without long-term current use of insulin    Relevant Orders    Ambulatory referral/consult to Endocrinology  Reviewed blood glucose   Noted elevated   Recommend f/u with endocrine        Other Visit Diagnoses       Blepharoconjunctivitis of left eye, unspecified blepharoconjunctivitis type    -  Primary    Relevant Medications    neomycin-polymyxin-dexamethasone (MAXITROL) 3.5mg/mL-10,000 unit/mL-0.1 % DrpS                  No follow-ups on file.

## 2023-03-06 ENCOUNTER — PATIENT MESSAGE (OUTPATIENT)
Dept: FAMILY MEDICINE | Facility: CLINIC | Age: 78
End: 2023-03-06
Payer: MEDICARE

## 2023-03-06 NOTE — TELEPHONE ENCOUNTER
No new care gaps identified.  [facility] Embedded Care Gaps. Reference number: [id number]. 3/06/2023   7:44:47 AM CST

## 2023-03-07 ENCOUNTER — PATIENT MESSAGE (OUTPATIENT)
Dept: FAMILY MEDICINE | Facility: CLINIC | Age: 78
End: 2023-03-07
Payer: MEDICARE

## 2023-03-09 DIAGNOSIS — N20.0 URIC ACID KIDNEY STONE: ICD-10-CM

## 2023-03-09 DIAGNOSIS — F41.9 ANXIETY: ICD-10-CM

## 2023-03-09 NOTE — TELEPHONE ENCOUNTER
Spoke with wife, Bell. Patient was told that Rx was sent to pharmacy; however, I do not see where a re-order Rx was sent. Please advise when Rxs are sent to the pharmacy, so I can notify the patient. Thank you.

## 2023-03-09 NOTE — TELEPHONE ENCOUNTER
No new care gaps identified.  [facility] Embedded Care Gaps. Reference number: [id number]. 3/09/2023   3:42:48 PM CST

## 2023-03-09 NOTE — TELEPHONE ENCOUNTER
----- Message from Norma Rosen sent at 3/9/2023  3:31 PM CST -----  Regarding: Wife [phone]  Type: Patient Call Back    Who called:  wife     What is the request in detail: would like for Gabriela to call her back about LORazepam (ATIVAN) 0.5 MG tablet not being called in to   [facility]/pharmacy #[id number] - [place] - [address] [facility]  [address] [place] [address]  Phone: [phone] Fax: [phone]    Can the clinic reply by [facility]? no    Would the patient rather a call back or a response via [facility]? Call back     Best call back number: [phone]

## 2023-03-10 RX ORDER — ALLOPURINOL 300 MG/1
300 TABLET ORAL DAILY
Qty: 90 TABLET | Refills: 3 | Status: SHIPPED | OUTPATIENT
Start: 2023-03-10

## 2023-03-10 RX ORDER — LORAZEPAM 0.5 MG/1
0.5 TABLET ORAL EVERY 6 HOURS PRN
Qty: 30 TABLET | Refills: 5 | Status: SHIPPED | OUTPATIENT
Start: 2023-03-10 | End: 2023-08-17 | Stop reason: SDUPTHER

## 2023-03-15 ENCOUNTER — PES CALL (OUTPATIENT)
Dept: ADMINISTRATIVE | Facility: CLINIC | Age: 78
End: 2023-03-15
Payer: MEDICARE

## 2023-03-21 ENCOUNTER — PES CALL (OUTPATIENT)
Dept: ADMINISTRATIVE | Facility: CLINIC | Age: 78
End: 2023-03-21
Payer: MEDICARE

## 2023-04-18 ENCOUNTER — TELEPHONE (OUTPATIENT)
Dept: ADMINISTRATIVE | Facility: CLINIC | Age: 78
End: 2023-04-18
Payer: MEDICARE

## 2023-04-20 ENCOUNTER — OFFICE VISIT (OUTPATIENT)
Dept: FAMILY MEDICINE | Facility: CLINIC | Age: 78
End: 2023-04-20
Payer: MEDICARE

## 2023-04-20 VITALS
SYSTOLIC BLOOD PRESSURE: 138 MMHG | WEIGHT: 189.25 LBS | DIASTOLIC BLOOD PRESSURE: 80 MMHG | BODY MASS INDEX: 27.16 KG/M2 | HEART RATE: 78 BPM | OXYGEN SATURATION: 96 % | TEMPERATURE: 98 F

## 2023-04-20 DIAGNOSIS — E78.5 HYPERLIPIDEMIA, UNSPECIFIED HYPERLIPIDEMIA TYPE: ICD-10-CM

## 2023-04-20 DIAGNOSIS — I25.10 CORONARY ARTERY DISEASE INVOLVING NATIVE CORONARY ARTERY OF NATIVE HEART WITHOUT ANGINA PECTORIS: ICD-10-CM

## 2023-04-20 DIAGNOSIS — D64.9 ANEMIA, UNSPECIFIED TYPE: ICD-10-CM

## 2023-04-20 DIAGNOSIS — F41.9 ANXIETY DISORDER, UNSPECIFIED TYPE: ICD-10-CM

## 2023-04-20 DIAGNOSIS — Z23 NEED FOR SHINGLES VACCINE: ICD-10-CM

## 2023-04-20 DIAGNOSIS — I10 ESSENTIAL HYPERTENSION: ICD-10-CM

## 2023-04-20 DIAGNOSIS — Z71.89 ADVANCED DIRECTIVES, COUNSELING/DISCUSSION: ICD-10-CM

## 2023-04-20 DIAGNOSIS — I70.0 ATHEROSCLEROSIS OF AORTA: ICD-10-CM

## 2023-04-20 DIAGNOSIS — F39 MOOD DISORDER: ICD-10-CM

## 2023-04-20 DIAGNOSIS — Z00.00 ENCOUNTER FOR PREVENTIVE HEALTH EXAMINATION: Primary | ICD-10-CM

## 2023-04-20 PROCEDURE — 3075F SYST BP GE 130 - 139MM HG: CPT | Mod: CPTII,S$GLB,, | Performed by: NURSE PRACTITIONER

## 2023-04-20 PROCEDURE — G0439 PPPS, SUBSEQ VISIT: HCPCS | Mod: S$GLB,,, | Performed by: NURSE PRACTITIONER

## 2023-04-20 PROCEDURE — G0439 PR MEDICARE ANNUAL WELLNESS SUBSEQUENT VISIT: ICD-10-PCS | Mod: S$GLB,,, | Performed by: NURSE PRACTITIONER

## 2023-04-20 PROCEDURE — 1170F FXNL STATUS ASSESSED: CPT | Mod: CPTII,S$GLB,, | Performed by: NURSE PRACTITIONER

## 2023-04-20 PROCEDURE — 3072F PR LOW RISK FOR RETINOPATHY: ICD-10-PCS | Mod: CPTII,S$GLB,, | Performed by: NURSE PRACTITIONER

## 2023-04-20 PROCEDURE — 3079F PR MOST RECENT DIASTOLIC BLOOD PRESSURE 80-89 MM HG: ICD-10-PCS | Mod: CPTII,S$GLB,, | Performed by: NURSE PRACTITIONER

## 2023-04-20 PROCEDURE — 1159F PR MEDICATION LIST DOCUMENTED IN MEDICAL RECORD: ICD-10-PCS | Mod: CPTII,S$GLB,, | Performed by: NURSE PRACTITIONER

## 2023-04-20 PROCEDURE — 3288F PR FALLS RISK ASSESSMENT DOCUMENTED: ICD-10-PCS | Mod: CPTII,S$GLB,, | Performed by: NURSE PRACTITIONER

## 2023-04-20 PROCEDURE — 99999 PR PBB SHADOW E&M-EST. PATIENT-LVL V: ICD-10-PCS | Mod: PBBFAC,,, | Performed by: NURSE PRACTITIONER

## 2023-04-20 PROCEDURE — 1159F MED LIST DOCD IN RCRD: CPT | Mod: CPTII,S$GLB,, | Performed by: NURSE PRACTITIONER

## 2023-04-20 PROCEDURE — 1126F PR PAIN SEVERITY QUANTIFIED, NO PAIN PRESENT: ICD-10-PCS | Mod: CPTII,S$GLB,, | Performed by: NURSE PRACTITIONER

## 2023-04-20 PROCEDURE — 1101F PR PT FALLS ASSESS DOC 0-1 FALLS W/OUT INJ PAST YR: ICD-10-PCS | Mod: CPTII,S$GLB,, | Performed by: NURSE PRACTITIONER

## 2023-04-20 PROCEDURE — 99999 PR PBB SHADOW E&M-EST. PATIENT-LVL V: CPT | Mod: PBBFAC,,, | Performed by: NURSE PRACTITIONER

## 2023-04-20 PROCEDURE — 3288F FALL RISK ASSESSMENT DOCD: CPT | Mod: CPTII,S$GLB,, | Performed by: NURSE PRACTITIONER

## 2023-04-20 PROCEDURE — 1170F PR FUNCTIONAL STATUS ASSESSED: ICD-10-PCS | Mod: CPTII,S$GLB,, | Performed by: NURSE PRACTITIONER

## 2023-04-20 PROCEDURE — 3075F PR MOST RECENT SYSTOLIC BLOOD PRESS GE 130-139MM HG: ICD-10-PCS | Mod: CPTII,S$GLB,, | Performed by: NURSE PRACTITIONER

## 2023-04-20 PROCEDURE — 3072F LOW RISK FOR RETINOPATHY: CPT | Mod: CPTII,S$GLB,, | Performed by: NURSE PRACTITIONER

## 2023-04-20 PROCEDURE — 3079F DIAST BP 80-89 MM HG: CPT | Mod: CPTII,S$GLB,, | Performed by: NURSE PRACTITIONER

## 2023-04-20 PROCEDURE — 1126F AMNT PAIN NOTED NONE PRSNT: CPT | Mod: CPTII,S$GLB,, | Performed by: NURSE PRACTITIONER

## 2023-04-20 PROCEDURE — 1101F PT FALLS ASSESS-DOCD LE1/YR: CPT | Mod: CPTII,S$GLB,, | Performed by: NURSE PRACTITIONER

## 2023-04-20 RX ORDER — ZOSTER VACCINE RECOMBINANT, ADJUVANTED 50 MCG/0.5
0.5 KIT INTRAMUSCULAR ONCE
Qty: 1 EACH | Refills: 1 | Status: SHIPPED | OUTPATIENT
Start: 2023-04-20 | End: 2023-04-20

## 2023-04-20 NOTE — PROGRESS NOTES
HPI     Chief Complaint:  AWV    Ruben Macedo Jr. is a 77 y.o. male with multiple medical diagnoses as listed in the medical history and problem list that presented for a Medicare AWV and comprehensive Health Risk Assessment today.  Pt is new to me but is known to this clinic with his last appointment being 3/3/2023.      The following components were reviewed and updated:    Medical history  Family History  Social history  Allergies and Current Medications  Health Risk Assessment  Health Maintenance  Care Team           Assessment & Plan   (all problems are new to me)    Diagnoses and health risks identified today and associated recommendations/orders:    Problem List Items Addressed This Visit          Psychiatric    Mood disorder    Reports mood is stable. Denies thoughts of self harm.      Anxiety disorder    Encouraged the patient to perform self-calming techniques, such as deep breathing/relaxation techniques and exercise.         Cardiac/Vascular    Hyperlipemia    discussed ways to lower triglycerides such as cutting simple sugars out of diet (white breads, candies, cookies, cakes, etc.) and reducing/eliminating intake of highly processed trans fatty acids.   Exercise 30 minutes a day for 4-5 days a week.   Eat more fiber.      Essential hypertension    BP Readings from Last 3 Encounters:   04/20/23 138/80   03/03/23 130/70   02/10/23 134/78       -continue current medication regimen  -DASH diet, regular cardiovascular exercises, portion control  - ?weight loss  -f/u with BP logs in 2 weeks       Coronary artery disease involving native coronary artery of native heart without angina pectoris    The current medical regimen is effective;  continue present plan      Atherosclerosis of aorta    -assessed and addressed all modifiable risk factors.  Continue with appropriate management to prevent complications with regards to lipid and BP monitoring.         Oncology    Anemia    The current medical regimen  is effective;  continue present plan       Other Visit Diagnoses       Encounter for preventive health examination    -  Primary    Counseled on age appropriate medical preventative services including age appropriate cancer screenings, age appropriate eye and dental exams, over all nutritional health, need for a consistent exercise regimen, and an over all push towards maintaining a vigorous and active lifestyle.  Counseled on age appropriate vaccines and discussed upcoming health care needs based on age/gender. Discussed good sleep hygiene and stress management.      Need for shingles vaccine        Relevant Medications    varicella-zoster gE-AS01B, PF, (SHINGRIX, PF,) 50 mcg/0.5 mL injection    Other Relevant Orders    Ambulatory referral/consult to the [facility]    Advanced directives, counseling/discussion      I offered to discuss advanced care planning, including how to pick a person who would make decisions for you if you were unable to make them for yourself, called a health care power of , and what kind of decisions you might make such as use of life sustaining treatments such as ventilators and tube feeding when faced with a life limiting illness recorded on a living will that they will need to know. (How you want to be cared for as you near the end of your natural life)     X Patient is interested in learning more about how to make advanced directives.  I provided them paperwork and offered to discuss this with them.                  --------------------------------------------    ** See Completed Assessments for Annual Wellness Visit within the encounter summary.**    The following assessments were completed:  Living Situation  CAGE  Depression Screening  Timed Get Up and Go  Whisper Test  Cognitive Function Screening  Nutrition Screening  ADL Screening  PAQ Screening      Provided Ruben Macedo Jr.  with a 5-10 year written screening schedule and personal prevention plan.  Recommendations were developed using the USPSTF age appropriate recommendations. Education, counseling, and referrals were provided as needed. After Visit Summary printed and given to patient which includes a list of additional screenings\tests needed.    Review for Opioid Screening: Pt does not have Rx for Opioids     Review for Substance Use Disorders: Patient does not abuse use substances    Exam     Review of Systems:  (as noted above)  Review of Systems   Constitutional:  Negative for fever.   HENT:  Negative for trouble swallowing.    Eyes:  Negative for visual disturbance.   Respiratory:  Negative for chest tightness and shortness of breath.    Cardiovascular:  Negative for chest pain.   Gastrointestinal:  Negative for blood in stool.     Physical Exam:   Physical Exam  Constitutional:       General: He is not in acute distress.     Appearance: He is not ill-appearing or diaphoretic.   HENT:      Head: Normocephalic and atraumatic.   Eyes:      General: No scleral icterus.  Cardiovascular:      Rate and Rhythm: Normal rate and regular rhythm.      Pulses: Normal pulses.      Heart sounds: No murmur heard.    No friction rub. No gallop.   Pulmonary:      Effort: No respiratory distress.   Chest:      Chest wall: No tenderness.   Musculoskeletal:      Cervical back: No rigidity.   Skin:     Capillary Refill: Capillary refill takes 2 to 3 seconds.   Neurological:      General: No focal deficit present.      Mental Status: He is alert and oriented to person, place, and time.     Vitals:    04/20/23 1405   BP: 138/80   BP Location: Left arm   Patient Position: Sitting   BP Method: Medium (Manual)   Pulse: 78   Temp: 98 °F (36.7 °C)   TempSrc: Oral   SpO2: 96%   Weight: 85.9 kg (189 lb 4.2 oz)      Body mass index is 27.16 kg/m².    Clock:                Health Maintenance:  Health Maintenance         Date Due Completion Date    Hepatitis C Screening Never done ---    TETANUS VACCINE Never done ---    Shingles  Vaccine (1 of 2) Never done ---    COVID-19 Vaccine (4 - Booster for Pfizer series) 01/14/2022 11/19/2021    Hemoglobin A1c 05/13/2023 2/13/2023    Eye Exam 12/20/2023 12/20/2022    Override on 4/11/2017: Not Clinically Appropriate (dr butts in [place])    Override on 1/10/2014: Done (Dr.Riley TAMAR Apodaca)    PROSTATE-SPECIFIC ANTIGEN 02/13/2024 2/13/2023    Diabetes Urine Screening 02/13/2024 2/13/2023    Aspirin/Antiplatelet Therapy 03/03/2024 3/3/2023    Colonoscopy 06/23/2024 6/23/2021            Shingles vaccine ordered      Follow Up:  Follow up in about 3 months (around 7/20/2023), or if symptoms worsen or fail to improve.    History     Past Medical History:  Past Medical History:   Diagnosis Date    Anticoagulant long-term use     plavix    Anxiety     Bladder cancer     BPH (benign prostatic hypertrophy) 02/02/2015    Cancer     Cataract     Coronary artery disease 2005    stent x 1        Diabetes mellitus, type 2     History of colonic polyps 04/11/2017 2/13 --3 yrs    History of MI (myocardial infarction) 2005    Followed by cardiology, Dr. Acevedo (at [facility])    History of stroke 11/2005    Hyperlipidemia     Hypertension     Incomplete bladder emptying 02/02/2015    Myocardial infarction 2006    Overweight(278.02)     Personal history of kidney stones     Renal cyst 02/10/2015    Stroke 2005    loss of peripheral vision       Past Surgical History:  Past Surgical History:   Procedure Laterality Date    APPENDECTOMY      BACK SURGERY      BLADDER FULGURATION  5/11/2020    Procedure: FULGURATION, BLADDER;  Surgeon: Gelacio Sanchez MD;  Location: [facility];  Service: Urology;;    CATARACT EXTRACTION      od    CORONARY ANGIOPLASTY WITH STENT PLACEMENT  2000    CYSTOSCOPY  5/11/2020    Procedure: CYSTOSCOPY;  Surgeon: Gelacio Sanchez MD;  Location: [facility] OR;  Service: Urology;;    CYSTOSCOPY W/ URETERAL STENT PLACEMENT      stents placed x2    DILATION OF URETHRA N/A 11/12/2020     Procedure: DILATION, URETHRA;  Surgeon: Gelacio Sanchez MD;  Location: [facility];  Service: Urology;  Laterality: N/A;    EYE SURGERY      cataract right eye    REMOVAL OF BLOOD CLOT  5/11/2020    Procedure: REMOVAL, BLOOD CLOT;  Surgeon: Gelacio Sanchez MD;  Location: [facility];  Service: Urology;;    SPINE SURGERY      TONSILLECTOMY      TURBT, WITH BLUE LIGHT CYSTOSCOPY AND CYSVIEW N/A 7/9/2020    Procedure: TURBT,WITH BLUE LIGHT CYSTOSCOPY AND CYSVIEW;  Surgeon: Gelacio Sanchez MD;  Location: [facility];  Service: Urology;  Laterality: N/A;    TURBT, WITH BLUE LIGHT CYSTOSCOPY AND CYSVIEW Bilateral 11/12/2020    Procedure: TURBT,WITH BLUE LIGHT CYSTOSCOPY AND CYSVIEW; bilateral retrograde pyelogram;  Surgeon: Gelacio Sanchez MD;  Location: [facility];  Service: Urology;  Laterality: Bilateral;       Social History:  Social History     Socioeconomic History    Marital status:     Number of children: 2   Occupational History    Occupation: Retired - chemical industry   Tobacco Use    Smoking status: Never    Smokeless tobacco: Never   Substance and Sexual Activity    Alcohol use: Yes     Comment: occasional    Drug use: No    Sexual activity: Yes     Partners: Female     Social Determinants of Health     Financial Resource Strain: Low Risk     Difficulty of Paying Living Expenses: Not hard at all   Food Insecurity: No Food Insecurity    Worried About Running Out of Food in the Last Year: Never true    Ran Out of Food in the Last Year: Never true   Transportation Needs: No Transportation Needs    Lack of Transportation (Medical): No    Lack of Transportation (Non-Medical): No   Physical Activity: Sufficiently Active    Days of Exercise per Week: 4 days    Minutes of Exercise per Session: 130 min   Stress: No Stress Concern Present    Feeling of Stress : Not at all   Social Connections: Moderately Isolated    Frequency of Communication with Friends and Family: Once a week    Frequency of Social Gatherings with  Friends and Family: Once a week    Attends Nondenominational Services: Never    Active Member of Clubs or Organizations: Yes    Attends Club or Organization Meetings: More than 4 times per year    Marital Status:    Housing Stability: Low Risk     Unable to Pay for Housing in the Last Year: No    Number of Places Lived in the Last Year: 1    Unstable Housing in the Last Year: No       Family History:  Family History   Problem Relation Age of Onset    Cataracts Mother     Hyperlipidemia Father     Cancer Father     Cataracts Father     Hyperlipidemia Sister     No Known Problems Sister     No Known Problems Brother     No Known Problems Maternal Aunt     No Known Problems Maternal Uncle     No Known Problems Paternal Aunt     No Known Problems Paternal Uncle     No Known Problems Maternal Grandmother     No Known Problems Maternal Grandfather     No Known Problems Paternal Grandmother     No Known Problems Paternal Grandfather     No Known Problems Son     No Known Problems Son     Alzheimer's disease Other     Diabetes Neg Hx     Amblyopia Neg Hx     Blindness Neg Hx     Glaucoma Neg Hx     Hypertension Neg Hx     Macular degeneration Neg Hx     Retinal detachment Neg Hx     Strabismus Neg Hx     Stroke Neg Hx     Thyroid disease Neg Hx        Allergies and Medications: (updated and reviewed)  Review of patient's allergies indicates:   Allergen Reactions    Tramadol Nausea And Vomiting    Morphine Nausea And Vomiting     Can take Dilaudid, Demerol, Percocet & Norco.  This is side effect, not allergy.     Current Outpatient Medications   Medication Sig Dispense Refill    allopurinoL (ZYLOPRIM) 300 MG tablet Take 1 tablet (300 mg total) by mouth once daily. 90 tablet 3    allopurinoL (ZYLOPRIM) 300 MG tablet Take 1 tablet (300 mg total) by mouth once daily. 90 tablet 3    aspirin (ECOTRIN) 81 MG EC tablet Take 81 mg by mouth once daily.      betamethasone valerate 0.1% (VALISONE) 0.1 % Crea       blood sugar diagnostic  Strp 2 strips by Misc.(Non-Drug; Combo Route) route 2 (two) times a day. 2 each 12    clopidogreL (PLAVIX) 75 mg tablet TAKE 1 TABLET BY MOUTH EVERY DAY 90 tablet 3    ezetimibe (ZETIA) 10 mg tablet TAKE 1 TABLET BY MOUTH EVERY DAY 90 tablet 3    finasteride (PROSCAR) 5 mg tablet TAKE 1 TABLET BY MOUTH EVERY DAY 90 tablet 3    FLUAD QUAD 2020-21,65Y UP,,PF, 60 mcg (15 mcg x 4)/0.5 mL Syrg ADMINISTER 0.5ML IN THE MUSCLE AS DIRECTED      metFORMIN (GLUCOPHAGE-XR) 750 MG ER 24hr tablet Take 2 tablets (1,500 mg total) by mouth every morning. 180 tablet 1    metoprolol succinate (TOPROL-XL) 25 MG 24 hr tablet TAKE 1 TABLET BY MOUTH EVERY DAY 90 tablet 3    neomycin-polymyxin-dexamethasone (MAXITROL) 3.5mg/mL-10,000 unit/mL-0.1 % DrpS Place 1 drop into the left eye every 6 (six) hours. 5 mL 0    polyethylene glycol (GOLYTELY,NULYTELY) 236-22.74-6.74 -5.86 gram suspension AS DIRECTED      rosuvastatin (CRESTOR) 40 MG Tab TAKE 1 TABLET (40 MG TOTAL) BY MOUTH ONCE DAILY. TAKE 1 TABLET BY MOUTH DAILY 90 tablet 3    SITagliptin phosphate (JANUVIA) 100 MG Tab Take 1 tablet (100 mg total) by mouth once daily. 180 tablet 0    valsartan (DIOVAN) 160 MG tablet TAKE 2 TABLETS EVERY  tablet 4    LORazepam (ATIVAN) 0.5 MG tablet Take 1 tablet (0.5 mg total) by mouth every 6 (six) hours as needed for Anxiety. 30 tablet 5    varicella-zoster gE-AS01B, PF, (SHINGRIX, PF,) 50 mcg/0.5 mL injection Inject 0.5 mLs into the muscle once. for 1 dose 1 each 1     Current Facility-Administered Medications   Medication Dose Route Frequency Provider Last Rate Last Admin    acetaminophen tablet 650 mg  650 mg Oral Once PRN Renato Holder MD        albuterol inhaler 2 puff  2 puff Inhalation Q20 Min PRN Renato Holder MD        diphenhydrAMINE injection 25 mg  25 mg Intravenous Once PRN Renato Holder MD        EPINEPHrine (EPIPEN) 0.3 mg/0.3 mL pen injection 0.3 mg  0.3 mg Intramuscular PRN Renato Holder MD         methylPREDNISolone sodium succinate injection 40 mg  40 mg Intravenous Once PRN Renato Holder MD        ondansetron disintegrating tablet 4 mg  4 mg Oral Once PRN Renato Holder MD        sodium chloride 0.9% 500 mL flush bag   Intravenous PRN Renato Holder MD        sodium chloride 0.9% flush 10 mL  10 mL Intravenous PRN Renato Holder MD               - The patient is given an After Visit Summary that lists all medications with directions, allergies, education, orders placed during this encounter and follow-up instructions.      - I have reviewed the patient's medical information including past medical, family, and social history sections including the medications and allergies.      - We discussed the patient's current medications.     This note was created by combination of typed  and MModal dictation.  Transcription errors may be present.  If there are any questions, please contact me.       Jeffery Irwin NP

## 2023-04-20 NOTE — PATIENT INSTRUCTIONS
Counseling and Referral of Other Preventative  (Italic type indicates deductible and co-insurance are waived)    Patient Name: Ruben Macedo  Today's Date: 4/20/2023    Health Maintenance         Date Due Completion Date    Hepatitis C Screening Never done ---    TETANUS VACCINE Never done ---    Shingles Vaccine (1 of 2) Never done ---    COVID-19 Vaccine (4 - Booster for Pfizer series) 01/14/2022 11/19/2021    Hemoglobin A1c 05/13/2023 2/13/2023    Eye Exam 12/20/2023 12/20/2022    Override on 4/11/2017: Not Clinically Appropriate (dr butts in [place])    Override on 1/10/2014: Done (Dr.Riley TAMAR Apodaca)    PROSTATE-SPECIFIC ANTIGEN 02/13/2024 2/13/2023    Diabetes Urine Screening 02/13/2024 2/13/2023    Aspirin/Antiplatelet Therapy 03/03/2024 3/3/2023    Colonoscopy 06/23/2024 6/23/2021          Orders Placed This Encounter   Procedures    Ambulatory referral/consult to the [facility]       The following information is provided to all patients.  This information is to help you find resources for any of the problems found today that may be affecting your health:                Living healthy guide: www.[facility].[place].gov      Understanding Diabetes: www.diabetes.org      Eating healthy: www.cdc.gov/healthyweight      CDC home safety checklist: www.cdc.gov/steadi/patient.html      Agency on Aging: www.goea.[place].[facility]      Alcoholics anonymous (AA): www.aa.org      Physical Activity: www.yaneli.nih.gov/oe9svry      Tobacco use: www.quitwithusla.org       Patient Education        Yearly Physical for Adults   About this topic   Most people do not want to be sick. Having a checkup each year with your doctor is one way to help you stay healthy. You may need to see your doctor more or less often. How often you need to go to the doctor depends on your age. Your family and medical history also play a role in how often you need to go to the doctor. Going to see your doctor on a routine basis can help you find  problems early or even before they start. This may make it easier to treat or cure your problem.  General   Your doctor will talk about many things during your checkup. Your doctor may ask about:  Your medical and family history.  All the drugs you are taking. Be sure to include all prescription, over the counter, and herbal supplements. Tell the doctor if you have any drug allergy. Bring a list of drugs you take with you.  How you are feeling and if you are having any problems.  Risky behaviors like smoking, drinking alcohol, using illegal drugs, not wearing seatbelts, having unprotected sex, etc.  Your doctor will do a physical exam and may check your:  Height and weight  Blood pressure  Reflexes  Memory  Vision  Hearing  Your doctor may order:  Lab tests  ECG to check your heart rhythm  X-rays  Tests or treatments based on your exam  What lifestyle changes are needed?   Your doctor may suggest you make changes to your lifestyle at this visit. The doctor may talk with you about being more active or lowering stress levels. Ask your doctor what you need to do.  What drugs may be needed?   Your doctor may order drugs or vaccines to protect you from illnesses.  What changes to diet are needed?   Talk to your doctor to see if any changes are needed to your diet.  When do I need to call the doctor?   Call your doctor if you need to learn about any test results. Together you can make a plan for more care.  Helpful tips   Make a list of questions for your doctor before you go. This will help you remember to ask about any concerns. Write down any answers from your doctor so you can look over them after your visit.   Tell your doctor about any changes in your body or health since your last visit.  Ask your doctor about any screening tests you need.  Where can I learn more?   American Academy of Family  Physicians  http://familydoctor.org/familydoctor/en/prevention-wellness/staying-healthy/healthy-living/preventive-services-for-healthy-living.printerview.html   Centers for Disease Control  http://www.cdc.gov/family/checkup/   Last Reviewed Date   2019-04-22  Consumer Information Use and Disclaimer   This information is not specific medical advice and does not replace information you receive from your health care provider. This is only a brief summary of general information. It does NOT include all information about conditions, illnesses, injuries, tests, procedures, treatments, therapies, discharge instructions or life-style choices that may apply to you. You must talk with your health care provider for complete information about your health and treatment options. This information should not be used to decide whether or not to accept your health care providers advice, instructions or recommendations. Only your health care provider has the knowledge and training to provide advice that is right for you.  Copyright   Copyright © 2021 UpToDate, Inc. and its affiliates and/or licensors. All rights reserved.

## 2023-04-28 ENCOUNTER — OFFICE VISIT (OUTPATIENT)
Dept: ENDOCRINOLOGY | Facility: CLINIC | Age: 78
End: 2023-04-28
Payer: MEDICARE

## 2023-04-28 ENCOUNTER — LAB VISIT (OUTPATIENT)
Dept: LAB | Facility: HOSPITAL | Age: 78
End: 2023-04-28
Attending: NURSE PRACTITIONER
Payer: MEDICARE

## 2023-04-28 VITALS
TEMPERATURE: 98 F | DIASTOLIC BLOOD PRESSURE: 84 MMHG | WEIGHT: 192 LBS | BODY MASS INDEX: 27.55 KG/M2 | SYSTOLIC BLOOD PRESSURE: 143 MMHG | HEART RATE: 75 BPM

## 2023-04-28 DIAGNOSIS — I25.10 CORONARY ARTERY DISEASE INVOLVING NATIVE CORONARY ARTERY OF NATIVE HEART WITHOUT ANGINA PECTORIS: ICD-10-CM

## 2023-04-28 DIAGNOSIS — R80.9 MICROALBUMINURIA: ICD-10-CM

## 2023-04-28 DIAGNOSIS — E11.65 TYPE 2 DIABETES MELLITUS WITH HYPERGLYCEMIA, WITHOUT LONG-TERM CURRENT USE OF INSULIN: Primary | ICD-10-CM

## 2023-04-28 DIAGNOSIS — E11.65 TYPE 2 DIABETES MELLITUS WITH HYPERGLYCEMIA, WITHOUT LONG-TERM CURRENT USE OF INSULIN: ICD-10-CM

## 2023-04-28 LAB
ESTIMATED AVG GLUCOSE: 197 MG/DL (ref 68–131)
GLUCOSE SERPL-MCNC: 194 MG/DL (ref 70–110)
HBA1C MFR BLD: 8.5 % (ref 4–5.6)

## 2023-04-28 PROCEDURE — 82962 GLUCOSE BLOOD TEST: CPT | Mod: S$GLB,,, | Performed by: NURSE PRACTITIONER

## 2023-04-28 PROCEDURE — 99999 PR PBB SHADOW E&M-EST. PATIENT-LVL V: ICD-10-PCS | Mod: PBBFAC,,, | Performed by: NURSE PRACTITIONER

## 2023-04-28 PROCEDURE — 3077F SYST BP >= 140 MM HG: CPT | Mod: CPTII,S$GLB,, | Performed by: NURSE PRACTITIONER

## 2023-04-28 PROCEDURE — 82962 POCT GLUCOSE, HAND-HELD DEVICE: ICD-10-PCS | Mod: S$GLB,,, | Performed by: NURSE PRACTITIONER

## 2023-04-28 PROCEDURE — 1160F PR REVIEW ALL MEDS BY PRESCRIBER/CLIN PHARMACIST DOCUMENTED: ICD-10-PCS | Mod: CPTII,S$GLB,, | Performed by: NURSE PRACTITIONER

## 2023-04-28 PROCEDURE — 36415 COLL VENOUS BLD VENIPUNCTURE: CPT | Mod: PO | Performed by: NURSE PRACTITIONER

## 2023-04-28 PROCEDURE — 83036 HEMOGLOBIN GLYCOSYLATED A1C: CPT | Performed by: NURSE PRACTITIONER

## 2023-04-28 PROCEDURE — 3072F PR LOW RISK FOR RETINOPATHY: ICD-10-PCS | Mod: CPTII,S$GLB,, | Performed by: NURSE PRACTITIONER

## 2023-04-28 PROCEDURE — 3079F PR MOST RECENT DIASTOLIC BLOOD PRESSURE 80-89 MM HG: ICD-10-PCS | Mod: CPTII,S$GLB,, | Performed by: NURSE PRACTITIONER

## 2023-04-28 PROCEDURE — 99204 OFFICE O/P NEW MOD 45 MIN: CPT | Mod: S$GLB,,, | Performed by: NURSE PRACTITIONER

## 2023-04-28 PROCEDURE — 1160F RVW MEDS BY RX/DR IN RCRD: CPT | Mod: CPTII,S$GLB,, | Performed by: NURSE PRACTITIONER

## 2023-04-28 PROCEDURE — 99999 PR PBB SHADOW E&M-EST. PATIENT-LVL V: CPT | Mod: PBBFAC,,, | Performed by: NURSE PRACTITIONER

## 2023-04-28 PROCEDURE — 99204 PR OFFICE/OUTPT VISIT, NEW, LEVL IV, 45-59 MIN: ICD-10-PCS | Mod: S$GLB,,, | Performed by: NURSE PRACTITIONER

## 2023-04-28 PROCEDURE — 3077F PR MOST RECENT SYSTOLIC BLOOD PRESSURE >= 140 MM HG: ICD-10-PCS | Mod: CPTII,S$GLB,, | Performed by: NURSE PRACTITIONER

## 2023-04-28 PROCEDURE — 1159F PR MEDICATION LIST DOCUMENTED IN MEDICAL RECORD: ICD-10-PCS | Mod: CPTII,S$GLB,, | Performed by: NURSE PRACTITIONER

## 2023-04-28 PROCEDURE — 3079F DIAST BP 80-89 MM HG: CPT | Mod: CPTII,S$GLB,, | Performed by: NURSE PRACTITIONER

## 2023-04-28 PROCEDURE — 1159F MED LIST DOCD IN RCRD: CPT | Mod: CPTII,S$GLB,, | Performed by: NURSE PRACTITIONER

## 2023-04-28 PROCEDURE — 3072F LOW RISK FOR RETINOPATHY: CPT | Mod: CPTII,S$GLB,, | Performed by: NURSE PRACTITIONER

## 2023-04-28 RX ORDER — METFORMIN HYDROCHLORIDE 750 MG/1
1500 TABLET, EXTENDED RELEASE ORAL EVERY MORNING
Qty: 180 TABLET | Refills: 2 | Status: SHIPPED | OUTPATIENT
Start: 2023-04-28 | End: 2024-02-01

## 2023-04-28 NOTE — PATIENT INSTRUCTIONS
A1C goal: <7.5%     Discussed progression of DM disease, long term complications, and tx options. Reviewed A1c and BG goals.     Resume exercise routine.   Improve diet. Pt declines diabetes education visit.    Briefly discussed starting weekly injection like Ozempic to help with glucose and appetite suppression. Pt would prefer to hold off on this - does not want injections.     Increase metformin to prescribed dose - take 2 tablets once daily.   Continue Januvia once daily.     Test glucose 2x/day - fasting and again 2 hours after a meal (prefer dinner which is the biggest meals).     Return to clinic in 3 months with labs prior.   A1c today.         Snacks can be an important part of a balanced, healthy meal plan. They allow you to eat more frequently, feeling full and satisfied throughout the day. Also, they allow you to spread carbohydrates evenly, which may stabilize blood sugars.  Plus, snacks are enjoyable!     The amount of carbohydrate needed at snacks varies. Generally, about 15-20 grams of carbohydrate per snack is recommended.  Below you will find some tasty treats.       0-5 gm carb  Crystal Light  Vitamin Water Zero  Herbal tea, unsweetened  2 tsp peanut butter on celery  1./2 cup sugar-free jell-o  1 sugar-free popsicle  ¼ cup blueberries  8oz Blue Natalie unsweetened almond milk  5 baby carrots & celery sticks, cucumbers, bell peppers dipped in ¼ cup salsa, 2Tbsp light ranch dressing or 2Tbsp plain Greek yogurt  10 Goldfish crackers  ½ oz low-fat cheese or string cheese  1 closed handful of nuts, unsalted  1 Tbsp of sunflower seeds, unsalted  1 cup Smart Pop popcorn  1 whole grain brown rice cake        15 gm carb  1 small piece of fruit or ½ banana or 1/2 cup lite canned fruit  3 bhanu cracker squares  3 cups Smart Pop popcorn, top spray butter, Mckoy lite salt or cinnamon and Truvia  5 Vanilla Wafers  ½ cup low fat, no added sugar ice cream or frozen yogurt (Blue bell, Blue Bunny, Weight  Watchers, Skinny Cow)  ½ turkey, ham, or chicken sandwich  ½ c fruit with ½ c Cottage cheese  4-6 unsalted wheat crackers with 1 oz low fat cheese or 1 tbsp peanut butter   30-45 goldfish crackers (depending on flavor)   7-8 Sabianism mini brown rice cakes (caramel, apple cinnamon, chocolate)   12 Sabianism mini brown rice cakes (cheddar, bbq, ranch)   1/3 cup hummus dip with raw veg  1/2 whole wheat tanisha, 1Tbsp hummus  Mini Pizza (1/2 whole wheat English muffin, low-fat  cheese, tomato sauce)  100 calorie snack pack (Oreo, Chips Ahoy, Ritz Mix, Baked Cheetos)  4-6 oz. light or Greek Style yogurt (Chobani, Yoplait, Okios, [place])  ½ cup sugar-free pudding    6 in. wheat tortilla or tanisha oven toasted chips (topped with spray butter flavoring, cinnamon, Truvia OR spray butter, garlic powder, chili powder)   18 BBQ Popchips (available at [employer], [employer], Fresh Market)

## 2023-04-28 NOTE — PROGRESS NOTES
CC: This 77 y.o. White male  is here for evaluation of  T2DM along with comorbidities indicated in the Visit Diagnosis section of this encounter.    HPI: Ruben Macedo Jr. was diagnosed with T2DM in his 60s. Oral agent started at the time of diagnosis.   Med hx: bladder cancer, CAD, stroke  2005, MI 2006.     DM COMPLICATIONS: nephropathy and cardiovascular disease    New to Endocrine. Arrives with wife Laverne who is involved in  his medical care.       LAST DIABETES EDUCATION: 8/2018 with PURA Hernandez RD        HOSPITALIZED FOR DIABETES OR RELATED COMPLICATIONS -  No.    SIGNIFICANT DIABETES MED HISTORY: denies intolerance to prior DM meds  Rash on face upon initiation of metformin; did not recur when he resumed metformin years later.   Glimepiride - stopped d/t hypoglycemia.     PRESCRIBED DIABETES MEDICATIONS:   Metformin ER 1500 mg once daily (750 mg tablets)  Januvia 100 mg once daily     Misses medication doses - takes 1 tablet metformin daily     SELF MONITORING BLOOD GLUCOSE: Checks blood glucose at home -infrequent, 1x/week; wife tests for him.      Fasting blood glucose: 140-160s    POCT  glucose - 3 hours after breakfast 194       HYPOGLYCEMIC EPISODES:      CURRENT DIET: drinks diet green tea, water, 1 cup of coffee with SF creamer and AS. Eats 3 meals/day, with snack after dinner.  Snacks: SF candy, SF cookies, sometimes banana   Has the urge to eat all the time, especially sweets.   Dinner is the biggest meal.       Diet recall:   Breakfast - [place] toast 6 small squares, SF syrup,  coffee. -- generally eats 2 biscuits every morning.   Lunch - crunchy tacos x 2, bag of nachos and cheese from [employer]   Dinner - fried shrimp sandwich on bun, diet coke, a bottle of beer.   Bedtime snack - crunchy taco, diet green   Afternoon snack - ?       CURRENT EXERCISE: none formal       BP (!) 143/84   Pulse 75   Temp 97.9 °F (36.6 °C)   Wt 87.1 kg (192 lb)   BMI 27.55 kg/m²     ROS:   CONSTITUTIONAL: Appetite  good, denies fatigue  SKIN: No rash or pruritis   EYES: No visual disturbances  RESPIRATORY: No shortness of breath or cough  CARDIAC: No chest pain or palpitations  GI: No nausea, vomiting, or diarrhea  : No urinary frequency or dysuria   MS: No arthralgias or mylagias   NEURO: No paresthesias or tremors.   OTHER: no polydipsia     PHYSICAL EXAM:  GENERAL: Well developed, well nourished. No acute distress.   PSYCH: AAOx3, appropriate mood and affect, conversant, well-groomed. Judgement and insight good.   NEURO: Cranial nerves grossly intact. Speech clear, no tremor.   NECK: Trachea midline, no thyromegaly or lymphadenopathy.   CHEST: Respirations even and unlabored. CTA bilaterally.  CARDIOVASCULAR: Regular rate and rhythm. No bruits. No murmur. No edema.   ABDOMEN: Soft, non-tender, non-distended. Bowel sounds present.   MS: Gait steady. No clubbing.   SKIN: Normal skin turgor. Skin warm and dry. No areas of breakdown. No acanthosis nigricans.      Hemoglobin A1C   Date Value Ref Range Status   02/13/2023 9.2 (H) 4.0 - 5.6 % Final     Comment:     ADA Screening Guidelines:  5.7-6.4%  Consistent with prediabetes  >or=6.5%  Consistent with diabetes    High levels of fetal hemoglobin interfere with the HbA1C  assay. Heterozygous hemoglobin variants (HbS, HgC, etc)do  not significantly interfere with this assay.   However, presence of multiple variants may affect accuracy.     04/22/2022 8.2 (H) 4.0 - 5.6 % Final     Comment:     ADA Screening Guidelines:  5.7-6.4%  Consistent with prediabetes  >or=6.5%  Consistent with diabetes    High levels of fetal hemoglobin interfere with the HbA1C  assay. Heterozygous hemoglobin variants (HbS, HgC, etc)do  not significantly interfere with this assay.   However, presence of multiple variants may affect accuracy.     06/15/2021 8.9 (H) 4.0 - 5.6 % Final     Comment:     ADA Screening Guidelines:  5.7-6.4%  Consistent with prediabetes  >or=6.5%  Consistent with diabetes    High  levels of fetal hemoglobin interfere with the HbA1C  assay. Heterozygous hemoglobin variants (HbS, HgC, etc)do  not significantly interfere with this assay.   However, presence of multiple variants may affect accuracy.         No results found for: CPEPTIDE, GLUTAMICACID, ISLETCELLANT, FRUCTOSAMINE     Lab Results   Component Value Date    CHOL 156 02/13/2023    CHOL 138 04/22/2022    CHOL 133 01/07/2021     Lab Results   Component Value Date    HDL 48 02/13/2023    HDL 51 04/22/2022    HDL 55 01/07/2021     Lab Results   Component Value Date    LDLCALC 67.6 02/13/2023    LDLCALC 63.6 04/22/2022    LDLCALC 56.4 (L) 01/07/2021     Lab Results   Component Value Date    TRIG 202 (H) 02/13/2023    TRIG 117 04/22/2022    TRIG 108 01/07/2021     Lab Results   Component Value Date    CHOLHDL 30.8 02/13/2023    CHOLHDL 37.0 04/22/2022    CHOLHDL 41.4 01/07/2021         Chemistry        Component Value Date/Time     02/13/2023 1005    K 4.8 02/13/2023 1005     02/13/2023 1005    CO2 20 (L) 02/13/2023 1005    BUN 22 02/13/2023 1005    CREATININE 1.1 02/13/2023 1005     (H) 02/13/2023 1005        Component Value Date/Time    CALCIUM 9.9 02/13/2023 1005    ALKPHOS 65 02/13/2023 1005    AST 24 02/13/2023 1005    ALT 25 02/13/2023 1005    BILITOT 0.6 02/13/2023 1005    ESTGFRAFRICA >60.0 04/22/2022 0907    EGFRNONAA >60.0 04/22/2022 0907             Latest Reference Range & Units 02/13/23 10:05   BUN 8 - 23 mg/dL 22   Creatinine 0.5 - 1.4 mg/dL 1.1   eGFR >60 mL/min/1.73 m^2 >60.0         Lab Results   Component Value Date    LABMICR 131.0 02/13/2023    CREATRANDUR 117.0 02/13/2023    MICALBCREAT 112.0 (H) 02/13/2023             ASSESSMENT and PLAN:    A1C GOAL:     1. Type 2 diabetes mellitus with hyperglycemia, without long-term current use of insulin  Discussed progression of DM disease, long term complications, and tx options. Reviewed A1c and BG goals.     Resume exercise routine.   Improve diet. Pt  declines diabetes education visit.    Briefly discussed starting weekly injection like Ozempic to help with glucose and appetite suppression. Pt would prefer to hold off on this - does not want injections.     Increase metformin to prescribed dose - take 2 tablets once daily.   Continue Januvia once daily.     Test glucose 2x/day - fasting and again 2 hours after a meal (prefer dinner which is the biggest meals).     Return to clinic in 3 months with labs prior.     Ambulatory referral/consult to Endocrinology    POCT Glucose, Hand-Held Device    Hemoglobin A1C      2. Coronary artery disease involving native coronary artery of native heart without angina pectoris  Improve glycemic control.         3. Microalbuminuria  Improve glycemic control. Continue valsartan             Orders Placed This Encounter   Procedures    Hemoglobin A1C     Standing Status:   Standing     Number of Occurrences:   2     Standing Expiration Date:   6/26/2024    POCT Glucose, Hand-Held Device        Follow up in about 3 months (around 7/28/2023).     Thank you very much for allowing me to participate in Ruben Macedo Jr.'s care.

## 2023-05-03 ENCOUNTER — OFFICE VISIT (OUTPATIENT)
Dept: CARDIOLOGY | Facility: CLINIC | Age: 78
End: 2023-05-03
Payer: MEDICARE

## 2023-05-03 VITALS
OXYGEN SATURATION: 95 % | WEIGHT: 182.56 LBS | HEART RATE: 80 BPM | SYSTOLIC BLOOD PRESSURE: 117 MMHG | DIASTOLIC BLOOD PRESSURE: 71 MMHG | BODY MASS INDEX: 26.19 KG/M2

## 2023-05-03 DIAGNOSIS — R00.1 BRADYCARDIA: ICD-10-CM

## 2023-05-03 DIAGNOSIS — I25.10 CORONARY ARTERY DISEASE INVOLVING NATIVE CORONARY ARTERY OF NATIVE HEART WITHOUT ANGINA PECTORIS: Primary | ICD-10-CM

## 2023-05-03 DIAGNOSIS — I10 ESSENTIAL HYPERTENSION: ICD-10-CM

## 2023-05-03 DIAGNOSIS — E78.5 HYPERLIPIDEMIA, UNSPECIFIED HYPERLIPIDEMIA TYPE: ICD-10-CM

## 2023-05-03 DIAGNOSIS — I70.0 ATHEROSCLEROSIS OF AORTA: ICD-10-CM

## 2023-05-03 DIAGNOSIS — I10 HYPERTENSION, UNSPECIFIED TYPE: ICD-10-CM

## 2023-05-03 PROCEDURE — 99999 PR PBB SHADOW E&M-EST. PATIENT-LVL III: ICD-10-PCS | Mod: PBBFAC,,, | Performed by: INTERNAL MEDICINE

## 2023-05-03 PROCEDURE — 99214 OFFICE O/P EST MOD 30 MIN: CPT | Mod: S$GLB,,, | Performed by: INTERNAL MEDICINE

## 2023-05-03 PROCEDURE — 93000 EKG 12-LEAD: ICD-10-PCS | Mod: S$GLB,,, | Performed by: INTERNAL MEDICINE

## 2023-05-03 PROCEDURE — 1126F AMNT PAIN NOTED NONE PRSNT: CPT | Mod: CPTII,S$GLB,, | Performed by: INTERNAL MEDICINE

## 2023-05-03 PROCEDURE — 1101F PT FALLS ASSESS-DOCD LE1/YR: CPT | Mod: CPTII,S$GLB,, | Performed by: INTERNAL MEDICINE

## 2023-05-03 PROCEDURE — 3078F PR MOST RECENT DIASTOLIC BLOOD PRESSURE < 80 MM HG: ICD-10-PCS | Mod: CPTII,S$GLB,, | Performed by: INTERNAL MEDICINE

## 2023-05-03 PROCEDURE — 3072F PR LOW RISK FOR RETINOPATHY: ICD-10-PCS | Mod: CPTII,S$GLB,, | Performed by: INTERNAL MEDICINE

## 2023-05-03 PROCEDURE — 3288F PR FALLS RISK ASSESSMENT DOCUMENTED: ICD-10-PCS | Mod: CPTII,S$GLB,, | Performed by: INTERNAL MEDICINE

## 2023-05-03 PROCEDURE — 1126F PR PAIN SEVERITY QUANTIFIED, NO PAIN PRESENT: ICD-10-PCS | Mod: CPTII,S$GLB,, | Performed by: INTERNAL MEDICINE

## 2023-05-03 PROCEDURE — 3072F LOW RISK FOR RETINOPATHY: CPT | Mod: CPTII,S$GLB,, | Performed by: INTERNAL MEDICINE

## 2023-05-03 PROCEDURE — 1101F PR PT FALLS ASSESS DOC 0-1 FALLS W/OUT INJ PAST YR: ICD-10-PCS | Mod: CPTII,S$GLB,, | Performed by: INTERNAL MEDICINE

## 2023-05-03 PROCEDURE — 93000 ELECTROCARDIOGRAM COMPLETE: CPT | Mod: S$GLB,,, | Performed by: INTERNAL MEDICINE

## 2023-05-03 PROCEDURE — 3074F PR MOST RECENT SYSTOLIC BLOOD PRESSURE < 130 MM HG: ICD-10-PCS | Mod: CPTII,S$GLB,, | Performed by: INTERNAL MEDICINE

## 2023-05-03 PROCEDURE — 99214 PR OFFICE/OUTPT VISIT, EST, LEVL IV, 30-39 MIN: ICD-10-PCS | Mod: S$GLB,,, | Performed by: INTERNAL MEDICINE

## 2023-05-03 PROCEDURE — 99999 PR PBB SHADOW E&M-EST. PATIENT-LVL III: CPT | Mod: PBBFAC,,, | Performed by: INTERNAL MEDICINE

## 2023-05-03 PROCEDURE — 3288F FALL RISK ASSESSMENT DOCD: CPT | Mod: CPTII,S$GLB,, | Performed by: INTERNAL MEDICINE

## 2023-05-03 PROCEDURE — 3074F SYST BP LT 130 MM HG: CPT | Mod: CPTII,S$GLB,, | Performed by: INTERNAL MEDICINE

## 2023-05-03 PROCEDURE — 3078F DIAST BP <80 MM HG: CPT | Mod: CPTII,S$GLB,, | Performed by: INTERNAL MEDICINE

## 2023-05-03 NOTE — PROGRESS NOTES
Subjective:   Patient ID:  Ruben Macedo Jr. is a 77 y.o. male who presents for follow-up of No chief complaint on file.      HPI    CAD - stent 2000, HTN, HLD, DM,  bladder CA     [facility]     Followed by Dr Holder  74-year-old white male .  Patient here with his wife who is in the medical field in some fashion.  In the interval he was diagnosed with bladder cancer.  He is receiving BCG treatment.  Lately his left ankle has swollen on occasion but then it goes down with elevation.  No pain.  Obviously we discussed the possibility of increased risk of DVT given the underlying cancer with his symptoms do not represent that.  Should he get any persistent swelling or pain they know to go to the emergency room for a more immediate ultrasound.     We also reviewed his labs.  His A1c went from 7.9 down to 7.3 but is due.  He has also developed some thrombocytopenia and some anemia.  He did have significant bleeding due to the bladder cancer.  He will discuss with urology whether not the BCG treatment would be expected to cause some thrombocytopenia.  He has had fatty liver on prior imaging but spleen on CT scans stated to be normal.  He does take Plavix but reassured that would not affect the platelet count but might affect platelet function.  Many years ago he did have one episode of thrombocytopenia but then normalized in the interval.  Wife was concerned and she did have an unofficial CBC done and by her verbal report sounds similar to what we have recorded in our system.  She also did an unofficial stool for blood which was negative.     Regarding diabetes A1c hopefully improved.  He had itching with metformin.  We discussed potential increased risk association of bladder cancer and Actos so it is time to discontinue.  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  He was on Januvia but expensive so has been off.  We  discussed many new medications are very expensive and will probably not be sustainable with his current insurance.  We discussed Amaryl and the potential for hypoglycemia.  We will start with 4 mg daily, reduce accordingly but obviously if he follow the diet and lost diabetes with improved as well.  Patient counseled at great length regarding the multitude these issues.Total time over 45 minutes with over 50% counseling.     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  It looks like he does have some paronychia.  He applied Neosporin without success and has been there for weeks.  We discussed using some clindamycin for a couple of days but also the more important thing is going to be local care since the cuticle appears to be chronically damaged and he would be at risk for further infection.  The abrasion on the top may well have also initiated the infection.  Rather than use Neosporin all call him in some mupirocin     Echo 7/30/21  The left ventricle is normal in size with concentric remodeling and normal systolic function.  The estimated ejection fraction is 55%.  Indeterminate left ventricular diastolic function.  Mild left atrial enlargement.  Normal right ventricular size with normal right ventricular systolic function.  There is mild aortic valve stenosis.  Aortic valve area is 1.83 cm2; peak velocity is 2.13 m/s; mean gradient is 11 mmHg.  Normal central venous pressure (3 mmHg).  The estimated PA systolic pressure is 27 mmHg.  There is no pulmonary hypertension.     Stress test 7/30/21    Normal myocardial perfusion scan. There is no evidence of myocardial ischemia or infarction.    The gated perfusion images showed an ejection fraction of 70% post stress.    There is normal wall motion post stress.    The EKG portion of this study is negative for ischemia.    The patient reported no chest pain  during the stress test.    During stress, rare PACs are noted.        12/2/20 Denies recent CP or SOB. Has not seen a cardiologist in years  EKG NSR old IMI   Echo and lexiscan myoview for CAD  Continue Rx for CAD, HTN, HLD, DM     8/1/22 Denies CP or SOB. Able to cut grass without issues  BP controlled  EKG NSR PACs in [facility] - otherwise ok  Continue Rx for CAD, HTN, HLD, DM  OV 6 months with echo to look at AS      5/3/23 Echo not done. Denies CP, mild fatigue and JACINTO  EKG NSR possible old IMI  BP controlled    Review of Systems   Constitutional: Negative for decreased appetite.   HENT:  Negative for ear discharge.    Eyes:  Negative for blurred vision.   Endocrine: Negative for polyphagia.   Skin:  Negative for nail changes.   Genitourinary:  Negative for bladder incontinence.   Neurological:  Negative for aphonia.   Psychiatric/Behavioral:  Negative for hallucinations.    Allergic/Immunologic: Negative for hives.     Objective:   Physical Exam  Constitutional:       Appearance: He is well-developed.   HENT:      Head: Normocephalic and atraumatic.   Eyes:      Conjunctiva/sclera: Conjunctivae normal.      Pupils: Pupils are equal, round, and reactive to light.   Cardiovascular:      Rate and Rhythm: Normal rate.      Pulses: Intact distal pulses.      Heart sounds: Normal heart sounds.   Pulmonary:      Effort: Pulmonary effort is normal.      Breath sounds: Normal breath sounds.   Abdominal:      General: Bowel sounds are normal.      Palpations: Abdomen is soft.   Musculoskeletal:         General: Normal range of motion.      Cervical back: Normal range of motion and neck supple.   Skin:     General: Skin is warm and dry.   Neurological:      Mental Status: He is alert and oriented to person, place, and time.       Assessment:      1. Coronary artery disease involving native coronary artery of native heart without angina pectoris    2. Hypertension, unspecified type    3. Atherosclerosis of aorta    4.  Essential hypertension    5. Hyperlipidemia, unspecified hyperlipidemia type    6. Bradycardia        Plan:     Continue Rx for CAD, HTN, HLD, DM  echo to look at AS - if stable then OV 6 months

## 2023-05-04 RX ORDER — EZETIMIBE 10 MG/1
TABLET ORAL
Qty: 90 TABLET | Refills: 3 | Status: SHIPPED | OUTPATIENT
Start: 2023-05-04

## 2023-05-11 ENCOUNTER — HOSPITAL ENCOUNTER (OUTPATIENT)
Dept: CARDIOLOGY | Facility: HOSPITAL | Age: 78
Discharge: HOME | End: 2023-05-11
Attending: INTERNAL MEDICINE
Payer: MEDICARE

## 2023-05-11 VITALS — WEIGHT: 192 LBS | BODY MASS INDEX: 27.49 KG/M2 | HEIGHT: 70 IN

## 2023-05-11 DIAGNOSIS — E78.5 HYPERLIPIDEMIA, UNSPECIFIED HYPERLIPIDEMIA TYPE: ICD-10-CM

## 2023-05-11 DIAGNOSIS — I10 HYPERTENSION, UNSPECIFIED TYPE: ICD-10-CM

## 2023-05-11 DIAGNOSIS — I70.0 ATHEROSCLEROSIS OF AORTA: ICD-10-CM

## 2023-05-11 DIAGNOSIS — I25.10 CORONARY ARTERY DISEASE INVOLVING NATIVE CORONARY ARTERY OF NATIVE HEART WITHOUT ANGINA PECTORIS: ICD-10-CM

## 2023-05-11 DIAGNOSIS — R00.1 BRADYCARDIA: ICD-10-CM

## 2023-05-11 DIAGNOSIS — I10 ESSENTIAL HYPERTENSION: ICD-10-CM

## 2023-05-11 LAB
AORTIC ROOT ANNULUS: 3.7 CM
AORTIC VALVE CUSP SEPERATION: 1.63 CM
ASCENDING AORTA: 3.56 CM
AV INDEX (PROSTH): 0.58
AV MEAN GRADIENT: 12 MMHG
AV PEAK GRADIENT: 19 MMHG
AV VALVE AREA: 2.21 CM2
AV VELOCITY RATIO: 0.5
BSA FOR ECHO PROCEDURE: 2.07 M2
CV ECHO LV RWT: 0.27 CM
DOP CALC AO PEAK VEL: 2.16 M/S
DOP CALC AO VTI: 37.8 CM
DOP CALC LVOT AREA: 3.8 CM2
DOP CALC LVOT DIAMETER: 2.21 CM
DOP CALC LVOT PEAK VEL: 1.07 M/S
DOP CALC LVOT STROKE VOLUME: 83.58 CM3
DOP CALCLVOT PEAK VEL VTI: 21.8 CM
E WAVE DECELERATION TIME: 194.8 MSEC
E/A RATIO: 0.89
E/E' RATIO: 10.53 M/S
ECHO LV POSTERIOR WALL: 0.71 CM (ref 0.6–1.1)
EJECTION FRACTION: 65 %
FRACTIONAL SHORTENING: 29 % (ref 28–44)
INTERVENTRICULAR SEPTUM: 0.69 CM (ref 0.6–1.1)
IVC DIAMETER: 1.85 CM
IVRT: 97.05 MSEC
LA MAJOR: 5.53 CM
LA MINOR: 5.37 CM
LA WIDTH: 4.6 CM
LEFT ATRIUM SIZE: 3.8 CM
LEFT ATRIUM VOLUME INDEX: 39.5 ML/M2
LEFT ATRIUM VOLUME: 80.96 CM3
LEFT INTERNAL DIMENSION IN SYSTOLE: 3.66 CM (ref 2.1–4)
LEFT VENTRICLE DIASTOLIC VOLUME INDEX: 62.27 ML/M2
LEFT VENTRICLE DIASTOLIC VOLUME: 127.66 ML
LEFT VENTRICLE MASS INDEX: 59 G/M2
LEFT VENTRICLE SYSTOLIC VOLUME INDEX: 27.6 ML/M2
LEFT VENTRICLE SYSTOLIC VOLUME: 56.51 ML
LEFT VENTRICULAR INTERNAL DIMENSION IN DIASTOLE: 5.17 CM (ref 3.5–6)
LEFT VENTRICULAR MASS: 121.58 G
LV LATERAL E/E' RATIO: 9.88 M/S
LV SEPTAL E/E' RATIO: 11.29 M/S
LVOT MG: 2.54 MMHG
LVOT MV: 0.75 CM/S
MV PEAK A VEL: 0.89 M/S
MV PEAK E VEL: 0.79 M/S
MV STENOSIS PRESSURE HALF TIME: 56.49 MS
MV VALVE AREA P 1/2 METHOD: 3.89 CM2
PISA TR MAX VEL: 2.29 M/S
PULM VEIN S/D RATIO: 1.32
PV PEAK D VEL: 0.34 M/S
PV PEAK S VEL: 0.45 M/S
PV PEAK VELOCITY: 1.26 CM/S
RA MAJOR: 4.64 CM
RA PRESSURE: 3 MMHG
RA WIDTH: 3.1 CM
RIGHT VENTRICULAR END-DIASTOLIC DIMENSION: 2.86 CM
RV TISSUE DOPPLER FREE WALL SYSTOLIC VELOCITY 1 (APICAL 4 CHAMBER VIEW): 0.01 CM/S
SINUS: 3.47 CM
STJ: 2.65 CM
TDI LATERAL: 0.08 M/S
TDI SEPTAL: 0.07 M/S
TDI: 0.08 M/S
TR MAX PG: 21 MMHG
TV PEAK GRADIENT: 2.7 MMHG
TV REST PULMONARY ARTERY PRESSURE: 24 MMHG

## 2023-05-11 PROCEDURE — 93306 TTE W/DOPPLER COMPLETE: CPT | Mod: 26,,, | Performed by: INTERNAL MEDICINE

## 2023-05-11 PROCEDURE — 93306 ECHO (CUPID ONLY): ICD-10-PCS | Mod: 26,,, | Performed by: INTERNAL MEDICINE

## 2023-05-11 PROCEDURE — 93306 TTE W/DOPPLER COMPLETE: CPT

## 2023-07-13 ENCOUNTER — PATIENT MESSAGE (OUTPATIENT)
Dept: ADMINISTRATIVE | Facility: HOSPITAL | Age: 78
End: 2023-07-13
Payer: MEDICARE

## 2023-07-13 ENCOUNTER — PATIENT OUTREACH (OUTPATIENT)
Dept: ADMINISTRATIVE | Facility: HOSPITAL | Age: 78
End: 2023-07-13
Payer: MEDICARE

## 2023-07-13 DIAGNOSIS — Z11.59 NEED FOR HEPATITIS C SCREENING TEST: Primary | ICD-10-CM

## 2023-07-31 ENCOUNTER — PATIENT MESSAGE (OUTPATIENT)
Dept: FAMILY MEDICINE | Facility: CLINIC | Age: 78
End: 2023-07-31
Payer: MEDICARE

## 2023-07-31 ENCOUNTER — TELEPHONE (OUTPATIENT)
Dept: FAMILY MEDICINE | Facility: CLINIC | Age: 78
End: 2023-07-31

## 2023-07-31 DIAGNOSIS — E11.65 TYPE 2 DIABETES MELLITUS WITH HYPERGLYCEMIA, WITHOUT LONG-TERM CURRENT USE OF INSULIN: Primary | ICD-10-CM

## 2023-07-31 NOTE — TELEPHONE ENCOUNTER
----- Message from Javid Burch MA sent at 7/31/2023 10:23 AM CDT -----  Type: Patient Call Back    Who called:Wife - Ms. Macedo    What is the request in detail: would like Maurice to give her a call regarding his medication .. States she eent to pick it up and was told it was $151 ..     SITagliptin phosphate (JANUVIA) 100 MG Tab    Can the clinic reply by [facility]?No    Would the patient rather a call back or a response via [facility]? yes    Best call back number: [phone] (home)

## 2023-08-01 ENCOUNTER — PATIENT MESSAGE (OUTPATIENT)
Dept: FAMILY MEDICINE | Facility: CLINIC | Age: 78
End: 2023-08-01
Payer: MEDICARE

## 2023-08-01 DIAGNOSIS — E11.9 TYPE 2 DIABETES MELLITUS WITHOUT COMPLICATION, WITHOUT LONG-TERM CURRENT USE OF INSULIN: ICD-10-CM

## 2023-08-01 NOTE — TELEPHONE ENCOUNTER
Patient needs an order for an A1C. His next appt is 08/17/23. Patient will be out of Januvia on Saturday, 08/05/23. The patient's wife is calling the pharmacy back to inquire about the GoodRx price. She will send message to inform if she gets the Januvia. Patient will need to know what to take if they do not purchase the Januvia.

## 2023-08-02 NOTE — TELEPHONE ENCOUNTER
Clarify if they called to see if in donut hole etc. That is very important. [facility]reen will not help w januvia cost.     He is overdue for labs anyway so set labs so we can see where he is

## 2023-08-02 NOTE — TELEPHONE ENCOUNTER
Care Due:                  Date            Visit Type   Department     Provider  --------------------------------------------------------------------------------                                SAME DAY -   [facility]   MED/ INTERNAL  Last Visit: 03-      PATIENT      MED/ PEDS      Millie CULVER [facility]                              PRIMARY      MED/ INTERNAL  Cedric Stark  Next Visit: 08-      CARE ([facility])   MED/ PEDS      [facility]                                                            Last  Test          Frequency    Reason                     Performed    Due Date  --------------------------------------------------------------------------------    HBA1C.......  6 months...  SITagliptin..............  04-   10-    Uric Acid...  12 months..  allopurinoL..............  Not Found    Overdue    [employer] Embedded Care Due Messages. Reference number: [id number].   8/02/2023 7:57:52 AM CDT

## 2023-08-04 ENCOUNTER — LAB VISIT (OUTPATIENT)
Dept: LAB | Facility: HOSPITAL | Age: 78
End: 2023-08-04
Attending: INTERNAL MEDICINE
Payer: MEDICARE

## 2023-08-04 DIAGNOSIS — E11.65 TYPE 2 DIABETES MELLITUS WITH HYPERGLYCEMIA, WITHOUT LONG-TERM CURRENT USE OF INSULIN: ICD-10-CM

## 2023-08-04 DIAGNOSIS — Z11.59 NEED FOR HEPATITIS C SCREENING TEST: ICD-10-CM

## 2023-08-04 LAB
ESTIMATED AVG GLUCOSE: 160 MG/DL (ref 68–131)
HBA1C MFR BLD: 7.2 % (ref 4–5.6)
HCV AB SERPL QL IA: NORMAL

## 2023-08-04 PROCEDURE — 83036 HEMOGLOBIN GLYCOSYLATED A1C: CPT | Performed by: INTERNAL MEDICINE

## 2023-08-04 PROCEDURE — 86803 HEPATITIS C AB TEST: CPT | Performed by: INTERNAL MEDICINE

## 2023-08-04 PROCEDURE — 36415 COLL VENOUS BLD VENIPUNCTURE: CPT | Mod: PO | Performed by: INTERNAL MEDICINE

## 2023-08-17 ENCOUNTER — OFFICE VISIT (OUTPATIENT)
Dept: FAMILY MEDICINE | Facility: CLINIC | Age: 78
End: 2023-08-17
Payer: MEDICARE

## 2023-08-17 VITALS
WEIGHT: 186.06 LBS | HEART RATE: 71 BPM | HEIGHT: 70 IN | SYSTOLIC BLOOD PRESSURE: 110 MMHG | OXYGEN SATURATION: 96 % | TEMPERATURE: 98 F | BODY MASS INDEX: 26.64 KG/M2 | DIASTOLIC BLOOD PRESSURE: 62 MMHG

## 2023-08-17 DIAGNOSIS — D69.6 THROMBOCYTOPENIA: ICD-10-CM

## 2023-08-17 DIAGNOSIS — F39 MOOD DISORDER: ICD-10-CM

## 2023-08-17 DIAGNOSIS — I25.10 CORONARY ARTERY DISEASE INVOLVING NATIVE CORONARY ARTERY OF NATIVE HEART WITHOUT ANGINA PECTORIS: ICD-10-CM

## 2023-08-17 DIAGNOSIS — R80.9 MICROALBUMINURIA: ICD-10-CM

## 2023-08-17 DIAGNOSIS — F41.9 ANXIETY DISORDER, UNSPECIFIED TYPE: ICD-10-CM

## 2023-08-17 DIAGNOSIS — E11.29 TYPE 2 DIABETES MELLITUS WITH MICROALBUMINURIA, WITHOUT LONG-TERM CURRENT USE OF INSULIN: ICD-10-CM

## 2023-08-17 DIAGNOSIS — Z86.010 HISTORY OF COLONIC POLYPS: ICD-10-CM

## 2023-08-17 DIAGNOSIS — Z91.199 POOR COMPLIANCE: ICD-10-CM

## 2023-08-17 DIAGNOSIS — E11.29 DIABETES MELLITUS WITH PROTEINURIA: ICD-10-CM

## 2023-08-17 DIAGNOSIS — C67.9 MALIGNANT NEOPLASM OF URINARY BLADDER, UNSPECIFIED SITE: ICD-10-CM

## 2023-08-17 DIAGNOSIS — E11.9 TYPE 2 DIABETES MELLITUS WITHOUT COMPLICATION, WITHOUT LONG-TERM CURRENT USE OF INSULIN: Primary | ICD-10-CM

## 2023-08-17 DIAGNOSIS — F41.9 ANXIETY: ICD-10-CM

## 2023-08-17 DIAGNOSIS — I10 ESSENTIAL HYPERTENSION: ICD-10-CM

## 2023-08-17 DIAGNOSIS — E11.65 TYPE 2 DIABETES MELLITUS WITH HYPERGLYCEMIA, WITHOUT LONG-TERM CURRENT USE OF INSULIN: ICD-10-CM

## 2023-08-17 DIAGNOSIS — D64.9 ANEMIA, UNSPECIFIED TYPE: ICD-10-CM

## 2023-08-17 DIAGNOSIS — R80.9 TYPE 2 DIABETES MELLITUS WITH MICROALBUMINURIA, WITHOUT LONG-TERM CURRENT USE OF INSULIN: ICD-10-CM

## 2023-08-17 DIAGNOSIS — E78.5 HYPERLIPIDEMIA, UNSPECIFIED HYPERLIPIDEMIA TYPE: ICD-10-CM

## 2023-08-17 DIAGNOSIS — R80.9 DIABETES MELLITUS WITH PROTEINURIA: ICD-10-CM

## 2023-08-17 PROCEDURE — 1126F PR PAIN SEVERITY QUANTIFIED, NO PAIN PRESENT: ICD-10-PCS | Mod: CPTII,S$GLB,, | Performed by: INTERNAL MEDICINE

## 2023-08-17 PROCEDURE — 99499 UNLISTED E&M SERVICE: CPT | Mod: S$GLB,,, | Performed by: INTERNAL MEDICINE

## 2023-08-17 PROCEDURE — 99999 PR PBB SHADOW E&M-EST. PATIENT-LVL IV: CPT | Mod: PBBFAC,,, | Performed by: INTERNAL MEDICINE

## 2023-08-17 PROCEDURE — 3074F SYST BP LT 130 MM HG: CPT | Mod: CPTII,S$GLB,, | Performed by: INTERNAL MEDICINE

## 2023-08-17 PROCEDURE — 1126F AMNT PAIN NOTED NONE PRSNT: CPT | Mod: CPTII,S$GLB,, | Performed by: INTERNAL MEDICINE

## 2023-08-17 PROCEDURE — 3078F PR MOST RECENT DIASTOLIC BLOOD PRESSURE < 80 MM HG: ICD-10-PCS | Mod: CPTII,S$GLB,, | Performed by: INTERNAL MEDICINE

## 2023-08-17 PROCEDURE — 3072F LOW RISK FOR RETINOPATHY: CPT | Mod: CPTII,S$GLB,, | Performed by: INTERNAL MEDICINE

## 2023-08-17 PROCEDURE — 1101F PR PT FALLS ASSESS DOC 0-1 FALLS W/OUT INJ PAST YR: ICD-10-PCS | Mod: CPTII,S$GLB,, | Performed by: INTERNAL MEDICINE

## 2023-08-17 PROCEDURE — 3074F PR MOST RECENT SYSTOLIC BLOOD PRESSURE < 130 MM HG: ICD-10-PCS | Mod: CPTII,S$GLB,, | Performed by: INTERNAL MEDICINE

## 2023-08-17 PROCEDURE — 1159F PR MEDICATION LIST DOCUMENTED IN MEDICAL RECORD: ICD-10-PCS | Mod: CPTII,S$GLB,, | Performed by: INTERNAL MEDICINE

## 2023-08-17 PROCEDURE — 99214 OFFICE O/P EST MOD 30 MIN: CPT | Mod: S$GLB,,, | Performed by: INTERNAL MEDICINE

## 2023-08-17 PROCEDURE — 3288F PR FALLS RISK ASSESSMENT DOCUMENTED: ICD-10-PCS | Mod: CPTII,S$GLB,, | Performed by: INTERNAL MEDICINE

## 2023-08-17 PROCEDURE — 3078F DIAST BP <80 MM HG: CPT | Mod: CPTII,S$GLB,, | Performed by: INTERNAL MEDICINE

## 2023-08-17 PROCEDURE — 1159F MED LIST DOCD IN RCRD: CPT | Mod: CPTII,S$GLB,, | Performed by: INTERNAL MEDICINE

## 2023-08-17 PROCEDURE — 3288F FALL RISK ASSESSMENT DOCD: CPT | Mod: CPTII,S$GLB,, | Performed by: INTERNAL MEDICINE

## 2023-08-17 PROCEDURE — 99999 PR PBB SHADOW E&M-EST. PATIENT-LVL IV: ICD-10-PCS | Mod: PBBFAC,,, | Performed by: INTERNAL MEDICINE

## 2023-08-17 PROCEDURE — 3072F PR LOW RISK FOR RETINOPATHY: ICD-10-PCS | Mod: CPTII,S$GLB,, | Performed by: INTERNAL MEDICINE

## 2023-08-17 PROCEDURE — 99214 PR OFFICE/OUTPT VISIT, EST, LEVL IV, 30-39 MIN: ICD-10-PCS | Mod: S$GLB,,, | Performed by: INTERNAL MEDICINE

## 2023-08-17 PROCEDURE — 1101F PT FALLS ASSESS-DOCD LE1/YR: CPT | Mod: CPTII,S$GLB,, | Performed by: INTERNAL MEDICINE

## 2023-08-17 RX ORDER — LORAZEPAM 0.5 MG/1
0.5 TABLET ORAL EVERY 6 HOURS PRN
Qty: 30 TABLET | Refills: 5 | Status: SHIPPED | OUTPATIENT
Start: 2023-08-17 | End: 2024-02-20 | Stop reason: SDUPTHER

## 2023-08-17 RX ORDER — GLIMEPIRIDE 2 MG/1
2 TABLET ORAL DAILY
Qty: 180 TABLET | Refills: 3 | Status: SHIPPED | OUTPATIENT
Start: 2023-08-17

## 2023-08-17 NOTE — PROGRESS NOTES
Chief complaint:  Follow-up on diabetes and discuss meds      Physical exam 2/23    77-year-old white male .  Patient cut back on sugars and his weight is down.  His A1c improved to 7.2 with the help of metformin and Januvia.  He is taking his metformin 750 twice a day.  He has been off the Januvia for two weeks since the co-pay went up likely indicating he is in the coverage gap.  We discussed Amaryl as well as the potential for hypoglycemia and how to adjust.  He is here with his wife who will also assist in management.  Will do 2 mg twice a day and make adjustments accordingly.  His labs and PSA will be due in February so I think that is inappropriate next time for lab work.  They will monitor sugars and if indeed sugars rise in general we might need to make an interval adjustment and increase in the Amaryl.    Other labs reviewed and unremarkable and up-to-date.  We discussed calorie intake in the need to continue to lose more weight.       his wife is in the medical field in some fashion. 2/23  PSA.  Up-to-date on colonoscopy- 2026.  Trying to lose some weight same    In the interval he was diagnosed with bladder cancer.  He is receiving BCG treatment.  .seen Dr Yony teran 6/2021 -5 years .     We also reviewed his labs.  His A1c went from 7.9 down to 7.3 , 7.7 , 8.6, 8.9, 8.2 , 9.2, 8.5, 7.2     Again explained to his wife about A1c every three months and typically people get an A1c and follow-up every three months.  They seem to again not be aware of that schedule but will commit to that.          ROS:   CONST: weight stable. EYES: no vision change. ENT: no sore throat. CV: no chest pain w/ exertion. RESP: no shortness of breath. GI: no nausea, vomiting, diarrhea. No dysphagia. : no urinary issues. MUSCULOSKELETAL: no new myalgias or arthralgias other than some chronic left neck pain with movement. SKIN: no new changes. NEURO: no focal deficits. PSYCH: no new issues. ENDOCRINE: no polyuria. HEME: no  lymph nodes. ALLERGY: no general pruritis.    Past Medical History:   Diagnosis Date    Anxiety     BPH (benign prostatic hypertrophy) 2/2/2015    Cataract     Coronary artery disease     Diabetes mellitus, type 2 - with PROTIENURIA     History of colonic polyps 4/11/2017 2/13 -- polyps 6/16, normal 6/2021 -5 years     History of MI (myocardial infarction) 2005    Followed by cardiology, Dr. Acevedo (at [facility])     History of stroke November 2005    Hyperlipidemia     Hypertension     Incomplete bladder emptying 2/2/2015    Overweight     Personal history of kidney stones     Renal cyst 2/10/2015   History of cervical radiculopathy on the right  Cervical arthropathy on x-ray  Anemia- chronic  bladder cancer.  He is receiving BCG treatment.            Past Surgical History:   Procedure Laterality Date    APPENDECTOMY      BACK SURGERY      BLADDER FULGURATION  5/11/2020    Procedure: FULGURATION, BLADDER;  Surgeon: Gelacio Sanchez MD;  Location: [facility];  Service: Urology;;    CATARACT EXTRACTION      od    CORONARY ANGIOPLASTY WITH STENT PLACEMENT  2000    CYSTOSCOPY  5/11/2020    Procedure: CYSTOSCOPY;  Surgeon: Gelacio Sanchez MD;  Location: [facility];  Service: Urology;;    CYSTOSCOPY W/ URETERAL STENT PLACEMENT      stents placed x2    DILATION OF URETHRA N/A 11/12/2020    Procedure: DILATION, URETHRA;  Surgeon: Gelacio Sanchez MD;  Location: [facility];  Service: Urology;  Laterality: N/A;    EYE SURGERY      cataract right eye    REMOVAL OF BLOOD CLOT  5/11/2020    Procedure: REMOVAL, BLOOD CLOT;  Surgeon: Gelacio Sanchez MD;  Location: [facility];  Service: Urology;;    SPINE SURGERY      TONSILLECTOMY      TURBT, WITH BLUE LIGHT CYSTOSCOPY AND CYSVIEW N/A 7/9/2020    Procedure: TURBT,WITH BLUE LIGHT CYSTOSCOPY AND CYSVIEW;  Surgeon: Gelacio Sanchez MD;  Location: [facility];  Service: Urology;  Laterality: N/A;    TURBT, WITH BLUE LIGHT CYSTOSCOPY AND CYSVIEW Bilateral 11/12/2020     Procedure: TURBT,WITH BLUE LIGHT CYSTOSCOPY AND CYSVIEW; bilateral retrograde pyelogram;  Surgeon: Gelacio Sanchez MD;  Location: [facility];  Service: Urology;  Laterality: Bilateral;     Social History     Socioeconomic History    Marital status:     Number of children: 2   Occupational History    Occupation: Retired - chemical industry   Tobacco Use    Smoking status: Never    Smokeless tobacco: Never   Substance and Sexual Activity    Alcohol use: Yes     Comment: occasional    Drug use: No    Sexual activity: Yes     Partners: Female     Social Determinants of Health     Financial Resource Strain: Low Risk  (8/17/2023)    Overall Financial Resource Strain (CARDIA)     Difficulty of Paying Living Expenses: Not hard at all   Food Insecurity: No Food Insecurity (8/17/2023)    Hunger Vital Sign     Worried About Running Out of Food in the Last Year: Never true     Ran Out of Food in the Last Year: Never true   Transportation Needs: No Transportation Needs (8/17/2023)    PRAPARE - Transportation     Lack of Transportation (Medical): No     Lack of Transportation (Non-Medical): No   Physical Activity: Inactive (8/17/2023)    Exercise Vital Sign     Days of Exercise per Week: 0 days     Minutes of Exercise per Session: 0 min   Stress: No Stress Concern Present (8/17/2023)    [employer] - Occupational Stress Questionnaire     Feeling of Stress : Not at all   Social Connections: Moderately Integrated (8/17/2023)    Social Connection and Isolation Panel [NHANES]     Frequency of Communication with Friends and Family: Twice a week     Frequency of Social Gatherings with Friends and Family: Once a week     Attends Taoism Services: Never     Active Member of Clubs or Organizations: Yes     Attends Club or Organization Meetings: More than 4 times per year     Marital Status:    Housing Stability: Low Risk  (8/17/2023)    Housing Stability Vital Sign     Unable to Pay for Housing  in the Last Year: No     Number of Places Lived in the Last Year: 1     Unstable Housing in the Last Year: No       family history includes Alzheimer's disease in an other family member; Cancer in his father; Cataracts in his father and mother; Hyperlipidemia in his father and sister; No Known Problems in his brother, maternal aunt, maternal grandfather, maternal grandmother, maternal uncle, paternal aunt, paternal grandfather, paternal grandmother, paternal uncle, sister, son, and son.     Gen: no distress          Assessment and plan:      Diagnoses and all orders for this visit:    Type 2 diabetes mellitus without complication, without long-term current use of insulin, still uncontrolled be great if his A1c was closer to 6.2.  Make medication adjustments  adjustments as above and follow-up probably in February when other labs are due    Type 2 diabetes mellitus with hyperglycemia, without long-term current use of insulin    Hyperlipidemia, unspecified hyperlipidemia type chronic and stable    Essential hypertension, chronic and stable    Coronary artery disease involving native coronary artery of native heart without angina pectoris, quiet, chronic and stable    Anxiety disorder, unspecified type, still requiring some intermittent Ativan, refilled    Mood disorder, chronic and stable    Anemia, unspecified type, chronic and stable    Thrombocytopenia, chronic and stable    Microalbuminuria    Poor compliance, getting better somewhat    Type 2 diabetes mellitus with microalbuminuria, without long-term current use of insulin    History of colonic polyps, up-to-date    Malignant neoplasm of urinary bladder, unspecified site, chronic and stable     Diabetes mellitus with proteinuria    Anxiety  -     LORazepam (ATIVAN) 0.5 MG tablet; Take 1 tablet (0.5 mg total) by mouth every 6 (six) hours as needed for Anxiety.    Other orders  -     glimepiride (AMARYL) 2 MG tablet; Take 1 tablet (2 mg total) by mouth once  daily.

## 2023-08-25 DIAGNOSIS — C68.9 UROTHELIAL CARCINOMA: Primary | ICD-10-CM

## 2023-09-29 ENCOUNTER — LAB VISIT (OUTPATIENT)
Dept: LAB | Facility: HOSPITAL | Age: 78
End: 2023-09-29
Attending: UROLOGY
Payer: MEDICARE

## 2023-09-29 DIAGNOSIS — C68.9 UROTHELIAL CARCINOMA: ICD-10-CM

## 2023-09-29 PROCEDURE — 88112 PR  CYTOPATH, CELL ENHANCE TECH: ICD-10-PCS | Mod: 26,,, | Performed by: STUDENT IN AN ORGANIZED HEALTH CARE EDUCATION/TRAINING PROGRAM

## 2023-09-29 PROCEDURE — 88112 CYTOPATH CELL ENHANCE TECH: CPT | Performed by: STUDENT IN AN ORGANIZED HEALTH CARE EDUCATION/TRAINING PROGRAM

## 2023-09-29 PROCEDURE — 88112 CYTOPATH CELL ENHANCE TECH: CPT | Mod: 26,,, | Performed by: STUDENT IN AN ORGANIZED HEALTH CARE EDUCATION/TRAINING PROGRAM

## 2023-10-06 ENCOUNTER — PROCEDURE VISIT (OUTPATIENT)
Dept: UROLOGY | Facility: CLINIC | Age: 78
End: 2023-10-06
Payer: MEDICARE

## 2023-10-06 VITALS
OXYGEN SATURATION: 97 % | RESPIRATION RATE: 17 BRPM | BODY MASS INDEX: 27.3 KG/M2 | HEART RATE: 75 BPM | SYSTOLIC BLOOD PRESSURE: 135 MMHG | DIASTOLIC BLOOD PRESSURE: 75 MMHG | WEIGHT: 190.25 LBS

## 2023-10-06 DIAGNOSIS — C68.9 UROTHELIAL CARCINOMA: Primary | ICD-10-CM

## 2023-10-06 PROCEDURE — 52000 CYSTOSCOPY: ICD-10-PCS | Mod: S$GLB,,, | Performed by: UROLOGY

## 2023-10-06 PROCEDURE — 52000 CYSTOURETHROSCOPY: CPT | Mod: S$GLB,,, | Performed by: UROLOGY

## 2023-10-06 RX ORDER — LIDOCAINE HYDROCHLORIDE 20 MG/ML
JELLY TOPICAL
Status: COMPLETED | OUTPATIENT
Start: 2023-10-06 | End: 2023-10-06

## 2023-10-06 RX ORDER — CIPROFLOXACIN 500 MG/1
500 TABLET ORAL
Status: COMPLETED | OUTPATIENT
Start: 2023-10-06 | End: 2023-10-06

## 2023-10-06 RX ADMIN — CIPROFLOXACIN 500 MG: 500 TABLET ORAL at 10:10

## 2023-10-06 RX ADMIN — LIDOCAINE HYDROCHLORIDE: 20 JELLY TOPICAL at 10:10

## 2023-10-06 NOTE — PROCEDURES
Cystoscopy    Date/Time: 10/6/2023 10:48 AM    Performed by: Gelacio Sanchez MD  Authorized by: Gelacio Sanchez MD    Consent Done?:  Yes (Written)  Timeout: prior to procedure the correct patient, procedure, and site was verified    Prep: patient was prepped and draped in usual sterile fashion    Local anesthesia used?: Yes    Anesthesia:  Lidocaine jelly  Indications: history bladder cancer    Position:  Supine  Anesthesia:  Lidocaine jelly  Patient sedated?: No    Preparation: Patient was prepped and draped in usual sterile fashion    Scope type:  Flexible cystoscope  Urethra normal: Yes    Prostate normal: Yes    Bladder neck normal: Yes    Bladder normal: Yes     patient tolerated the procedure well with no immediate complications  Comments:      Cytology pending    NMIBC CORBIN* 3 years    If cytology OK, RTC 1 year to recheck UA  No additional cysto needed at this time

## 2023-10-10 LAB
FINAL PATHOLOGIC DIAGNOSIS: NORMAL
Lab: NORMAL

## 2023-12-19 DIAGNOSIS — I25.10 CORONARY ARTERY DISEASE INVOLVING NATIVE CORONARY ARTERY OF NATIVE HEART WITHOUT ANGINA PECTORIS: ICD-10-CM

## 2023-12-20 RX ORDER — CLOPIDOGREL BISULFATE 75 MG/1
TABLET ORAL
Qty: 90 TABLET | Refills: 3 | Status: SHIPPED | OUTPATIENT
Start: 2023-12-20

## 2023-12-20 RX ORDER — ROSUVASTATIN CALCIUM 40 MG/1
40 TABLET, COATED ORAL
Qty: 90 TABLET | Refills: 3 | Status: SHIPPED | OUTPATIENT
Start: 2023-12-20

## 2023-12-20 NOTE — TELEPHONE ENCOUNTER
Patient requesting a refill. Last seen 5/3/2023. Next appointment not scheduled please advise. sandra

## 2024-01-16 RX ORDER — FINASTERIDE 5 MG/1
TABLET, FILM COATED ORAL
Qty: 90 TABLET | Refills: 3 | Status: SHIPPED | OUTPATIENT
Start: 2024-01-16

## 2024-02-01 RX ORDER — METFORMIN HYDROCHLORIDE 750 MG/1
1500 TABLET, EXTENDED RELEASE ORAL EVERY MORNING
Qty: 180 TABLET | Refills: 2 | Status: SHIPPED | OUTPATIENT
Start: 2024-02-01

## 2024-02-11 ENCOUNTER — HOSPITAL ENCOUNTER (EMERGENCY)
Facility: HOSPITAL | Age: 79
Discharge: HOME | End: 2024-02-11
Attending: EMERGENCY MEDICINE
Payer: MEDICARE

## 2024-02-11 VITALS
HEART RATE: 86 BPM | SYSTOLIC BLOOD PRESSURE: 130 MMHG | OXYGEN SATURATION: 98 % | BODY MASS INDEX: 27.2 KG/M2 | RESPIRATION RATE: 18 BRPM | WEIGHT: 190 LBS | DIASTOLIC BLOOD PRESSURE: 82 MMHG | TEMPERATURE: 99 F | HEIGHT: 70 IN

## 2024-02-11 DIAGNOSIS — J06.9 VIRAL URI WITH COUGH: Primary | ICD-10-CM

## 2024-02-11 LAB
CTP QC/QA: YES
INFLUENZA A ANTIGEN, POC: NEGATIVE
INFLUENZA B ANTIGEN, POC: NEGATIVE
POC RAPID STREP A: NEGATIVE
POCT GLUCOSE: 186 MG/DL (ref 70–110)
SARS-COV-2 RDRP RESP QL NAA+PROBE: NEGATIVE

## 2024-02-11 PROCEDURE — 87804 INFLUENZA ASSAY W/OPTIC: CPT | Mod: 59,ER

## 2024-02-11 PROCEDURE — 87880 STREP A ASSAY W/OPTIC: CPT | Mod: ER

## 2024-02-11 PROCEDURE — 82962 GLUCOSE BLOOD TEST: CPT | Mod: ER

## 2024-02-11 PROCEDURE — 87635 SARS-COV-2 COVID-19 AMP PRB: CPT | Mod: ER

## 2024-02-11 PROCEDURE — 99283 EMERGENCY DEPT VISIT LOW MDM: CPT | Mod: ER

## 2024-02-11 RX ORDER — GUAIFENESIN AND DEXTROMETHORPHAN HYDROBROMIDE 1200; 60 MG/1; MG/1
1 TABLET, EXTENDED RELEASE ORAL EVERY 12 HOURS
Qty: 14 TABLET | Refills: 0 | Status: SHIPPED | OUTPATIENT
Start: 2024-02-11 | End: 2024-02-18

## 2024-02-11 RX ORDER — LEVOCETIRIZINE DIHYDROCHLORIDE 5 MG/1
5 TABLET, FILM COATED ORAL NIGHTLY
Qty: 14 TABLET | Refills: 0 | Status: SHIPPED | OUTPATIENT
Start: 2024-02-11 | End: 2024-02-25

## 2024-02-11 RX ORDER — BENZONATATE 200 MG/1
200 CAPSULE ORAL 3 TIMES DAILY PRN
Qty: 20 CAPSULE | Refills: 0 | Status: SHIPPED | OUTPATIENT
Start: 2024-02-11 | End: 2024-02-21

## 2024-02-11 NOTE — ED NOTES
Pt consented to being seen/treated in [facility] chair. Pt prefers treatment in hallway instead of waiting for room. Provider notified.

## 2024-02-11 NOTE — ED PROVIDER NOTES
Encounter Date: 2/11/2024    SCRIBE #1 NOTE: I, Karla Graham, am scribing for, and in the presence of,  Anya Snyder MD. I have scribed the following portions of the note - Other sections scribed: HPI,ROS,PE.       History     Chief Complaint   Patient presents with    Cough    Sore Throat    URI     A 77 y/o male presents to the ER c/o URI symptoms accompanied with a productive cough and sore throat since Thursday. VSS, Afebrile.      Ruben Macedo Jr. is a 78 y.o. male, with a PMHx of bladder cancer, DM type 2, HLD, HTN, MI, stroke, and CAD, who presents to the ED with cough, congestion and sneezing that began 4 days ago. Patient reports associated symptom of sore throat that has alleviated. Patient reports worsening symptoms with no alleviation when taking Dayquil and robitussin which is what brought him into the ED today. Patient reports having a yellow phlegm. Patient reports that he is allergic to morphine. No other exacerbating or alleviating factors. Denies fever or other associated symptoms.      The history is provided by the patient. No  was used.     Review of patient's allergies indicates:   Allergen Reactions    Tramadol Nausea And Vomiting    Morphine Nausea And Vomiting     Can take Dilaudid, Demerol, Percocet & Norco.  This is side effect, not allergy.     Past Medical History:   Diagnosis Date    Anticoagulant long-term use     plavix    Anxiety     Bladder cancer     BPH (benign prostatic hypertrophy) 02/02/2015    Cancer     Cataract     Coronary artery disease 2005    stent x 1        Diabetes mellitus, type 2     History of colonic polyps 04/11/2017 2/13 --3 yrs    History of MI (myocardial infarction) 2005    Followed by cardiology, Dr. Acevedo (at [facility])    History of stroke 11/2005    Hyperlipidemia     Hypertension     Incomplete bladder emptying 02/02/2015    Myocardial infarction 2006    Overweight(278.02)     Personal history of kidney  stones     Renal cyst 02/10/2015    Stroke 2005    loss of peripheral vision     Past Surgical History:   Procedure Laterality Date    APPENDECTOMY      BACK SURGERY      BLADDER FULGURATION  5/11/2020    Procedure: FULGURATION, BLADDER;  Surgeon: Gelacio Sanchez MD;  Location: [facility];  Service: Urology;;    CATARACT EXTRACTION      od    CORONARY ANGIOPLASTY WITH STENT PLACEMENT  2000    CYSTOSCOPY  5/11/2020    Procedure: CYSTOSCOPY;  Surgeon: Gelacio Sanchez MD;  Location: [facility];  Service: Urology;;    CYSTOSCOPY W/ URETERAL STENT PLACEMENT      stents placed x2    DILATION OF URETHRA N/A 11/12/2020    Procedure: DILATION, URETHRA;  Surgeon: Gelacio Sanchez MD;  Location: [facility];  Service: Urology;  Laterality: N/A;    EYE SURGERY      cataract right eye    REMOVAL OF BLOOD CLOT  5/11/2020    Procedure: REMOVAL, BLOOD CLOT;  Surgeon: Gelacio Sanchez MD;  Location: [facility];  Service: Urology;;    SPINE SURGERY      TONSILLECTOMY      TURBT, WITH BLUE LIGHT CYSTOSCOPY AND CYSVIEW N/A 7/9/2020    Procedure: TURBT,WITH BLUE LIGHT CYSTOSCOPY AND CYSVIEW;  Surgeon: Gelacio Sanchez MD;  Location: [facility];  Service: Urology;  Laterality: N/A;    TURBT, WITH BLUE LIGHT CYSTOSCOPY AND CYSVIEW Bilateral 11/12/2020    Procedure: TURBT,WITH BLUE LIGHT CYSTOSCOPY AND CYSVIEW; bilateral retrograde pyelogram;  Surgeon: Gelacio Sanchez MD;  Location: [facility];  Service: Urology;  Laterality: Bilateral;     Family History   Problem Relation Age of Onset    Cataracts Mother     Hyperlipidemia Father     Cancer Father     Cataracts Father     Hyperlipidemia Sister     No Known Problems Sister     No Known Problems Brother     No Known Problems Maternal Aunt     No Known Problems Maternal Uncle     No Known Problems Paternal Aunt     No Known Problems Paternal Uncle     No Known Problems Maternal Grandmother     No Known Problems Maternal Grandfather     No Known Problems Paternal Grandmother     No Known Problems Paternal  Grandfather     No Known Problems Son     No Known Problems Son     Alzheimer's disease Other     Diabetes Neg Hx     Amblyopia Neg Hx     Blindness Neg Hx     Glaucoma Neg Hx     Hypertension Neg Hx     Macular degeneration Neg Hx     Retinal detachment Neg Hx     Strabismus Neg Hx     Stroke Neg Hx     Thyroid disease Neg Hx      Social History     Tobacco Use    Smoking status: Never    Smokeless tobacco: Never   Substance Use Topics    Alcohol use: Yes     Comment: occasional    Drug use: No     Review of Systems   Constitutional:  Negative for activity change, appetite change, chills and fever.   HENT:  Positive for congestion and sneezing. Negative for rhinorrhea and sore throat.    Respiratory:  Positive for cough. Negative for chest tightness, shortness of breath and wheezing.    Cardiovascular:  Negative for chest pain and palpitations.   Gastrointestinal:  Negative for abdominal pain, diarrhea, nausea and vomiting.   Skin:  Negative for rash.   Neurological:  Negative for dizziness, light-headedness and headaches.   All other systems reviewed and are negative.      Physical Exam     Initial Vitals [02/11/24 0807]   BP Pulse Resp Temp SpO2   138/87 89 18 98.8 °F (37.1 °C) 96 %      MAP       --         Physical Exam    Nursing note and vitals reviewed.  Constitutional: He appears well-developed and well-nourished. No distress.   HENT:   Head: Normocephalic and atraumatic.   Right Ear: Tympanic membrane normal.   Left Ear: Tympanic membrane normal.   Mouth/Throat: Oropharynx is clear and moist and mucous membranes are normal. No oropharyngeal exudate, posterior oropharyngeal edema or posterior oropharyngeal erythema.   Eyes: Conjunctivae are normal.   Neck:   Normal range of motion.  Cardiovascular:  Normal rate and regular rhythm.           No murmur heard.  Pulmonary/Chest: Effort normal and breath sounds normal. No respiratory distress.   Abdominal: Bowel sounds are normal. He exhibits no distension.  There is no abdominal tenderness.   Musculoskeletal:         General: No tenderness or edema. Normal range of motion.      Cervical back: Normal range of motion.     Neurological: He is alert and oriented to person, place, and time.   Skin: Skin is warm and dry. No rash noted.   Psychiatric: He has a normal mood and affect. His behavior is normal.         ED Course   Procedures  Labs Reviewed   POCT GLUCOSE - Abnormal; Notable for the following components:       Result Value    POCT Glucose 186 (*)     All other components within normal limits   SARS-COV-2 RDRP GENE    Narrative:     This test utilizes isothermal nucleic acid amplification technology to detect the SARS-CoV-2 RdRp nucleic acid segment. The analytical sensitivity (limit of detection) is 500 copies/swab.     A POSITIVE result is indicative of the presence of SARS-CoV-2 RNA; clinical correlation with patient history and other diagnostic information is necessary to determine patient infection status.    A NEGATIVE result means that SARS-CoV-2 nucleic acids are not present above the limit of detection. A NEGATIVE result should be treated as presumptive. It does not rule out the possibility of COVID-19 and should not be the sole basis for treatment decisions. If COVID-19 is strongly suspected based on clinical and exposure history, re-testing using an alternate molecular assay should be considered.     Commercial kits are provided by [employer].   _________________________________________________________________   The authorized Fact Sheet for Healthcare Providers and the authorized Fact Sheet for Patients of the ID NOW COVID-19 are available on the FDA website:    https://www.fda.gov/media/113805/download      https://www.fda.gov/media/397070/download      POCT STREP A MOLECULAR   POCT INFLUENZA A/B MOLECULAR   POCT GLUCOSE MONITORING CONTINUOUS   POCT STREP A, RAPID   POCT RAPID INFLUENZA A/B          Imaging Results    None          Medications  - No data to display  Medical Decision Making  Ruben Macedo Jr. is a 78 y.o. male, presents to the ED with cough, congestion and sneezing that began 4 days ago. On exam TM's were clear, Oropharynx is clear, moist and mucous membranes are normal. Clear and equal breath sounds. In shared decision making with the patient I ordered labs. I considered but excluded fever, covid, flu and strep throat in my differential diagnosis. I feel this is likely allergic or other viral illness - will treat with xyzal, mucinex dm, and tessalon perles.      Amount and/or Complexity of Data Reviewed  Labs: ordered.    Risk  OTC drugs.  Prescription drug management.            Scribe Attestation:   Scribe #1: I performed the above scribed service and the documentation accurately describes the services I performed. I attest to the accuracy of the note.              I, Dr. Anya Snyder, personally performed the services described in this documentation.   All medical record entries made by the scribe were at my direction and in my presence.   I have reviewed the chart and agree that the record is accurate and complete.   Anya Snyder MD.  8:56 AM 02/11/2024                    Clinical Impression:  Final diagnoses:  [J06.9] Viral URI with cough (Primary)          ED Disposition Condition    Discharge Stable          ED Prescriptions       Medication Sig Dispense Start Date End Date Auth. Provider    levocetirizine (XYZAL) 5 MG tablet Take 1 tablet (5 mg total) by mouth every evening. for 14 days 14 tablet 2/11/2024 2/25/2024 Anya Snyder MD    dextromethorphan-guaiFENesin 60-1,200 mg per 12 hr tablet Take 1 tablet by mouth every 12 (twelve) hours. for 7 days 14 tablet 2/11/2024 2/18/2024 Anya Snyder MD    benzonatate (TESSALON) 200 MG capsule Take 1 capsule (200 mg total) by mouth 3 (three) times daily as needed for Cough. 20 capsule 2/11/2024 2/21/2024 Anya Snyder MD          Follow-up Information       Follow up With Specialties  Details Why Contact Info    [facility] ED Emergency Medicine  As needed [address]  [facility] [address] [phone]             Anya Snyder MD  02/11/24 0993

## 2024-02-11 NOTE — DISCHARGE INSTRUCTIONS
You do not have covid, flu or strep throat. You most likely have another virus. Take medications as directed. Drink lots of water.

## 2024-02-18 ENCOUNTER — OFFICE VISIT (OUTPATIENT)
Dept: URGENT CARE | Facility: CLINIC | Age: 79
End: 2024-02-18
Payer: MEDICARE

## 2024-02-18 VITALS
BODY MASS INDEX: 27.2 KG/M2 | WEIGHT: 190 LBS | HEART RATE: 86 BPM | HEIGHT: 70 IN | TEMPERATURE: 98 F | OXYGEN SATURATION: 95 % | SYSTOLIC BLOOD PRESSURE: 166 MMHG | DIASTOLIC BLOOD PRESSURE: 87 MMHG | RESPIRATION RATE: 20 BRPM

## 2024-02-18 DIAGNOSIS — B96.89 BACTERIAL SINUSITIS: Primary | ICD-10-CM

## 2024-02-18 DIAGNOSIS — R05.9 COUGH, UNSPECIFIED TYPE: ICD-10-CM

## 2024-02-18 DIAGNOSIS — J32.9 BACTERIAL SINUSITIS: Primary | ICD-10-CM

## 2024-02-18 PROCEDURE — 99214 OFFICE O/P EST MOD 30 MIN: CPT | Mod: S$GLB,,, | Performed by: NURSE PRACTITIONER

## 2024-02-18 PROCEDURE — 71046 X-RAY EXAM CHEST 2 VIEWS: CPT | Mod: S$GLB,,, | Performed by: RADIOLOGY

## 2024-02-18 RX ORDER — AZELASTINE 1 MG/ML
1 SPRAY, METERED NASAL 2 TIMES DAILY
Qty: 30 ML | Refills: 0 | Status: SHIPPED | OUTPATIENT
Start: 2024-02-18 | End: 2024-06-04

## 2024-02-18 RX ORDER — PROMETHAZINE HYDROCHLORIDE AND DEXTROMETHORPHAN HYDROBROMIDE 6.25; 15 MG/5ML; MG/5ML
5 SYRUP ORAL NIGHTLY PRN
Qty: 118 ML | Refills: 0 | Status: SHIPPED | OUTPATIENT
Start: 2024-02-18

## 2024-02-18 RX ORDER — AMOXICILLIN AND CLAVULANATE POTASSIUM 875; 125 MG/1; MG/1
1 TABLET, FILM COATED ORAL 2 TIMES DAILY
Qty: 14 TABLET | Refills: 0 | Status: SHIPPED | OUTPATIENT
Start: 2024-02-18

## 2024-02-18 RX ORDER — FLUTICASONE PROPIONATE 50 MCG
1 SPRAY, SUSPENSION (ML) NASAL 2 TIMES DAILY
Qty: 9.9 ML | Refills: 0 | Status: SHIPPED | OUTPATIENT
Start: 2024-02-18

## 2024-02-18 NOTE — PROGRESS NOTES
"Subjective:      Patient ID: Rubne Macedo Jr. is a 78 y.o. male.    Vitals:  height is 5' 10" (1.778 m) and weight is 86.2 kg (190 lb). His oral temperature is 98.1 °F (36.7 °C). His blood pressure is 166/87 (abnormal) and his pulse is 86. His respiration is 20 and oxygen saturation is 95%.     Chief Complaint: Cough and Headache    78-year-old male with medical history as listed below presents to clinic for evaluation of sinus congestion, pressure, cough, chest congestion for the last 2 weeks.  Patient states that he was seen in the freestanding Emergency room, where he tested negative for COVID and influenza.  He has been taking recommended medications with only mild relief.  He denies shortness or breath, however feels that his cough has worsened.  He is awake and alert, answers questions appropriately, no acute distress noted on today's visit.    Past Medical History:  No date: Anticoagulant long-term use      Comment:  plavix  No date: Anxiety  No date: Bladder cancer  02/02/2015: BPH (benign prostatic hypertrophy)  No date: Cancer  No date: Cataract  2005: Coronary artery disease      Comment:  stent x 1      No date: Diabetes mellitus, type 2  04/11/2017: History of colonic polyps      Comment:  2/13 --3 yrs  2005: History of MI (myocardial infarction)      Comment:  Followed by cardiology, Dr. Acevedo (at [facility])  11/2005: History of stroke  No date: Hyperlipidemia  No date: Hypertension  02/02/2015: Incomplete bladder emptying  2006: Myocardial infarction  No date: Overweight(278.02)  No date: Personal history of kidney stones  02/10/2015: Renal cyst  2005: Stroke      Comment:  loss of peripheral vision          Cough  This is a recurrent problem. The current episode started 1 to 4 weeks ago. The problem has been gradually worsening. The problem occurs constantly. The cough is Productive of brown sputum. Associated symptoms include headaches, nasal congestion and a sore " throat. Pertinent negatives include no chest pain, chills, fever or shortness of breath. He has tried nothing (tessalon perles, mucinex,) for the symptoms. The treatment provided mild relief. His past medical history is significant for bronchitis.       Constitution: Negative for activity change, appetite change, chills, sweating, fatigue and fever.   HENT:  Positive for congestion and sore throat.    Cardiovascular:  Negative for chest pain.   Respiratory:  Positive for cough. Negative for shortness of breath.    Gastrointestinal:  Negative for nausea and vomiting.   Neurological:  Positive for headaches. Negative for dizziness.      Objective:     Physical Exam   Constitutional: He is oriented to person, place, and time. He appears well-developed.  Non-toxic appearance. He does not appear ill.   HENT:   Head: Normocephalic and atraumatic. Head is without abrasion, without contusion and without laceration.   Ears:   Right Ear: Tympanic membrane and external ear normal.   Left Ear: Tympanic membrane and external ear normal.   Nose: Congestion present.   Mouth/Throat: Mucous membranes are normal. Mucous membranes are moist. Posterior oropharyngeal erythema present. No oropharyngeal exudate.   Eyes: Conjunctivae, EOM and lids are normal.   Neck: Trachea normal and phonation normal.   Cardiovascular: Normal rate.   Pulmonary/Chest: Effort normal and breath sounds normal. No stridor. No respiratory distress. He has no wheezes.   Abdominal: Normal appearance.   Musculoskeletal: Normal range of motion.         General: Normal range of motion.   Neurological: He is alert and oriented to person, place, and time.   Skin: Skin is warm, dry, intact and not pale. No abrasion, No burn and No ecchymosis   Psychiatric: His speech is normal and behavior is normal. Mood, judgment and thought content normal.   Nursing note and vitals reviewed.    X-Ray Chest PA And Lateral    Result Date: 2/18/2024  EXAMINATION: XR CHEST PA AND  LATERAL CLINICAL HISTORY: Cough, unspecified TECHNIQUE: PA and lateral views of the chest were performed. COMPARISON: 01/14/2022 FINDINGS: The cardiomediastinal silhouette is not enlarged noting calcification of the aorta..  There is no pleural effusion.  The trachea is midline.  The lungs are symmetrically expanded bilaterally with coarse interstitial attenuation.  No large focal consolidation seen.  There is no pneumothorax.  The osseous structures are remarkable for degenerative change..     1. Interstitial findings could reflect edema versus chronic change.  No large focal consolidation. Electronically signed by: Fabio Staley MD Date:    02/18/2024 Time:    13:19       Assessment:     1. Bacterial sinusitis    2. Cough, unspecified type        Plan:       Bacterial sinusitis  -     amoxicillin-clavulanate 875-125mg (AUGMENTIN) 875-125 mg per tablet; Take 1 tablet by mouth 2 (two) times daily.  Dispense: 14 tablet; Refill: 0  -     fluticasone propionate (FLONASE) 50 mcg/actuation nasal spray; 1 spray (50 mcg total) by Each Nostril route 2 (two) times daily.  Dispense: 9.9 mL; Refill: 0  -     azelastine (ASTELIN) 137 mcg (0.1 %) nasal spray; 1 spray (137 mcg total) by Nasal route 2 (two) times daily. for 10 days  Dispense: 30 mL; Refill: 0    Cough, unspecified type  -     X-Ray Chest PA And Lateral; Future; Expected date: 02/18/2024  -     promethazine-dextromethorphan (PROMETHAZINE-DM) 6.25-15 mg/5 mL Syrp; Take 5 mLs by mouth nightly as needed (cough).  Dispense: 118 mL; Refill: 0      - given length of symptoms, will cover for bacterial etiology.  Continue symptomatic care.  Follow-up with PCP.  Patient verbalized understanding and is in agreement with plan.    Patient Instructions   - Follow up with your PCP or specialty clinic as directed in the next 1-2 weeks if not improved or as needed.  You can call [phone] to schedule an appointment with the appropriate provider.    - Go to the ER or seek  medical attention immediately if you develop new or worsening symptoms.    - You must understand that you have received an Urgent Care treatment only and that you may be released before all of your medical problems are known or treated.   - You, the patient, will arrange for follow up care as instructed.   - If your condition worsens or fails to improve we recommend that you receive another evaluation at the ER immediately or contact your PCP to discuss your concerns or return here.        - Tylenol or Ibuprofen as directed as needed for fever/pain. Avoid tylenol if you have a history of liver disease. Do not take ibuprofen if you have a history of GI bleeding, kidney disease, or if you take blood thinners.   - Take ibuprofen 600-800 mg every 6-8 hours for pain and inflammation.  You can also take Tylenol/acetaminophen 650-1000 mg every 6-8 hours for added pain relief.     - You can take over-the-counter claritin, zyrtec, allegra, or xyzal as directed. These are antihistamines that can help with runny nose, nasal congestion, sneezing, and helps to dry up post-nasal drip, which usually causes sore throat and cough.              - If you do NOT have high blood pressure, you may use a decongestant form (D)  of this medication or if you do not take the D form, you can take sudafed (pseudoephedrine) over the counter, which is a decongestant.     - You can use Flonase (fluticasone) nasal spray as directed for sinus congestion and postnasal drip. This is a steroid nasal spray that works locally over time to decrease the inflammation in your nose/sinuses and help with allergic symptoms. This is not an quick- relief spray like afrin, but it works well if used daily.  Discontinue if you develop nose bleed  - use nasal saline prior to Flonase.     - Use Ocean Spray Nasal Saline 1-3 puffs each nostril every 2-3 hours then blow out onto tissue. This is to irrigate the nasal passage way to clear the sinus openings. Use until  sinus problem resolved.     - you can take plain Mucinex (guaifenesin) 1200 mg twice a day to help loosen mucous     -warm salt water gargles can help with sore throat     - warm tea with honey can help with cough. Honey is a natural cough suppressant.     - Follow up with your PCP or specialty clinic as directed in the next 1-2 weeks if not improved or as needed.  You can call [phone] to schedule an appointment with the appropriate provider.       - Go to the ER if you develop new or worsening symptoms.

## 2024-02-18 NOTE — PATIENT INSTRUCTIONS
- Follow up with your PCP or specialty clinic as directed in the next 1-2 weeks if not improved or as needed.  You can call [phone] to schedule an appointment with the appropriate provider.    - Go to the ER or seek medical attention immediately if you develop new or worsening symptoms.    - You must understand that you have received an Urgent Care treatment only and that you may be released before all of your medical problems are known or treated.   - You, the patient, will arrange for follow up care as instructed.   - If your condition worsens or fails to improve we recommend that you receive another evaluation at the ER immediately or contact your PCP to discuss your concerns or return here.        - Tylenol or Ibuprofen as directed as needed for fever/pain. Avoid tylenol if you have a history of liver disease. Do not take ibuprofen if you have a history of GI bleeding, kidney disease, or if you take blood thinners.   - Take ibuprofen 600-800 mg every 6-8 hours for pain and inflammation.  You can also take Tylenol/acetaminophen 650-1000 mg every 6-8 hours for added pain relief.     - You can take over-the-counter claritin, zyrtec, allegra, or xyzal as directed. These are antihistamines that can help with runny nose, nasal congestion, sneezing, and helps to dry up post-nasal drip, which usually causes sore throat and cough.              - If you do NOT have high blood pressure, you may use a decongestant form (D)  of this medication or if you do not take the D form, you can take sudafed (pseudoephedrine) over the counter, which is a decongestant.     - You can use Flonase (fluticasone) nasal spray as directed for sinus congestion and postnasal drip. This is a steroid nasal spray that works locally over time to decrease the inflammation in your nose/sinuses and help with allergic symptoms. This is not an quick- relief spray like afrin, but it works well if used daily.  Discontinue if you develop nose  bleed  - use nasal saline prior to Flonase.     - Use Ocean Spray Nasal Saline 1-3 puffs each nostril every 2-3 hours then blow out onto tissue. This is to irrigate the nasal passage way to clear the sinus openings. Use until sinus problem resolved.     - you can take plain Mucinex (guaifenesin) 1200 mg twice a day to help loosen mucous     -warm salt water gargles can help with sore throat     - warm tea with honey can help with cough. Honey is a natural cough suppressant.     - Follow up with your PCP or specialty clinic as directed in the next 1-2 weeks if not improved or as needed.  You can call [phone] to schedule an appointment with the appropriate provider.       - Go to the ER if you develop new or worsening symptoms.

## 2024-02-20 DIAGNOSIS — F41.9 ANXIETY: ICD-10-CM

## 2024-02-21 RX ORDER — LORAZEPAM 0.5 MG/1
0.5 TABLET ORAL EVERY 6 HOURS PRN
Qty: 30 TABLET | Refills: 0 | Status: SHIPPED | OUTPATIENT
Start: 2024-02-21 | End: 2024-04-17 | Stop reason: SDUPTHER

## 2024-02-21 NOTE — TELEPHONE ENCOUNTER
Care Due:                  Date            Visit Type   Department     Provider  --------------------------------------------------------------------------------                                [facility]                              PRIMARY      MED/ INTERNAL  Cedric Stark  Last Visit: 08-      CARE ([facility])   MED/ PEDS      Ehrensing  Next Visit: None Scheduled  None         None Found                                                            Last  Test          Frequency    Reason                     Performed    Due Date  --------------------------------------------------------------------------------    CBC.........  12 months..  allopurinoL..............  01-   01-    CMP.........  12 months..  SITagliptin, allopurinoL,   02- 02-                             glimepiride, metFORMIN...    HBA1C.......  6 months...  SITagliptin, glimepiride,   08- 02-                             metFORMIN................    Uric Acid...  12 months..  allopurinoL..............  Not Found    Overdue    [employer] Embedded Care Due Messages. Reference number: [id number].   2/20/2024 10:25:11 PM CST

## 2024-03-18 RX ORDER — VALSARTAN 320 MG/1
320 TABLET ORAL DAILY
Qty: 90 TABLET | Refills: 3 | Status: SHIPPED | OUTPATIENT
Start: 2024-03-18

## 2024-03-30 DIAGNOSIS — I10 PRIMARY HYPERTENSION: ICD-10-CM

## 2024-04-01 RX ORDER — METOPROLOL SUCCINATE 25 MG/1
TABLET, EXTENDED RELEASE ORAL
Qty: 90 TABLET | Refills: 3 | Status: SHIPPED | OUTPATIENT
Start: 2024-04-01

## 2024-04-17 DIAGNOSIS — F41.9 ANXIETY: ICD-10-CM

## 2024-04-17 NOTE — TELEPHONE ENCOUNTER
No care due was identified.  [facility] Embedded Care Due Messages. Reference number: [id number].   4/17/2024 9:11:49 AM CDT

## 2024-04-18 ENCOUNTER — TELEPHONE (OUTPATIENT)
Dept: FAMILY MEDICINE | Facility: CLINIC | Age: 79
End: 2024-04-18
Payer: MEDICARE

## 2024-04-18 NOTE — TELEPHONE ENCOUNTER
----- Message from Sadie Hogan sent at 4/18/2024 12:42 PM CDT -----  Regarding: wife  Type: RX Refill Request     Who Called:wife     Have you contacted your pharmacy:no     Refill     RX Name and Strength:LORazepam (ATIVAN) 0.5 MG tablet     Preferred Pharmacy with phone number:   [facility]/pharmacy #[id number] - [place] - [address] [facility]  [address]  [place] [address]  Phone: [phone] Fax: [phone]             Local or Mail Order:local     Would the patient rather a call back or a response via [facility]?call     Best Call Back Number: [phone]     Additional Information:     Thank you.

## 2024-04-18 NOTE — TELEPHONE ENCOUNTER
----- Message from Sadie Hogan sent at 4/18/2024 12:42 PM CDT -----  Regarding: wife  Type: RX Refill Request     Who Called:wife     Have you contacted your pharmacy:no     Refill     RX Name and Strength:LORazepam (ATIVAN) 0.5 MG tablet     Preferred Pharmacy with phone number:   [facility]/pharmacy #[id number] - [place] - [address] [facility]  [address]  [place] [address]  Phone: [phone] Fax: [phone]             Local or Mail Order:local     Would the patient rather a call back or a response via [facility]?call     Best Call Back Number: [phone]     Additional Information:     Thank you.

## 2024-04-21 RX ORDER — LORAZEPAM 0.5 MG/1
0.5 TABLET ORAL EVERY 6 HOURS PRN
Qty: 15 TABLET | Refills: 0 | Status: SHIPPED | OUTPATIENT
Start: 2024-04-21 | End: 2024-06-04 | Stop reason: SDUPTHER

## 2024-04-30 DIAGNOSIS — Z00.00 ENCOUNTER FOR MEDICARE ANNUAL WELLNESS EXAM: ICD-10-CM

## 2024-05-01 ENCOUNTER — PATIENT OUTREACH (OUTPATIENT)
Dept: ADMINISTRATIVE | Facility: HOSPITAL | Age: 79
End: 2024-05-01
Payer: MEDICARE

## 2024-05-01 DIAGNOSIS — E11.9 TYPE 2 DIABETES MELLITUS WITHOUT COMPLICATION, WITHOUT LONG-TERM CURRENT USE OF INSULIN: Primary | ICD-10-CM

## 2024-05-02 RX ORDER — EZETIMIBE 10 MG/1
TABLET ORAL
Qty: 90 TABLET | Refills: 3 | Status: SHIPPED | OUTPATIENT
Start: 2024-05-02

## 2024-05-03 ENCOUNTER — LAB VISIT (OUTPATIENT)
Dept: LAB | Facility: HOSPITAL | Age: 79
End: 2024-05-03
Payer: MEDICARE

## 2024-05-03 DIAGNOSIS — E11.9 TYPE 2 DIABETES MELLITUS WITHOUT COMPLICATION, WITHOUT LONG-TERM CURRENT USE OF INSULIN: ICD-10-CM

## 2024-05-03 LAB
ESTIMATED AVG GLUCOSE: 192 MG/DL (ref 68–131)
HBA1C MFR BLD: 8.3 % (ref 4–5.6)

## 2024-05-03 PROCEDURE — 36415 COLL VENOUS BLD VENIPUNCTURE: CPT | Mod: PO | Performed by: INTERNAL MEDICINE

## 2024-05-03 PROCEDURE — 83036 HEMOGLOBIN GLYCOSYLATED A1C: CPT | Performed by: INTERNAL MEDICINE

## 2024-05-07 ENCOUNTER — OFFICE VISIT (OUTPATIENT)
Dept: OPTOMETRY | Facility: CLINIC | Age: 79
End: 2024-05-07
Payer: MEDICARE

## 2024-05-07 DIAGNOSIS — Z96.1 PSEUDOPHAKIA OF BOTH EYES: ICD-10-CM

## 2024-05-07 DIAGNOSIS — E11.9 DIABETES MELLITUS TYPE 2 WITHOUT RETINOPATHY: Primary | ICD-10-CM

## 2024-05-07 DIAGNOSIS — H53.461 RIGHT HOMONYMOUS HEMIANOPSIA: ICD-10-CM

## 2024-05-07 DIAGNOSIS — Z13.5 GLAUCOMA SCREENING: ICD-10-CM

## 2024-05-07 PROCEDURE — 3288F FALL RISK ASSESSMENT DOCD: CPT | Mod: CPTII,S$GLB,, | Performed by: OPTOMETRIST

## 2024-05-07 PROCEDURE — 2023F DILAT RTA XM W/O RTNOPTHY: CPT | Mod: CPTII,S$GLB,, | Performed by: OPTOMETRIST

## 2024-05-07 PROCEDURE — 1101F PT FALLS ASSESS-DOCD LE1/YR: CPT | Mod: CPTII,S$GLB,, | Performed by: OPTOMETRIST

## 2024-05-07 PROCEDURE — 99999 PR PBB SHADOW E&M-EST. PATIENT-LVL III: CPT | Mod: PBBFAC,,, | Performed by: OPTOMETRIST

## 2024-05-07 PROCEDURE — 1159F MED LIST DOCD IN RCRD: CPT | Mod: CPTII,S$GLB,, | Performed by: OPTOMETRIST

## 2024-05-07 PROCEDURE — 92014 COMPRE OPH EXAM EST PT 1/>: CPT | Mod: S$GLB,,, | Performed by: OPTOMETRIST

## 2024-05-07 NOTE — PROGRESS NOTES
HPI    HPI    Annual Diabetic Eye Exam    Dls: 12/20/2022 Dr. Mullins      79 y/o male presents today for diabetic and hypertensive eye exam.  Pt states no changes in vision since last eye exam . Pt wears bifocal   glasses daily states no changes. Would like Updated Rx     Hx of TIA in 2005 with decreased peripheral vision      LBS: 150 Last week ?   Pt does not check daily   - tearing  - itching  -burning  -pain  -floaters  - flashes     Eye meds   Otc gtts ou prn       Hemoglobin A1C       Date                     Value               Ref Range             Status                05/03/2024               8.3 (H)             4.0 - 5.6 %           Final                 08/04/2023               7.2 (H)             4.0 - 5.6 %           Final                 04/28/2023               8.5 (H)             4.0 - 5.6 %           Final                Last edited by Bethel Mullins, OD on 5/7/2024 12:33 PM.            Assessment /Plan     For exam results, see Encounter Report.    Diabetes mellitus type 2 without retinopathy  -No retinopathy noted today.  Continued control with primary care physician and annual comprehensive eye exam.    Right homonymous hemianopsia  -Long standing from TIA 2005, subjective improvement    Glaucoma screening  -Monitor with annual eye exam and IOP check    Pseudophakia of both eyes  -clear, centered      RTC 1 yr

## 2024-06-03 ENCOUNTER — TELEPHONE (OUTPATIENT)
Dept: FAMILY MEDICINE | Facility: CLINIC | Age: 79
End: 2024-06-03
Payer: MEDICARE

## 2024-06-03 NOTE — TELEPHONE ENCOUNTER
I left two voice mail reminders for the patient in regards to an upcoming appointment on 06/04/26 with Dr. Holder.

## 2024-06-04 ENCOUNTER — OFFICE VISIT (OUTPATIENT)
Dept: FAMILY MEDICINE | Facility: CLINIC | Age: 79
End: 2024-06-04
Payer: MEDICARE

## 2024-06-04 VITALS
TEMPERATURE: 98 F | OXYGEN SATURATION: 96 % | DIASTOLIC BLOOD PRESSURE: 58 MMHG | HEIGHT: 70 IN | SYSTOLIC BLOOD PRESSURE: 110 MMHG | BODY MASS INDEX: 26.99 KG/M2 | HEART RATE: 81 BPM | WEIGHT: 188.5 LBS

## 2024-06-04 DIAGNOSIS — I25.10 CORONARY ARTERY DISEASE INVOLVING NATIVE CORONARY ARTERY OF NATIVE HEART WITHOUT ANGINA PECTORIS: ICD-10-CM

## 2024-06-04 DIAGNOSIS — E11.65 TYPE 2 DIABETES MELLITUS WITH HYPERGLYCEMIA, WITHOUT LONG-TERM CURRENT USE OF INSULIN: ICD-10-CM

## 2024-06-04 DIAGNOSIS — I70.0 ATHEROSCLEROSIS OF AORTA: ICD-10-CM

## 2024-06-04 DIAGNOSIS — N20.0 URIC ACID KIDNEY STONE: ICD-10-CM

## 2024-06-04 DIAGNOSIS — F41.9 ANXIETY DISORDER, UNSPECIFIED TYPE: ICD-10-CM

## 2024-06-04 DIAGNOSIS — F13.20 BENZODIAZEPINE DEPENDENCE: ICD-10-CM

## 2024-06-04 DIAGNOSIS — Z12.5 SCREENING FOR PROSTATE CANCER: ICD-10-CM

## 2024-06-04 DIAGNOSIS — Z86.010 HISTORY OF COLONIC POLYPS: ICD-10-CM

## 2024-06-04 DIAGNOSIS — D69.6 THROMBOCYTOPENIA: ICD-10-CM

## 2024-06-04 DIAGNOSIS — E11.9 TYPE 2 DIABETES MELLITUS WITHOUT COMPLICATION, WITHOUT LONG-TERM CURRENT USE OF INSULIN: ICD-10-CM

## 2024-06-04 DIAGNOSIS — F41.9 ANXIETY: ICD-10-CM

## 2024-06-04 DIAGNOSIS — F39 MOOD DISORDER: ICD-10-CM

## 2024-06-04 DIAGNOSIS — I10 ESSENTIAL HYPERTENSION: ICD-10-CM

## 2024-06-04 DIAGNOSIS — Z91.199 POOR COMPLIANCE: ICD-10-CM

## 2024-06-04 DIAGNOSIS — M46.92 UNSPECIFIED INFLAMMATORY SPONDYLOPATHY, CERVICAL REGION: ICD-10-CM

## 2024-06-04 DIAGNOSIS — Z00.00 ROUTINE MEDICAL EXAM: Primary | ICD-10-CM

## 2024-06-04 DIAGNOSIS — E78.5 HYPERLIPIDEMIA, UNSPECIFIED HYPERLIPIDEMIA TYPE: ICD-10-CM

## 2024-06-04 DIAGNOSIS — R80.9 TYPE 2 DIABETES MELLITUS WITH MICROALBUMINURIA, WITHOUT LONG-TERM CURRENT USE OF INSULIN: ICD-10-CM

## 2024-06-04 DIAGNOSIS — C67.9 MALIGNANT NEOPLASM OF URINARY BLADDER, UNSPECIFIED SITE: ICD-10-CM

## 2024-06-04 DIAGNOSIS — E11.29 TYPE 2 DIABETES MELLITUS WITH MICROALBUMINURIA, WITHOUT LONG-TERM CURRENT USE OF INSULIN: ICD-10-CM

## 2024-06-04 DIAGNOSIS — R80.9 MICROALBUMINURIA: ICD-10-CM

## 2024-06-04 DIAGNOSIS — N40.0 BENIGN PROSTATIC HYPERPLASIA, UNSPECIFIED WHETHER LOWER URINARY TRACT SYMPTOMS PRESENT: ICD-10-CM

## 2024-06-04 DIAGNOSIS — D64.9 ANEMIA, UNSPECIFIED TYPE: ICD-10-CM

## 2024-06-04 PROCEDURE — 1101F PT FALLS ASSESS-DOCD LE1/YR: CPT | Mod: CPTII,S$GLB,, | Performed by: INTERNAL MEDICINE

## 2024-06-04 PROCEDURE — 3288F FALL RISK ASSESSMENT DOCD: CPT | Mod: CPTII,S$GLB,, | Performed by: INTERNAL MEDICINE

## 2024-06-04 PROCEDURE — 99214 OFFICE O/P EST MOD 30 MIN: CPT | Mod: 25,S$GLB,, | Performed by: INTERNAL MEDICINE

## 2024-06-04 PROCEDURE — 99999 PR PBB SHADOW E&M-EST. PATIENT-LVL IV: CPT | Mod: PBBFAC,,, | Performed by: INTERNAL MEDICINE

## 2024-06-04 PROCEDURE — 3078F DIAST BP <80 MM HG: CPT | Mod: CPTII,S$GLB,, | Performed by: INTERNAL MEDICINE

## 2024-06-04 PROCEDURE — 3074F SYST BP LT 130 MM HG: CPT | Mod: CPTII,S$GLB,, | Performed by: INTERNAL MEDICINE

## 2024-06-04 PROCEDURE — 1159F MED LIST DOCD IN RCRD: CPT | Mod: CPTII,S$GLB,, | Performed by: INTERNAL MEDICINE

## 2024-06-04 PROCEDURE — 1126F AMNT PAIN NOTED NONE PRSNT: CPT | Mod: CPTII,S$GLB,, | Performed by: INTERNAL MEDICINE

## 2024-06-04 PROCEDURE — 99397 PER PM REEVAL EST PAT 65+ YR: CPT | Mod: S$GLB,,, | Performed by: INTERNAL MEDICINE

## 2024-06-04 RX ORDER — ALLOPURINOL 300 MG/1
300 TABLET ORAL DAILY
Qty: 90 TABLET | Refills: 3 | Status: SHIPPED | OUTPATIENT
Start: 2024-06-04

## 2024-06-04 RX ORDER — GLIMEPIRIDE 4 MG/1
4 TABLET ORAL DAILY
Qty: 90 TABLET | Refills: 0 | Status: SHIPPED | OUTPATIENT
Start: 2024-06-04

## 2024-06-04 RX ORDER — LORAZEPAM 0.5 MG/1
0.5 TABLET ORAL NIGHTLY PRN
Qty: 30 TABLET | Refills: 3 | Status: SHIPPED | OUTPATIENT
Start: 2024-06-04 | End: 2024-07-04
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
## 2024-06-13 ENCOUNTER — OFFICE VISIT (OUTPATIENT)
Dept: CARDIOLOGY | Facility: CLINIC | Age: 79
End: 2024-06-13
Payer: MEDICARE

## 2024-06-13 VITALS
HEIGHT: 70 IN | BODY MASS INDEX: 27.46 KG/M2 | OXYGEN SATURATION: 96 % | DIASTOLIC BLOOD PRESSURE: 64 MMHG | SYSTOLIC BLOOD PRESSURE: 128 MMHG | HEART RATE: 81 BPM | WEIGHT: 191.81 LBS

## 2024-06-13 DIAGNOSIS — I10 ESSENTIAL HYPERTENSION: ICD-10-CM

## 2024-06-13 DIAGNOSIS — I70.0 ATHEROSCLEROSIS OF AORTA: ICD-10-CM

## 2024-06-13 DIAGNOSIS — E78.5 HYPERLIPIDEMIA, UNSPECIFIED HYPERLIPIDEMIA TYPE: ICD-10-CM

## 2024-06-13 DIAGNOSIS — I10 HYPERTENSION, UNSPECIFIED TYPE: ICD-10-CM

## 2024-06-13 DIAGNOSIS — R00.1 BRADYCARDIA: Primary | ICD-10-CM

## 2024-06-13 DIAGNOSIS — I25.10 CORONARY ARTERY DISEASE INVOLVING NATIVE CORONARY ARTERY OF NATIVE HEART WITHOUT ANGINA PECTORIS: ICD-10-CM

## 2024-06-13 PROCEDURE — 99999 PR PBB SHADOW E&M-EST. PATIENT-LVL V: CPT | Mod: PBBFAC,,, | Performed by: INTERNAL MEDICINE

## 2024-06-13 PROCEDURE — 1101F PT FALLS ASSESS-DOCD LE1/YR: CPT | Mod: CPTII,S$GLB,, | Performed by: INTERNAL MEDICINE

## 2024-06-13 PROCEDURE — 1126F AMNT PAIN NOTED NONE PRSNT: CPT | Mod: CPTII,S$GLB,, | Performed by: INTERNAL MEDICINE

## 2024-06-13 PROCEDURE — 99214 OFFICE O/P EST MOD 30 MIN: CPT | Mod: S$GLB,,, | Performed by: INTERNAL MEDICINE

## 2024-06-13 PROCEDURE — 1124F ACP DISCUSS-NO DSCNMKR DOCD: CPT | Mod: CPTII,S$GLB,, | Performed by: INTERNAL MEDICINE

## 2024-06-13 PROCEDURE — 3074F SYST BP LT 130 MM HG: CPT | Mod: CPTII,S$GLB,, | Performed by: INTERNAL MEDICINE

## 2024-06-13 PROCEDURE — 93000 ELECTROCARDIOGRAM COMPLETE: CPT | Mod: S$GLB,,, | Performed by: INTERNAL MEDICINE

## 2024-06-13 PROCEDURE — 3288F FALL RISK ASSESSMENT DOCD: CPT | Mod: CPTII,S$GLB,, | Performed by: INTERNAL MEDICINE

## 2024-06-13 PROCEDURE — 1159F MED LIST DOCD IN RCRD: CPT | Mod: CPTII,S$GLB,, | Performed by: INTERNAL MEDICINE

## 2024-06-13 PROCEDURE — 3078F DIAST BP <80 MM HG: CPT | Mod: CPTII,S$GLB,, | Performed by: INTERNAL MEDICINE

## 2024-06-13 NOTE — PROGRESS NOTES
Subjective   Patient ID:  Ruben Macedo Jr. is a 78 y.o. male who presents for follow-up of No chief complaint on file.      HPI      CAD - stent 2000, HTN, HLD, DM,  bladder CA     [facility]     Followed by Dr Holder  74-year-old white male .  Patient here with his wife who is in the medical field in some fashion.  In the interval he was diagnosed with bladder cancer.  He is receiving BCG treatment.  Lately his left ankle has swollen on occasion but then it goes down with elevation.  No pain.  Obviously we discussed the possibility of increased risk of DVT given the underlying cancer with his symptoms do not represent that.  Should he get any persistent swelling or pain they know to go to the emergency room for a more immediate ultrasound.     We also reviewed his labs.  His A1c went from 7.9 down to 7.3 but is due.  He has also developed some thrombocytopenia and some anemia.  He did have significant bleeding due to the bladder cancer.  He will discuss with urology whether not the BCG treatment would be expected to cause some thrombocytopenia.  He has had fatty liver on prior imaging but spleen on CT scans stated to be normal.  He does take Plavix but reassured that would not affect the platelet count but might affect platelet function.  Many years ago he did have one episode of thrombocytopenia but then normalized in the interval.  Wife was concerned and she did have an unofficial CBC done and by her verbal report sounds similar to what we have recorded in our system.  She also did an unofficial stool for blood which was negative.     Regarding diabetes A1c hopefully improved.  He had itching with metformin.  We discussed potential increased risk association of bladder cancer and Actos so it is time to discontinue.  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  He was on Januvia but expensive so has been off.  We  discussed many new medications are very expensive and will probably not be sustainable with his current insurance.  We discussed Amaryl and the potential for hypoglycemia.  We will start with 4 mg daily, reduce accordingly but obviously if he follow the diet and lost diabetes with improved as well.  Patient counseled at great length regarding the multitude these issues.Total time over 45 minutes with over 50% counseling.     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  It looks like he does have some paronychia.  He applied Neosporin without success and has been there for weeks.  We discussed using some clindamycin for a couple of days but also the more important thing is going to be local care since the cuticle appears to be chronically damaged and he would be at risk for further infection.  The abrasion on the top may well have also initiated the infection.  Rather than use Neosporin all call him in some mupirocin     Echo 5/11/23  The left ventricle is normal in size with normal systolic function.  The estimated ejection fraction is 65%.  Indeterminate left ventricular diastolic function.  Mild left atrial enlargement.  Normal right ventricular size with normal right ventricular systolic function.  Normal central venous pressure (3 mmHg).  The estimated PA systolic pressure is 24 mmHg.  There is no pulmonary hypertension.    Stress test 7/30/21    Normal myocardial perfusion scan. There is no evidence of myocardial ischemia or infarction.    The gated perfusion images showed an ejection fraction of 70% post stress.    There is normal wall motion post stress.    The EKG portion of this study is negative for ischemia.    The patient reported no chest pain during the stress test.    During stress, rare PACs are noted.        12/2/20 Denies recent CP or SOB. Has not seen a cardiologist in years  EKG NSR old  IMI   Echo and lexiscan myoview for CAD  Continue Rx for CAD, HTN, HLD, DM     8/1/22 Denies CP or SOB. Able to cut grass without issues  BP controlled  EKG NSR PACs in [facility] - otherwise ok  Continue Rx for CAD, HTN, HLD, DM  OV 6 months with echo to look at AS      5/3/23 Echo not done. Denies CP, mild fatigue and JACINTO  EKG NSR possible old IMI  BP controlledContinue Rx for CAD, HTN, HLD, DM  echo to look at AS - if stable then OV 6 months    6/13/24 Denies CP, mild JACINTO. Works out side in the yard frequently  BP controlled  EKG NSR - ok    Review of Systems   Constitutional: Negative for decreased appetite.   HENT:  Negative for ear discharge.    Eyes:  Negative for blurred vision.   Endocrine: Negative for polyphagia.   Skin:  Negative for nail changes.   Genitourinary:  Negative for bladder incontinence.   Neurological:  Negative for aphonia.   Psychiatric/Behavioral:  Negative for hallucinations.    Allergic/Immunologic: Negative for hives.          Objective     Physical Exam  Constitutional:       Appearance: He is well-developed.   HENT:      Head: Normocephalic and atraumatic.   Eyes:      Conjunctiva/sclera: Conjunctivae normal.      Pupils: Pupils are equal, round, and reactive to light.   Cardiovascular:      Rate and Rhythm: Normal rate.      Pulses: Intact distal pulses.      Heart sounds: Murmur heard.      Harsh midsystolic murmur is present with a grade of 2/6 at the upper right sternal border radiating to the neck.   Pulmonary:      Effort: Pulmonary effort is normal.      Breath sounds: Normal breath sounds.   Abdominal:      General: Bowel sounds are normal.      Palpations: Abdomen is soft.   Musculoskeletal:         General: Normal range of motion.      Cervical back: Normal range of motion and neck supple.   Skin:     General: Skin is warm and dry.   Neurological:      Mental Status: He is alert and oriented to person, place, and time.            Assessment and Plan     1. Bradycardia    2.  Atherosclerosis of aorta    3. Essential hypertension    4. Coronary artery disease involving native coronary artery of native heart without angina pectoris    5. Hyperlipidemia, unspecified hyperlipidemia type    6. Hypertension, unspecified type        Plan:     Continue Rx for CAD, HTN, HLD, DM  OV 6 months with echo to look at AS and lexiscan myoview for CAD    Advance Care Planning     Date: 06/13/2024  Patient did not wish or was not able to name a surrogate decision maker or provide an Advance Care Plan.

## 2024-06-16 LAB
OHS QRS DURATION: 104 MS
OHS QTC CALCULATION: 410 MS

## 2024-06-27 ENCOUNTER — OFFICE VISIT (OUTPATIENT)
Dept: FAMILY MEDICINE | Facility: CLINIC | Age: 79
End: 2024-06-27
Payer: MEDICARE

## 2024-06-27 VITALS
HEART RATE: 74 BPM | OXYGEN SATURATION: 96 % | TEMPERATURE: 98 F | DIASTOLIC BLOOD PRESSURE: 60 MMHG | SYSTOLIC BLOOD PRESSURE: 114 MMHG | HEIGHT: 70 IN | BODY MASS INDEX: 27.17 KG/M2 | WEIGHT: 189.81 LBS | RESPIRATION RATE: 15 BRPM

## 2024-06-27 DIAGNOSIS — I25.10 CORONARY ARTERY DISEASE INVOLVING NATIVE CORONARY ARTERY OF NATIVE HEART WITHOUT ANGINA PECTORIS: ICD-10-CM

## 2024-06-27 DIAGNOSIS — Z00.00 ENCOUNTER FOR MEDICARE ANNUAL WELLNESS EXAM: ICD-10-CM

## 2024-06-27 DIAGNOSIS — H91.93 BILATERAL HEARING LOSS, UNSPECIFIED HEARING LOSS TYPE: ICD-10-CM

## 2024-06-27 DIAGNOSIS — E11.65 TYPE 2 DIABETES MELLITUS WITH HYPERGLYCEMIA, WITHOUT LONG-TERM CURRENT USE OF INSULIN: ICD-10-CM

## 2024-06-27 DIAGNOSIS — F13.20 BENZODIAZEPINE DEPENDENCE: ICD-10-CM

## 2024-06-27 DIAGNOSIS — Z00.00 ENCOUNTER FOR PREVENTIVE HEALTH EXAMINATION: Primary | ICD-10-CM

## 2024-06-27 PROCEDURE — 99999 PR PBB SHADOW E&M-EST. PATIENT-LVL V: CPT | Mod: PBBFAC,,, | Performed by: NURSE PRACTITIONER

## 2024-06-27 NOTE — PROGRESS NOTES
"  Ruben Macedo presented for a  Medicare AWV and comprehensive Health Risk Assessment today. The following components were reviewed and updated:    Medical history  Family History  Social history  Allergies and Current Medications  Health Risk Assessment  Health Maintenance  Care Team         ** See Completed Assessments for Annual Wellness Visit within the encounter summary.**         The following assessments were completed:  Living Situation  CAGE  Depression Screening  Timed Get Up and Go  Whisper Test  Cognitive Function Screening  Nutrition Screening  ADL Screening  PAQ Screening      Opioid documentation:      Patient does not have a current opioid prescription.        Vitals:    06/27/24 1339   BP: 114/60   BP Location: Left arm   Patient Position: Sitting   BP Method: Medium (Manual)   Pulse: 74   Resp: 15   Temp: 98.1 °F (36.7 °C)   TempSrc: Oral   SpO2: 96%   Weight: 86.1 kg (189 lb 13.1 oz)   Height: 5' 10" (1.778 m)     Body mass index is 27.24 kg/m².  Physical Exam  Vitals reviewed.   Constitutional:       General: He is not in acute distress.     Appearance: Normal appearance. He is not ill-appearing, toxic-appearing or diaphoretic.   HENT:      Head: Normocephalic and atraumatic.   Pulmonary:      Effort: Pulmonary effort is normal. No respiratory distress.      Breath sounds: No wheezing.   Skin:     General: Skin is warm and dry.   Neurological:      Mental Status: He is alert and oriented to person, place, and time.   Psychiatric:         Mood and Affect: Mood normal.         Behavior: Behavior normal.               Diagnoses and health risks identified today and associated recommendations/orders:    1. Encounter for Medicare annual wellness exam  The patient was seen today for an annual Medicare wellness exam.  Health maintenance and screening topics were discussed.  Proper diet and exercise recommendations were reviewed.  - Ambulatory Referral/Consult to Enhanced Annual Wellness Visit " (eAWV)    2. Encounter for preventive health examination  The patient was seen today for an annual Medicare wellness exam.  Health maintenance and screening topics were discussed.  Proper diet and exercise recommendations were reviewed.    3. Coronary artery disease involving native coronary artery of native heart without angina pectoris  Without acute symptoms.  Followed by cardiology.    4. Type 2 diabetes mellitus with hyperglycemia, without long-term current use of insulin  No acute concerns.    5. Benzodiazepine dependence  No acute concerns.  Managed per PCP.    6. Bilateral hearing loss, unspecified hearing loss type  Patient does report having some chronic hearing loss.  Technically had abnormal whisper test.  Will consult ENT/audiology.  - Ambulatory referral/consult to Audiology; Future  - Ambulatory referral/consult to ENT; Future      Provided Ruben with a 5-10 year written screening schedule and personal prevention plan. Recommendations were developed using the USPSTF age appropriate recommendations. Education, counseling, and referrals were provided as needed. After Visit Summary printed and given to patient which includes a list of additional screenings\tests needed.    Follow up in about 1 year (around 6/27/2025) for AWV.    Surya Hidalgo, KENDRA  I offered to discuss advanced care planning, including how to pick a person who would make decisions for you if you were unable to make them for yourself, called a health care power of , and what kind of decisions you might make such as use of life sustaining treatments such as ventilators and tube feeding when faced with a life limiting illness recorded on a living will that they will need to know. (How you want to be cared for as you near the end of your natural life)     X  Patient has advanced directives written and agrees to provide copies to the institution.

## 2024-06-27 NOTE — PATIENT INSTRUCTIONS
Counseling and Referral of Other Preventative  (Italic type indicates deductible and co-insurance are waived)    Patient Name: Ruben Macedo  Today's Date: 6/27/2024    Health Maintenance       Date Due Completion Date    TETANUS VACCINE Never done ---    RSV Vaccine (Age 60+ and Pregnant patients) (1 - 1-dose 60+ series) Never done ---    COVID-19 Vaccine (4 - 2023-24 season) 09/01/2023 11/19/2021    PROSTATE-SPECIFIC ANTIGEN 02/13/2024 2/13/2023    Hemoglobin A1c 11/03/2024 5/3/2024    Diabetes Urine Screening 05/03/2025 5/3/2024    Eye Exam 05/07/2025 5/7/2024    Override on 4/11/2017: Not Clinically Appropriate (dr butts in [place])    Override on 1/10/2014: Done (Dr.Riley TAMAR Apodaca)    Aspirin/Antiplatelet Therapy 06/13/2025 6/13/2024    Colonoscopy 06/23/2026 6/23/2021        No orders of the defined types were placed in this encounter.      The following information is provided to all patients.  This information is to help you find resources for any of the problems found today that may be affecting your health:                  Living healthy guide: www.[facility].[place].gov      Understanding Diabetes: www.diabetes.org      Eating healthy: www.cdc.gov/healthyweight      CDC home safety checklist: www.cdc.gov/steadi/patient.html      Agency on Aging: www.goea.[place].[facility]      Alcoholics anonymous (AA): www.aa.org      Physical Activity: www.yaneli.nih.gov/hl3ivqg      Tobacco use: www.quitwithusla.org

## 2024-07-15 DIAGNOSIS — F41.9 ANXIETY: ICD-10-CM

## 2024-07-15 NOTE — TELEPHONE ENCOUNTER
Care Due:                  Date            Visit Type   Department     Provider  --------------------------------------------------------------------------------                                MYCHART                              ANNUAL       [facility]                              CHECKUP/PHY  MED/ INTERNAL  Cedric Stark  Last Visit: 06-      S            MED/ PEDS      [place]  Next Visit: None Scheduled  None         None Found                                                            Last  Test          Frequency    Reason                     Performed    Due Date  --------------------------------------------------------------------------------    CBC.........  12 months..  allopurinoL..............  01-   01-    CMP.........  12 months..  allopurinoL, glimepiride,   02- 02-                             metFORMIN................    Uric Acid...  12 months..  allopurinoL..............  Not Found    Overdue    [employer] Embedded Care Due Messages. Reference number: [id number].   7/15/2024 1:24:49 PM CDT

## 2024-07-16 RX ORDER — LORAZEPAM 0.5 MG/1
0.5 TABLET ORAL NIGHTLY PRN
Qty: 30 TABLET | Refills: 3 | Status: SHIPPED | OUTPATIENT
Start: 2024-07-16 | End: 2024-08-15

## 2024-10-09 DIAGNOSIS — C68.9 UROTHELIAL CARCINOMA: Primary | ICD-10-CM

## 2024-10-16 ENCOUNTER — LAB VISIT (OUTPATIENT)
Dept: LAB | Facility: HOSPITAL | Age: 79
End: 2024-10-16
Attending: UROLOGY
Payer: MEDICARE

## 2024-10-16 DIAGNOSIS — C68.9 UROTHELIAL CARCINOMA: ICD-10-CM

## 2024-10-16 PROCEDURE — 88112 CYTOPATH CELL ENHANCE TECH: CPT | Mod: 26,,, | Performed by: PATHOLOGY

## 2024-10-16 PROCEDURE — 88112 CYTOPATH CELL ENHANCE TECH: CPT | Performed by: PATHOLOGY

## 2024-10-22 LAB
FINAL PATHOLOGIC DIAGNOSIS: NORMAL
Lab: NORMAL

## 2024-11-06 RX ORDER — METFORMIN HYDROCHLORIDE 750 MG/1
1500 TABLET, EXTENDED RELEASE ORAL EVERY MORNING
Qty: 60 TABLET | Refills: 0 | Status: SHIPPED | OUTPATIENT
Start: 2024-11-06

## 2024-11-06 NOTE — TELEPHONE ENCOUNTER
Care Due:                  Date            Visit Type   Department     Provider  --------------------------------------------------------------------------------                                MYCHART                              ANNUAL       [facility]                              CHECKUP/PHY  MED/ INTERNAL  Cedric Stark  Last Visit: 06-      S            MED/ PEDS      [place]  Next Visit: None Scheduled  None         None Found                                                            Last  Test          Frequency    Reason                     Performed    Due Date  --------------------------------------------------------------------------------    CBC.........  12 months..  allopurinoL..............  01-   01-    CMP.........  12 months..  allopurinoL, glimepiride,   02- 02-                             metFORMIN................    HBA1C.......  6 months...  glimepiride, metFORMIN...  05-   10-    Uric Acid...  12 months..  allopurinoL..............  Not Found    Overdue    [employer] Embedded Care Due Messages. Reference number: [id number].   11/06/2024 7:43:47 AM CST

## 2024-11-06 NOTE — TELEPHONE ENCOUNTER
Refill Routing Note   Medication(s) are not appropriate for processing by [facility] for the following reason(s):        Required labs outdated    [facility] action(s):  Defer   Requires labs : Yes             Appointments  past 12m or future 3m with PCP    Date Provider   Last Visit   6/4/2024 Cedric Holder MD   Next Visit   Visit date not found Cedric Holder MD   ED visits in past 90 days: 0        Note composed:7:44 AM 11/06/2024

## 2024-12-13 DIAGNOSIS — F41.9 ANXIETY: ICD-10-CM

## 2024-12-13 NOTE — TELEPHONE ENCOUNTER
No care due was identified.  [facility] Embedded Care Due Messages. Reference number: [id number].   12/13/2024 9:20:36 AM CST

## 2024-12-18 RX ORDER — LORAZEPAM 0.5 MG/1
0.5 TABLET ORAL NIGHTLY PRN
Qty: 30 TABLET | Refills: 3 | Status: SHIPPED | OUTPATIENT
Start: 2024-12-18

## 2024-12-19 DIAGNOSIS — I25.10 CORONARY ARTERY DISEASE INVOLVING NATIVE CORONARY ARTERY OF NATIVE HEART WITHOUT ANGINA PECTORIS: ICD-10-CM

## 2024-12-19 RX ORDER — ROSUVASTATIN CALCIUM 40 MG/1
40 TABLET, COATED ORAL
Qty: 90 TABLET | Refills: 3 | Status: SHIPPED | OUTPATIENT
Start: 2024-12-19

## 2024-12-19 RX ORDER — CLOPIDOGREL BISULFATE 75 MG/1
TABLET ORAL
Qty: 90 TABLET | Refills: 3 | Status: SHIPPED | OUTPATIENT
Start: 2024-12-19

## 2025-01-08 RX ORDER — FINASTERIDE 5 MG/1
TABLET, FILM COATED ORAL
Qty: 90 TABLET | Refills: 3 | Status: SHIPPED | OUTPATIENT
Start: 2025-01-08

## 2025-01-29 ENCOUNTER — LAB VISIT (OUTPATIENT)
Dept: LAB | Facility: HOSPITAL | Age: 80
End: 2025-01-29
Attending: INTERNAL MEDICINE
Payer: MEDICARE

## 2025-01-29 DIAGNOSIS — E78.5 HYPERLIPIDEMIA, UNSPECIFIED HYPERLIPIDEMIA TYPE: ICD-10-CM

## 2025-01-29 DIAGNOSIS — N40.0 BENIGN PROSTATIC HYPERPLASIA, UNSPECIFIED WHETHER LOWER URINARY TRACT SYMPTOMS PRESENT: ICD-10-CM

## 2025-01-29 DIAGNOSIS — E11.9 TYPE 2 DIABETES MELLITUS WITHOUT COMPLICATION, WITHOUT LONG-TERM CURRENT USE OF INSULIN: ICD-10-CM

## 2025-01-29 DIAGNOSIS — Z12.5 SCREENING FOR PROSTATE CANCER: ICD-10-CM

## 2025-01-29 DIAGNOSIS — I10 ESSENTIAL HYPERTENSION: ICD-10-CM

## 2025-01-29 DIAGNOSIS — Z00.00 ROUTINE MEDICAL EXAM: ICD-10-CM

## 2025-01-29 LAB
ALBUMIN SERPL BCP-MCNC: 4.2 G/DL (ref 3.5–5.2)
ALP SERPL-CCNC: 58 U/L (ref 40–150)
ALT SERPL W/O P-5'-P-CCNC: 35 U/L (ref 10–44)
ANION GAP SERPL CALC-SCNC: 12 MMOL/L (ref 8–16)
AST SERPL-CCNC: 27 U/L (ref 10–40)
BASOPHILS # BLD AUTO: 0.04 K/UL (ref 0–0.2)
BASOPHILS NFR BLD: 0.7 % (ref 0–1.9)
BILIRUB SERPL-MCNC: 0.7 MG/DL (ref 0.1–1)
BUN SERPL-MCNC: 19 MG/DL (ref 8–23)
CALCIUM SERPL-MCNC: 9.9 MG/DL (ref 8.7–10.5)
CHLORIDE SERPL-SCNC: 106 MMOL/L (ref 95–110)
CHOLEST SERPL-MCNC: 135 MG/DL (ref 120–199)
CHOLEST/HDLC SERPL: 2.8 {RATIO} (ref 2–5)
CO2 SERPL-SCNC: 19 MMOL/L (ref 23–29)
COMPLEXED PSA SERPL-MCNC: 0.3 NG/ML (ref 0–4)
CREAT SERPL-MCNC: 1.2 MG/DL (ref 0.5–1.4)
DIFFERENTIAL METHOD BLD: ABNORMAL
EOSINOPHIL # BLD AUTO: 0.2 K/UL (ref 0–0.5)
EOSINOPHIL NFR BLD: 4.2 % (ref 0–8)
ERYTHROCYTE [DISTWIDTH] IN BLOOD BY AUTOMATED COUNT: 14.2 % (ref 11.5–14.5)
EST. GFR  (NO RACE VARIABLE): >60 ML/MIN/1.73 M^2
ESTIMATED AVG GLUCOSE: 214 MG/DL (ref 68–131)
GLUCOSE SERPL-MCNC: 236 MG/DL (ref 70–110)
HBA1C MFR BLD: 9.1 % (ref 4–5.6)
HCT VFR BLD AUTO: 36.5 % (ref 40–54)
HDLC SERPL-MCNC: 49 MG/DL (ref 40–75)
HDLC SERPL: 36.3 % (ref 20–50)
HGB BLD-MCNC: 11.8 G/DL (ref 14–18)
IMM GRANULOCYTES # BLD AUTO: 0.01 K/UL (ref 0–0.04)
IMM GRANULOCYTES NFR BLD AUTO: 0.2 % (ref 0–0.5)
LDLC SERPL CALC-MCNC: 56.4 MG/DL (ref 63–159)
LYMPHOCYTES # BLD AUTO: 1.1 K/UL (ref 1–4.8)
LYMPHOCYTES NFR BLD: 19.8 % (ref 18–48)
MCH RBC QN AUTO: 30.3 PG (ref 27–31)
MCHC RBC AUTO-ENTMCNC: 32.3 G/DL (ref 32–36)
MCV RBC AUTO: 94 FL (ref 82–98)
MONOCYTES # BLD AUTO: 0.5 K/UL (ref 0.3–1)
MONOCYTES NFR BLD: 9.7 % (ref 4–15)
NEUTROPHILS # BLD AUTO: 3.6 K/UL (ref 1.8–7.7)
NEUTROPHILS NFR BLD: 65.4 % (ref 38–73)
NONHDLC SERPL-MCNC: 86 MG/DL
NRBC BLD-RTO: 0 /100 WBC
PLATELET # BLD AUTO: 152 K/UL (ref 150–450)
PMV BLD AUTO: 11.1 FL (ref 9.2–12.9)
POTASSIUM SERPL-SCNC: 4.5 MMOL/L (ref 3.5–5.1)
PROT SERPL-MCNC: 7.8 G/DL (ref 6–8.4)
RBC # BLD AUTO: 3.9 M/UL (ref 4.6–6.2)
SODIUM SERPL-SCNC: 137 MMOL/L (ref 136–145)
TRIGL SERPL-MCNC: 148 MG/DL (ref 30–150)
TSH SERPL DL<=0.005 MIU/L-ACNC: 1.32 UIU/ML (ref 0.4–4)
WBC # BLD AUTO: 5.46 K/UL (ref 3.9–12.7)

## 2025-01-29 PROCEDURE — 84443 ASSAY THYROID STIM HORMONE: CPT | Performed by: INTERNAL MEDICINE

## 2025-01-29 PROCEDURE — 85025 COMPLETE CBC W/AUTO DIFF WBC: CPT | Performed by: INTERNAL MEDICINE

## 2025-01-29 PROCEDURE — 36415 COLL VENOUS BLD VENIPUNCTURE: CPT | Mod: PO | Performed by: INTERNAL MEDICINE

## 2025-01-29 PROCEDURE — 80053 COMPREHEN METABOLIC PANEL: CPT | Performed by: INTERNAL MEDICINE

## 2025-01-29 PROCEDURE — 83036 HEMOGLOBIN GLYCOSYLATED A1C: CPT | Performed by: INTERNAL MEDICINE

## 2025-01-29 PROCEDURE — 84153 ASSAY OF PSA TOTAL: CPT | Performed by: INTERNAL MEDICINE

## 2025-01-29 PROCEDURE — 80061 LIPID PANEL: CPT | Performed by: INTERNAL MEDICINE

## 2025-02-13 ENCOUNTER — CLINICAL SUPPORT (OUTPATIENT)
Dept: FAMILY MEDICINE | Facility: CLINIC | Age: 80
End: 2025-02-13
Attending: NURSE PRACTITIONER
Payer: MEDICARE

## 2025-02-13 ENCOUNTER — OFFICE VISIT (OUTPATIENT)
Dept: FAMILY MEDICINE | Facility: CLINIC | Age: 80
End: 2025-02-13
Payer: MEDICARE

## 2025-02-13 VITALS
HEART RATE: 81 BPM | TEMPERATURE: 98 F | SYSTOLIC BLOOD PRESSURE: 132 MMHG | OXYGEN SATURATION: 94 % | BODY MASS INDEX: 28.03 KG/M2 | DIASTOLIC BLOOD PRESSURE: 75 MMHG | WEIGHT: 195.31 LBS

## 2025-02-13 DIAGNOSIS — F41.9 ANXIETY: ICD-10-CM

## 2025-02-13 DIAGNOSIS — H00.015 HORDEOLUM EXTERNUM OF LEFT LOWER EYELID: Primary | ICD-10-CM

## 2025-02-13 DIAGNOSIS — E11.65 TYPE 2 DIABETES MELLITUS WITH HYPERGLYCEMIA, WITHOUT LONG-TERM CURRENT USE OF INSULIN: ICD-10-CM

## 2025-02-13 DIAGNOSIS — I70.0 ATHEROSCLEROSIS OF AORTA: ICD-10-CM

## 2025-02-13 DIAGNOSIS — I10 ESSENTIAL HYPERTENSION: ICD-10-CM

## 2025-02-13 LAB — HM EYE EXAM: NEGATIVE

## 2025-02-13 PROCEDURE — 3288F FALL RISK ASSESSMENT DOCD: CPT | Mod: CPTII,S$GLB,, | Performed by: NURSE PRACTITIONER

## 2025-02-13 PROCEDURE — 1101F PT FALLS ASSESS-DOCD LE1/YR: CPT | Mod: CPTII,S$GLB,, | Performed by: NURSE PRACTITIONER

## 2025-02-13 PROCEDURE — 99214 OFFICE O/P EST MOD 30 MIN: CPT | Mod: S$GLB,,, | Performed by: NURSE PRACTITIONER

## 2025-02-13 PROCEDURE — 99999 PR PBB SHADOW E&M-EST. PATIENT-LVL V: CPT | Mod: PBBFAC,,, | Performed by: NURSE PRACTITIONER

## 2025-02-13 PROCEDURE — 3078F DIAST BP <80 MM HG: CPT | Mod: CPTII,S$GLB,, | Performed by: NURSE PRACTITIONER

## 2025-02-13 PROCEDURE — 92229 IMG RTA DETC/MNTR DS POC ALY: CPT | Mod: S$GLB,,, | Performed by: INTERNAL MEDICINE

## 2025-02-13 PROCEDURE — G2211 COMPLEX E/M VISIT ADD ON: HCPCS | Mod: S$GLB,,, | Performed by: NURSE PRACTITIONER

## 2025-02-13 PROCEDURE — 3072F LOW RISK FOR RETINOPATHY: CPT | Mod: CPTII,S$GLB,, | Performed by: NURSE PRACTITIONER

## 2025-02-13 PROCEDURE — 3075F SYST BP GE 130 - 139MM HG: CPT | Mod: CPTII,S$GLB,, | Performed by: NURSE PRACTITIONER

## 2025-02-13 PROCEDURE — 1159F MED LIST DOCD IN RCRD: CPT | Mod: CPTII,S$GLB,, | Performed by: NURSE PRACTITIONER

## 2025-02-13 RX ORDER — ERYTHROMYCIN 5 MG/G
OINTMENT OPHTHALMIC NIGHTLY
Qty: 3.5 G | Refills: 0 | Status: SHIPPED | OUTPATIENT
Start: 2025-02-13

## 2025-02-13 NOTE — PATIENT INSTRUCTIONS
Medical Fitness--[phone]  Imaging, Xray, CT, MRI, Ultrasound---[phone]  Bariatrics---[phone]  Breast Surgery---[phone]  Case Management---[phone]  Colonoscopy---[phone]  DME---[phone]  Infectious Disease---[phone]  Interventional Radiology---[phone]  Medical Records---[phone]  [facility]---[phone]  Optometry/Ophthalmology---[phone]  [facility]---[phone]  Physical Therapy---[phone]  Psychiatry---[phone] or [phone]  Plastic Surgery---[phone]  Recovery--[phone] option 2, or [phone].  Sleep Study---[phone]  Smoking Cessation---[phone]  Wound Care---[phone]  Referral Desk---[phone]      Patient Education       High Blood Pressure Discharge Instructions   About this topic   The medical name for high blood pressure is hypertension. Your blood pressure is measured with 2 numbers. For example, you may hear the staff say your blood pressure is 130 over 80. High blood pressure cannot be cured. You must control it with drugs and lifestyle changes. High blood pressure puts you at risk for heart attack, stroke, and kidney disease.         What care is needed at home?   Ask your doctor what you need to do when you go home. Make sure you ask questions if you do not understand what the doctor says. This way you will know what you need to do.  Take all your medicines as ordered. Do not stop taking any of your regular medicines without talking to your doctor.  Learn how to check your blood pressure at home, if your doctor suggests you do this.  What follow-up care is needed?   Your doctor may ask you to make visits to the office to check on your progress. Be sure to keep these visits.  What drugs may be needed?   The doctor may order drugs to help control your high blood pressure. Take your drugs as ordered. Do not take other prescription drugs or over-the-counter (OTC) drugs without talking to your doctor first.  Will  physical activity be limited?   Talk to your doctor about the right amount of activity for you. Exercise may help you lose weight and lower your blood pressure.  What changes to diet are needed?   Do not use salt on your food. Use herbs and spices to improve the taste.  Eat less than 1,500 mg of sodium a day. Read food labels to see how much sodium is in a food.  Limit coffee, tea, and soda to 2 cups (480 mL) a day.  Limit beer, wine, and mixed drinks (alcohol) to 1 drink a day for women and 2 drinks a day for men.  Eat lots of fruits, vegetables, and low-fat dairy products.  Avoid fatty foods like fried foods or chips.  Talk with your dietitian about the diet changes you need and the number of calories you should eat each day.  What problems could happen?   Heart attack, heart failure, or other heart problems  Stroke  Swelling of blood vessels  Kidney failure  Loss of eyesight  Memory problems  Fluid in the lungs  Death  What can be done to prevent this health problem?   Exercise regularly. Try to get at least 30 minutes of exercise most days of the week.  Avoid weight gain.  Stop smoking. Your doctor can tell you about stop smoking programs.  Learn to lower stress.  When do I need to call the doctor?   You have signs of a heart attack, which may include:  Severe chest pain, pressure, or discomfort with:  Breathing trouble, sweating, upset stomach, or cold, clammy skin  Pain in your arms, back, or jaw  Worse pain with activity like walking up stairs  Fast or irregular heartbeat  Feeling dizzy, faint, or weak  You have signs of stroke like sudden:  Numbness or weakness of the face, arm, or leg, especially on one side of the body  Confusion, trouble speaking, or understanding  Trouble seeing in one or both eyes  Trouble walking, dizziness, loss of balance, or coordination  Severe headache with no known cause  You have a seizure or pass out.  You have a severe headache with an upset stomach or throwing up.  You have  sudden, severe back pain.  You have 2 home blood pressure readings higher than 180/120.  Your urine is brown or bloody.  Teach Back: Helping You Understand   The Teach Back Method helps you understand the information we are giving you. After you talk with the staff, tell them in your own words what you learned. This helps to make sure the staff has described each thing clearly. It also helps to explain things that may have been confusing. Before going home, make sure you can do these:  I can tell you about my condition.  I can tell you what numbers are too high for my blood pressure.  I can tell you what I will do if I have signs of a heart attack or stroke.  Where can I learn more?   American Academy of Family Physicians  https://familydoctor.org/condition/high-blood-pressure/   American Academy of Family Physicians  https://familydoctor.org/the-dash-diet-healthy-eating-to-control-your-blood-pressure/   American Heart Association  http://www.heart.org/HEARTORG/Conditions/HighBloodPressure/AboutHighBloodPressure/About-High-Blood-Pressure_[facility]_002050_Article.jsp#.[email or web address]   NHS Choices  http://www.nhs.uk/conditions/blood-pressure-(high)/pages/introduction.aspx   [employer]  https://www.[employer]/contents/high-blood-pressure-in-adults-beyond-the-basics?source=see_link   Last Reviewed Date   2021-06-08  Consumer Information Use and Disclaimer   This information is not specific medical advice and does not replace information you receive from your health care provider. This is only a brief summary of general information. It does NOT include all information about conditions, illnesses, injuries, tests, procedures, treatments, therapies, discharge instructions or life-style choices that may apply to you. You must talk with your health care provider for complete information about your health and treatment options. This information should not be used to decide whether or not to accept your health care providers advice,  instructions or recommendations. Only your health care provider has the knowledge and training to provide advice that is right for you.  Copyright   Copyright © 2021 Up[employer]Date, Inc. and its affiliates and/or licensors. All rights reserved.  Patient Education       DASH Diet   About this topic   DASH stands for Dietary Approaches to Stop Hypertension. The DASH diet may help you lower blood pressure. It may also help keep you from getting high blood pressure. You will eat less fat and more fiber on the DASH diet.  This diet gives you more minerals that fight high blood pressure. Some nutrients in this diet are:  Potassium ? Acts to help you get rid of salt. This may help to lower blood pressure.  Calcium ? Makes blood vessels and muscles work the right way  Magnesium - Helps blood vessels relax  Fiber ? Helps you feel full. It also helps digestion.  What will the results be?   The DASH diet may help you:  Lower your blood pressure and cholesterol  Lower your risk for cancer, heart disease, heart attack, and stroke. It may also lower your risk for heart failure, kidney stones, and diabetes.  Lose weight or keep a healthy weight  What lifestyle changes are needed?   Add regular exercise to get the most help from this diet.  Try to lower stress. Find ways to relax.  Stop smoking. Avoid secondhand smoke.  Limit alcohol intake.  What changes to diet are needed?   Know about poor eating habits. Then, you can fix them as you work with the program.  This diet encourages fruits and vegetables, whole grains, lean meats, healthy fats, and low-fat or fat-free dairy products.  This diet is lower in saturated fats, trans-fats, cholesterol, added sugars, and sodium.  Who should use this diet?   This eating plan is good for the whole family. It is also good for people with high blood pressure and those at risk for high blood pressure.  What foods are good to eat?   Grains: Try to eat 6 to 8 servings of whole grain, high fiber foods  each day. These are bread, cereals, brown rice, or pasta.  Fruits and vegetables: Eat 4 to 5 servings each day. Try to pick many kinds and colors. Fresh or frozen are best. Look for low sodium or salt-free if you choose canned.  Dairy: Try to eat 2 to 3 servings of fat free and low fat milk products each day.  Lean meats, poultry, and seafood: Try to eat 6 servings or less of lean meats, poultry, and seafood each day. Try to choose more low fat or lean meats like chicken and turkey. Eat less red meat. Eat more fish instead.  Nuts, seeds, and legumes (dry beans and peas): Try to eat 4 to 5 servings each week. Try to pick nuts such as almonds and walnuts, sunflower seeds, peanut butter, soy beans, lentils, kidney beans, and split peas.  Fats and oils: Try to eat 2 to 3 servings of fats and oils each day. Eat good fats found in fish, nuts, and avocados. Try using olive oil or vegetable oils such as canola oil. Other good oils to try are corn, safflower, sunflower, or soybean oils. Use low-sodium and low-fat salad dressing and mayonnaise.  Condiments: Pepper, herbs, spices, vinegar, lemon or lime juices are great for seasoning. Be careful to choose low-sodium or salt-free products if you use broths, soups, or soy sauce.  Sweets: Try to eat less than 5 servings each week. Choose low-fat and trans fat-free desserts. These are things like fruit flavored gelatin, sorbet, jelly beans, bhanu crackers, animal crackers, low-fat fig bars, and nima snaps. Eat fruit to satisfy your desire for sweets.     What foods should be limited or avoided?   Grains: Salted breads, rolls, crackers, quick breads, self-rising flours, biscuit mixes, regular bread crumbs, instant hot cereals, commercially-prepared rice, pasta, stuffing mixes  Fruits and vegetables: Commercially-prepared potatoes and vegetable mixes, regular canned vegetables and juices, vegetables frozen with sauce or pickled vegetables, processed fruits with salt or  sodium  Milk: Whole milk, malted milk, chocolate milk, buttermilk, cheese, ice cream  Meats and beans: Smoked, cured, salted, or canned fish; meats or poultry such as soto, sausages, sardines; high-fat cuts of meat like beef, lamb, or pork; chicken with the skin on it  Fats: Cut back on solid fats like butter, lard, and margarine. Eat less food with high saturated fat, cholesterol and total fat.  Condiments and snacks: Salted and canned peas, beans, and olives; salted snack foods; fried foods; soda or other sweetened drinks  Sweets: High-fat baked goods such as muffins, donuts, pastries, commercial baked goods, candy bars  If you choose to drink alcohol, limit the amount you drink. Women should have 1 drink or less per day and men should have 2 drinks or less per day.  Helpful tips   Avoid eating canned vegetables and processed foods. These have a lot of salt in them. Look for a low-salt or low-sodium choice.  Try baking or broiling instead of frying food.  Write down the foods you eat. This will help you track what you have eaten each week.  When you go to a grocery store, have a list or a meal plan. Do not shop when you are hungry to avoid cravings for foods.  Read food labels with care. They will show you how much is in a serving. The amount is given as a percentage of the total amount you need each day. Reading labels will help you make healthy food choices.       Avoid fast foods.  Talk to your doctor or dietitian to see if you need vitamin and mineral supplements to help you balance your diet.  Talk to a dietitian for help.  Where can I learn more?   Academy of Nutrition and Dietetics  https://www.eatright.org/health/wellness/heart-and-cardiovascular-health/dash-diet-reducing-hypertension-through-diet-and-lifestyle   FamilyDoctor.org  http://familydoctor.org/familydoctor/en/prevention-wellness/food-nutrition/weight-loss/the-dash-diet-healthy-eating-to-control-your-blood-pressure.html   Last Reviewed Date    2021-03-15  Consumer Information Use and Disclaimer   This information is not specific medical advice and does not replace information you receive from your health care provider. This is only a brief summary of general information. It does NOT include all information about conditions, illnesses, injuries, tests, procedures, treatments, therapies, discharge instructions or life-style choices that may apply to you. You must talk with your health care provider for complete information about your health and treatment options. This information should not be used to decide whether or not to accept your health care providers advice, instructions or recommendations. Only your health care provider has the knowledge and training to provide advice that is right for you.  Copyright   Copyright © 2021 UpToDate, Inc. and its affiliates and/or licensors. All rights reserved.  Patient Education       Type 2 Diabetes Discharge Instructions   About this topic   Type 2 diabetes is the most common type of diabetes. If you have this illness, your body does not make enough insulin or does not correctly use the insulin it does make. When you eat, your body breaks down all sugars and starches into glucose. Your body needs insulin to use glucose for energy. The insulin takes the glucose from your blood into your cells. If you do not have enough insulin, or your body does not use the insulin well, the glucose or sugar stays in your blood instead of going into your cells. This causes your blood sugar levels to be too high. Over time, this can damage your heart, nerves, eyes, kidneys, and other organs.     What care is needed at home?   Learn how to take care of your diabetes.  Ask your doctor what you need to do when you go home. Make sure you ask questions if you do not understand what the doctor says. This way you will know what you need to do.  Based on what diabetes drugs you take, you might need to check your blood sugar regularly at  home. But not everyone with type 2 diabetes needs to do this. If you need to, your doctor will teach you how to check your blood sugar. Ask what the goal numbers are for your blood sugar. Keep a list of your blood sugar levels. This will help you learn what causes high or low readings and help you manage your diabetes.     Take your diabetes drugs as directed. Ask what to do if you miss a dose of your diabetes drugs.  Learn when, what, and how much to eat.  Be active. Walk, garden, or do something active for 30 minutes or more on most days of the week. This will also help you control your weight. Ask your doctor for proper food and exercise programs to follow.  Check your feet often. Look for blisters or sores. Always wear socks and shoes. Never walk barefoot, especially outdoors. Take special care around the pool and at the beach, as these surfaces may be extremely hot and burn your feet. Report any problems with your feet to your doctor.  Wear a medical ID.  Limit or avoid beer, wine, and mixed drinks (alcohol).  If you smoke, try to quit. Your doctor or nurse can help.  Talk with your doctor about if you need to check your blood sugar at home.  If you are overweight, ask your doctor if you would be a good candidate for bariatric surgery as this can reverse diabetes in some cases.  What follow-up care is needed?   Your doctor may ask you to make visits to the office to check on your progress. Be sure to keep these visits.  What drugs may be needed?   Diabetes drugs will help control your blood sugar. You may have more than one diabetes drug. Your doctor may order drugs for you to take by mouth or insulin as a shot. You will be trained on how to give insulin shots, if needed. Talk to your doctor about your diabetes drugs and what you need to do when you go home.  Will physical activity be limited?   Being active can lower blood sugar and blood pressure. It can also help control weight. Be sure to check with your  doctor before starting any exercise program.  Try to walk, bike, or swim every day. Start with 5 to 10 minutes each day. Work up to about 30 minutes most days.  Drink lots of water during workouts.  What changes to diet are needed?   Eating a healthy diet is important. This means you need to eat regularly throughout the day. You need to include a variety of foods such as fruits and vegetables, whole grains, nonfat dairy products, and lean meats. Do not eat too much food at one time and do not skip meals. Limit foods high in sugar like sweets, desserts, and fruit juices. Ask your doctor about what kind of diet is right for you.  What problems could happen?   Heart, kidney, and nerve problems  Foot problems. Sores and infection may also happen.  Eye problems  Dangerously high blood sugar levels requiring emergency treatment  Severe infections  Talk with your doctor often. Other drugs or care may be needed to treat or prevent these problems.  When do I need to call the doctor?   You have signs of high blood sugar like you:  Are more thirsty  Are urinating more often  Have new shortness of breath  Have belly pain, an upset stomach, or are throwing up.  Are more tired than normal  Have a very dry mouth or a fruity breath odor  Signs of infection. These include a fever of 100.4°F (38°C) or higher, chills, or a wound that will not heal.  Blurred vision  Chest pain  Swelling in your legs  Change in color and odor of your feet  Blood sugar that remains high and does not respond to treatment  Helpful tips   Talk to your doctor about getting a flu shot and pneumonia shot.  Teach Back: Helping You Understand   The Teach Back Method helps you understand the information we are giving you. After you talk with the staff, tell them in your own words what you learned. This helps to make sure the staff has described each thing clearly. It also helps to explain things that may have been confusing. Before going home, make sure you can  do these:  I can tell you about my condition.  I can tell you what I need to do to control my blood sugar.  I can tell you what I will do if I have signs of high blood sugar.  Where can I learn more?   Center for Disease Control and Prevention  https://www.cdc.gov/diabetes/basics/type2.html   International Diabetes Foundation  https://www.idf.org/aboutdiabetes/type-2-diabetes.html   [employer]  https://www.[employer]/contents/type-2-diabetes-overview-beyond-the-basics   Last Reviewed Date   2021-05-04  Consumer Information Use and Disclaimer   This information is not specific medical advice and does not replace information you receive from your health care provider. This is only a brief summary of general information. It does NOT include all information about conditions, illnesses, injuries, tests, procedures, treatments, therapies, discharge instructions or life-style choices that may apply to you. You must talk with your health care provider for complete information about your health and treatment options. This information should not be used to decide whether or not to accept your health care providers advice, instructions or recommendations. Only your health care provider has the knowledge and training to provide advice that is right for you.  Copyright   Copyright © 2021 UpToDate, Inc. and its affiliates and/or licensors. All rights reserved.  Patient Education       Diabetes and Diet   The Basics   Written by the doctors and editors at [employer]   Why is diet important in diabetes? -- Diet is important because it is part of diabetes treatment. Many people need to change what they eat and how much they eat to help treat their diabetes. It is important for people to treat their diabetes so that they:  Keep their blood sugar at or near a normal level  Prevent long-term problems, such as heart or kidney problems, that can happen in people with diabetes  Changing your diet can also help treat obesity, high blood  "pressure, and high cholesterol. These conditions can affect people with diabetes and can lead to future problems, such as heart attacks or strokes.  Who will work with me to change my diet? -- Your doctor or nurse will work with you to make a food plan to change your diet. They might also recommend that you work with a "dietitian." A dietitian is an expert on food and eating.  Do I need to eat at the same times every day? -- When and how often you should eat depends, in part, on the diabetes medicines you take. For example:  People who take about the same amount of insulin at the same time each day (called a "fixed regimen") should eat meals at the same times. This is also true for people who take pills that increase insulin levels, such as sulfonylureas. Eating meals at the same time every day helps prevent low blood sugar.  People who adjust the dose and timing of their insulin each day (called a "flexible regimen") do not always have to eat meals at the same time. That's because they can time their insulin dose for before they plan to eat, and also adjust the dose for how much they plan to eat.  People who take medicines that don't usually cause low blood sugar, such as metformin, don't have to eat meals at the same time every day.  What do I need to think about when planning what to eat? -- Our bodies break down the food we eat into small pieces called carbohydrates, proteins, and fats.  When planning what to eat, people with diabetes need to think about:  Carbohydrates (or "carbs") - Carbohydrates, which are sugars that our bodies use for energy, can raise a person's blood sugar level. Your doctor, nurse, or dietitian will tell you how many carbohydrates you should eat at each meal or snack. Foods that have carbohydrates include:  Bread, pasta, and rice  Vegetables and fruits  Dairy foods  Foods and drinks with added sugar  It is best to get your carbohydrates from fruits, vegetables, whole grains, and low-fat " "milk. It is best to avoid drinks with added sugar, like soda, juices, and sports drinks.   Protein - Your doctor, nurse, or dietitian will tell you how much protein you should eat each day. It is best to eat lean meats, fish, eggs, beans, peas, soy products, nuts, and seeds.  Fats - The type of fat you eat is more important than the amount of fat. "Saturated" and "trans" fats can increase your risk for heart problems, like a heart attack.  Foods that have saturated fats include meat, butter, cheese, and ice cream.  Foods that have trans fats include processed food with "partially hydrogenated oils" on the ingredient list. This may include fried foods, store bought cookies, muffins, pies, and cakes.  "Monounsaturated" and "polyunsaturated" fats are better for you. Foods with these types of fat include fish, avocado, olive oil, and nuts.  Calories - People need to eat a certain amount of calories each day to keep their weight the same. People who are overweight and want to lose weight need to eat fewer calories each day.  Fiber - Eating foods with a lot of fiber can help control a person's blood sugar level. Foods that have a lot of fiber include apples, green beans, peas, beans, lentils, nuts, oatmeal, and whole grains.  Salt - People who have high blood pressure should not eat foods that contain a lot of salt (also called sodium). People with high blood pressure should also eat healthy foods, such as fruits, vegetables, and low-fat dairy foods.  Alcohol - Having more than 1 drink (for women) or 2 drinks (for men) a day can raise blood sugar levels. Also, drinks that have fruit juice or soda in them can raise blood sugar levels.  What can I do if I need to lose weight? -- If you need to lose weight, you can:  Exercise - Try to get at least 30 minutes of physical activity a day, most days of the week. Even gentle exercise, like walking, is good for your health. Some people with diabetes need to change their medicine " dose before they exercise. They might also need to check their blood sugar levels before and after exercising.  Eat fewer calories - Your doctor, nurse, or dietitian can tell you how many calories you should eat each day in order to lose weight.  If you are worried about your weight, size, or shape, talk with your doctor, nurse, or dietitian. They can help you make changes to improve your health.  Can I eat the same foods as my family? -- Yes. You do not need to eat special foods if you have diabetes. You and your family can eat the same foods. Changing your diet is mostly about eating healthy foods and not eating too much.  What are the other parts of diabetes treatment? -- Besides changing your diet, the other parts of diabetes treatment are:  Exercise  Medicines  Some people with diabetes need to learn how to match their diet and exercise with their medicine dose. For example, people who use insulin might need to choose the dose of insulin they give themselves. To choose their dose, they need to think about:  What they plan to eat at the next meal  How much exercise they plan to do  What their blood sugar level is  If the diet and exercise do not match the medicine dose, a person's blood sugar level can get too low or too high. Blood sugar levels that are too low or too high can cause problems.  All topics are updated as new evidence becomes available and our peer review process is complete.  This topic retrieved from [employer] on: Sep 21, 2021.  Topic 51684 Version 7.0  Release: 29.4.2 - C29.263  © 2021 UpToDate, Inc. and/or its affiliates. All rights reserved.  Consumer Information Use and Disclaimer   This information is not specific medical advice and does not replace information you receive from your health care provider. This is only a brief summary of general information. It does NOT include all information about conditions, illnesses, injuries, tests, procedures, treatments, therapies, discharge  instructions or life-style choices that may apply to you. You must talk with your health care provider for complete information about your health and treatment options. This information should not be used to decide whether or not to accept your health care provider's advice, instructions or recommendations. Only your health care provider has the knowledge and training to provide advice that is right for you. The use of this information is governed by the [employer] End User License Agreement, available at https://www.[employer]/en/solutions/Majitek/about/wilma.The use of [employer] content is governed by the [employer] Terms of Use. ©2021 [employer] All rights reserved.  Copyright   © 2021 UpToDate, Inc. and/or its affiliates. All rights reserved.

## 2025-02-13 NOTE — PROGRESS NOTES
HPI     Ruben Macedo Jr. is a 79 y.o. male with multiple medical diagnoses as listed in the medical history and problem list that presents for   Chief Complaint   Patient presents with    Eye Problem     Left eye       HPI    Discomfort in left lower eyelid:  Patient reports new onset discomfort in mild swelling/redness to left outer quadrant of left lower eyelid.  Patient reports onset roughly 1 week ago.  Associated with yellowish crusting in the morning.  Also reports sensation that something is stuck in his eye.  Discomfort is described as mild.  Denies changes to vision.  Reports history of similar symptoms in the past which was treated with unknown medication.  Denies any other new or concerning symptoms at this time.      Assessment & Plan     1. Hordeolum externum of left lower eyelid  Signs and symptoms consistent with diagnosis.  Visual acuity intact.  Erythromycin opthalamic qid  placed into the conjunctival sac approximately until symptoms have resolved.  Warm compresses may be created by soaking a cloth towel in hot water and then applied with gentle pressure onto the closed eyelid. They may be applied 4 to 5 times per day for 10 to 15 minutes and may be accompanied by gentle massage of the area.  Discussed DDx, condition, and treatment.   Education sent to patient portal/included in after visit summary.  ED precautions given.   Notify provider if symptoms do not resolve or increase in severity.   Patient verbalizes understanding and agrees with plan of care.   - erythromycin (ROMYCIN) ophthalmic ointment; Place into the left eye every evening.  Dispense: 3.5 g; Refill: 0    2. Essential hypertension  BP Readings from Last 3 Encounters:   02/13/25 132/75   06/27/24 114/60   06/13/24 128/64     -continue current medication regimen  -DASH diet, regular cardiovascular exercises, portion control  -weight loss  -f/u with BP logs in 2 weeks      3. Type 2 diabetes mellitus with hyperglycemia, without  long-term current use of insulin  -discussed with patient about routine diabetic care that includes but are not limited to regular eye exams, skin care, daily foot exam, proper nutrition, regular BG monitoring at home (fasting and mealtime) and medication compliance in a diabetic.  Target morning BS  and meal-time BS <180   Assisted patient scheduling eye exam camera today.  - Diabetic Eye Screening Photo - Automated Analysis; Future    4. Atherosclerosis of aorta  -assessed and addressed all modifiable risk factors.  Continue with appropriate management to prevent complications with regards to lipid and BP monitoring.     5. Anxiety  Encouraged the patient to perform self-calming techniques, such as deep breathing/relaxation techniques and exercise.           --------------------------------------------      Health Maintenance:  Health Maintenance         Date Due Completion Date    TETANUS VACCINE Never done ---    RSV Vaccine (Age 60+ and Pregnant patients) (1 - 1-dose 75+ series) Never done ---    COVID-19 Vaccine (4 - 2024-25 season) 09/01/2024 11/19/2021    Diabetic Eye Exam 05/07/2025 5/7/2024    Override on 4/11/2017: Not Clinically Appropriate (dr butts in [place])    Override on 1/10/2014: Done (Dr.Riley TAMAR Apodaca)    Hemoglobin A1c 04/29/2025 1/29/2025    Diabetes Urine Screening 05/03/2025 5/3/2024    PROSTATE-SPECIFIC ANTIGEN 01/29/2026 1/29/2025    Aspirin/Antiplatelet Therapy 02/13/2026 2/13/2025    Colonoscopy 06/23/2026 6/23/2021            Advised patient on the importance of completing overdue health maintenance items    Follow Up:  Follow up in about 2 weeks (around 2/27/2025), or if symptoms worsen or fail to improve.    Exam     Review of Systems:  (as noted above)  Review of Systems   Constitutional:  Negative for activity change and unexpected weight change.   HENT:  Negative for hearing loss, rhinorrhea and trouble swallowing.    Eyes:  Positive for pain and redness.   Respiratory:   Negative for chest tightness and wheezing.    Cardiovascular:  Negative for chest pain and palpitations.   Gastrointestinal:  Negative for blood in stool, constipation and diarrhea.   Endocrine: Negative for polydipsia and polyuria.   Genitourinary:  Negative for difficulty urinating, hematuria and urgency.   Musculoskeletal:  Negative for arthralgias, joint swelling and neck pain.   Neurological:  Negative for weakness and headaches.   Psychiatric/Behavioral:  Negative for confusion and dysphoric mood.        Physical Exam:   Physical Exam  Constitutional:       General: He is not in acute distress.     Appearance: He is not ill-appearing or diaphoretic.   HENT:      Head: Normocephalic and atraumatic.   Eyes:      General: No scleral icterus.        Left eye: Hordeolum present.    Cardiovascular:      Rate and Rhythm: Normal rate and regular rhythm.   Pulmonary:      Effort: No respiratory distress.   Chest:      Chest wall: No tenderness.   Neurological:      General: No focal deficit present.      Mental Status: He is alert and oriented to person, place, and time.       Vitals:    02/13/25 1039 02/13/25 1051   BP: (!) 146/80 132/75   BP Location: Left arm    Patient Position: Sitting    Pulse: 81    Temp: 97.9 °F (36.6 °C)    TempSrc: Oral    SpO2: (!) 94%    Weight: 88.6 kg (195 lb 5.2 oz)       Body mass index is 28.03 kg/m².        History     Past Medical History:  Past Medical History:   Diagnosis Date    Anticoagulant long-term use     plavix    Anxiety     Bladder cancer     BPH (benign prostatic hypertrophy) 02/02/2015    Cancer     Cataract     Coronary artery disease 2005    stent x 1        Diabetes mellitus, type 2     History of colonic polyps 04/11/2017 2/13 --3 yrs    History of MI (myocardial infarction) 2005    Followed by cardiology, Dr. Acevedo (at [facility])    History of stroke 11/2005    Hyperlipidemia     Hypertension     Incomplete bladder emptying 02/02/2015     Myocardial infarction 2006    Overweight(278.02)     Personal history of kidney stones     Renal cyst 02/10/2015    Stroke 2005    loss of peripheral vision       Past Surgical History:  Past Surgical History:   Procedure Laterality Date    APPENDECTOMY      BACK SURGERY      BLADDER FULGURATION  5/11/2020    Procedure: FULGURATION, BLADDER;  Surgeon: Gelacio Sanchez MD;  Location: [facility];  Service: Urology;;    CATARACT EXTRACTION      od    CORONARY ANGIOPLASTY WITH STENT PLACEMENT  2000    CYSTOSCOPY  5/11/2020    Procedure: CYSTOSCOPY;  Surgeon: Gelacio Sanchez MD;  Location: [facility];  Service: Urology;;    CYSTOSCOPY W/ URETERAL STENT PLACEMENT      stents placed x2    DILATION OF URETHRA N/A 11/12/2020    Procedure: DILATION, URETHRA;  Surgeon: Gelacio Sanchez MD;  Location: [facility];  Service: Urology;  Laterality: N/A;    EYE SURGERY      cataract right eye    REMOVAL OF BLOOD CLOT  5/11/2020    Procedure: REMOVAL, BLOOD CLOT;  Surgeon: Gelacio Sanchez MD;  Location: [facility];  Service: Urology;;    SPINE SURGERY      TONSILLECTOMY      TURBT, WITH BLUE LIGHT CYSTOSCOPY AND CYSVIEW N/A 7/9/2020    Procedure: TURBT,WITH BLUE LIGHT CYSTOSCOPY AND CYSVIEW;  Surgeon: Gelacio Sanchez MD;  Location: [facility];  Service: Urology;  Laterality: N/A;    TURBT, WITH BLUE LIGHT CYSTOSCOPY AND CYSVIEW Bilateral 11/12/2020    Procedure: TURBT,WITH BLUE LIGHT CYSTOSCOPY AND CYSVIEW; bilateral retrograde pyelogram;  Surgeon: Gelacio Sanchez MD;  Location: [facility];  Service: Urology;  Laterality: Bilateral;       Social History:  Social History     Socioeconomic History    Marital status:     Number of children: 2   Occupational History    Occupation: Retired - chemical industry   Tobacco Use    Smoking status: Never    Smokeless tobacco: Never   Substance and Sexual Activity    Alcohol use: Yes     Comment: occasional    Drug use: No    Sexual activity: Yes     Partners: Female     Social Drivers of Health      Financial Resource Strain: Low Risk  (6/27/2024)    Overall Financial Resource Strain (CARDIA)     Difficulty of Paying Living Expenses: Not hard at all   Food Insecurity: No Food Insecurity (6/27/2024)    Hunger Vital Sign     Worried About Running Out of Food in the Last Year: Never true     Ran Out of Food in the Last Year: Never true   Transportation Needs: No Transportation Needs (6/27/2024)    PRAPARE - Transportation     Lack of Transportation (Medical): No     Lack of Transportation (Non-Medical): No   Physical Activity: Sufficiently Active (6/27/2024)    Exercise Vital Sign     Days of Exercise per Week: 4 days     Minutes of Exercise per Session: 120 min   Stress: No Stress Concern Present (6/27/2024)    [employer] [place] [employer] - Occupational Stress Questionnaire     Feeling of Stress : Not at all   Housing Stability: Low Risk  (8/17/2023)    Housing Stability Vital Sign     Unable to Pay for Housing in the Last Year: No     Number of Places Lived in the Last Year: 1     Unstable Housing in the Last Year: No       Family History:  Family History   Problem Relation Name Age of Onset    Cataracts Mother      Hyperlipidemia Father      Cancer Father      Cataracts Father      Hyperlipidemia Sister      No Known Problems Sister      No Known Problems Brother      No Known Problems Maternal Aunt      No Known Problems Maternal Uncle      No Known Problems Paternal Aunt      No Known Problems Paternal Uncle      No Known Problems Maternal Grandmother      No Known Problems Maternal Grandfather      No Known Problems Paternal Grandmother      No Known Problems Paternal Grandfather      No Known Problems Son      No Known Problems Son      Alzheimer's disease Other      Diabetes Neg Hx      Amblyopia Neg Hx      Blindness Neg Hx      Glaucoma Neg Hx      Hypertension Neg Hx      Macular degeneration Neg Hx      Retinal detachment Neg Hx      Strabismus Neg Hx      Stroke Neg Hx      Thyroid  disease Neg Hx         Allergies and Medications: (updated and reviewed)  Review of patient's allergies indicates:   Allergen Reactions    Tramadol Nausea And Vomiting    Morphine Nausea And Vomiting     Can take Dilaudid, Demerol, Percocet & Norco.  This is side effect, not allergy.     Current Outpatient Medications   Medication Sig Dispense Refill    allopurinoL (ZYLOPRIM) 300 MG tablet Take 1 tablet (300 mg total) by mouth once daily. 90 tablet 3    aspirin (ECOTRIN) 81 MG EC tablet Take 81 mg by mouth once daily.      betamethasone valerate 0.1% (VALISONE) 0.1 % Crea       blood sugar diagnostic Strp 2 strips by Misc.(Non-Drug; Combo Route) route 2 (two) times a day. 2 each 12    clopidogreL (PLAVIX) 75 mg tablet TAKE 1 TABLET BY MOUTH EVERY DAY 90 tablet 3    ezetimibe (ZETIA) 10 mg tablet TAKE 1 TABLET BY MOUTH EVERY DAY 90 tablet 3    finasteride (PROSCAR) 5 mg tablet TAKE 1 TABLET BY MOUTH EVERY DAY 90 tablet 3    glimepiride (AMARYL) 4 MG tablet Take 1 tablet (4 mg total) by mouth once daily. 90 tablet 0    LORazepam (ATIVAN) 0.5 MG tablet Take 1 tablet (0.5 mg total) by mouth nightly as needed for Anxiety. 30 tablet 3    metFORMIN (GLUCOPHAGE-XR) 750 MG ER 24hr tablet TAKE 2 TABLETS (1,500 MG TOTAL) BY MOUTH EVERY MORNING. 60 tablet 0    metoprolol succinate (TOPROL-XL) 25 MG 24 hr tablet TAKE 1 TABLET BY MOUTH EVERY DAY 90 tablet 3    rosuvastatin (CRESTOR) 40 MG Tab TAKE 1 TABLET BY MOUTH EVERY DAY 90 tablet 3    valsartan (DIOVAN) 320 MG tablet Take 1 tablet (320 mg total) by mouth once daily. 90 tablet 3    erythromycin (ROMYCIN) ophthalmic ointment Place into the left eye every evening. 3.5 g 0    FLUAD QUAD 2020-21,65Y UP,,PF, 60 mcg (15 mcg x 4)/0.5 mL Syrg ADMINISTER 0.5ML IN THE MUSCLE AS DIRECTED (Patient not taking: Reported on 2/13/2025)      levocetirizine (XYZAL) 5 MG tablet Take 1 tablet (5 mg total) by mouth every evening. for 14 days (Patient not taking: Reported on 6/13/2024) 14 tablet 0      Current Facility-Administered Medications   Medication Dose Route Frequency Provider Last Rate Last Admin    acetaminophen tablet 650 mg  650 mg Oral Once PRN Renato Holder MD        albuterol inhaler 2 puff  2 puff Inhalation Q20 Min PRN Renato Holder MD        ondansetron disintegrating tablet 4 mg  4 mg Oral Once PRN Renato Holder MD           Patient Care Team:  Cedric Holder MD as PCP - General (Internal Medicine)  Mushtaq Barbour MD as Consulting Physician (Gastroenterology)  Ruben Rey MD as Consulting Physician (Cardiology)  Alea Sanchez NP as Nurse Practitioner (Urology)         - The patient is given an After Visit Summary that lists all medications with directions, allergies, education, orders placed during this encounter and follow-up instructions.      - I have reviewed the patient's medical information including past medical, family, and social history sections including the medications and allergies.      - We discussed the patient's current medications.     This note was created by combination of typed  and MModal dictation.  Transcription errors may be present.  If there are any questions, please contact me.

## 2025-02-17 NOTE — PROGRESS NOTES
Subjective:      Ruben Macedo Jr. is a 79 y.o. male who returns today regarding his     No complaints    No hematuria    On proscar  No LUTS.    The following portions of the patient's history were reviewed and updated as appropriate: allergies, current medications, past family history, past medical history, past social history, past surgical history and problem list.    Review of Systems  Pertinent items are noted in HPI.  A comprehensive multipoint review of systems was negative except as otherwise stated in the HPI.    Past Medical History:   Diagnosis Date    Anticoagulant long-term use     plavix    Anxiety     Bladder cancer     BPH (benign prostatic hypertrophy) 02/02/2015    Cancer     Cataract     Coronary artery disease 2005    stent x 1        Diabetes mellitus, type 2     History of colonic polyps 04/11/2017 2/13 --3 yrs    History of MI (myocardial infarction) 2005    Followed by cardiology, Dr. Acevedo (at [facility])    History of stroke 11/2005    Hyperlipidemia     Hypertension     Incomplete bladder emptying 02/02/2015    Myocardial infarction 2006    Overweight(278.02)     Personal history of kidney stones     Renal cyst 02/10/2015    Stroke 2005    loss of peripheral vision     Past Surgical History:   Procedure Laterality Date    APPENDECTOMY      BACK SURGERY      BLADDER FULGURATION  5/11/2020    Procedure: FULGURATION, BLADDER;  Surgeon: Gelacio Sanchez MD;  Location: [facility];  Service: Urology;;    CATARACT EXTRACTION      od    CORONARY ANGIOPLASTY WITH STENT PLACEMENT  2000    CYSTOSCOPY  5/11/2020    Procedure: CYSTOSCOPY;  Surgeon: Gelacio Sanchez MD;  Location: [facility];  Service: Urology;;    CYSTOSCOPY W/ URETERAL STENT PLACEMENT      stents placed x2    DILATION OF URETHRA N/A 11/12/2020    Procedure: DILATION, URETHRA;  Surgeon: Gelacio Sanchez MD;  Location: [facility];  Service: Urology;  Laterality: N/A;    EYE SURGERY      cataract right eye    REMOVAL OF  BLOOD CLOT  5/11/2020    Procedure: REMOVAL, BLOOD CLOT;  Surgeon: Gelacio Sanchez MD;  Location: [facility];  Service: Urology;;    SPINE SURGERY      TONSILLECTOMY      TURBT, WITH BLUE LIGHT CYSTOSCOPY AND CYSVIEW N/A 7/9/2020    Procedure: TURBT,WITH BLUE LIGHT CYSTOSCOPY AND CYSVIEW;  Surgeon: Gelacio Sanchez MD;  Location: [facility];  Service: Urology;  Laterality: N/A;    TURBT, WITH BLUE LIGHT CYSTOSCOPY AND CYSVIEW Bilateral 11/12/2020    Procedure: TURBT,WITH BLUE LIGHT CYSTOSCOPY AND CYSVIEW; bilateral retrograde pyelogram;  Surgeon: Gelacio Sanchez MD;  Location: [facility];  Service: Urology;  Laterality: Bilateral;     Diabetes Medications              glimepiride (AMARYL) 4 MG tablet Take 1 tablet (4 mg total) by mouth once daily.    metFORMIN (GLUCOPHAGE-XR) 750 MG ER 24hr tablet TAKE 2 TABLETS (1,500 MG TOTAL) BY MOUTH EVERY MORNING.          Review of patient's allergies indicates:   Allergen Reactions    Tramadol Nausea And Vomiting    Morphine Nausea And Vomiting     Can take Dilaudid, Demerol, Percocet & Norco.  This is side effect, not allergy.          Objective:   Vitals: There were no vitals taken for this visit.    Physical Exam   General: alert and oriented, no acute distress  Respiratory: Symmetric expansion, non-labored breathing  Cardiovascular: no peripheral edema  Abdomen: non distended  Skin: normal coloration and turgor, no rashes, no suspicious skin lesions noted  Neuro: no gross deficits  Psych: normal judgment and insight, normal mood/affect, and non-anxious  NADEEN 35g    Physical Exam    Lab Review   Urinalysis demonstrates negative for all components  Lab Results   Component Value Date    WBC 5.46 01/29/2025    HGB 11.8 (L) 01/29/2025    HCT 36.5 (L) 01/29/2025    MCV 94 01/29/2025     01/29/2025     Lab Results   Component Value Date    CREATININE 1.2 01/29/2025    BUN 19 01/29/2025     Lab Results   Component Value Date    PSA 0.67 12/14/2015    PSA 1.86 06/18/2013    PSADIAG  0.30 01/29/2025    PSADIAG 0.31 02/13/2023    PSADIAG 0.43 04/14/2022         Final Pathologic Diagnosis Negative for high grade urothelial carcinoma.  Reactive urothelial cells   Comment: Interp By Janie Partida D.O., Signed on 10/22/2024 at 13:09       Imaging  XR CHEST PA AND LATERAL     CLINICAL HISTORY:  Cough, unspecified     TECHNIQUE:  PA and lateral views of the chest were performed.     COMPARISON:  01/14/2022     FINDINGS:  The cardiomediastinal silhouette is not enlarged noting calcification of the aorta..  There is no pleural effusion.  The trachea is midline.  The lungs are symmetrically expanded bilaterally with coarse interstitial attenuation.  No large focal consolidation seen.  There is no pneumothorax.  The osseous structures are remarkable for degenerative change..     Impression:     1. Interstitial findings could reflect edema versus chronic change.  No large focal consolidation.        Electronically signed by:Fabio Staley MD  Date:                                            02/18/2024  Time:                                           13:19    Assessment and Plan:   Urothelial carcinoma  NMIBC CORBIN#5 years     UA today  If UA neg, no further cysto needed      Enlarged prostate  Cont proscar    OAB  Avoid pm fluids  Avoid caffeine and alcohol  Timed voiding      RTC 1 yr for UA  No need for PSA

## 2025-02-18 ENCOUNTER — OFFICE VISIT (OUTPATIENT)
Dept: UROLOGY | Facility: CLINIC | Age: 80
End: 2025-02-18
Payer: MEDICARE

## 2025-02-18 ENCOUNTER — TELEPHONE (OUTPATIENT)
Dept: UROLOGY | Facility: CLINIC | Age: 80
End: 2025-02-18

## 2025-02-18 VITALS
DIASTOLIC BLOOD PRESSURE: 78 MMHG | HEIGHT: 70 IN | SYSTOLIC BLOOD PRESSURE: 132 MMHG | HEART RATE: 73 BPM | WEIGHT: 195.31 LBS | BODY MASS INDEX: 27.96 KG/M2 | OXYGEN SATURATION: 98 %

## 2025-02-18 DIAGNOSIS — C68.9 UROTHELIAL CARCINOMA: Primary | ICD-10-CM

## 2025-02-18 LAB
BILIRUB SERPL-MCNC: NORMAL MG/DL
BLOOD URINE, POC: NORMAL
CLARITY, POC UA: CLEAR
COLOR, POC UA: NORMAL
GLUCOSE UR QL STRIP: 1000
KETONES UR QL STRIP: NORMAL
LEUKOCYTE ESTERASE URINE, POC: NORMAL
NITRITE, POC UA: NORMAL
PH, POC UA: 5
PROTEIN, POC: NORMAL
SPECIFIC GRAVITY, POC UA: 1.02
UROBILINOGEN, POC UA: NORMAL

## 2025-02-18 NOTE — TELEPHONE ENCOUNTER
----- Message from Gelacio Sanchez MD sent at 2/18/2025  1:53 PM CST -----  Cytology in 1 yr then see Dr Sanchez for UA  thanks

## 2025-02-25 DIAGNOSIS — I10 PRIMARY HYPERTENSION: ICD-10-CM

## 2025-02-25 RX ORDER — METOPROLOL SUCCINATE 25 MG/1
25 TABLET, EXTENDED RELEASE ORAL
Qty: 90 TABLET | Refills: 3 | Status: SHIPPED | OUTPATIENT
Start: 2025-02-25

## 2025-04-06 ENCOUNTER — RESULTS FOLLOW-UP (OUTPATIENT)
Dept: FAMILY MEDICINE | Facility: CLINIC | Age: 80
End: 2025-04-06

## 2025-04-07 RX ORDER — EZETIMIBE 10 MG/1
10 TABLET ORAL
Qty: 90 TABLET | Refills: 3 | Status: SHIPPED | OUTPATIENT
Start: 2025-04-07

## 2025-04-09 RX ORDER — VALSARTAN 320 MG/1
320 TABLET ORAL
Qty: 90 TABLET | Refills: 3 | Status: SHIPPED | OUTPATIENT
Start: 2025-04-09

## 2025-05-08 ENCOUNTER — TELEPHONE (OUTPATIENT)
Dept: FAMILY MEDICINE | Facility: CLINIC | Age: 80
End: 2025-05-08
Payer: MEDICARE

## 2025-05-08 NOTE — TELEPHONE ENCOUNTER
I confirmed with the patient's spouse, Bell, his upcoming appointment on 05/09/25 with Dr. Holder.

## 2025-05-09 ENCOUNTER — LAB VISIT (OUTPATIENT)
Dept: LAB | Facility: HOSPITAL | Age: 80
End: 2025-05-09
Attending: INTERNAL MEDICINE
Payer: MEDICARE

## 2025-05-09 ENCOUNTER — OFFICE VISIT (OUTPATIENT)
Dept: FAMILY MEDICINE | Facility: CLINIC | Age: 80
End: 2025-05-09
Payer: MEDICARE

## 2025-05-09 VITALS
BODY MASS INDEX: 27.87 KG/M2 | DIASTOLIC BLOOD PRESSURE: 72 MMHG | TEMPERATURE: 98 F | WEIGHT: 194.69 LBS | HEART RATE: 77 BPM | HEIGHT: 70 IN | OXYGEN SATURATION: 97 % | SYSTOLIC BLOOD PRESSURE: 138 MMHG

## 2025-05-09 DIAGNOSIS — E11.59 TYPE 2 DIABETES MELLITUS WITH CARDIAC COMPLICATION: ICD-10-CM

## 2025-05-09 DIAGNOSIS — R80.9 MICROALBUMINURIA: ICD-10-CM

## 2025-05-09 DIAGNOSIS — Z00.00 ROUTINE MEDICAL EXAM: ICD-10-CM

## 2025-05-09 DIAGNOSIS — F41.9 ANXIETY DISORDER, UNSPECIFIED TYPE: ICD-10-CM

## 2025-05-09 DIAGNOSIS — Z91.199 POOR COMPLIANCE: ICD-10-CM

## 2025-05-09 DIAGNOSIS — C67.9 MALIGNANT NEOPLASM OF URINARY BLADDER, UNSPECIFIED SITE: ICD-10-CM

## 2025-05-09 DIAGNOSIS — R80.9 DIABETES MELLITUS WITH PROTEINURIA: ICD-10-CM

## 2025-05-09 DIAGNOSIS — Z12.5 SCREENING FOR PROSTATE CANCER: ICD-10-CM

## 2025-05-09 DIAGNOSIS — D64.9 ANEMIA, UNSPECIFIED TYPE: ICD-10-CM

## 2025-05-09 DIAGNOSIS — E11.65 TYPE 2 DIABETES MELLITUS WITH HYPERGLYCEMIA, WITHOUT LONG-TERM CURRENT USE OF INSULIN: ICD-10-CM

## 2025-05-09 DIAGNOSIS — I10 ESSENTIAL HYPERTENSION: ICD-10-CM

## 2025-05-09 DIAGNOSIS — F39 MOOD DISORDER: ICD-10-CM

## 2025-05-09 DIAGNOSIS — F13.20 BENZODIAZEPINE DEPENDENCE: ICD-10-CM

## 2025-05-09 DIAGNOSIS — E78.5 HYPERLIPIDEMIA, UNSPECIFIED HYPERLIPIDEMIA TYPE: ICD-10-CM

## 2025-05-09 DIAGNOSIS — Z00.00 ROUTINE MEDICAL EXAM: Primary | ICD-10-CM

## 2025-05-09 DIAGNOSIS — E11.29 DIABETES MELLITUS WITH PROTEINURIA: ICD-10-CM

## 2025-05-09 DIAGNOSIS — I25.10 CORONARY ARTERY DISEASE INVOLVING NATIVE CORONARY ARTERY OF NATIVE HEART WITHOUT ANGINA PECTORIS: ICD-10-CM

## 2025-05-09 DIAGNOSIS — D69.6 THROMBOCYTOPENIA: ICD-10-CM

## 2025-05-09 DIAGNOSIS — Z86.0100 HISTORY OF COLONIC POLYPS: ICD-10-CM

## 2025-05-09 PROBLEM — E11.9 TYPE 2 DIABETES MELLITUS WITHOUT COMPLICATION, WITHOUT LONG-TERM CURRENT USE OF INSULIN: Status: RESOLVED | Noted: 2020-11-12 | Resolved: 2025-05-09

## 2025-05-09 LAB
ABSOLUTE EOSINOPHIL (OHS): 0.15 K/UL
ABSOLUTE MONOCYTE (OHS): 0.6 K/UL (ref 0.3–1)
ABSOLUTE NEUTROPHIL COUNT (OHS): 3.52 K/UL (ref 1.8–7.7)
ALBUMIN SERPL BCP-MCNC: 4 G/DL (ref 3.5–5.2)
ALP SERPL-CCNC: 53 UNIT/L (ref 40–150)
ALT SERPL W/O P-5'-P-CCNC: 24 UNIT/L (ref 10–44)
ANION GAP (OHS): 8 MMOL/L (ref 8–16)
AST SERPL-CCNC: 25 UNIT/L (ref 11–45)
BASOPHILS # BLD AUTO: 0.03 K/UL
BASOPHILS NFR BLD AUTO: 0.5 %
BILIRUB SERPL-MCNC: 0.6 MG/DL (ref 0.1–1)
BUN SERPL-MCNC: 19 MG/DL (ref 8–23)
CALCIUM SERPL-MCNC: 9.5 MG/DL (ref 8.7–10.5)
CHLORIDE SERPL-SCNC: 105 MMOL/L (ref 95–110)
CO2 SERPL-SCNC: 23 MMOL/L (ref 23–29)
CREAT SERPL-MCNC: 1.2 MG/DL (ref 0.5–1.4)
EAG (OHS): 180 MG/DL (ref 68–131)
ERYTHROCYTE [DISTWIDTH] IN BLOOD BY AUTOMATED COUNT: 13.7 % (ref 11.5–14.5)
GFR SERPLBLD CREATININE-BSD FMLA CKD-EPI: >60 ML/MIN/1.73/M2
GLUCOSE SERPL-MCNC: 261 MG/DL (ref 70–110)
HBA1C MFR BLD: 7.9 % (ref 4–5.6)
HCT VFR BLD AUTO: 35.6 % (ref 40–54)
HGB BLD-MCNC: 11.2 GM/DL (ref 14–18)
IMM GRANULOCYTES # BLD AUTO: 0.01 K/UL (ref 0–0.04)
IMM GRANULOCYTES NFR BLD AUTO: 0.2 % (ref 0–0.5)
LYMPHOCYTES # BLD AUTO: 1.16 K/UL (ref 1–4.8)
MCH RBC QN AUTO: 29.7 PG (ref 27–31)
MCHC RBC AUTO-ENTMCNC: 31.5 G/DL (ref 32–36)
MCV RBC AUTO: 94 FL (ref 82–98)
NUCLEATED RBC (/100WBC) (OHS): 0 /100 WBC
PLATELET # BLD AUTO: 161 K/UL (ref 150–450)
PMV BLD AUTO: 11.1 FL (ref 9.2–12.9)
POTASSIUM SERPL-SCNC: 4.8 MMOL/L (ref 3.5–5.1)
PROT SERPL-MCNC: 7.4 GM/DL (ref 6–8.4)
RBC # BLD AUTO: 3.77 M/UL (ref 4.6–6.2)
RELATIVE EOSINOPHIL (OHS): 2.7 %
RELATIVE LYMPHOCYTE (OHS): 21.2 % (ref 18–48)
RELATIVE MONOCYTE (OHS): 11 % (ref 4–15)
RELATIVE NEUTROPHIL (OHS): 64.4 % (ref 38–73)
SODIUM SERPL-SCNC: 136 MMOL/L (ref 136–145)
WBC # BLD AUTO: 5.47 K/UL (ref 3.9–12.7)

## 2025-05-09 PROCEDURE — 99999 PR PBB SHADOW E&M-EST. PATIENT-LVL IV: CPT | Mod: PBBFAC,,, | Performed by: INTERNAL MEDICINE

## 2025-05-09 PROCEDURE — 85025 COMPLETE CBC W/AUTO DIFF WBC: CPT

## 2025-05-09 PROCEDURE — 36415 COLL VENOUS BLD VENIPUNCTURE: CPT | Mod: PO

## 2025-05-09 PROCEDURE — 83036 HEMOGLOBIN GLYCOSYLATED A1C: CPT

## 2025-05-09 PROCEDURE — 80053 COMPREHEN METABOLIC PANEL: CPT

## 2025-05-09 NOTE — PROGRESS NOTES
Chief complaint:  Physical exam -last seen 6/24    79-year-old white male .   his wife is in the medical field in some fashion.   PSA 1/25.  Up-to-date on colonoscopy- 2026.  Trying to lose some weight same              ROS:   CONST: weight stable. EYES: no vision change. ENT: no sore throat. CV: no chest pain w/ exertion. RESP: no shortness of breath. GI: no nausea, vomiting, diarrhea. No dysphagia. : no urinary issues. MUSCULOSKELETAL: no new myalgias or arthralgias other than some chronic left neck pain with movement. SKIN: no new changes. NEURO: no focal deficits. PSYCH: no new issues. ENDOCRINE: no polyuria. HEME: no lymph nodes. ALLERGY: no general pruritis.    Past Medical History:   Diagnosis Date    Anxiety     BPH (benign prostatic hypertrophy) 2/2/2015    Cataract     Coronary artery disease     Diabetes mellitus, type 2 - with PROTIENURIA     History of colonic polyps 4/11/2017 2/13 -- polyps 6/16, normal 6/2021 -5 years     History of MI (myocardial infarction) 2005    Followed by cardiology, Dr. Acevedo (at [facility])     History of stroke November 2005    Hyperlipidemia     Hypertension     Incomplete bladder emptying 2/2/2015    Overweight     Personal history of kidney stones     Renal cyst 2/10/2015   History of cervical radiculopathy on the right  Cervical arthropathy on x-ray  Anemia- chronic  bladder cancer.  He is receiving BCG treatment.            Past Surgical History:   Procedure Laterality Date    APPENDECTOMY      BACK SURGERY      BLADDER FULGURATION  5/11/2020    Procedure: FULGURATION, BLADDER;  Surgeon: Gelacio Sanchez MD;  Location: [facility];  Service: Urology;;    CATARACT EXTRACTION      od    CORONARY ANGIOPLASTY WITH STENT PLACEMENT  2000    CYSTOSCOPY  5/11/2020    Procedure: CYSTOSCOPY;  Surgeon: Gelacio Sanchez MD;  Location: [facility] OR;  Service: Urology;;    CYSTOSCOPY W/ URETERAL STENT PLACEMENT      stents placed x2    DILATION OF URETHRA N/A 11/12/2020     Procedure: DILATION, URETHRA;  Surgeon: Gelacio Sanchez MD;  Location: [facility];  Service: Urology;  Laterality: N/A;    EYE SURGERY      cataract right eye    REMOVAL OF BLOOD CLOT  5/11/2020    Procedure: REMOVAL, BLOOD CLOT;  Surgeon: Gelacio Sanchez MD;  Location: [facility];  Service: Urology;;    SPINE SURGERY      TONSILLECTOMY      TURBT, WITH BLUE LIGHT CYSTOSCOPY AND CYSVIEW N/A 7/9/2020    Procedure: TURBT,WITH BLUE LIGHT CYSTOSCOPY AND CYSVIEW;  Surgeon: Gelacio Sanchez MD;  Location: [facility];  Service: Urology;  Laterality: N/A;    TURBT, WITH BLUE LIGHT CYSTOSCOPY AND CYSVIEW Bilateral 11/12/2020    Procedure: TURBT,WITH BLUE LIGHT CYSTOSCOPY AND CYSVIEW; bilateral retrograde pyelogram;  Surgeon: Gelacio Sanchez MD;  Location: [facility];  Service: Urology;  Laterality: Bilateral;     Social History     Socioeconomic History    Marital status:     Number of children: 2   Occupational History    Occupation: Retired - chemical industry   Tobacco Use    Smoking status: Never    Smokeless tobacco: Never   Substance and Sexual Activity    Alcohol use: Yes     Comment: occasional    Drug use: No    Sexual activity: Yes     Partners: Female     Social Drivers of Health     Financial Resource Strain: Low Risk  (6/27/2024)    Overall Financial Resource Strain (CARDIA)     Difficulty of Paying Living Expenses: Not hard at all   Food Insecurity: No Food Insecurity (6/27/2024)    Hunger Vital Sign     Worried About Running Out of Food in the Last Year: Never true     Ran Out of Food in the Last Year: Never true   Transportation Needs: No Transportation Needs (6/27/2024)    PRAPARE - Transportation     Lack of Transportation (Medical): No     Lack of Transportation (Non-Medical): No   Physical Activity: Sufficiently Active (6/27/2024)    Exercise Vital Sign     Days of Exercise per Week: 4 days     Minutes of Exercise per Session: 120 min   Stress: No Stress Concern Present (6/27/2024)    [employer] - Occupational Stress Questionnaire     Feeling of Stress : Not at all   Housing Stability: Low Risk  (8/17/2023)    Housing Stability Vital Sign     Unable to Pay for Housing in the Last Year: No     Number of Places Lived in the Last Year: 1     Unstable Housing in the Last Year: No       family history includes Alzheimer's disease in an other family member; Cancer in his father; Cataracts in his father and mother; Hyperlipidemia in his father and sister; No Known Problems in his brother, maternal aunt, maternal grandfather, maternal grandmother, maternal uncle, paternal aunt, paternal grandfather, paternal grandmother, paternal uncle, sister, son, and son.     Gen: no distress  EYES: conjunctiva clear, non-icteric, PERRL  ENT: nose clear, nasal mucosa normal, oropharynx clear and moist, teeth good  NECK:supple, thyroid non-palpable  RESP: effort is good, lungs clear  CV: heart RRR w/o murmur, gallops or rubs; no carotid bruits, no edema  GI: abdomen soft, non-distended, non-tender, no hepatosplenomegaly  MS: gait normal, no clubbing or cyanosis of the digits, .  SKIN: no rashes, warm to touch    Ruben was seen today for annual exam.    Diagnoses and all orders for this visit:    Routine medical exam, due for blood work  -         Screening for prostate cancer  -     Prostate Specific Antigen, Diagnostic; 1/25    History of colonic polyps, repeat 2026                                                          Additional significant and separate evaluation and management issues:      Additionally patient has numerous other medical issues to address completely separate from those of a preventative visit and medically necessary that we address them today.  Here again with his wife.  We discussed and reviewed that his diabetes has been uncontrolled for quite some time.  We discussed Ozempic at length but wife is hesitant.  We discussed cardiovascular and renal benefits and he would be a perfect  candidate for it.  We discussed cardiac benefits of Jardiance and they are willing to do that.  He is circumcised but discussed increased risk of urination renal insufficiency and yeast infections.  He will increase fluid intake.  Will try and give him to follow up in three months and he also really needs to improve his diet which is the cornerstone of the problem.  Might consider Ozempic in the future.      Blood pressure also sometimes 120 sometimes 130s we discussed trying to keep it below the 130s.  He is on valsartan full-dose and metoprolol.  If indeed allow the readings are in the high 130s he might need an additional medicine.        Patient cut back on sugars and his weight is down.  His A1c improved to 7.2 with the help of metformin and Januvia but then 8.3, 9.1 in Jan 2025.  He is taking his metformin 750 twice a day.  He had been off the Januvia for two weeks since the co-pay went up likely indicating he is in the coverage gap.  At prior visit we started Amaryl a and discuss the potential for hypoglycemia and how to adjust.  He is here with his wife who will also assist in management.  At prior visit we started 2 mg twice a day and make adjustments accordingly..  They will monitor sugars and if indeed sugars rise in general we might need to make an interval adjustment and increase in the Amaryl.  Apparently not monitoring that great.  We discussed that his A1c is worse so we  increased Amaryl to 4 mg a day with a discussion of the potential for low sugars.  He will take it in the morning and he always eats a breakfast.  He does need to improve his diet however or additional diabetes medication will be needed at higher cost    Labs reviewed and discussed..  We discussed calorie intake in the need to continue to lose more weight.    Will set up all labs for three months from now but not done ??        he was diagnosed with bladder cancer.  He is receiving BCG treatment.      .seen Dr Yony teran 6/2021  -5 years .     We also reviewed his labs.  His A1c went from 7.9 down to 7.3 , 7.7 , 8.6, 8.9, 8.2 , 9.2, 8.5, 7.2, 8.3, 9.1    Again explained to his wife about A1c every three months and typically people get an A1c and follow-up every three months.  They seem to again not be aware of that schedule but will commit to that.    Evaluation and management of all the separate issues will be based on medical decision making as below         Assessment and plan:        Type 2 diabetes mellitus with hyperglycemia, without long-term current use of insulin, chronically uncontrolled, needs better follow-up, better compliance with diet.  Add Jardiance 10 and then increase to 25    Type 2 diabetes mellitus with cardiac complication  -     CBC Auto Differential; Future  -     Comprehensive Metabolic Panel; Future  -     Hemoglobin A1C; Future    Benzodiazepine dependence, chronic and stable    Mood disorder, chronic and stable    Essential hypertension, possibly uncontrolled and need more readings    Coronary artery disease involving native coronary artery of native heart without angina pectoris, chronic and stable and discuss getting risk factors under control    Hyperlipidemia, unspecified hyperlipidemia type, chronic and stable    Anxiety disorder, unspecified type, chronic and stable    Anemia, unspecified type, chronic and stable    Malignant neoplasm of urinary bladder, unspecified site, keep follow-up with Urology, apparently stable    Poor compliance    Microalbuminuria    Diabetes mellitus with proteinuria, try to get diabetes under control, on ARB    Thrombocytopenia, chronic and stable    Other orders  -     empagliflozin (JARDIANCE) 10 mg tablet; Take 1 tablet (10 mg total) by mouth once daily.

## 2025-06-03 ENCOUNTER — PATIENT MESSAGE (OUTPATIENT)
Dept: ADMINISTRATIVE | Facility: HOSPITAL | Age: 80
End: 2025-06-03
Payer: MEDICARE

## 2025-06-03 DIAGNOSIS — N20.0 URIC ACID KIDNEY STONE: ICD-10-CM

## 2025-06-03 DIAGNOSIS — F41.9 ANXIETY: ICD-10-CM

## 2025-06-03 NOTE — TELEPHONE ENCOUNTER
No care due was identified.  [facility] Embedded Care Due Messages. Reference number: [id number].   6/03/2025 6:01:58 PM CDT

## 2025-06-05 ENCOUNTER — PATIENT OUTREACH (OUTPATIENT)
Dept: ADMINISTRATIVE | Facility: HOSPITAL | Age: 80
End: 2025-06-05
Payer: MEDICARE

## 2025-06-05 DIAGNOSIS — E11.9 TYPE 2 DIABETES MELLITUS WITHOUT COMPLICATION, WITHOUT LONG-TERM CURRENT USE OF INSULIN: Primary | ICD-10-CM

## 2025-06-10 RX ORDER — ALLOPURINOL 300 MG/1
300 TABLET ORAL DAILY
Qty: 90 TABLET | Refills: 3 | Status: SHIPPED | OUTPATIENT
Start: 2025-06-10

## 2025-06-10 RX ORDER — LORAZEPAM 0.5 MG/1
0.5 TABLET ORAL NIGHTLY PRN
Qty: 90 TABLET | Refills: 4 | Status: SHIPPED | OUTPATIENT
Start: 2025-06-10

## 2025-06-23 DIAGNOSIS — Z00.00 ENCOUNTER FOR MEDICARE ANNUAL WELLNESS EXAM: ICD-10-CM

## 2025-08-01 ENCOUNTER — TELEPHONE (OUTPATIENT)
Dept: FAMILY MEDICINE | Facility: CLINIC | Age: 80
End: 2025-08-01
Payer: MEDICARE

## 2025-08-01 RX ORDER — GLIMEPIRIDE 4 MG/1
4 TABLET ORAL
Qty: 90 TABLET | Refills: 0 | Status: SHIPPED | OUTPATIENT
Start: 2025-08-01

## 2025-08-01 NOTE — TELEPHONE ENCOUNTER
Care Due:                  Date            Visit Type   Department     Provider  --------------------------------------------------------------------------------                                MYCHART                              ANNUAL       [facility]                              CHECKUP/PHY  / INTERNAL MED Cedric Stark  Last Visit: 05-      S            / PEDS         [place]  Next Visit: None Scheduled  None         None Found                                                            Last  Test          Frequency    Reason                     Performed    Due Date  --------------------------------------------------------------------------------    Uric Acid...  12 months..  allopurinoL..............  Not Found    Overdue    [employer] Embedded Care Due Messages. Reference number: [id number].   8/01/2025 9:57:23 AM CDT

## 2025-08-01 NOTE — TELEPHONE ENCOUNTER
Copied from CRM #[id number]. Topic: Medications - Pharmacy  >> Aug 1, 2025  9:49 AM Deisy wrote:  .Type:  Pharmacy Calling to Clarify an RX    Name of Caller: Guilherme     Pharmacy Name: [facility]     Prescription Name:  Disp Refills Start End   glimepiride (AMARYL) 4 MG tablet             What do they need to clarify? Asked if the office received a fax to fill medication     Can you be contacted via [facility]? Call     Best Call Back Number:  [phone]    Additional Information:

## 2025-08-04 ENCOUNTER — APPOINTMENT (OUTPATIENT)
Dept: LAB | Facility: HOSPITAL | Age: 80
End: 2025-08-04
Attending: INTERNAL MEDICINE
Payer: MEDICARE

## 2025-08-04 ENCOUNTER — PATIENT OUTREACH (OUTPATIENT)
Dept: ADMINISTRATIVE | Facility: HOSPITAL | Age: 80
End: 2025-08-04
Payer: MEDICARE

## 2025-08-07 ENCOUNTER — OFFICE VISIT (OUTPATIENT)
Dept: FAMILY MEDICINE | Facility: CLINIC | Age: 80
End: 2025-08-07
Payer: MEDICARE

## 2025-08-07 VITALS
HEART RATE: 72 BPM | TEMPERATURE: 98 F | BODY MASS INDEX: 27.65 KG/M2 | HEIGHT: 70 IN | OXYGEN SATURATION: 97 % | WEIGHT: 193.13 LBS | DIASTOLIC BLOOD PRESSURE: 78 MMHG | SYSTOLIC BLOOD PRESSURE: 118 MMHG

## 2025-08-07 DIAGNOSIS — F13.20 BENZODIAZEPINE DEPENDENCE: ICD-10-CM

## 2025-08-07 DIAGNOSIS — I10 ESSENTIAL HYPERTENSION: ICD-10-CM

## 2025-08-07 DIAGNOSIS — E11.29 DIABETES MELLITUS WITH PROTEINURIA: ICD-10-CM

## 2025-08-07 DIAGNOSIS — E11.65 TYPE 2 DIABETES MELLITUS WITH HYPERGLYCEMIA, WITHOUT LONG-TERM CURRENT USE OF INSULIN: ICD-10-CM

## 2025-08-07 DIAGNOSIS — C67.9 MALIGNANT NEOPLASM OF URINARY BLADDER, UNSPECIFIED SITE: ICD-10-CM

## 2025-08-07 DIAGNOSIS — C68.9 UROTHELIAL CARCINOMA: ICD-10-CM

## 2025-08-07 DIAGNOSIS — Z00.00 ENCOUNTER FOR MEDICARE ANNUAL WELLNESS EXAM: Primary | ICD-10-CM

## 2025-08-07 DIAGNOSIS — R80.9 DIABETES MELLITUS WITH PROTEINURIA: ICD-10-CM

## 2025-08-07 DIAGNOSIS — Z71.89 ADVANCED DIRECTIVES, COUNSELING/DISCUSSION: ICD-10-CM

## 2025-08-07 PROCEDURE — 99999 PR PBB SHADOW E&M-EST. PATIENT-LVL V: CPT | Mod: PBBFAC,,, | Performed by: NURSE PRACTITIONER

## 2025-08-07 NOTE — PATIENT INSTRUCTIONS
Counseling and Referral of Other Preventative  (Italic type indicates deductible and co-insurance are waived)    Patient Name: Ruben Macedo  Today's Date: 8/7/2025    Health Maintenance       Date Due Completion Date    TETANUS VACCINE Never done ---    RSV Vaccine (Age 60+ and Pregnant patients) (1 - 1-dose 75+ series) Never done ---    COVID-19 Vaccine (4 - 2024-25 season) 09/01/2024 11/19/2021    Influenza Vaccine (1) 09/01/2025 9/25/2024    Override on 10/11/2014: Done    Override on 9/1/2013: Done    Hemoglobin A1c 11/09/2025 5/9/2025    PROSTATE-SPECIFIC ANTIGEN 01/29/2026 1/29/2025    Aspirin/Antiplatelet Therapy 02/13/2026 2/13/2025    Diabetic Eye Exam 02/13/2026 2/13/2025    Override on 4/11/2017: Not Clinically Appropriate (dr butts in [place])    Override on 1/10/2014: Done (Dr.Riley TAMAR Apodaca)    Colonoscopy 06/23/2026 6/23/2021    Diabetes Urine Screening 08/04/2026 8/4/2025        Orders Placed This Encounter   Procedures    Diabetes Digital Medicine (DDMP) Enrollment Order    Hypertension Digital Medicine (HDMP) Enrollment Order       The following information is provided to all patients.  This information is to help you find resources for any of the problems found today that may be affecting your health:                  Living healthy guide: www.[facility].[place].gov      Understanding Diabetes: www.diabetes.org      Eating healthy: www.cdc.gov/healthyweight      CDC home safety checklist: www.cdc.gov/steadi/patient.html      Agency on Aging: www.goea.[place].[facility]      Alcoholics anonymous (AA): www.aa.org      Physical Activity: www.yaneli.nih.gov/xp6sejg      Tobacco use: www.quitwithusla.org

## 2025-08-29 RX ORDER — EZETIMIBE 10 MG/1
10 TABLET ORAL DAILY
Qty: 90 TABLET | Refills: 3 | Status: SHIPPED | OUTPATIENT
Start: 2025-08-29

## (undated) DEVICE — SYR 30CC LUER LOCK

## (undated) DEVICE — SET IRR URLGY 2LINE UNIV SPIKE

## (undated) DEVICE — Device

## (undated) DEVICE — LOOP CUTTING BPLR 24/26F .30MM

## (undated) DEVICE — SCRUB 10% POVIDONE IODINE 4OZ

## (undated) DEVICE — SET BASIN 48X48IN 6000ML RING

## (undated) DEVICE — SOL IRR NACL .9% 3000ML

## (undated) DEVICE — TUBING SUCTION SET FOR ENDOMAT

## (undated) DEVICE — TRAY DRY SKIN SCRUB PREP

## (undated) DEVICE — SOL PVP-I SCRUB 7.5% 4OZ

## (undated) DEVICE — SOL 9P NACL IRR PIC IL

## (undated) DEVICE — BAG URINARY DRN 2000ML

## (undated) DEVICE — URINARY DRAINAGE BAG

## (undated) DEVICE — EVACUATOR BLADDER UROVAC ADPT

## (undated) DEVICE — WIRE GUIDE 0.038OLD

## (undated) DEVICE — SEE MEDLINE ITEM 146420

## (undated) DEVICE — CATH HEMATURIA 3WAY 30CC 24FR

## (undated) DEVICE — GLOVE BIOGEL SKINSENSE PI 7.0

## (undated) DEVICE — LOOP CUT RESECTSCPE 30 DG 24F

## (undated) DEVICE — ELECTRODE RESECTSCP HI 24FR

## (undated) DEVICE — CATH CONE TIP